# Patient Record
Sex: FEMALE | Race: BLACK OR AFRICAN AMERICAN | Employment: OTHER | URBAN - METROPOLITAN AREA
[De-identification: names, ages, dates, MRNs, and addresses within clinical notes are randomized per-mention and may not be internally consistent; named-entity substitution may affect disease eponyms.]

---

## 2017-01-03 ENCOUNTER — DOCUMENTATION ONLY (OUTPATIENT)
Dept: INTERNAL MEDICINE CLINIC | Age: 67
End: 2017-01-03

## 2017-01-04 ENCOUNTER — OFFICE VISIT (OUTPATIENT)
Dept: INTERNAL MEDICINE CLINIC | Age: 67
End: 2017-01-04

## 2017-01-04 VITALS
SYSTOLIC BLOOD PRESSURE: 158 MMHG | TEMPERATURE: 98 F | WEIGHT: 202.2 LBS | HEART RATE: 64 BPM | DIASTOLIC BLOOD PRESSURE: 100 MMHG | RESPIRATION RATE: 16 BRPM | BODY MASS INDEX: 35.83 KG/M2 | HEIGHT: 63 IN | OXYGEN SATURATION: 100 %

## 2017-01-04 DIAGNOSIS — M79.10 MYALGIA: ICD-10-CM

## 2017-01-04 DIAGNOSIS — I10 ESSENTIAL HYPERTENSION: Primary | ICD-10-CM

## 2017-01-04 DIAGNOSIS — Z23 NEED FOR PNEUMOCOCCAL VACCINATION: ICD-10-CM

## 2017-01-04 DIAGNOSIS — Z12.31 ENCOUNTER FOR SCREENING MAMMOGRAM FOR MALIGNANT NEOPLASM OF BREAST: ICD-10-CM

## 2017-01-04 NOTE — PATIENT INSTRUCTIONS
It was a pleasure to see you! As discussed:    Elevated Blood pressure  - Your blood pressure was slightly high today. Since you did not take your medication this AM we will recheck when you have taken it.   -Please return in 24-48hrs for a nurse visit for a blood pressure check. -Bring your blood pressure monitor to your next appointment.   -Continue to follow a low salt/ low sodium diet (1500mg/ day)  -If your blood pressure is too low (<90/60) or too high (>180/100) or you have any symptoms such as chest pain, dizziness, shortness of breath- seek immediate medical attention. Goal   Good Control <150/90  Call office >160/100  Call office> 180/100   Go to ER> 200/110  Do not take any NSAIDS (Aleve, Ibuprofen, Motrin etc) or decongestants which can raise your blood pressure . Home Blood Pressure Test: About This Test  What is it? A home blood pressure test allows you to keep track of your blood pressure at home. Blood pressure is a measure of the force of blood against the walls of your arteries. Blood pressure readings include two numbers, such as 130/80 (say \"130 over 80\"). The first number is the systolic pressure. The second number is the diastolic pressure. Why is this test done? You may do this test at home to:  · Find out if you have high blood pressure. · Track your blood pressure if you have high blood pressure. · Track how well medicine is working to reduce high blood pressure. · Check how lifestyle changes, such as weight loss and exercise, are affecting blood pressure. How can you prepare for the test?  · Do not use caffeine, tobacco, or medicines known to raise blood pressure (such as nasal decongestant sprays) for at least 30 minutes before taking your blood pressure. · Do not exercise for at least 30 minutes before taking your blood pressure. What happens before the test?  Take your blood pressure while you feel comfortable and relaxed.  Sit quietly with both feet on the floor for at least 5 minutes before the test.  What happens during the test?  · Sit with your arm slightly bent and resting on a table so that your upper arm is at the same level as your heart. · Roll up your sleeve or take off your shirt to expose your upper arm. · Wrap the blood pressure cuff around your upper arm so that the lower edge of the cuff is about 1 inch above the bend of your elbow. Proceed with the following steps depending on if you are using an automatic or manual pressure monitor. Automatic blood pressure monitors  · Press the on/off button on the automatic monitor and wait until the ready-to-measure \"heart\" symbol appears next to zero in the display window. · Press the start button. The cuff will inflate and deflate by itself. · Your blood pressure numbers will appear on the screen. · Write your numbers in your log book, along with the date and time. Manual blood pressure monitors  · Place the earpieces of a stethoscope in your ears, and place the bell of the stethoscope over the artery, just below the cuff. · Close the valve on the rubber inflating bulb. · Squeeze the bulb rapidly with your opposite hand to inflate the cuff until the dial or column of mercury reads about 30 mm Hg higher than your usual systolic pressure. If you do not know your usual pressure, inflate the cuff to 210 mm Hg or until the pulse at your wrist disappears. · Open the pressure valve just slightly by twisting or pressing the valve on the bulb. · As you watch the pressure slowly fall, note the level on the dial at which you first start to hear a pulsing or tapping sound through the stethoscope. This is your systolic blood pressure. · Continue letting the air out slowly. The sounds will become muffled and will finally disappear. Note the pressure when the sounds completely disappear. This is your diastolic blood pressure. Let out all the remaining air.   · Write your numbers in your log book, along with the date and time.  What else should you know about the test?  Results for adults ages 25 and older (mm Hg):  · Normal (ideal): Systolic 289 or below. Diastolic 79 or below. · Prehypertension: Systolic 152 to 723. Diastolic 80 to 89. · Hypertension: Systolic 451 or above. Diastolic 90 or above. Follow-up care is a key part of your treatment and safety. Be sure to make and go to all appointments, and call your doctor if you are having problems. It's also a good idea to keep a list of the medicines you take. Where can you learn more? Go to http://sha-damien.info/. Enter C427 in the search box to learn more about \"Home Blood Pressure Test: About This Test.\"  Current as of: January 27, 2016  Content Version: 11.1  © 0435-2543 [employer]. Care instructions adapted under license by [employer] (which disclaims liability or warranty for this information). If you have questions about a medical condition or this instruction, always ask your healthcare professional. [address] any warranty or liability for your use of this information.

## 2017-01-04 NOTE — PROGRESS NOTES
HISTORY OF PRESENT ILLNESS  Sona Gardner is a 74 y.o. female. HPI  Cardiovascular Review:  The patient has hypertension, HLD, and obesity. Diet and Lifestyle: generally follows a low fat low cholesterol diet, generally follows a low sodium diet, exercises sporadically, nonsmoker has started a reduced carb. Home BP Monitoring: is not measured at home. Pertinent ROS: taking medications as instructed, no medication side effects noted, no TIA's, no chest pain on exertion, no dyspnea on exertion, no swelling of ankles. Myalgias   Has improved. Reviewed labs wnl. ROS per HPI  Patient Active Problem List    Diagnosis Date Noted    Mass of kidney of unknown nature 07/22/2016    Plantar fasciitis, bilateral 11/12/2014    Gout 10/07/2014    Hemorrhoid 07/30/2012    Rosacea 06/03/2011    GERD (gastroesophageal reflux disease) 02/04/2010    Essential hypertension, benign 01/28/2010    Anxiety 01/28/2010       Current Outpatient Prescriptions   Medication Sig Dispense Refill    ALPRAZolam (XANAX) 0.5 mg tablet TAKE 1 TABLET BY MOUTH 3 TIMES A DAY AS NEEDED 45 Tab 0    atorvastatin (LIPITOR) 20 mg tablet Take 1 Tab by mouth daily. 90 Tab 2    co-enzyme Q-10 (CO Q-10) 100 mg capsule Take 1 Cap by mouth daily. 30 Cap 3    lisinopril (PRINIVIL, ZESTRIL) 40 mg tablet TAKE 1 TABLET DAILY 90 Tab 1    ranitidine (ZANTAC) 300 mg tablet TAKE 1 TABLET DAILY 90 Tab 1    Sulfacetamide Sodium-Sulfur 9-4 % clsr by Apply Externally route as needed.  desonide (TRIDESILON) 0.05 % cream Apply  to affected area as needed for Skin Irritation.  ranitidine (ZANTAC) 300 mg tablet Take 300 mg by mouth daily.  allopurinol (ZYLOPRIM) 100 mg tablet TAKE 1 TABLET DAILY 90 Tab 2    fluticasone (FLONASE) 50 mcg/actuation nasal spray USE 2 SPRAYS BY BOTH NOSTRILS ROUTE DAILY. 16 g 2    BYSTOLIC 20 mg tablet TAKE 1 TABLET DAILY 90 Tab 2    metFORMIN ER (GLUCOPHAGE XR) 750 mg tablet Take 750 mg by mouth daily. Allergies   Allergen Reactions    Codeine Nausea and Vomiting    Minocycline Nausea Only      Visit Vitals    BP (!) 158/100 (BP 1 Location: Right arm, BP Patient Position: Sitting)    Pulse 64    Temp 98 °F (36.7 °C) (Oral)    Resp 16    Ht 5' 3\" (1.6 m)    Wt 202 lb 3.2 oz (91.7 kg)    SpO2 100%    BMI 35.82 kg/m2       Physical Exam   Constitutional: She is oriented to person, place, and time. She appears well-developed. No distress. Eyes: Conjunctivae are normal.   Neck: Neck supple. Cardiovascular: Normal rate, regular rhythm and normal heart sounds. Pulmonary/Chest: Effort normal and breath sounds normal. No respiratory distress. She has no wheezes. She has no rales. She exhibits no tenderness. Musculoskeletal: She exhibits no edema (BLE ). Neurological: She is alert and oriented to person, place, and time. Skin: Skin is warm. Psychiatric: She has a normal mood and affect. ASSESSMENT and PLAN  Wai Aquino was seen today for hypertension. Diagnoses and all orders for this visit:    Essential hypertension- BP elevated in the context of lapse of medication. No s/s of end organ damage. Stable for out pt management. Resume home regimen. Return in 24-48hrs for nurse visit for BP recheck. Bring home monitor for calibration. Red flags to warrant ER or earlier clinical evaluation reviewed. See AVS for full details of plan and patient discussion. Discussed the patient's above normal BMI with her. I have recommended the following interventions: dietary management education, guidance, and counseling . The BMI follow up plan is as follows: BMI is out of normal parameters and plan is as follows: I have counseled this patient on diet and exercise regimens        Myalgia- improving. Labs wnl. Check CBC and ESR during next acute flare. Symptomatic management for now.      Encounter for screening mammogram for malignant neoplasm of breast  -     [facility] BI SCREENING INCL CAD; Future    Need for pneumococcal vaccination  -     pneumococcal 13 edwin conj dip (PREVNAR-13) 0.5 mL syrg injection; 0.5 mL by IntraMUSCular route once for 1 dose. Other orders  -     Cancel: [facility] BI SCREENING INCL CAD; Future  -     Cancel: HEPATITIS C AB      Follow-up Disposition:  Return in about 3 months (around 4/4/2017) for Physical - 30 minute appointment, Medicare Wellness. Medication risks/benefits/costs/interactions/alternatives discussed with patient. Rivera Marx  was given an after visit summary which includes diagnoses, current medications, & vitals. she expressed understanding with the diagnosis and plan.

## 2017-01-05 ENCOUNTER — CLINICAL SUPPORT (OUTPATIENT)
Dept: INTERNAL MEDICINE CLINIC | Age: 67
End: 2017-01-05

## 2017-01-05 VITALS
HEART RATE: 70 BPM | BODY MASS INDEX: 35.79 KG/M2 | SYSTOLIC BLOOD PRESSURE: 178 MMHG | OXYGEN SATURATION: 99 % | TEMPERATURE: 98.1 F | RESPIRATION RATE: 16 BRPM | WEIGHT: 202 LBS | DIASTOLIC BLOOD PRESSURE: 100 MMHG | HEIGHT: 63 IN

## 2017-01-05 DIAGNOSIS — I10 ESSENTIAL HYPERTENSION: Primary | ICD-10-CM

## 2017-01-05 NOTE — PROGRESS NOTES
HISTORY OF PRESENT ILLNESS  Leopoldo Hills is a 74 y.o. female. HPI  Cardiovascular Review:  The patient has hypertension. Diet and Lifestyle: nonsmoker  Home BP Monitoring: is not measured at home. Pertinent ROS: taking medications as instructed, no medication side effects noted, no TIA's, no chest pain on exertion, no dyspnea on exertion, no swelling of ankles. High stress due to complications with pregnancy of grandchild- currently deciding if her daughter will be hospitalized (her daughter has tried 3 times to get pregnancy)   Review of Systems   Constitutional: Negative for diaphoresis, fever and weight loss. Eyes: Negative for blurred vision and pain. Respiratory: Negative for shortness of breath. Cardiovascular: Negative for chest pain, orthopnea and leg swelling. Neurological: Negative for focal weakness and headaches. Psychiatric/Behavioral: Negative for depression. Patient Active Problem List    Diagnosis Date Noted    Mass of kidney of unknown nature 07/22/2016    Plantar fasciitis, bilateral 11/12/2014    Gout 10/07/2014    Hemorrhoid 07/30/2012    Rosacea 06/03/2011    GERD (gastroesophageal reflux disease) 02/04/2010    Essential hypertension, benign 01/28/2010    Anxiety 01/28/2010       Current Outpatient Prescriptions   Medication Sig Dispense Refill    ALPRAZolam (XANAX) 0.5 mg tablet TAKE 1 TABLET BY MOUTH 3 TIMES A DAY AS NEEDED 45 Tab 0    atorvastatin (LIPITOR) 20 mg tablet Take 1 Tab by mouth daily. 90 Tab 2    co-enzyme Q-10 (CO Q-10) 100 mg capsule Take 1 Cap by mouth daily. 30 Cap 3    lisinopril (PRINIVIL, ZESTRIL) 40 mg tablet TAKE 1 TABLET DAILY 90 Tab 1    ranitidine (ZANTAC) 300 mg tablet TAKE 1 TABLET DAILY 90 Tab 1    Sulfacetamide Sodium-Sulfur 9-4 % clsr by Apply Externally route as needed.  desonide (TRIDESILON) 0.05 % cream Apply  to affected area as needed for Skin Irritation.       ranitidine (ZANTAC) 300 mg tablet Take 300 mg by mouth daily.      metFORMIN ER (GLUCOPHAGE XR) 750 mg tablet Take 750 mg by mouth daily.  allopurinol (ZYLOPRIM) 100 mg tablet TAKE 1 TABLET DAILY 90 Tab 2    fluticasone (FLONASE) 50 mcg/actuation nasal spray USE 2 SPRAYS BY BOTH NOSTRILS ROUTE DAILY. 16 g 2    BYSTOLIC 20 mg tablet TAKE 1 TABLET DAILY 90 Tab 2       Allergies   Allergen Reactions    Codeine Nausea and Vomiting    Minocycline Nausea Only      Visit Vitals    BP (!) 178/100 (BP 1 Location: Right arm, BP Patient Position: Sitting)    Pulse 70    Temp 98.1 °F (36.7 °C) (Oral)    Resp 16    Ht 5' 3\" (1.6 m)    Wt 202 lb (91.6 kg)    SpO2 99%    BMI 35.78 kg/m2       Physical Exam   Constitutional: She is oriented to person, place, and time. No distress. Cardiovascular: Normal rate, regular rhythm and normal heart sounds. Pulmonary/Chest: Breath sounds normal. No respiratory distress. She has no wheezes. She has no rales. Neurological: She is alert and oriented to person, place, and time. Psychiatric: She has a normal mood and affect. ASSESSMENT and PLAN  Manuela Dominique was seen today for hypertension. Her BP has been elevated on multiple visit while stress and white coat HTN could be contributory given her risk factors she would benefit from improve BP control. Red flags to warrant ER or earlier clinical evaluation reviewed. See AVS for full details of plan and patient discussion. Diagnoses and all orders for this visit:    Essential hypertension  Patient Discussion (from AVS)  Not well controlled today  We will increase your Bystolic to 48HE (2 tabs) once a day. Please purchase a home blood pressure monitor to check your blood pressure daily. Check your blood pressure at a drug store or grocery store until you purchase your machine  -If your blood pressure is too low (<90/60) or too high (>180/100) or you have any symptoms such as chest pain, dizziness, shortness of breath- seek immediate medical attention.   Follow a low sodium diet (<1500mg/ day) at least 1/3 (7 meals a week) and gradually increase   -Exercise regularly goal, 150 minutes of cardiovascular exercise/ week  -If your blood pressure is too low (<90/60) or too high (>180/100) or you have any symptoms such as chest pain, dizziness, shortness of breath- seek immediate medical attention. Goal   Good Control <140/90  Call office >160/100  Call office> 180/100   Go to ER> 200/110  Do not take any NSAIDS (Aleve, Ibuprofen, Motrin etc) or decongestants which can raise your blood pressure . Follow-up Disposition:  Return in about 2 weeks (around 1/19/2017). Medication risks/benefits/costs/interactions/alternatives discussed with patient. Sona Gardner  was given an after visit summary which includes diagnoses, current medications, & vitals. she expressed understanding with the diagnosis and plan.

## 2017-01-05 NOTE — PROGRESS NOTES
HISTORY OF PRESENT ILLNESS  Viktoria De Leon is a 74 y.o. female.   HPI    ROS    Physical Exam    ASSESSMENT and PLAN  {ASSESSMENT/PLAN:[id number]}

## 2017-01-05 NOTE — PATIENT INSTRUCTIONS
It was a pleasure to see you! As discussed:    High Blood Pressure  Not well controlled today  We will increase your Bystolic to 51PS (2 tabs) once a day. Please purchase a home blood pressure monitor to check your blood pressure daily. Check your blood pressure at a drug store or grocery store until you purchase your machine  -If your blood pressure is too low (<90/60) or too high (>180/100) or you have any symptoms such as chest pain, dizziness, shortness of breath- seek immediate medical attention. Follow a low sodium diet (<1500mg/ day) at least 1/3 (7 meals a week) and gradually increase   -Exercise regularly goal, 150 minutes of cardiovascular exercise/ week  -If your blood pressure is too low (<90/60) or too high (>180/100) or you have any symptoms such as chest pain, dizziness, shortness of breath- seek immediate medical attention. Goal   Good Control <140/90  Call office >160/100  Call office> 180/100   Go to ER> 200/110  Do not take any NSAIDS (Aleve, Ibuprofen, Motrin etc) or decongestants which can raise your blood pressure . Home Blood Pressure Test: About This Test  What is it? A home blood pressure test allows you to keep track of your blood pressure at home. Blood pressure is a measure of the force of blood against the walls of your arteries. Blood pressure readings include two numbers, such as 130/80 (say \"130 over 80\"). The first number is the systolic pressure. The second number is the diastolic pressure. Why is this test done? You may do this test at home to:  · Find out if you have high blood pressure. · Track your blood pressure if you have high blood pressure. · Track how well medicine is working to reduce high blood pressure. · Check how lifestyle changes, such as weight loss and exercise, are affecting blood pressure.   How can you prepare for the test?  · Do not use caffeine, tobacco, or medicines known to raise blood pressure (such as nasal decongestant sprays) for at least 30 minutes before taking your blood pressure. · Do not exercise for at least 30 minutes before taking your blood pressure. What happens before the test?  Take your blood pressure while you feel comfortable and relaxed. Sit quietly with both feet on the floor for at least 5 minutes before the test.  What happens during the test?  · Sit with your arm slightly bent and resting on a table so that your upper arm is at the same level as your heart. · Roll up your sleeve or take off your shirt to expose your upper arm. · Wrap the blood pressure cuff around your upper arm so that the lower edge of the cuff is about 1 inch above the bend of your elbow. Proceed with the following steps depending on if you are using an automatic or manual pressure monitor. Automatic blood pressure monitors  · Press the on/off button on the automatic monitor and wait until the ready-to-measure \"heart\" symbol appears next to zero in the display window. · Press the start button. The cuff will inflate and deflate by itself. · Your blood pressure numbers will appear on the screen. · Write your numbers in your log book, along with the date and time. Manual blood pressure monitors  · Place the earpieces of a stethoscope in your ears, and place the bell of the stethoscope over the artery, just below the cuff. · Close the valve on the rubber inflating bulb. · Squeeze the bulb rapidly with your opposite hand to inflate the cuff until the dial or column of mercury reads about 30 mm Hg higher than your usual systolic pressure. If you do not know your usual pressure, inflate the cuff to 210 mm Hg or until the pulse at your wrist disappears. · Open the pressure valve just slightly by twisting or pressing the valve on the bulb. · As you watch the pressure slowly fall, note the level on the dial at which you first start to hear a pulsing or tapping sound through the stethoscope. This is your systolic blood pressure.   · Continue letting the air out slowly. The sounds will become muffled and will finally disappear. Note the pressure when the sounds completely disappear. This is your diastolic blood pressure. Let out all the remaining air. · Write your numbers in your log book, along with the date and time. What else should you know about the test?  Results for adults ages 25 and older (mm Hg):  · Normal (ideal): Systolic 173 or below. Diastolic 79 or below. · Prehypertension: Systolic 393 to 301. Diastolic 80 to 89. · Hypertension: Systolic 249 or above. Diastolic 90 or above. Follow-up care is a key part of your treatment and safety. Be sure to make and go to all appointments, and call your doctor if you are having problems. It's also a good idea to keep a list of the medicines you take. Where can you learn more? Go to http://sha-damien.info/. Enter C427 in the search box to learn more about \"Home Blood Pressure Test: About This Test.\"  Current as of: January 27, 2016  Content Version: 11.1  © 0371-5739 [employer]. Care instructions adapted under license by [employer] (which disclaims liability or warranty for this information). If you have questions about a medical condition or this instruction, always ask your healthcare professional. [address] any warranty or liability for your use of this information.

## 2017-01-19 ENCOUNTER — OFFICE VISIT (OUTPATIENT)
Dept: INTERNAL MEDICINE CLINIC | Age: 67
End: 2017-01-19

## 2017-01-19 VITALS
HEART RATE: 65 BPM | BODY MASS INDEX: 35.68 KG/M2 | RESPIRATION RATE: 16 BRPM | DIASTOLIC BLOOD PRESSURE: 90 MMHG | WEIGHT: 201.4 LBS | TEMPERATURE: 97.8 F | SYSTOLIC BLOOD PRESSURE: 142 MMHG | OXYGEN SATURATION: 99 % | HEIGHT: 63 IN

## 2017-01-19 DIAGNOSIS — I10 ESSENTIAL HYPERTENSION: Primary | ICD-10-CM

## 2017-01-19 RX ORDER — NEBIVOLOL 20 MG/1
TABLET ORAL
Qty: 180 TAB | Refills: 2 | Status: SHIPPED | OUTPATIENT
Start: 2017-01-19 | End: 2018-04-30

## 2017-01-19 NOTE — PROGRESS NOTES
HISTORY OF PRESENT ILLNESS  Jose Kuo is a 74 y.o. female. HPI  Cardiovascular Review:  The patient has hypertension and obesity Body mass index is 35.68 kg/(m^2). she brought in her monitor for calibration  Diet and Lifestyle: generally follows a low sodium diet, sedentary  Home BP Monitoring: is well controlled at home,  Pertinent ROS: taking medications as instructed, no medication side effects noted, no TIA's, no chest pain on exertion, no dyspnea on exertion, no swelling of ankles. SHx: daughter is having a complex pregnancy- HTN. Jose Kuo plans to go [place] in 2 weeks. Review of Systems   Constitutional: Negative for diaphoresis, fever and weight loss. Eyes: Negative for blurred vision and pain. Respiratory: Negative for shortness of breath. Cardiovascular: Negative for chest pain, orthopnea and leg swelling. Neurological: Negative for focal weakness and headaches. Psychiatric/Behavioral: Negative for depression. Patient Active Problem List    Diagnosis Date Noted    Mass of kidney of unknown nature 07/22/2016    Plantar fasciitis, bilateral 11/12/2014    Gout 10/07/2014    Hemorrhoid 07/30/2012    Rosacea 06/03/2011    GERD (gastroesophageal reflux disease) 02/04/2010    Essential hypertension, benign 01/28/2010    Anxiety 01/28/2010       Current Outpatient Prescriptions   Medication Sig Dispense Refill    ALPRAZolam (XANAX) 0.5 mg tablet TAKE 1 TABLET BY MOUTH 3 TIMES A DAY AS NEEDED 45 Tab 0    atorvastatin (LIPITOR) 20 mg tablet Take 1 Tab by mouth daily. 90 Tab 2    co-enzyme Q-10 (CO Q-10) 100 mg capsule Take 1 Cap by mouth daily. 30 Cap 3    lisinopril (PRINIVIL, ZESTRIL) 40 mg tablet TAKE 1 TABLET DAILY 90 Tab 1    ranitidine (ZANTAC) 300 mg tablet TAKE 1 TABLET DAILY 90 Tab 1    Sulfacetamide Sodium-Sulfur 9-4 % clsr by Apply Externally route as needed.  desonide (TRIDESILON) 0.05 % cream Apply  to affected area as needed for Skin Irritation.  ranitidine (ZANTAC) 300 mg tablet Take 300 mg by mouth daily.  metFORMIN ER (GLUCOPHAGE XR) 750 mg tablet Take 750 mg by mouth daily.  allopurinol (ZYLOPRIM) 100 mg tablet TAKE 1 TABLET DAILY 90 Tab 2    fluticasone (FLONASE) 50 mcg/actuation nasal spray USE 2 SPRAYS BY BOTH NOSTRILS ROUTE DAILY. 16 g 2    BYSTOLIC 20 mg tablet TAKE 1 TABLET DAILY (Patient taking differently: TAKE 2 TABLET DAILY) 90 Tab 2       Allergies   Allergen Reactions    Codeine Nausea and Vomiting    Minocycline Nausea Only      Visit Vitals    /90 (BP 1 Location: Right arm, BP Patient Position: Sitting)    Pulse 65    Temp 97.8 °F (36.6 °C) (Oral)    Resp 16    Ht 5' 3\" (1.6 m)    Wt 201 lb 6.4 oz (91.4 kg)    SpO2 99%    BMI 35.68 kg/m2       Physical Exam   Constitutional: She is oriented to person, place, and time. She appears well-developed. No distress. Eyes: Conjunctivae are normal.   Neck: Neck supple. Cardiovascular: Normal rate, regular rhythm and normal heart sounds. Pulmonary/Chest: Effort normal and breath sounds normal. No respiratory distress. She has no wheezes. She has no rales. She exhibits no tenderness. Neurological: She is alert and oriented to person, place, and time. Skin: Skin is warm. Psychiatric: She has a normal mood and affect. ASSESSMENT and PLAN  Staci Branch was seen today for hypertension. Diagnoses and all orders for this visit:    Essential hypertension- continue current regimen (increased bystolic dose). More walking. Stress control See AVS for full details of plan and patient discussion. Red flags to warrant ER or earlier clinical evaluation reviewed. -     nebivolol (BYSTOLIC) 20 mg tablet; TAKE 2 TABLET by mouth DAILY      Follow-up Disposition: If symptoms worsen before appointment    Medication risks/benefits/costs/interactions/alternatives discussed with patient.   Emili Paez  was given an after visit summary which includes diagnoses, current medications, & vitals. she expressed understanding with the diagnosis and plan.

## 2017-01-19 NOTE — PATIENT INSTRUCTIONS
It was a pleasure to see you! As discussed:    High Blood Pressure (BP)  Well controlled today  -Continue to check your BP at home    Subtract 20mmHg from top number, 10mmHg lower number   -Follow a low sodium diet (<1500mg/ day)   -Exercise regularly goal, 150 minutes of cardiovascular exercise/ week  -If your blood pressure is too low (<90/60) or too high (>180/100) or you have any symptoms such as chest pain, dizziness, shortness of breath- seek immediate medical attention. See  Www.health.gov for more information. Home Blood Pressure Test: About This Test  What is it? A home blood pressure test allows you to keep track of your blood pressure at home. Blood pressure is a measure of the force of blood against the walls of your arteries. Blood pressure readings include two numbers, such as 130/80 (say \"130 over 80\"). The first number is the systolic pressure. The second number is the diastolic pressure. Why is this test done? You may do this test at home to:  · Find out if you have high blood pressure. · Track your blood pressure if you have high blood pressure. · Track how well medicine is working to reduce high blood pressure. · Check how lifestyle changes, such as weight loss and exercise, are affecting blood pressure. How can you prepare for the test?  · Do not use caffeine, tobacco, or medicines known to raise blood pressure (such as nasal decongestant sprays) for at least 30 minutes before taking your blood pressure. · Do not exercise for at least 30 minutes before taking your blood pressure. What happens before the test?  Take your blood pressure while you feel comfortable and relaxed. Sit quietly with both feet on the floor for at least 5 minutes before the test.  What happens during the test?  · Sit with your arm slightly bent and resting on a table so that your upper arm is at the same level as your heart.   · Roll up your sleeve or take off your shirt to expose your upper arm.  · Wrap the blood pressure cuff around your upper arm so that the lower edge of the cuff is about 1 inch above the bend of your elbow. Proceed with the following steps depending on if you are using an automatic or manual pressure monitor. Automatic blood pressure monitors  · Press the on/off button on the automatic monitor and wait until the ready-to-measure \"heart\" symbol appears next to zero in the display window. · Press the start button. The cuff will inflate and deflate by itself. · Your blood pressure numbers will appear on the screen. · Write your numbers in your log book, along with the date and time. Manual blood pressure monitors  · Place the earpieces of a stethoscope in your ears, and place the bell of the stethoscope over the artery, just below the cuff. · Close the valve on the rubber inflating bulb. · Squeeze the bulb rapidly with your opposite hand to inflate the cuff until the dial or column of mercury reads about 30 mm Hg higher than your usual systolic pressure. If you do not know your usual pressure, inflate the cuff to 210 mm Hg or until the pulse at your wrist disappears. · Open the pressure valve just slightly by twisting or pressing the valve on the bulb. · As you watch the pressure slowly fall, note the level on the dial at which you first start to hear a pulsing or tapping sound through the stethoscope. This is your systolic blood pressure. · Continue letting the air out slowly. The sounds will become muffled and will finally disappear. Note the pressure when the sounds completely disappear. This is your diastolic blood pressure. Let out all the remaining air. · Write your numbers in your log book, along with the date and time. What else should you know about the test?  Results for adults ages 25 and older (mm Hg):  · Normal (ideal): Systolic 900 or below. Diastolic 79 or below. · Prehypertension: Systolic 492 to 720. Diastolic 80 to 89.   · Hypertension: Systolic 202 or above. Diastolic 90 or above. Follow-up care is a key part of your treatment and safety. Be sure to make and go to all appointments, and call your doctor if you are having problems. It's also a good idea to keep a list of the medicines you take. Where can you learn more? Go to http://sha-damien.info/. Enter C427 in the search box to learn more about \"Home Blood Pressure Test: About This Test.\"  Current as of: January 27, 2016  Content Version: 11.1  © 8161-6419 [employer]. Care instructions adapted under license by [employer] (which disclaims liability or warranty for this information). If you have questions about a medical condition or this instruction, always ask your healthcare professional. [address] any warranty or liability for your use of this information.

## 2017-03-01 DIAGNOSIS — Z12.31 ENCOUNTER FOR SCREENING MAMMOGRAM FOR MALIGNANT NEOPLASM OF BREAST: ICD-10-CM

## 2017-04-04 ENCOUNTER — OFFICE VISIT (OUTPATIENT)
Dept: INTERNAL MEDICINE CLINIC | Age: 67
End: 2017-04-04

## 2017-04-04 VITALS
TEMPERATURE: 98 F | OXYGEN SATURATION: 98 % | BODY MASS INDEX: 35.97 KG/M2 | HEART RATE: 65 BPM | WEIGHT: 203 LBS | DIASTOLIC BLOOD PRESSURE: 90 MMHG | SYSTOLIC BLOOD PRESSURE: 150 MMHG | HEIGHT: 63 IN | RESPIRATION RATE: 16 BRPM

## 2017-04-04 DIAGNOSIS — Z11.59 NEED FOR HEPATITIS C SCREENING TEST: ICD-10-CM

## 2017-04-04 DIAGNOSIS — E78.2 MIXED HYPERLIPIDEMIA: ICD-10-CM

## 2017-04-04 DIAGNOSIS — I10 ESSENTIAL HYPERTENSION: ICD-10-CM

## 2017-04-04 DIAGNOSIS — Z66 DNR (DO NOT RESUSCITATE): ICD-10-CM

## 2017-04-04 DIAGNOSIS — Z00.00 MEDICARE ANNUAL WELLNESS VISIT, SUBSEQUENT: Primary | ICD-10-CM

## 2017-04-04 DIAGNOSIS — Z71.89 ACP (ADVANCE CARE PLANNING): ICD-10-CM

## 2017-04-04 RX ORDER — AMLODIPINE BESYLATE 5 MG/1
5 TABLET ORAL DAILY
Qty: 90 TAB | Refills: 1 | Status: SHIPPED | OUTPATIENT
Start: 2017-04-04 | End: 2017-10-11 | Stop reason: SDUPTHER

## 2017-04-04 NOTE — ACP (ADVANCE CARE PLANNING)
Advance Care Planning (ACP) Provider Note - Comprehensive     Date of ACP Conversation: 04/04/17  Persons included in Conversation:  patient  Length of ACP Conversation in minutes:  16 minutes    Authorized Decision Maker (if patient is incapable of making informed decisions): This person is:   Kelby Maradiaga (1st) if unable Mariluz Perry 2nd           General ACP for ALL Patients with Decision Making Capacity:   Importance of advance care planning, including choosing a healthcare agent to communicate patient's healthcare decisions if patient lost the ability to make decisions, such as after a sudden illness or accident    Review of Existing Advance Directive:  n/a     For Serious or Chronic Illness:  Understanding of medical condition    Understanding of CPR, goals and expected outcomes, benefits and burdens discussed.     Interventions Provided:  Entered DNR order (If yes, complete Durable DNR form)

## 2017-04-04 NOTE — PATIENT INSTRUCTIONS
It was a pleasure to see you! As discussed:    Blood pressure  - Your blood pressure was slightly high today   -Start amlodipine. Please let me know if you have any side effects.   - Since you told me your blood pressure is lower at home. Keep a log of your blood pressure every day. -Bring your blood pressure monitor to your next appointment.   -Continue to follow a low salt/ low sodium diet (1500mg/ day)  -If your blood pressure is too low (<90/60) or too high (>180/100) or you have any symptoms such as chest pain, dizziness, shortness of breath- seek immediate medical attention. Home Blood Pressure Test: About This Test  What is it? A home blood pressure test allows you to keep track of your blood pressure at home. Blood pressure is a measure of the force of blood against the walls of your arteries. Blood pressure readings include two numbers, such as 130/80 (say \"130 over 80\"). The first number is the systolic pressure. The second number is the diastolic pressure. Why is this test done? You may do this test at home to:  · Find out if you have high blood pressure. · Track your blood pressure if you have high blood pressure. · Track how well medicine is working to reduce high blood pressure. · Check how lifestyle changes, such as weight loss and exercise, are affecting blood pressure. How can you prepare for the test?  · Do not use caffeine, tobacco, or medicines known to raise blood pressure (such as nasal decongestant sprays) for at least 30 minutes before taking your blood pressure. · Do not exercise for at least 30 minutes before taking your blood pressure. What happens before the test?  Take your blood pressure while you feel comfortable and relaxed. Sit quietly with both feet on the floor for at least 5 minutes before the test.  What happens during the test?  · Sit with your arm slightly bent and resting on a table so that your upper arm is at the same level as your heart.   · Roll up your sleeve or take off your shirt to expose your upper arm. · Wrap the blood pressure cuff around your upper arm so that the lower edge of the cuff is about 1 inch above the bend of your elbow. Proceed with the following steps depending on if you are using an automatic or manual pressure monitor. Automatic blood pressure monitors  · Press the on/off button on the automatic monitor and wait until the ready-to-measure \"heart\" symbol appears next to zero in the display window. · Press the start button. The cuff will inflate and deflate by itself. · Your blood pressure numbers will appear on the screen. · Write your numbers in your log book, along with the date and time. Manual blood pressure monitors  · Place the earpieces of a stethoscope in your ears, and place the bell of the stethoscope over the artery, just below the cuff. · Close the valve on the rubber inflating bulb. · Squeeze the bulb rapidly with your opposite hand to inflate the cuff until the dial or column of mercury reads about 30 mm Hg higher than your usual systolic pressure. If you do not know your usual pressure, inflate the cuff to 210 mm Hg or until the pulse at your wrist disappears. · Open the pressure valve just slightly by twisting or pressing the valve on the bulb. · As you watch the pressure slowly fall, note the level on the dial at which you first start to hear a pulsing or tapping sound through the stethoscope. This is your systolic blood pressure. · Continue letting the air out slowly. The sounds will become muffled and will finally disappear. Note the pressure when the sounds completely disappear. This is your diastolic blood pressure. Let out all the remaining air. · Write your numbers in your log book, along with the date and time. What else should you know about the test?  Results for adults ages 25 and older (mm Hg):  · Normal (ideal): Systolic 492 or below. Diastolic 79 or below. · Prehypertension: Systolic 994 to 869. Diastolic 80 to 89. · Hypertension: Systolic 884 or above. Diastolic 90 or above. Follow-up care is a key part of your treatment and safety. Be sure to make and go to all appointments, and call your doctor if you are having problems. It's also a good idea to keep a list of the medicines you take. Where can you learn more? Go to http://sha-damien.info/. Enter C427 in the search box to learn more about \"Home Blood Pressure Test: About This Test.\"  Current as of: January 27, 2016  Content Version: 11.2  © 9251-8613 [employer]. Care instructions adapted under license by [employer] (which disclaims liability or warranty for this information). If you have questions about a medical condition or this instruction, always ask your healthcare professional. [address] any warranty or liability for your use of this information.

## 2017-04-04 NOTE — PROGRESS NOTES
HISTORY OF PRESENT ILLNESS  Rosalia Diaz is a 69 y.o. female. HPI  Health Maintenance   Topic Date Due    Hepatitis C Screening  1950    Pneumococcal 65+ Low/Medium Risk (1 of 2 - PCV13) 03/18/2013    INFLUENZA AGE 9 TO ADULT  08/01/2016    GLAUCOMA SCREENING Q2Y  02/10/2017    BREAST CANCER SCRN MAMMOGRAM  02/02/2018    MEDICARE YEARLY EXAM  04/05/2018    DTaP/Tdap/Td series (2 - Td) 06/03/2021    COLONOSCOPY  07/29/2021    OSTEOPOROSIS SCREENING (DEXA)  Completed    ZOSTER VACCINE AGE 60>  Completed     Cardiovascular Review:  The patient has hypertension Body mass index is 35.96 kg/(m^2). Diet and Lifestyle: not attempting to follow a low fat, low cholesterol diet, not attempting to follow a low sodium diet, sedentary, nonsmoker  Home BP Monitoring: is well controlled at home  Pertinent ROS: taking medications as instructed, no medication side effects noted, no TIA's, no chest pain on exertion, no dyspnea on exertion, no swelling of ankles. SHx. Granddaughter Classie  born 2/6/17; DIL had to be hospitalized due to postpartum HTN both doing well now    This is a Subsequent Medicare Annual Wellness Visit providing Personalized Prevention Plan Services (PPPS) (Performed 12 months after initial AWV and PPPS )    I have reviewed the patient's medical history in detail and updated the computerized patient record.      History     Past Medical History:   Diagnosis Date    Gall stones     GERD (gastroesophageal reflux disease)     Hemorrhoid     Hypertension     Ill-defined condition     gout    Ill-defined condition     prediabetes - is on metformin    Ill-defined condition     roscea    Sleep apnea     borderline - was put on a machine/not using a machine now      Past Surgical History:   Procedure Laterality Date    BREAST SURGERY PROCEDURE UNLISTED      benign tumor right breast    COLONOSCOPY  2004    COLONOSCOPY N/A 7/29/2016    [facility]COPY    DEXA BONE DENSITY STUDY AXIAL  2010    nml    HX  SECTION      x 2    HX CHOLECYSTECTOMY      HX HYSTERECTOMY      HX TONSILLECTOMY      WILMAR MAMMOGRAM SCREEN BILAT  3/2014    PAP SMEAR  2000    SINUS SURGERY PROC UNLISTED       Current Outpatient Prescriptions   Medication Sig Dispense Refill    nebivolol (BYSTOLIC) 20 mg tablet TAKE 2 TABLET by mouth DAILY 180 Tab 2    ALPRAZolam (XANAX) 0.5 mg tablet TAKE 1 TABLET BY MOUTH 3 TIMES A DAY AS NEEDED 45 Tab 0    atorvastatin (LIPITOR) 20 mg tablet Take 1 Tab by mouth daily. 90 Tab 2    lisinopril (PRINIVIL, ZESTRIL) 40 mg tablet TAKE 1 TABLET DAILY 90 Tab 1    ranitidine (ZANTAC) 300 mg tablet TAKE 1 TABLET DAILY 90 Tab 1    Sulfacetamide Sodium-Sulfur 9-4 % clsr by Apply Externally route as needed.  desonide (TRIDESILON) 0.05 % cream Apply  to affected area as needed for Skin Irritation.  fluticasone (FLONASE) 50 mcg/actuation nasal spray USE 2 SPRAYS BY BOTH NOSTRILS ROUTE DAILY. 16 g 2    co-enzyme Q-10 (CO Q-10) 100 mg capsule Take 1 Cap by mouth daily. 30 Cap 3    metFORMIN ER (GLUCOPHAGE XR) 750 mg tablet Take 750 mg by mouth daily.       allopurinol (ZYLOPRIM) 100 mg tablet TAKE 1 TABLET DAILY 90 Tab 2     Allergies   Allergen Reactions    Codeine Nausea and Vomiting    Minocycline Nausea Only     Family History   Problem Relation Age of Onset    Cancer Mother      breast, 46s     Diabetes Mother     Heart Disease Mother      MI, CAD    Stroke Father     Heart Disease Father      CAD    Dementia Father      Social History   Substance Use Topics    Smoking status: Never Smoker    Smokeless tobacco: Never Used    Alcohol use Yes      Comment: special occasional     Patient Active Problem List   Diagnosis Code    Essential hypertension, benign I10    Anxiety F41.9    GERD (gastroesophageal reflux disease) K21.9    Rosacea L71.9    Hemorrhoid K64.9    Gout M10.9    Plantar fasciitis, bilateral M72.2    Mass of kidney of unknown nature N28.89       Depression Risk Factor Screening:     PHQ 2 / 9, over the last two weeks 4/4/2017   Little interest or pleasure in doing things Not at all   Feeling down, depressed or hopeless Not at all   Total Score PHQ 2 0     Alcohol Risk Factor Screening: On any occasion during the past 3 months, have you had more than 3 drinks containing alcohol? No    Do you average more than 7 drinks per week? No      Functional Ability and Level of Safety:     Hearing Loss   none    Activities of Daily Living   Self-care. Requires assistance with: no ADLs    Fall Risk     Fall Risk Assessment, last 12 mths 4/4/2017   Able to walk? Yes   Fall in past 12 months? No     Abuse Screen   Patient is not abused  Lives with  of nearly 36 yrs (September)       Review of Systems   Constitutional: Negative for chills, fever and weight loss. HENT: Negative for congestion and sore throat. Eyes: Negative for blurred vision and double vision. Respiratory: Negative for cough and shortness of breath. Cardiovascular: Negative for chest pain, orthopnea and leg swelling. Gastrointestinal: Negative for abdominal pain, blood in stool, constipation, diarrhea, heartburn, nausea and vomiting. Genitourinary: Negative for frequency and urgency. Musculoskeletal: Negative for joint pain and myalgias. Neurological: Negative for dizziness, tremors, weakness and headaches. Endo/Heme/Allergies: Does not bruise/bleed easily. Psychiatric/Behavioral: Negative for depression and suicidal ideas. Physical Exam   Constitutional: She is oriented to person, place, and time. She appears well-developed and well-nourished. HENT:   Right Ear: External ear normal.   Left Ear: External ear normal.   Mouth/Throat: Oropharynx is clear and moist. No oropharyngeal exudate. Eyes: Conjunctivae are normal. No scleral icterus. Neck: Normal range of motion. Neck supple. No thyromegaly present. Cardiovascular: Normal rate, regular rhythm and normal heart sounds. Exam reveals no gallop and no friction rub. No murmur heard. Pulmonary/Chest: Effort normal and breath sounds normal. No respiratory distress. She has no wheezes. She has no rales. She exhibits no tenderness. Abdominal: Soft. Bowel sounds are normal. She exhibits no distension. There is no tenderness. There is no rebound and no guarding. Genitourinary:   Genitourinary Comments: Not indicated. Musculoskeletal: Normal range of motion. She exhibits no edema or tenderness. Neurological: She is alert and oriented to person, place, and time. Skin:        Psychiatric: She has a normal mood and affect. Evaluation of Cognitive Function:  Mood/affect:  happy  Appearance: younger than stated age  Family member/caregiver input: n/a     Patient Care Team:  Layla Fischer MD as PCP - General (Internal Medicine)  Federico Fulton MD (Cardiology)    ASSESSMENT and PLAN    Advice/Referrals/Counseling   Education and counseling provided:  Are appropriate based on today's review and evaluation  End-of-Life planning (with patient's consent)  Pneumococcal Vaccine      Assessment/Plan   Sara Birmingham was seen today for complete physical.    Diagnoses and all orders for this visit:    Medicare annual wellness visit, subsequent- Health Maintenance reviewed - Pneumovax rx previously given. Essential hypertension- Change medication due to cost. Has been on Norvasc in past with questionable   -     amLODIPine (NORVASC) 5 mg tablet; Take 1 Tab by mouth daily.  -     METABOLIC PANEL, COMPREHENSIVE  -     LIPID PANEL    ACP (advance care planning)- see acp note     DNR (do not resuscitate)    Mixed hyperlipidemia- .check lipids.   -     LIPID PANEL    Need for hepatitis C screening test  -     HEPATITIS C AB      Follow-up Disposition: 4 month- HTN    Medication risks/benefits/costs/interactions/alternatives discussed with patient.   Alexandria Espinoza Espinoza  was given an after visit summary which includes diagnoses, current medications, & vitals. she expressed understanding with the diagnosis and plan.

## 2017-05-17 RX ORDER — LISINOPRIL 40 MG/1
TABLET ORAL
Qty: 90 TAB | Refills: 0 | Status: SHIPPED | OUTPATIENT
Start: 2017-05-17 | End: 2017-09-05 | Stop reason: SDUPTHER

## 2017-05-17 RX ORDER — ALLOPURINOL 100 MG/1
TABLET ORAL
Qty: 90 TAB | Refills: 1 | Status: SHIPPED | OUTPATIENT
Start: 2017-05-17 | End: 2017-12-29 | Stop reason: SDUPTHER

## 2017-08-12 DIAGNOSIS — F41.9 ANXIETY: ICD-10-CM

## 2017-08-15 ENCOUNTER — TELEPHONE (OUTPATIENT)
Dept: INTERNAL MEDICINE CLINIC | Age: 67
End: 2017-08-15

## 2017-08-15 RX ORDER — ALPRAZOLAM 0.5 MG/1
TABLET ORAL
Qty: 45 TAB | Refills: 0 | OUTPATIENT
Start: 2017-08-15 | End: 2019-02-03 | Stop reason: SDUPTHER

## 2017-08-30 ENCOUNTER — OFFICE VISIT (OUTPATIENT)
Dept: INTERNAL MEDICINE CLINIC | Age: 67
End: 2017-08-30

## 2017-08-30 VITALS
SYSTOLIC BLOOD PRESSURE: 150 MMHG | RESPIRATION RATE: 16 BRPM | HEART RATE: 83 BPM | TEMPERATURE: 97.4 F | WEIGHT: 211 LBS | BODY MASS INDEX: 37.39 KG/M2 | HEIGHT: 63 IN | DIASTOLIC BLOOD PRESSURE: 96 MMHG | OXYGEN SATURATION: 96 %

## 2017-08-30 DIAGNOSIS — I10 ESSENTIAL HYPERTENSION, BENIGN: Primary | ICD-10-CM

## 2017-08-30 DIAGNOSIS — R63.5 WEIGHT GAIN: ICD-10-CM

## 2017-08-30 DIAGNOSIS — M26.69: ICD-10-CM

## 2017-08-30 NOTE — PROGRESS NOTES
Chief Complaint   Patient presents with    Hypertension     1. Have you been to the ER, urgent care clinic since your last visit? Hospitalized since your last visit? No    2. Have you seen or consulted any other health care providers outside of the [address] [place] [address] since your last visit? Include any pap smears or colon screening. No    Jaw clicking, popping when eating and brushing teeth.  Started a month ago

## 2017-08-30 NOTE — PROGRESS NOTES
HISTORY OF PRESENT ILLNESS  Enrique Kebede is a 69 y.o. female. HPI   Jaw Popping   Started 1 month ago. Left side only Popping and clicking sound each time she eats. Has tried diet modification without improvement. She has spoken to her dentist but has not changed. Denies pain, fevers, chills, tinnitus, h/o  Jaw injury, dry mouth. Cardiovascular Review:  The patient has hypertension and obesity Body mass index is 37.38 kg/(m^2). Diet and Lifestyle: not attempting to follow a low fat, low cholesterol diet, does not rigorously follow a diabetic diet, exercises sporadically, nonsmoker Has stopped her low carb diet. Home BP Monitoring: is not measured at home. Pertinent ROS: taking medications as instructed- did not take her medication this AM, no medication side effects noted, no TIA's, no chest pain on exertion, no dyspnea on exertion, no swelling of ankles. Shx: Granddaughter is 6 months. \"Arlette\"  Review of Systems   Constitutional: Negative for diaphoresis, fever and weight loss. Eyes: Negative for blurred vision and pain. Respiratory: Negative for shortness of breath. Cardiovascular: Negative for chest pain, orthopnea and leg swelling. Neurological: Negative for focal weakness and headaches. Psychiatric/Behavioral: Negative for depression. The patient is nervous/anxious (associated with jaw sx. Only 1 in past month. ).       Patient Active Problem List    Diagnosis Date Noted    Mass of kidney of unknown nature 07/22/2016    Plantar fasciitis, bilateral 11/12/2014    Gout 10/07/2014    Hemorrhoid 07/30/2012    Rosacea 06/03/2011    GERD (gastroesophageal reflux disease) 02/04/2010    Essential hypertension, benign 01/28/2010    Anxiety 01/28/2010       Current Outpatient Prescriptions   Medication Sig Dispense Refill    ALPRAZolam (XANAX) 0.5 mg tablet TAKE 1 TABLET BY MOUTH 3 TIMES A DAY AS NEEDED 45 Tab 0    fluticasone (FLONASE) 50 mcg/actuation nasal spray USE 2 SPRAYS BY BOTH NOSTRILS ROUTE DAILY. 1 Bottle 4    allopurinol (ZYLOPRIM) 100 mg tablet TAKE 1 TABLET DAILY 90 Tab 1    lisinopril (PRINIVIL, ZESTRIL) 40 mg tablet TAKE 1 TABLET DAILY 90 Tab 0    amLODIPine (NORVASC) 5 mg tablet Take 1 Tab by mouth daily. 90 Tab 1    nebivolol (BYSTOLIC) 20 mg tablet TAKE 2 TABLET by mouth DAILY 180 Tab 2    atorvastatin (LIPITOR) 20 mg tablet Take 1 Tab by mouth daily. 90 Tab 2    ranitidine (ZANTAC) 300 mg tablet TAKE 1 TABLET DAILY 90 Tab 1    Sulfacetamide Sodium-Sulfur 9-4 % clsr by Apply Externally route as needed.  desonide (TRIDESILON) 0.05 % cream Apply  to affected area as needed for Skin Irritation. Allergies   Allergen Reactions    Codeine Nausea and Vomiting    Hydrochlorothiazide Other (comments)     Gout     Minocycline Nausea Only      Visit Vitals    BP (!) 150/96 (BP 1 Location: Right arm, BP Patient Position: Sitting)    Pulse 83    Temp 97.4 °F (36.3 °C) (Oral)    Resp 16    Ht 5' 3\" (1.6 m)    Wt 211 lb (95.7 kg)    SpO2 96%    BMI 37.38 kg/m2       Physical Exam   Constitutional: She is oriented to person, place, and time. No distress. HENT:   Head:       Cardiovascular: Normal rate, regular rhythm and normal heart sounds. Pulmonary/Chest: Breath sounds normal. No respiratory distress. She has no wheezes. She has no rales. Musculoskeletal: She exhibits no edema (BLE ). Neurological: She is alert and oriented to person, place, and time. No cranial nerve deficit. ASSESSMENT and PLAN  Diagnoses and all orders for this visit:    1. Essential hypertension, benign- Elevated has not taken medication this AM. Advised to return with home monitor and haven taken her medication. Will titrate medication as indicated. 2. Crepitus of temporomandibular joint on opening of jaw- arthritis +/- TMJ. See ENT. Red flags to warrant ER or earlier clinical evaluation reviewed.      -     REFERRAL TO ENT-OTOLARYNGOLOGY    3. Weight gain- I have reviewed/discussed the above normal BMI with the patient. I have recommended the following interventions: dietary management education, guidance, and counseling . .      -     REFERRAL TO NUTRITION      Follow-up Disposition: 6 weeks HTN    Medication risks/benefits/costs/interactions/alternatives discussed with patient. Snehal Quezada  was given an after visit summary which includes diagnoses, current medications, & vitals. she expressed understanding with the diagnosis and plan.

## 2017-08-30 NOTE — PATIENT INSTRUCTIONS
It was a pleasure to see you! As discussed:    Health Maintenance   I have ordered your age appropriate labs please complete them. You will need to fast 10-12hrs before your appointment. Start paying more attention to your diet and start exercising. Goal for exercise is 150minutes of moderate exercise a week and diet goal is to eat 50% fruits and vegetables with minimal sugar, fat and oil daily. See health.gov or MobileSnackplate.gov for more details. Your cervical cancer and breast cancer screening will be completed by your ob/ gyn as scheduled. Blood pressure  - Your blood pressure was slightly high today   - Since you told me your blood pressure is lower at home. Keep a log of your blood pressure every 2-3 day. -Bring your blood pressure monitor to your next appointment.   -Continue to follow a low salt/ low sodium diet (1500mg/ day)  -If your blood pressure is too low (<90/60) or too high (>180/100) or you have any symptoms such as chest pain, dizziness, shortness of breath- seek immediate medical attention. Goal   Good Control <150/90  Call office >160/100  Call office> 180/100   Go to ER> 200/110  Do not take any NSAIDS (Aleve, Ibuprofen, Motrin etc) or decongestants which can raise your blood pressure . Home Blood Pressure Test: About This Test  What is it? A home blood pressure test allows you to keep track of your blood pressure at home. Blood pressure is a measure of the force of blood against the walls of your arteries. Blood pressure readings include two numbers, such as 130/80 (say \"130 over 80\"). The first number is the systolic pressure. The second number is the diastolic pressure. Why is this test done? You may do this test at home to:  · Find out if you have high blood pressure. · Track your blood pressure if you have high blood pressure. · Track how well medicine is working to reduce high blood pressure.   · Check how lifestyle changes, such as weight loss and exercise, are affecting blood pressure. How can you prepare for the test?  · Do not use caffeine, tobacco, or medicines known to raise blood pressure (such as nasal decongestant sprays) for at least 30 minutes before taking your blood pressure. · Do not exercise for at least 30 minutes before taking your blood pressure. What happens before the test?  Take your blood pressure while you feel comfortable and relaxed. Sit quietly with both feet on the floor for at least 5 minutes before the test.  What happens during the test?  · Sit with your arm slightly bent and resting on a table so that your upper arm is at the same level as your heart. · Roll up your sleeve or take off your shirt to expose your upper arm. · Wrap the blood pressure cuff around your upper arm so that the lower edge of the cuff is about 1 inch above the bend of your elbow. Proceed with the following steps depending on if you are using an automatic or manual pressure monitor. Automatic blood pressure monitors  · Press the on/off button on the automatic monitor and wait until the ready-to-measure \"heart\" symbol appears next to zero in the display window. · Press the start button. The cuff will inflate and deflate by itself. · Your blood pressure numbers will appear on the screen. · Write your numbers in your log book, along with the date and time. Manual blood pressure monitors  · Place the earpieces of a stethoscope in your ears, and place the bell of the stethoscope over the artery, just below the cuff. · Close the valve on the rubber inflating bulb. · Squeeze the bulb rapidly with your opposite hand to inflate the cuff until the dial or column of mercury reads about 30 mm Hg higher than your usual systolic pressure. If you do not know your usual pressure, inflate the cuff to 210 mm Hg or until the pulse at your wrist disappears. · Open the pressure valve just slightly by twisting or pressing the valve on the bulb.   · As you watch the pressure slowly fall, note the level on the dial at which you first start to hear a pulsing or tapping sound through the stethoscope. This is your systolic blood pressure. · Continue letting the air out slowly. The sounds will become muffled and will finally disappear. Note the pressure when the sounds completely disappear. This is your diastolic blood pressure. Let out all the remaining air. · Write your numbers in your log book, along with the date and time. What else should you know about the test?  Results for adults ages 25 and older (mm Hg):  · Normal (ideal): Systolic 108 or below. Diastolic 79 or below. · Prehypertension: Systolic 082 to 784. Diastolic 80 to 89. · Hypertension: Systolic 507 or above. Diastolic 90 or above. Follow-up care is a key part of your treatment and safety. Be sure to make and go to all appointments, and call your doctor if you are having problems. It's also a good idea to keep a list of the medicines you take. Where can you learn more? Go to http://sha-damien.info/. Enter C427 in the search box to learn more about \"Home Blood Pressure Test: About This Test.\"  Current as of: November 3, 2016  Content Version: 11.3  © 3565-3284 [employer]. Care instructions adapted under license by [employer] (which disclaims liability or warranty for this information). If you have questions about a medical condition or this instruction, always ask your healthcare professional. [place] [address] any warranty or liability for your use of this information.

## 2017-08-30 NOTE — MR AVS SNAPSHOT
Visit Information Date & Time Provider Department Dept. Phone Encounter #  
 8/30/2017  8:30 AM Helga Elizondo MD [facility] [phone] [id number] Follow-up Instructions Return in about 6 weeks (around 10/11/2017) for Follow-up, Weight Management. Upcoming Health Maintenance Date Due Hepatitis C Screening 1950 GLAUCOMA SCREENING Q2Y 2/10/2017 BREAST CANCER SCRN MAMMOGRAM 2/2/2018 MEDICARE YEARLY EXAM 4/5/2018 Pneumococcal 65+ Low/Medium Risk (2 of 2 - PPSV23) 8/22/2018 DTaP/Tdap/Td series (2 - Td) 6/3/2021 COLONOSCOPY 7/29/2021 Allergies as of 8/30/2017  Review Complete On: 8/30/2017 By: Helga Elizondo MD  
  
 Severity Noted Reaction Type Reactions Codeine  01/28/2010    Nausea and Vomiting Hydrochlorothiazide  04/04/2017    Other (comments) Gout Minocycline Low 01/28/2010    Nausea Only Current Immunizations  Reviewed on 6/3/2011 Name Date Influenza High Dose Vaccine PF 10/7/2014 Influenza Vaccine Whole 11/1/2009 TDAP Vaccine 6/3/2011 Not reviewed this visit You Were Diagnosed With   
  
 Codes Comments Essential hypertension, benign    -  Primary ICD-10-CM: I10 
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 
ICD-9-CM: 524.69 Weight gain     ICD-10-CM: R63.5 ICD-9-CM: 783.1 Vitals BP Pulse Temp Resp Height(growth percentile) Weight(growth percentile) (!) 150/96 (BP 1 Location: Right arm, BP Patient Position: Sitting) 83 97.4 °F (36.3 °C) (Oral) 16 5' 3\" (1.6 m) 211 lb (95.7 kg) SpO2 BMI OB Status Smoking Status 96% 37.38 kg/m2 Hysterectomy Never Smoker Vitals History BMI and BSA Data Body Mass Index Body Surface Area  
 37.38 kg/m 2 2.06 m 2 Preferred Pharmacy Pharmacy Name Phone  [id number]- [place], [place] - [address] [place] [phone] Your Updated Medication List  
  
   
This list is accurate as of: 8/30/17  9:28 AM.  Always use your most recent med list.  
  
  
  
  
 allopurinol 100 mg tablet Commonly known as:  ZYLOPRIM  
TAKE 1 TABLET DAILY ALPRAZolam 0.5 mg tablet Commonly known as:  XANAX  
TAKE 1 TABLET BY MOUTH 3 TIMES A DAY AS NEEDED  
  
 amLODIPine 5 mg tablet Commonly known as:  Semmes Stuart Take 1 Tab by mouth daily. atorvastatin 20 mg tablet Commonly known as:  LIPITOR Take 1 Tab by mouth daily. desonide 0.05 % cream  
Commonly known as:  Charlann Betito Apply  to affected area as needed for Skin Irritation. fluticasone 50 mcg/actuation nasal spray Commonly known as:  FLONASE  
USE 2 SPRAYS BY BOTH NOSTRILS ROUTE DAILY. lisinopril 40 mg tablet Commonly known as:  PRINIVIL, ZESTRIL  
TAKE 1 TABLET DAILY  
  
 nebivolol 20 mg tablet Commonly known as:  BYSTOLIC  
TAKE 2 TABLET by mouth DAILY  
  
 raNITIdine 300 mg tablet Commonly known as:  ZANTAC TAKE 1 TABLET DAILY Sulfacetamide Sodium-Sulfur 9-4 % Clsr  
by Apply Externally route as needed. We Performed the Following REFERRAL TO ENT-OTOLARYNGOLOGY [FGP38 Custom] Comments:  
 Please evaluate patient for left  ear pain and jaw crepitus. REFERRAL TO NUTRITION [REF50 Custom] Comments:  
 Please evaluate patient for diet counseling Follow-up Instructions Return in about 6 weeks (around 10/11/2017) for Follow-up, Weight Management. Referral Information Referral ID Referred By Referred To  
  
 [id number] Kya Vaughn   
   [address] [place] [address] Visits Status Start Date End Date 1 New Request 8/30/17 8/30/18 If your referral has a status of pending review or denied, additional information will be sent to support the outcome of this decision. Referral ID Referred By Referred To [id number] [address] [place] [address], [place]  
   [address] [facility] [address] [place] [address], [address] [facility] Phone: [phone] Fax: [phone] Visits Status Start Date End Date 1 New Request 8/30/17 8/30/18 If your referral has a status of pending review or denied, additional information will be sent to support the outcome of this decision. Patient Instructions It was a pleasure to see you! As discussed: 
 
Health Maintenance I have ordered your age appropriate labs please complete them. You will need to fast 10-12hrs before your appointment. Start paying more attention to your diet and start exercising. Goal for exercise is 150minutes of moderate exercise a week and diet goal is to eat 50% fruits and vegetables with minimal sugar, fat and oil daily. See health.gov or choosemyplate.gov for more details. Your cervical cancer and breast cancer screening will be completed by your ob/ gyn as scheduled. Blood pressure - Your blood pressure was slightly high today  
- Since you told me your blood pressure is lower at home. Keep a log of your blood pressure every 2-3 day. -Bring your blood pressure monitor to your next appointment.  
-Continue to follow a low salt/ low sodium diet (1500mg/ day) -If your blood pressure is too low (<90/60) or too high (>180/100) or you have any symptoms such as chest pain, dizziness, shortness of breath- seek immediate medical attention. Goal  
Good Control <150/90 Call office >160/100 Call office> 180/100 Go to ER> 200/110 Do not take any NSAIDS (Aleve, Ibuprofen, Motrin etc) or decongestants which can raise your blood pressure . Home Blood Pressure Test: About This Test 
What is it? A home blood pressure test allows you to keep track of your blood pressure at home. Blood pressure is a measure of the force of blood against the walls of your arteries.  Blood pressure readings include two numbers, such as 130/80 (say \"130 over 80\"). The first number is the systolic pressure. The second number is the diastolic pressure. Why is this test done? You may do this test at home to: · Find out if you have high blood pressure. · Track your blood pressure if you have high blood pressure. · Track how well medicine is working to reduce high blood pressure. · Check how lifestyle changes, such as weight loss and exercise, are affecting blood pressure. How can you prepare for the test? 
· Do not use caffeine, tobacco, or medicines known to raise blood pressure (such as nasal decongestant sprays) for at least 30 minutes before taking your blood pressure. · Do not exercise for at least 30 minutes before taking your blood pressure. What happens before the test? 
Take your blood pressure while you feel comfortable and relaxed. Sit quietly with both feet on the floor for at least 5 minutes before the test. 
What happens during the test? 
· Sit with your arm slightly bent and resting on a table so that your upper arm is at the same level as your heart. · Roll up your sleeve or take off your shirt to expose your upper arm. · Wrap the blood pressure cuff around your upper arm so that the lower edge of the cuff is about 1 inch above the bend of your elbow. Proceed with the following steps depending on if you are using an automatic or manual pressure monitor. Automatic blood pressure monitors · Press the on/off button on the automatic monitor and wait until the ready-to-measure \"heart\" symbol appears next to zero in the display window. · Press the start button. The cuff will inflate and deflate by itself. · Your blood pressure numbers will appear on the screen. · Write your numbers in your log book, along with the date and time. Manual blood pressure monitors · Place the earpieces of a stethoscope in your ears, and place the bell of the stethoscope over the artery, just below the cuff. · Close the valve on the rubber inflating bulb. · Squeeze the bulb rapidly with your opposite hand to inflate the cuff until the dial or column of mercury reads about 30 mm Hg higher than your usual systolic pressure. If you do not know your usual pressure, inflate the cuff to 210 mm Hg or until the pulse at your wrist disappears. · Open the pressure valve just slightly by twisting or pressing the valve on the bulb. · As you watch the pressure slowly fall, note the level on the dial at which you first start to hear a pulsing or tapping sound through the stethoscope. This is your systolic blood pressure. · Continue letting the air out slowly. The sounds will become muffled and will finally disappear. Note the pressure when the sounds completely disappear. This is your diastolic blood pressure. Let out all the remaining air. · Write your numbers in your log book, along with the date and time. What else should you know about the test? 
Results for adults ages 25 and older (mm Hg): · Normal (ideal): Systolic 974 or below. Diastolic 79 or below. · Prehypertension: Systolic 161 to 817. Diastolic 80 to 89. · Hypertension: Systolic 325 or above. Diastolic 90 or above. Follow-up care is a key part of your treatment and safety. Be sure to make and go to all appointments, and call your doctor if you are having problems. It's also a good idea to keep a list of the medicines you take. Where can you learn more? Go to http://sha-damien.info/. Enter C427 in the search box to learn more about \"Home Blood Pressure Test: About This Test.\" Current as of: November 3, 2016 Content Version: 11.3 © 2142-6963 [employer]. Care instructions adapted under license by [employer] (which disclaims liability or warranty for this information).  If you have questions about a medical condition or this instruction, always ask your healthcare professional. [employer] disclaims any warranty or liability for your use of this information. Introducing [facility]! Dear Eliseo Landry: Thank you for requesting a SheZoom account. Our records indicate that you already have an active SheZoom account. You can access your account anytime at [email or web address] Did you know that you can access your hospital and ER discharge instructions at any time in SheZoom? You can also review all of your test results from your hospital stay or ER visit. Additional Information If you have questions, please visit the Frequently Asked Questions section of the SheZoom website at [email or web address] Remember, SheZoom is NOT to be used for urgent needs. For medical emergencies, dial 911. Now available from your iPhone and Android! Please provide this summary of care documentation to your next provider. Your primary care clinician is listed as Samreen Huggins. If you have any questions after today's visit, please call [phone].

## 2017-08-31 LAB
ALBUMIN SERPL-MCNC: 4.4 G/DL (ref 3.6–4.8)
ALBUMIN/GLOB SERPL: 1.6 {RATIO} (ref 1.2–2.2)
ALP SERPL-CCNC: 73 IU/L (ref 39–117)
ALT SERPL-CCNC: 20 IU/L (ref 0–32)
AST SERPL-CCNC: 13 IU/L (ref 0–40)
BILIRUB SERPL-MCNC: 0.4 MG/DL (ref 0–1.2)
BUN SERPL-MCNC: 9 MG/DL (ref 8–27)
BUN/CREAT SERPL: 11 (ref 12–28)
CALCIUM SERPL-MCNC: 8.6 MG/DL (ref 8.7–10.3)
CHLORIDE SERPL-SCNC: 104 MMOL/L (ref 96–106)
CHOLEST SERPL-MCNC: 115 MG/DL (ref 100–199)
CO2 SERPL-SCNC: 23 MMOL/L (ref 18–29)
CREAT SERPL-MCNC: 0.79 MG/DL (ref 0.57–1)
GLOBULIN SER CALC-MCNC: 2.7 G/DL (ref 1.5–4.5)
GLUCOSE SERPL-MCNC: 98 MG/DL (ref 65–99)
HCV AB S/CO SERPL IA: <0.1 S/CO RATIO (ref 0–0.9)
HDLC SERPL-MCNC: 47 MG/DL
LDLC SERPL CALC-MCNC: 50 MG/DL (ref 0–99)
POTASSIUM SERPL-SCNC: 4.2 MMOL/L (ref 3.5–5.2)
PROT SERPL-MCNC: 7.1 G/DL (ref 6–8.5)
SODIUM SERPL-SCNC: 144 MMOL/L (ref 134–144)
TRIGL SERPL-MCNC: 90 MG/DL (ref 0–149)
VLDLC SERPL CALC-MCNC: 18 MG/DL (ref 5–40)

## 2017-09-06 RX ORDER — LISINOPRIL 40 MG/1
TABLET ORAL
Qty: 90 TAB | Refills: 0 | Status: SHIPPED | OUTPATIENT
Start: 2017-09-06 | End: 2017-12-29 | Stop reason: SDUPTHER

## 2017-10-01 NOTE — PROGRESS NOTES
Gerard Sims Jonah Tabitha,  Thank you for completing your labs. Good news your cholesterol is well controlled continue your current medication regimen. The remainder of your labs are clinically normal including your hepatitis C screening and your kidney and liver function. I look forward to seeing you at your upcoming appointment. Do not hesitate to contact the office if you have any questions or concerns before your appointment.   Kind regards,   Dr. Bishop Hanks

## 2017-10-11 ENCOUNTER — OFFICE VISIT (OUTPATIENT)
Dept: INTERNAL MEDICINE CLINIC | Age: 67
End: 2017-10-11

## 2017-10-11 VITALS
DIASTOLIC BLOOD PRESSURE: 98 MMHG | TEMPERATURE: 98.2 F | BODY MASS INDEX: 37 KG/M2 | OXYGEN SATURATION: 95 % | HEIGHT: 63 IN | HEART RATE: 85 BPM | WEIGHT: 208.8 LBS | SYSTOLIC BLOOD PRESSURE: 160 MMHG

## 2017-10-11 DIAGNOSIS — I10 ESSENTIAL HYPERTENSION: ICD-10-CM

## 2017-10-11 RX ORDER — AMLODIPINE BESYLATE 10 MG/1
10 TABLET ORAL DAILY
Qty: 90 TAB | Refills: 1 | Status: SHIPPED | OUTPATIENT
Start: 2017-10-11 | End: 2018-05-22 | Stop reason: SDUPTHER

## 2017-10-11 NOTE — PATIENT INSTRUCTIONS
It was a pleasure to see you! As discussed:    Blood pressure calibration (Your monitor vs Office monitor)   Systolic (Top): Subtract- 7   Diastolic (Bottom): Accurate     High Blood Pressure  Not well controlled today  We will increase your Amlodipine to 10mg (2 tabs) once a day. Please purchase a home blood pressure monitor to check your blood pressure daily. Check your blood pressure at a drug store or grocery store until you purchase your machine  -If your blood pressure is too low (<90/60) or too high (>180/100) or you have any symptoms such as chest pain, dizziness, shortness of breath- seek immediate medical attention. Follow a low sodium diet (<1500mg/ day) at least 1/3 (7 meals a week) and gradually increase   -Exercise regularly goal, 150 minutes of cardiovascular exercise/ week  -If your blood pressure is too low (<90/60) or too high (>180/100) or you have any symptoms such as chest pain, dizziness, shortness of breath- seek immediate medical attention. Goal   Good Control <140/90  Call office >160/100  Call office> 180/100   Go to ER> 200/110  Do not take any NSAIDS (Aleve, Ibuprofen, Motrin etc) or decongestants which can raise your blood pressure . Home Blood Pressure Test: About This Test  What is it? A home blood pressure test allows you to keep track of your blood pressure at home. Blood pressure is a measure of the force of blood against the walls of your arteries. Blood pressure readings include two numbers, such as 130/80 (say \"130 over 80\"). The first number is the systolic pressure. The second number is the diastolic pressure. Why is this test done? You may do this test at home to:  · Find out if you have high blood pressure. · Track your blood pressure if you have high blood pressure. · Track how well medicine is working to reduce high blood pressure. · Check how lifestyle changes, such as weight loss and exercise, are affecting blood pressure.   How can you prepare for the test?  · Do not use caffeine, tobacco, or medicines known to raise blood pressure (such as nasal decongestant sprays) for at least 30 minutes before taking your blood pressure. · Do not exercise for at least 30 minutes before taking your blood pressure. What happens before the test?  Take your blood pressure while you feel comfortable and relaxed. Sit quietly with both feet on the floor for at least 5 minutes before the test.  What happens during the test?  · Sit with your arm slightly bent and resting on a table so that your upper arm is at the same level as your heart. · Roll up your sleeve or take off your shirt to expose your upper arm. · Wrap the blood pressure cuff around your upper arm so that the lower edge of the cuff is about 1 inch above the bend of your elbow. Proceed with the following steps depending on if you are using an automatic or manual pressure monitor. Automatic blood pressure monitors  · Press the on/off button on the automatic monitor and wait until the ready-to-measure \"heart\" symbol appears next to zero in the display window. · Press the start button. The cuff will inflate and deflate by itself. · Your blood pressure numbers will appear on the screen. · Write your numbers in your log book, along with the date and time. Manual blood pressure monitors  · Place the earpieces of a stethoscope in your ears, and place the bell of the stethoscope over the artery, just below the cuff. · Close the valve on the rubber inflating bulb. · Squeeze the bulb rapidly with your opposite hand to inflate the cuff until the dial or column of mercury reads about 30 mm Hg higher than your usual systolic pressure. If you do not know your usual pressure, inflate the cuff to 210 mm Hg or until the pulse at your wrist disappears. · Open the pressure valve just slightly by twisting or pressing the valve on the bulb.   · As you watch the pressure slowly fall, note the level on the dial at which you first start to hear a pulsing or tapping sound through the stethoscope. This is your systolic blood pressure. · Continue letting the air out slowly. The sounds will become muffled and will finally disappear. Note the pressure when the sounds completely disappear. This is your diastolic blood pressure. Let out all the remaining air. · Write your numbers in your log book, along with the date and time. What else should you know about the test?  Results for adults ages 25 and older (mm Hg):  · Normal (ideal): Systolic 591 or below. Diastolic 79 or below. · Prehypertension: Systolic 886 to 013. Diastolic 80 to 89. · Hypertension: Systolic 449 or above. Diastolic 90 or above. Follow-up care is a key part of your treatment and safety. Be sure to make and go to all appointments, and call your doctor if you are having problems. It's also a good idea to keep a list of the medicines you take. Where can you learn more? Go to http://sha-damien.info/. Enter C427 in the search box to learn more about \"Home Blood Pressure Test: About This Test.\"  Current as of: November 3, 2016  Content Version: 11.3  © 9869-9583 [employer]. Care instructions adapted under license by [employer] (which disclaims liability or warranty for this information). If you have questions about a medical condition or this instruction, always ask your healthcare professional. [place] [address] any warranty or liability for your use of this information.

## 2017-10-11 NOTE — PROGRESS NOTES
Chief Complaint   Patient presents with    Weight Management     1. Have you been to the ER, urgent care clinic since your last visit? Hospitalized since your last visit? No    2. Have you seen or consulted any other health care providers outside of the [address] [place] [address] since your last visit? Include any pap smears or colon screening.  No    205/117-home monitor

## 2017-10-11 NOTE — PROGRESS NOTES
HISTORY OF PRESENT ILLNESS  Kelli Boyle is a 69 y.o. female. HPI  Cardiovascular Review:  The patient has hypertension and obesity Body mass index is 36.99 kg/(m^2). Diet and Lifestyle: exercises regularly, nonsmoker, has decreased carbs and increased exercise  Home BP Monitoring: is well controlled at home, ranging 130-140's/70's. Pertinent ROS: taking medications as instructed, no medication side effects noted, no TIA's, no chest pain on exertion, no dyspnea on exertion, no swelling of ankles. Review of Systems   Constitutional: Negative for diaphoresis, fever and weight loss. Eyes: Negative for blurred vision and pain. Respiratory: Negative for shortness of breath. Cardiovascular: Negative for chest pain, orthopnea and leg swelling. Neurological: Negative for focal weakness and headaches. Psychiatric/Behavioral: Negative for depression. Patient Active Problem List    Diagnosis Date Noted    Mass of kidney of unknown nature 07/22/2016    Plantar fasciitis, bilateral 11/12/2014    Gout 10/07/2014    Hemorrhoid 07/30/2012    Rosacea 06/03/2011    GERD (gastroesophageal reflux disease) 02/04/2010    Essential hypertension, benign 01/28/2010    Anxiety 01/28/2010       Current Outpatient Prescriptions   Medication Sig Dispense Refill    lisinopril (PRINIVIL, ZESTRIL) 40 mg tablet TAKE 1 TABLET DAILY 90 Tab 0    ALPRAZolam (XANAX) 0.5 mg tablet TAKE 1 TABLET BY MOUTH 3 TIMES A DAY AS NEEDED 45 Tab 0    fluticasone (FLONASE) 50 mcg/actuation nasal spray USE 2 SPRAYS BY BOTH NOSTRILS ROUTE DAILY. 1 Bottle 4    allopurinol (ZYLOPRIM) 100 mg tablet TAKE 1 TABLET DAILY 90 Tab 1    amLODIPine (NORVASC) 5 mg tablet Take 1 Tab by mouth daily. 90 Tab 1    atorvastatin (LIPITOR) 20 mg tablet Take 1 Tab by mouth daily. 90 Tab 2    ranitidine (ZANTAC) 300 mg tablet TAKE 1 TABLET DAILY 90 Tab 1    Sulfacetamide Sodium-Sulfur 9-4 % clsr by Apply Externally route as needed.       desonide (TRIDESILON) 0.05 % cream Apply  to affected area as needed for Skin Irritation.  nebivolol (BYSTOLIC) 20 mg tablet TAKE 2 TABLET by mouth DAILY 180 Tab 2       Allergies   Allergen Reactions    Codeine Nausea and Vomiting    Hydrochlorothiazide Other (comments)     Gout     Minocycline Nausea Only      Visit Vitals    BP (!) 160/98 (BP 1 Location: Right arm, BP Patient Position: Sitting)    Pulse 85    Temp 98.2 °F (36.8 °C) (Oral)    Ht 5' 3\" (1.6 m)    Wt 208 lb 12.8 oz (94.7 kg)    SpO2 95%    BMI 36.99 kg/m2       Physical Exam   Constitutional: She is oriented to person, place, and time. She appears well-developed. No distress. Eyes: Conjunctivae are normal.   Neck: Neck supple. Cardiovascular: Normal rate, regular rhythm and normal heart sounds. Pulmonary/Chest: Effort normal and breath sounds normal. No respiratory distress. She has no wheezes. She has no rales. She exhibits no tenderness. Neurological: She is alert and oriented to person, place, and time. Skin: Skin is warm. Psychiatric: She has a normal mood and affect. ASSESSMENT and PLAN  Diagnoses and all orders for this visit:    1. Essential hypertension-blood pressure still not at goal.  Home machine calibrated and when checked on left arm was close to goal.  Will increase her amlodipine to 10 mg. Monitor for any side effects such as leg swelling continue to monitor blood pressure at home and work on weight loss she is already lost 3 pounds return to clinic sooner if symptoms worsen before Medicare wellness exam.See AVS for full details of plan and patient discussion. Red flags to warrant ER or earlier clinical evaluation reviewed. -     amLODIPine (NORVASC) 10 mg tablet; Take 1 Tab by mouth daily. Follow-up Disposition:  Return in about 3 months (around 1/11/2018) for Medicare Wellness, Physical - 30 minute appointment.     Medication risks/benefits/costs/interactions/alternatives discussed with patient. Jam Garcia  was given an after visit summary which includes diagnoses, current medications, & vitals. she expressed understanding with the diagnosis and plan.

## 2017-10-11 NOTE — MR AVS SNAPSHOT
Visit Information Date & Time Provider Department Dept. Phone Encounter #  
 10/11/2017 11:30 AM Viktor Kyle MD [address] [id number] Internal Medicine [phone] [id number] Follow-up Instructions Return in about 3 months (around 1/11/2018) for Medicare Wellness, Physical - 30 minute appointment. Upcoming Health Maintenance Date Due  
 GLAUCOMA SCREENING Q2Y 2/10/2017 BREAST CANCER SCRN MAMMOGRAM 2/2/2018 MEDICARE YEARLY EXAM 4/5/2018 Pneumococcal 65+ Low/Medium Risk (2 of 2 - PPSV23) 8/22/2018 DTaP/Tdap/Td series (2 - Td) 6/3/2021 COLONOSCOPY 7/29/2021 Allergies as of 10/11/2017  Review Complete On: 10/11/2017 By: Viktor Kyle MD  
  
 Severity Noted Reaction Type Reactions Codeine  01/28/2010    Nausea and Vomiting Hydrochlorothiazide  04/04/2017    Other (comments) Gout Minocycline Low 01/28/2010    Nausea Only Current Immunizations  Reviewed on 6/3/2011 Name Date Influenza High Dose Vaccine PF 10/7/2014 Influenza Vaccine Whole 11/1/2009 TDAP Vaccine 6/3/2011 Not reviewed this visit You Were Diagnosed With   
  
 Codes Comments Essential hypertension     ICD-10-CM: I10 
ICD-9-CM: 401.9 Vitals BP Pulse Temp Height(growth percentile) Weight(growth percentile) SpO2  
 (!) 160/98 (BP 1 Location: Right arm, BP Patient Position: Sitting) 85 98.2 °F (36.8 °C) (Oral) 5' 3\" (1.6 m) 208 lb 12.8 oz (94.7 kg) 95% BMI OB Status Smoking Status 36.99 kg/m2 Hysterectomy Never Smoker Vitals History BMI and BSA Data Body Mass Index Body Surface Area  
 36.99 kg/m 2 2.05 m 2 Preferred Pharmacy Pharmacy Name Phone [address] [facility] [phone] Your Updated Medication List  
  
   
This list is accurate as of: 10/11/17 12:13 PM.  Always use your most recent med list.  
  
  
  
  
 allopurinol 100 mg tablet Commonly known as:  ZYLOPRIM  
TAKE 1 TABLET DAILY ALPRAZolam 0.5 mg tablet Commonly known as:  XANAX  
TAKE 1 TABLET BY MOUTH 3 TIMES A DAY AS NEEDED  
  
 amLODIPine 10 mg tablet Commonly known as:  Jazmyn Croon Take 1 Tab by mouth daily. atorvastatin 20 mg tablet Commonly known as:  LIPITOR Take 1 Tab by mouth daily. desonide 0.05 % cream  
Commonly known as:  Henrene Molly Apply  to affected area as needed for Skin Irritation. fluticasone 50 mcg/actuation nasal spray Commonly known as:  FLONASE  
USE 2 SPRAYS BY BOTH NOSTRILS ROUTE DAILY. lisinopril 40 mg tablet Commonly known as:  PRINIVIL, ZESTRIL  
TAKE 1 TABLET DAILY  
  
 nebivolol 20 mg tablet Commonly known as:  BYSTOLIC  
TAKE 2 TABLET by mouth DAILY  
  
 raNITIdine 300 mg tablet Commonly known as:  ZANTAC TAKE 1 TABLET DAILY Sulfacetamide Sodium-Sulfur 9-4 % Clsr  
by Apply Externally route as needed. Prescriptions Sent to Pharmacy Refills  
 amLODIPine (NORVASC) 10 mg tablet 1 Sig: Take 1 Tab by mouth daily. Class: Normal  
 Pharmacy: [address], [address] [place] [address] #: [phone] Route: Oral  
  
Follow-up Instructions Return in about 3 months (around 1/11/2018) for Medicare Wellness, Physical - 30 minute appointment. To-Do List   
 10/12/2017 9:30 AM  
(Arrive by 9:15 AM) Appointment with OLVIN Sam at [facility] [address] [facility] ([phone]) CONTRAST STUDY: 1. The patient should not eat solid food four hours before the appointment but should be encouraged to drink clear liquids. 2.  If you have to drink oral contrast, please pick it up any weekday prior to your appointment, if you cannot please check in 2 hrs before appt time. 3.  The patient will require IV access for contrast administration.  4.  The patient should not take Ibuprofen (Advil, Motrin, etc.) and Naproxen Sodium (Aleve, etc.)  on the day of the exam. Stopping non-steroidal anti-inflammatory agents (NSAIDs) like Ibuprofen decreases the risk of kidney damage from the x-ray contrast (dye). 5.  Bring any non [facility] facility films/images pertaining to the area of interest with you on the day of appointment. 6.  Bring current lab work if available (within last 90 days CMP) ***If scheduled at [facility], iSTAT is not available, labs will need to be done before appointment*** 7. Check in at registration at least 30 minutes before appt time unless you were instructed to do otherwise. Please arrive 15 minutes prior to appointment to register Patient Instructions It was a pleasure to see you! As discussed: 
 
Blood pressure calibration (Your monitor vs Office monitor) Systolic (Top): Subtract- 7 Diastolic (Bottom): Accurate High Blood Pressure Not well controlled today We will increase your Amlodipine to 10mg (2 tabs) once a day. Please purchase a home blood pressure monitor to check your blood pressure daily. Check your blood pressure at a drug store or grocery store until you purchase your machine 
-If your blood pressure is too low (<90/60) or too high (>180/100) or you have any symptoms such as chest pain, dizziness, shortness of breath- seek immediate medical attention. Follow a low sodium diet (<1500mg/ day) at least 1/3 (7 meals a week) and gradually increase  
-Exercise regularly goal, 150 minutes of cardiovascular exercise/ week 
-If your blood pressure is too low (<90/60) or too high (>180/100) or you have any symptoms such as chest pain, dizziness, shortness of breath- seek immediate medical attention. Goal  
Good Control <140/90 Call office >160/100 Call office> 180/100 Go to ER> 200/110 Do not take any NSAIDS (Aleve, Ibuprofen, Motrin etc) or decongestants which can raise your blood pressure . Home Blood Pressure Test: About This Test 
What is it? A home blood pressure test allows you to keep track of your blood pressure at home. Blood pressure is a measure of the force of blood against the walls of your arteries. Blood pressure readings include two numbers, such as 130/80 (say \"130 over 80\"). The first number is the systolic pressure. The second number is the diastolic pressure. Why is this test done? You may do this test at home to: · Find out if you have high blood pressure. · Track your blood pressure if you have high blood pressure. · Track how well medicine is working to reduce high blood pressure. · Check how lifestyle changes, such as weight loss and exercise, are affecting blood pressure. How can you prepare for the test? 
· Do not use caffeine, tobacco, or medicines known to raise blood pressure (such as nasal decongestant sprays) for at least 30 minutes before taking your blood pressure. · Do not exercise for at least 30 minutes before taking your blood pressure. What happens before the test? 
Take your blood pressure while you feel comfortable and relaxed. Sit quietly with both feet on the floor for at least 5 minutes before the test. 
What happens during the test? 
· Sit with your arm slightly bent and resting on a table so that your upper arm is at the same level as your heart. · Roll up your sleeve or take off your shirt to expose your upper arm. · Wrap the blood pressure cuff around your upper arm so that the lower edge of the cuff is about 1 inch above the bend of your elbow. Proceed with the following steps depending on if you are using an automatic or manual pressure monitor. Automatic blood pressure monitors · Press the on/off button on the automatic monitor and wait until the ready-to-measure \"heart\" symbol appears next to zero in the display window. · Press the start button. The cuff will inflate and deflate by itself. · Your blood pressure numbers will appear on the screen. · Write your numbers in your log book, along with the date and time. Manual blood pressure monitors · Place the earpieces of a stethoscope in your ears, and place the bell of the stethoscope over the artery, just below the cuff. · Close the valve on the rubber inflating bulb. · Squeeze the bulb rapidly with your opposite hand to inflate the cuff until the dial or column of mercury reads about 30 mm Hg higher than your usual systolic pressure. If you do not know your usual pressure, inflate the cuff to 210 mm Hg or until the pulse at your wrist disappears. · Open the pressure valve just slightly by twisting or pressing the valve on the bulb. · As you watch the pressure slowly fall, note the level on the dial at which you first start to hear a pulsing or tapping sound through the stethoscope. This is your systolic blood pressure. · Continue letting the air out slowly. The sounds will become muffled and will finally disappear. Note the pressure when the sounds completely disappear. This is your diastolic blood pressure. Let out all the remaining air. · Write your numbers in your log book, along with the date and time. What else should you know about the test? 
Results for adults ages 25 and older (mm Hg): · Normal (ideal): Systolic 117 or below. Diastolic 79 or below. · Prehypertension: Systolic 630 to 813. Diastolic 80 to 89. · Hypertension: Systolic 606 or above. Diastolic 90 or above. Follow-up care is a key part of your treatment and safety. Be sure to make and go to all appointments, and call your doctor if you are having problems. It's also a good idea to keep a list of the medicines you take. Where can you learn more? Go to http://sha-damien.info/. Enter C427 in the search box to learn more about \"Home Blood Pressure Test: About This Test.\" Current as of: November 3, 2016 Content Version: 11.3 © 8428-3440 [employer].  Care instructions adapted under license by [address] (which disclaims liability or warranty for this information). If you have questions about a medical condition or this instruction, always ask your healthcare professional. [address] any warranty or liability for your use of this information. Introducing [facility]! Dear Adams Vera: Thank you for requesting a [employer] account. Our records indicate that you already have an active [employer] account. You can access your account anytime at [email or web address] Did you know that you can access your hospital and ER discharge instructions at any time in [employer]? You can also review all of your test results from your hospital stay or ER visit. Additional Information If you have questions, please visit the Frequently Asked Questions section of the [employer] website at [email or web address] Remember, C[employer]rade is NOT to be used for urgent needs. For medical emergencies, dial 911. Now available from your iPhone and Android! Please provide this summary of care documentation to your next provider. Your primary care clinician is listed as Madiha Desir. If you have any questions after today's visit, please call [phone].

## 2017-10-12 ENCOUNTER — HOSPITAL ENCOUNTER (OUTPATIENT)
Dept: CT IMAGING | Age: 67
Discharge: HOME | End: 2017-10-12
Attending: UROLOGY
Payer: MEDICARE

## 2017-10-12 DIAGNOSIS — D30.02 BENIGN NEOPLASM OF LEFT KIDNEY: ICD-10-CM

## 2017-10-12 PROCEDURE — 74178 CT ABD&PLV WO CNTR FLWD CNTR: CPT

## 2017-10-12 PROCEDURE — 74011000258 HC RX REV CODE- 258: Performed by: UROLOGY

## 2017-10-12 PROCEDURE — 74011636320 HC RX REV CODE- 636/320: Performed by: UROLOGY

## 2017-10-12 RX ORDER — SODIUM CHLORIDE 9 MG/ML
50 INJECTION, SOLUTION INTRAVENOUS
Status: COMPLETED | OUTPATIENT
Start: 2017-10-12 | End: 2017-10-12

## 2017-10-12 RX ADMIN — IOPAMIDOL 100 ML: 755 INJECTION, SOLUTION INTRAVENOUS at 09:57

## 2017-10-12 RX ADMIN — SODIUM CHLORIDE 50 ML/HR: 900 INJECTION, SOLUTION INTRAVENOUS at 09:57

## 2017-10-18 DIAGNOSIS — E78.2 MIXED HYPERLIPIDEMIA: ICD-10-CM

## 2017-10-18 RX ORDER — ATORVASTATIN CALCIUM 20 MG/1
TABLET, FILM COATED ORAL
Qty: 90 TAB | Refills: 2 | Status: SHIPPED | OUTPATIENT
Start: 2017-10-18 | End: 2018-09-01 | Stop reason: SDUPTHER

## 2018-01-15 ENCOUNTER — OFFICE VISIT (OUTPATIENT)
Dept: INTERNAL MEDICINE CLINIC | Age: 68
End: 2018-01-15

## 2018-01-15 VITALS
HEIGHT: 63 IN | SYSTOLIC BLOOD PRESSURE: 140 MMHG | DIASTOLIC BLOOD PRESSURE: 90 MMHG | TEMPERATURE: 97.9 F | OXYGEN SATURATION: 99 % | BODY MASS INDEX: 38.2 KG/M2 | HEART RATE: 77 BPM | RESPIRATION RATE: 16 BRPM | WEIGHT: 215.6 LBS

## 2018-01-15 DIAGNOSIS — I10 ESSENTIAL HYPERTENSION, BENIGN: Primary | ICD-10-CM

## 2018-01-15 DIAGNOSIS — M54.50 CHRONIC MIDLINE LOW BACK PAIN WITHOUT SCIATICA: ICD-10-CM

## 2018-01-15 DIAGNOSIS — F41.9 ANXIETY: ICD-10-CM

## 2018-01-15 DIAGNOSIS — M1A.0790 CHRONIC IDIOPATHIC GOUT INVOLVING TOE WITHOUT TOPHUS, UNSPECIFIED LATERALITY: ICD-10-CM

## 2018-01-15 DIAGNOSIS — R79.9 ABNORMAL BLOOD CHEMISTRY TEST: ICD-10-CM

## 2018-01-15 DIAGNOSIS — G89.29 CHRONIC MIDLINE LOW BACK PAIN WITHOUT SCIATICA: ICD-10-CM

## 2018-01-15 DIAGNOSIS — Z12.39 BREAST CANCER SCREENING: ICD-10-CM

## 2018-01-15 NOTE — PATIENT INSTRUCTIONS
It was a pleasure to see you! As discussed: Three Goals (Big Goal: at least  3-5 lbs weight loss by next visit)  1. Increase exercise  -Getting exercise machine at home   -Join Silver Sneakers   2. Decrease carbohydrates to 150-200g/ day. 3. Increase water goal 70-90oz/ day. I have ordered an xray to ensure there are no spinal abnormalities- based on the results I will determine if you need to be referred to Sports Medicine or Orthopedics         Back Stretches: Exercises  Your Care Instructions  Here are some examples of exercises for stretching your back. Start each exercise slowly. Ease off the exercise if you start to have pain. Your doctor or physical therapist will tell you when you can start these exercises and which ones will work best for you. How to do the exercises  Overhead stretch    1. Stand comfortably with your feet shoulder-width apart. 2. Looking straight ahead, raise both arms over your head and reach toward the ceiling. Do not allow your head to tilt back. 3. Hold for 15 to 30 seconds, then lower your arms to your sides. 4. Repeat 2 to 4 times. Side stretch    1. Stand comfortably with your feet shoulder-width apart. 2. Raise one arm over your head, and then lean to the other side. 3. Slide your hand down your leg as you let the weight of your arm gently stretch your side muscles. Hold for 15 to 30 seconds. 4. Repeat 2 to 4 times on each side. Press-up    1. Lie on your stomach, supporting your body with your forearms. 2. Press your elbows down into the floor to raise your upper back. As you do this, relax your stomach muscles and allow your back to arch without using your back muscles. As your press up, do not let your hips or pelvis come off the floor. 3. Hold for 15 to 30 seconds, then relax. 4. Repeat 2 to 4 times. Relax and rest    1. Lie on your back with a rolled towel under your neck and a pillow under your knees.  Extend your arms comfortably to your sides.  2. Relax and breathe normally. 3. Remain in this position for about 10 minutes. 4. If you can, do this 2 or 3 times each day. Follow-up care is a key part of your treatment and safety. Be sure to make and go to all appointments, and call your doctor if you are having problems. It's also a good idea to know your test results and keep a list of the medicines you take. Where can you learn more? Go to http://sha-damien.info/. Enter T009 in the search box to learn more about \"Back Stretches: Exercises. \"  Current as of: March 21, 2017  Content Version: 11.4  © 1594-9427 [employer]fw[employer]suhl. Care instructions adapted under license by [employer] (which disclaims liability or warranty for this information). If you have questions about a medical condition or this instruction, always ask your healthcare professional. [place] [address] any warranty or liability for your use of this information. Back Pain: Care Instructions  Your Care Instructions    Back pain has many possible causes. It is often related to problems with muscles and ligaments of the back. It may also be related to problems with the nerves, discs, or bones of the back. Moving, lifting, standing, sitting, or sleeping in an awkward way can strain the back. Sometimes you don't notice the injury until later. Arthritis is another common cause of back pain. Although it may hurt a lot, back pain usually improves on its own within several weeks. Most people recover in 12 weeks or less. Using good home treatment and being careful not to stress your back can help you feel better sooner. Follow-up care is a key part of your treatment and safety. Be sure to make and go to all appointments, and call your doctor if you are having problems. It's also a good idea to know your test results and keep a list of the medicines you take. How can you care for yourself at home?   · Sit or lie in positions that are most comfortable and reduce your pain. Try one of these positions when you lie down:  ¨ Lie on your back with your knees bent and supported by large pillows. ¨ Lie on the floor with your legs on the seat of a sofa or chair. Rosa Elena Fontan on your side with your knees and hips bent and a pillow between your legs. ¨ Lie on your stomach if it does not make pain worse. · Do not sit up in bed, and avoid soft couches and twisted positions. Bed rest can help relieve pain at first, but it delays healing. Avoid bed rest after the first day of back pain. · Change positions every 30 minutes. If you must sit for long periods of time, take breaks from sitting. Get up and walk around, or lie in a comfortable position. · Try using a heating pad on a low or medium setting for 15 to 20 minutes every 2 or 3 hours. Try a warm shower in place of one session with the heating pad. · You can also try an ice pack for 10 to 15 minutes every 2 to 3 hours. Put a thin cloth between the ice pack and your skin. · Take pain medicines exactly as directed. ¨ If the doctor gave you a prescription medicine for pain, take it as prescribed. ¨ If you are not taking a prescription pain medicine, ask your doctor if you can take an over-the-counter medicine. · Take short walks several times a day. You can start with 5 to 10 minutes, 3 or 4 times a day, and work up to longer walks. Walk on level surfaces and avoid hills and stairs until your back is better. · Return to work and other activities as soon as you can. Continued rest without activity is usually not good for your back. · To prevent future back pain, do exercises to stretch and strengthen your back and stomach. Learn how to use good posture, safe lifting techniques, and proper body mechanics. When should you call for help? Call your doctor now or seek immediate medical care if:  ? · You have new or worsening numbness in your legs. ? · You have new or worsening weakness in your legs. (This could make it hard to stand up.)   ? · You lose control of your bladder or bowels. ? Watch closely for changes in your health, and be sure to contact your doctor if:  ? · Your pain gets worse. ? · You are not getting better after 2 weeks. Where can you learn more? Go to http://sha-damien.info/. Enter I832 in the search box to learn more about \"Back Pain: Care Instructions. \"  Current as of: March 21, 2017  Content Version: 11.4  © 1476-9895 [employer]. Care instructions adapted under license by [employer] (which disclaims liability or warranty for this information). If you have questions about a medical condition or this instruction, always ask your healthcare professional. [address] any warranty or liability for your use of this information.

## 2018-01-15 NOTE — PROGRESS NOTES
Chief Complaint   Patient presents with    Hypertension     1. Have you been to the ER, urgent care clinic since your last visit? Hospitalized since your last visit? No    2. Have you seen or consulted any other health care providers outside of the [address] [place] [address] since your last visit? Include any pap smears or colon screening. No    Lower back pain, shoulder.  Pt thinks because not active

## 2018-01-15 NOTE — PROGRESS NOTES
HISTORY OF PRESENT ILLNESS  Bernardo Rebolledo is a 69 y.o. female. Back Pain    The current episode started more than 1 week ago. The pain is present in the lower back. The quality of the pain is described as aching. The pain radiates to the left thigh and right thigh. The pain is moderate. Pertinent negatives include no chest pain, no fever, no weight loss, no headaches and no dysuria. She has tried nothing for the symptoms. Risk factors include obesity. Cardiovascular Review:  The patient has hypertension, hyperlipidemia and obesity Body mass index is 38.19 kg/(m^2). She is planning to start [address]. Diet and Lifestyle: not attempting to follow a low fat, low cholesterol diet, not attempting to follow a low sodium diet, nonsmoker, overindulged over the holidays going back to low carb. Home BP Monitoring: is not measured at home. Pertinent ROS: taking medications as instructed, no medication side effects noted, no TIA's, no chest pain on exertion, no dyspnea on exertion, no swelling of ankles. Three Goals (Big Goal: at least  3-5 lbs weight loss by next visit)  1. Increase exercise  -Getting exercise machine at home   -Join Silver Sneakers   2. -Decrease carbohydrates to 150-200g/ day. 3. Increase water goal 70-90oz/ day. Review of Systems   Constitutional: Negative for diaphoresis, fever and weight loss. Eyes: Negative for blurred vision and pain. Respiratory: Negative for shortness of breath. Cardiovascular: Negative for chest pain, orthopnea and leg swelling. Genitourinary: Negative for dysuria. Musculoskeletal: Positive for back pain. Neurological: Negative for focal weakness and headaches. Psychiatric/Behavioral: Negative for depression.      Patient Active Problem List    Diagnosis Date Noted    Mass of kidney of unknown nature 07/22/2016    Plantar fasciitis, bilateral 11/12/2014    Gout 10/07/2014    Hemorrhoid 07/30/2012    Rosacea 06/03/2011    GERD (gastroesophageal reflux disease) 02/04/2010    Essential hypertension, benign 01/28/2010    Anxiety 01/28/2010       Current Outpatient Prescriptions   Medication Sig Dispense Refill    allopurinol (ZYLOPRIM) 100 mg tablet TAKE 1 TABLET DAILY 90 Tab 3    lisinopril (PRINIVIL, ZESTRIL) 40 mg tablet TAKE 1 TABLET DAILY 90 Tab 2    atorvastatin (LIPITOR) 20 mg tablet TAKE 1 TABLET DAILY 90 Tab 2    amLODIPine (NORVASC) 10 mg tablet Take 1 Tab by mouth daily. 90 Tab 1    ALPRAZolam (XANAX) 0.5 mg tablet TAKE 1 TABLET BY MOUTH 3 TIMES A DAY AS NEEDED 45 Tab 0    fluticasone (FLONASE) 50 mcg/actuation nasal spray USE 2 SPRAYS BY BOTH NOSTRILS ROUTE DAILY. 1 Bottle 4    ranitidine (ZANTAC) 300 mg tablet TAKE 1 TABLET DAILY 90 Tab 1    Sulfacetamide Sodium-Sulfur 9-4 % clsr by Apply Externally route as needed.  desonide (TRIDESILON) 0.05 % cream Apply  to affected area as needed for Skin Irritation.  nebivolol (BYSTOLIC) 20 mg tablet TAKE 2 TABLET by mouth DAILY 180 Tab 2       Allergies   Allergen Reactions    Codeine Nausea and Vomiting    Hydrochlorothiazide Other (comments)     Gout     Minocycline Nausea Only        Physical Exam   Constitutional: She is oriented to person, place, and time. She appears well-developed. No distress. HENT:   Nose: Right sinus exhibits no maxillary sinus tenderness and no frontal sinus tenderness. Left sinus exhibits no maxillary sinus tenderness and no frontal sinus tenderness. Eyes: Conjunctivae are normal.   Neck: Neck supple. Cardiovascular: Normal rate, regular rhythm and normal heart sounds. Pulmonary/Chest: Effort normal and breath sounds normal. No respiratory distress. She has no wheezes. She has no rales. She exhibits no tenderness. Neurological: She is alert and oriented to person, place, and time. Skin: Skin is warm. Psychiatric: She has a normal mood and affect. ASSESSMENT and PLAN  Diagnoses and all orders for this visit:    1. Essential hypertension, benign- sBP elevated with weight gain. Check BP at home. Work on weight loss. Will have to add 4th agent if no improvement. Red flags to warrant ER or earlier clinical evaluation reviewed. -     CBC WITH AUTOMATED DIFF  -     METABOLIC PANEL, COMPREHENSIVE  -     LIPID PANEL    2. Anxiety- stable. Patient Education:  Reviewed concept of anxiety as biochemical imbalance of neurotransmitters and rationale for treatment. Instructed patient to contact office or 911 promptly should condition worsen or any new symptoms appear and provided on-call telephone numbers. IF THE PATIENT HAS ANY SUICIDAL OR HOMICIDAL IDEATION, CALL THE OFFICE, DISCUSS WITH A SUPPORT MEMBER OR GO TO THE ER IMMEDIATELY. Patient was agreeable with this      3. Chronic idiopathic gout involving toe without tophus, unspecified laterality- stable. Check for leukopenia given allopurinol use. -     CBC WITH AUTOMATED DIFF    4. Chronic midline low back pain without sciatica- Check for RA. I have ordered an xray to ensure there are no spinal abnormalities- based on the results I will determine if you need to be referred to Sports Medicine or Orthopedics  Red flags to warrant ER or earlier clinical evaluation reviewed. See AVS for full details of plan and patient discussion.     -     CBC WITH AUTOMATED DIFF  -     RA + CCP ABS  -     XR SPINE LUMB 2 OR 3 V; Future    5. Breast cancer screening  -     [facility] BI SCREENING INCL CAD; Future    6. Abnormal blood chemistry test- h/o prediabetes. -     HEMOGLOBIN A1C WITH EAG      Follow-up Disposition:  Return in about 3 months (around 4/15/2018). Medication risks/benefits/costs/interactions/alternatives discussed with patient. Alyssa Rick  was given an after visit summary which includes diagnoses, current medications, & vitals. she expressed understanding with the diagnosis and plan.

## 2018-01-15 NOTE — MR AVS SNAPSHOT
[address] 
[phone] Patient: Osito Clifford MRN: [id number] Visit Information Date & Time Provider Department Dept. Phone Encounter #  
 1/15/2018 11:30 AM Mio Coffman MD [address] [id number] Internal Medicine [phone] [id number] Follow-up Instructions Return in about 3 months (around 4/15/2018). Upcoming Health Maintenance Date Due  
 GLAUCOMA SCREENING Q2Y 2/10/2017 BREAST CANCER SCRN MAMMOGRAM 2/2/2018 MEDICARE YEARLY EXAM 4/5/2018 Pneumococcal 65+ Low/Medium Risk (2 of 2 - PPSV23) 8/22/2018 DTaP/Tdap/Td series (2 - Td) 6/3/2021 COLONOSCOPY 7/29/2021 Allergies as of 1/15/2018  Review Complete On: 1/15/2018 By: Mio Coffman MD  
  
 Severity Noted Reaction Type Reactions Codeine  01/28/2010    Nausea and Vomiting Hydrochlorothiazide  04/04/2017    Other (comments) Gout Minocycline Low 01/28/2010    Nausea Only Current Immunizations  Reviewed on 6/3/2011 Name Date Influenza High Dose Vaccine PF 10/7/2014 Influenza Vaccine Whole 11/1/2009 TDAP Vaccine 6/3/2011 Not reviewed this visit You Were Diagnosed With   
  
 Codes Comments Essential hypertension, benign    -  Primary ICD-10-CM: I10 
ICD-9-CM: 401.1 Anxiety     ICD-10-CM: F41.9 ICD-9-CM: 300.00 Chronic idiopathic gout involving toe without tophus, unspecified laterality     ICD-10-CM: M1A.0790 ICD-9-CM: 274.02 Chronic midline low back pain without sciatica     ICD-10-CM: M54.5, G89.29 ICD-9-CM: 724.2, 338.29 Breast cancer screening     ICD-10-CM: Z12.31 
ICD-9-CM: V76.10 Abnormal blood chemistry test     ICD-10-CM: R79.9 ICD-9-CM: 790.6 Vitals BP Pulse Temp Resp Height(growth percentile) Weight(growth percentile)  140/90 (BP 1 Location: Right arm, BP Patient Position: Sitting) 77 97.9 °F (36.6 °C) (Oral) 16 5' 3\" (1.6 m) 215 lb 9.6 oz (97.8 kg) SpO2 BMI OB Status Smoking Status 99% 38.19 kg/m2 Hysterectomy Never Smoker Vitals History BMI and BSA Data Body Mass Index Body Surface Area  
 38.19 kg/m 2 2.08 m 2 Preferred Pharmacy Pharmacy Name Phone [address] [facility] [phone] Your Updated Medication List  
  
   
This list is accurate as of: 1/15/18 12:43 PM.  Always use your most recent med list.  
  
  
  
  
 allopurinol 100 mg tablet Commonly known as:  ZYLOPRIM  
TAKE 1 TABLET DAILY ALPRAZolam 0.5 mg tablet Commonly known as:  XANAX  
TAKE 1 TABLET BY MOUTH 3 TIMES A DAY AS NEEDED  
  
 amLODIPine 10 mg tablet Commonly known as:  Jillyn Boast Take 1 Tab by mouth daily. atorvastatin 20 mg tablet Commonly known as:  LIPITOR  
TAKE 1 TABLET DAILY  
  
 desonide 0.05 % cream  
Commonly known as:  TRIDESILON Apply  to affected area as needed for Skin Irritation. fluticasone 50 mcg/actuation nasal spray Commonly known as:  FLONASE  
USE 2 SPRAYS BY BOTH NOSTRILS ROUTE DAILY. lisinopril 40 mg tablet Commonly known as:  PRINIVIL, ZESTRIL  
TAKE 1 TABLET DAILY  
  
 nebivolol 20 mg tablet Commonly known as:  BYSTOLIC  
TAKE 2 TABLET by mouth DAILY  
  
 raNITIdine 300 mg tablet Commonly known as:  ZANTAC TAKE 1 TABLET DAILY Sulfacetamide Sodium-Sulfur 9-4 % Clsr  
by Apply Externally route as needed. We Performed the Following CBC WITH AUTOMATED DIFF [68251 CPT(R)] HEMOGLOBIN A1C WITH EAG [45592 CPT(R)] LIPID PANEL [23819 CPT(R)] METABOLIC PANEL, COMPREHENSIVE [38632 CPT(R)]   
 RA + CCP ABS [[id number] Custom] Follow-up Instructions Return in about 3 months (around 4/15/2018). To-Do List   
 07/18/2018 Imaging:  WILMAR MAMMO BI SCREENING INCL CAD Patient Instructions It was a pleasure to see you! As discussed: Three Goals (Big Goal: at least  3-5 lbs weight loss by next visit) 1. Increase exercise 
-Getting exercise machine at home  
-Join Silver Sneakers 2. Decrease carbohydrates to 150-200g/ day. 3. Increase water goal 70-90oz/ day. I have ordered an xray to ensure there are no spinal abnormalities- based on the results I will determine if you need to be referred to Sports Medicine or Orthopedics Back Stretches: Exercises Your Care Instructions Here are some examples of exercises for stretching your back. Start each exercise slowly. Ease off the exercise if you start to have pain. Your doctor or physical therapist will tell you when you can start these exercises and which ones will work best for you. How to do the exercises Overhead stretch 1. Stand comfortably with your feet shoulder-width apart. 2. Looking straight ahead, raise both arms over your head and reach toward the ceiling. Do not allow your head to tilt back. 3. Hold for 15 to 30 seconds, then lower your arms to your sides. 4. Repeat 2 to 4 times. Side stretch 1. Stand comfortably with your feet shoulder-width apart. 2. Raise one arm over your head, and then lean to the other side. 3. Slide your hand down your leg as you let the weight of your arm gently stretch your side muscles. Hold for 15 to 30 seconds. 4. Repeat 2 to 4 times on each side. Press-up 1. Lie on your stomach, supporting your body with your forearms. 2. Press your elbows down into the floor to raise your upper back. As you do this, relax your stomach muscles and allow your back to arch without using your back muscles. As your press up, do not let your hips or pelvis come off the floor. 3. Hold for 15 to 30 seconds, then relax. 4. Repeat 2 to 4 times. Relax and rest 
 
1. Lie on your back with a rolled towel under your neck and a pillow under your knees. Extend your arms comfortably to your sides. 2. Relax and breathe normally. 3. Remain in this position for about 10 minutes. 4. If you can, do this 2 or 3 times each day. Follow-up care is a key part of your treatment and safety. Be sure to make and go to all appointments, and call your doctor if you are having problems. It's also a good idea to know your test results and keep a list of the medicines you take. Where can you learn more? Go to http://sha-damien.info/. Enter Z764 in the search box to learn more about \"Back Stretches: Exercises. \" Current as of: March 21, 2017 Content Version: 11.4 © 6487-8799 [employer]. Care instructions adapted under license by [employer] (which disclaims liability or warranty for this information). If you have questions about a medical condition or this instruction, always ask your healthcare professional. [place] [address] any warranty or liability for your use of this information. Back Pain: Care Instructions Your Care Instructions Back pain has many possible causes. It is often related to problems with muscles and ligaments of the back. It may also be related to problems with the nerves, discs, or bones of the back. Moving, lifting, standing, sitting, or sleeping in an awkward way can strain the back. Sometimes you don't notice the injury until later. Arthritis is another common cause of back pain. Although it may hurt a lot, back pain usually improves on its own within several weeks. Most people recover in 12 weeks or less. Using good home treatment and being careful not to stress your back can help you feel better sooner. Follow-up care is a key part of your treatment and safety. Be sure to make and go to all appointments, and call your doctor if you are having problems. It's also a good idea to know your test results and keep a list of the medicines you take. How can you care for yourself at home? · Sit or lie in positions that are most comfortable and reduce your pain. Try one of these positions when you lie down: ¨ Lie on your back with your knees bent and supported by large pillows. ¨ Lie on the floor with your legs on the seat of a sofa or chair. Delgado Alar on your side with your knees and hips bent and a pillow between your legs. ¨ Lie on your stomach if it does not make pain worse. · Do not sit up in bed, and avoid soft couches and twisted positions. Bed rest can help relieve pain at first, but it delays healing. Avoid bed rest after the first day of back pain. · Change positions every 30 minutes. If you must sit for long periods of time, take breaks from sitting. Get up and walk around, or lie in a comfortable position. · Try using a heating pad on a low or medium setting for 15 to 20 minutes every 2 or 3 hours. Try a warm shower in place of one session with the heating pad. · You can also try an ice pack for 10 to 15 minutes every 2 to 3 hours. Put a thin cloth between the ice pack and your skin. · Take pain medicines exactly as directed. ¨ If the doctor gave you a prescription medicine for pain, take it as prescribed. ¨ If you are not taking a prescription pain medicine, ask your doctor if you can take an over-the-counter medicine. · Take short walks several times a day. You can start with 5 to 10 minutes, 3 or 4 times a day, and work up to longer walks. Walk on level surfaces and avoid hills and stairs until your back is better. · Return to work and other activities as soon as you can. Continued rest without activity is usually not good for your back. · To prevent future back pain, do exercises to stretch and strengthen your back and stomach. Learn how to use good posture, safe lifting techniques, and proper body mechanics. When should you call for help? Call your doctor now or seek immediate medical care if: 
? · You have new or worsening numbness in your legs. ? · You have new or worsening weakness in your legs. (This could make it hard to stand up.) ? · You lose control of your bladder or bowels. ? Watch closely for changes in your health, and be sure to contact your doctor if: 
? · Your pain gets worse. ? · You are not getting better after 2 weeks. Where can you learn more? Go to http://sha-damien.info/. Enter F906 in the search box to learn more about \"Back Pain: Care Instructions. \" Current as of: March 21, 2017 Content Version: 11.4 © 6689-0074 [employer]. Care instructions adapted under license by [employer] (which disclaims liability or warranty for this information). If you have questions about a medical condition or this instruction, always ask your healthcare professional. [address] any warranty or liability for your use of this information. Introducing [facility]! Dear Anthony Rowley: Thank you for requesting a [facility] [place] account. Our records indicate that you already have an active [employer] [facility] [place] account. You can access your account anytime at [email or web address] Did you know that you can access your hospital and ER discharge instructions at any time in [employer] [facility] [employer] [place] [facility]? You can also review all of your test results from your hospital stay or ER visit. Additional Information If you have questions, please visit the Frequently Asked Questions section of the [employer] [place] website at [email or web address] Remember, [facility] [place] is NOT to be used for urgent needs. For medical emergencies, dial 911. Now available from your iPhone and Android! Please provide this summary of care documentation to your next provider. Your primary care clinician is listed as Regina Mclean. If you have any questions after today's visit, please call [phone].

## 2018-04-11 ENCOUNTER — HOSPITAL ENCOUNTER (OUTPATIENT)
Dept: GENERAL RADIOLOGY | Age: 68
Discharge: HOME | End: 2018-04-11
Payer: MEDICARE

## 2018-04-11 DIAGNOSIS — G89.29 CHRONIC MIDLINE LOW BACK PAIN WITHOUT SCIATICA: ICD-10-CM

## 2018-04-11 DIAGNOSIS — M54.50 CHRONIC MIDLINE LOW BACK PAIN WITHOUT SCIATICA: ICD-10-CM

## 2018-04-11 PROCEDURE — 72100 X-RAY EXAM L-S SPINE 2/3 VWS: CPT

## 2018-04-12 ENCOUNTER — TELEPHONE (OUTPATIENT)
Dept: INTERNAL MEDICINE CLINIC | Age: 68
End: 2018-04-12

## 2018-04-12 DIAGNOSIS — M47.816 SPONDYLOSIS OF LUMBAR REGION WITHOUT MYELOPATHY OR RADICULOPATHY: Primary | ICD-10-CM

## 2018-04-12 NOTE — PROGRESS NOTES
Please let her know the following. Hi Mrs. Tegan Brunner,  Your x-ray shows that you have arthritis of the lower back. You would benefit from seeing the orthopedist given your symptoms. I recommend you schedule with  Foster Adorno MD or   Ab Tsai MD  [facility]  Address: [place] [address] [address]  Phone: [phone]    Also your bones show that you have osteopenia, the precursor to osteoporosis \"brittle bones \", have ordered a bone scan to further evaluate your bones. In the interim follow the following precautions. Do not hesitate to contact the office if you have any questions or concerns before your next appointment. Kind regards,   Dr. Chele De La Cruz About Osteopenia  What is osteopenia? Osteopenia is a decrease in thickness, or density, in bones. That means the bones become thinner and weaker. It is much more common in women than in men. It is an early form of osteoporosis, a condition in which the bones are so thin and weak that they can break easily. It's important to know that osteopenia is not a disease. It can happen normally with aging. Having osteopenia means that there is a greater risk that you may get osteoporosis. It also means that you are more likely to break a bone than someone who does not have osteopenia. But not everyone with osteopenia gets osteoporosis or breaks a bone. Osteopenia doesn't cause any symptoms. It's usually found with a type of X-ray called a bone density test. Osteopenia means that your bone density result (T-score) is between -1.0 and -2.5. What increases the risk for osteopenia? Things that increase your risk include:  · Having a family history of osteoporosis. · Being thin. · Being white or . · Getting too little physical activity. · Smoking. · Drinking too much alcohol often. · Using certain medicines such as steroids. How can you prevent osteoporosis?   There are things you can do to slow down osteopenia and prevent osteoporosis. Certain lifestyle changes will help slow the loss of bone density. · Eat food that has plenty of calcium and vitamin D. Yogurt, cheese, milk, and dark green vegetables are high in calcium. Eggs, fatty fish, cereal, and fortified milk are high in vitamin D.  · Talk to your doctor about taking a calcium supplement that has vitamin D in it. · Get regular exercise. ¨ Do 30 minutes of weight-bearing exercise on most days of the week. Walking, jogging, stair climbing, and dancing are good choices. ¨ Do resistance exercises with weights or elastic bands 2 or 3 days a week. · Limit alcohol to 2 drinks a day for men and 1 drink a day for women. Too much alcohol can cause health problems. · Do not smoke. Smoking can make bones thin faster. If you need help quitting, talk to your doctor about stop-smoking programs and medicines. These can increase your chances of quitting for good. Prescription medicines are available for treating bone thinning. But these are more often used to treat osteoporosis. Follow-up care is a key part of your treatment and safety. Be sure to make and go to all appointments, and call your doctor if you are having problems. It's also a good idea to know your test results and keep a list of the medicines you take. Where can you learn more? Go to http://sha-damien.info/. Enter K960 in the search box to learn more about \"Learning About Osteopenia. \"  Current as of: May 12, 2017  Content Version: 11.4  © 9614-3789 [employer]. Care instructions adapted under license by [employer] (which disclaims liability or warranty for this information). If you have questions about a medical condition or this instruction, always ask your healthcare professional. [place] [address] any warranty or liability for your use of this information.

## 2018-04-13 LAB
ALBUMIN SERPL-MCNC: 4.7 G/DL (ref 3.5–4.8)
ALBUMIN/GLOB SERPL: 1.6 {RATIO} (ref 1.2–2.2)
ALP SERPL-CCNC: 82 IU/L (ref 39–117)
ALT SERPL-CCNC: 14 IU/L (ref 0–32)
AST SERPL-CCNC: 18 IU/L (ref 0–40)
BASOPHILS # BLD AUTO: 0.1 X10E3/UL (ref 0–0.2)
BASOPHILS NFR BLD AUTO: 1 %
BILIRUB SERPL-MCNC: 0.6 MG/DL (ref 0–1.2)
BUN SERPL-MCNC: 19 MG/DL (ref 8–27)
BUN/CREAT SERPL: 22 (ref 12–28)
CALCIUM SERPL-MCNC: 9.5 MG/DL (ref 8.7–10.3)
CCP IGA+IGG SERPL IA-ACNC: 7 UNITS (ref 0–19)
CHLORIDE SERPL-SCNC: 102 MMOL/L (ref 96–106)
CHOLEST SERPL-MCNC: 126 MG/DL (ref 100–199)
CO2 SERPL-SCNC: 22 MMOL/L (ref 18–29)
CREAT SERPL-MCNC: 0.85 MG/DL (ref 0.57–1)
EOSINOPHIL # BLD AUTO: 0.2 X10E3/UL (ref 0–0.4)
EOSINOPHIL NFR BLD AUTO: 3 %
ERYTHROCYTE [DISTWIDTH] IN BLOOD BY AUTOMATED COUNT: 13.6 % (ref 12.3–15.4)
EST. AVERAGE GLUCOSE BLD GHB EST-MCNC: 120 MG/DL
GFR SERPLBLD CREATININE-BSD FMLA CKD-EPI: 70 ML/MIN/1.73
GFR SERPLBLD CREATININE-BSD FMLA CKD-EPI: 80 ML/MIN/1.73
GLOBULIN SER CALC-MCNC: 3 G/DL (ref 1.5–4.5)
GLUCOSE SERPL-MCNC: 95 MG/DL (ref 65–99)
HBA1C MFR BLD: 5.8 % (ref 4.8–5.6)
HCT VFR BLD AUTO: 44.6 % (ref 34–46.6)
HDLC SERPL-MCNC: 42 MG/DL
HGB BLD-MCNC: 15.1 G/DL (ref 11.1–15.9)
IMM GRANULOCYTES # BLD: 0 X10E3/UL (ref 0–0.1)
IMM GRANULOCYTES NFR BLD: 0 %
LDLC SERPL CALC-MCNC: 61 MG/DL (ref 0–99)
LYMPHOCYTES # BLD AUTO: 2.7 X10E3/UL (ref 0.7–3.1)
LYMPHOCYTES NFR BLD AUTO: 33 %
MCH RBC QN AUTO: 31.9 PG (ref 26.6–33)
MCHC RBC AUTO-ENTMCNC: 33.9 G/DL (ref 31.5–35.7)
MCV RBC AUTO: 94 FL (ref 79–97)
MONOCYTES # BLD AUTO: 0.3 X10E3/UL (ref 0.1–0.9)
MONOCYTES NFR BLD AUTO: 4 %
NEUTROPHILS # BLD AUTO: 4.9 X10E3/UL (ref 1.4–7)
NEUTROPHILS NFR BLD AUTO: 59 %
PLATELET # BLD AUTO: 224 X10E3/UL (ref 150–379)
POTASSIUM SERPL-SCNC: 4.2 MMOL/L (ref 3.5–5.2)
PROT SERPL-MCNC: 7.7 G/DL (ref 6–8.5)
RBC # BLD AUTO: 4.74 X10E6/UL (ref 3.77–5.28)
RHEUMATOID FACT SERPL-ACNC: <10 IU/ML (ref 0–13.9)
SODIUM SERPL-SCNC: 142 MMOL/L (ref 134–144)
TRIGL SERPL-MCNC: 114 MG/DL (ref 0–149)
VLDLC SERPL CALC-MCNC: 23 MG/DL (ref 5–40)
WBC # BLD AUTO: 8.3 X10E3/UL (ref 3.4–10.8)

## 2018-04-19 ENCOUNTER — OFFICE VISIT (OUTPATIENT)
Dept: INTERNAL MEDICINE CLINIC | Age: 68
End: 2018-04-19

## 2018-04-19 VITALS
SYSTOLIC BLOOD PRESSURE: 162 MMHG | OXYGEN SATURATION: 99 % | HEART RATE: 90 BPM | RESPIRATION RATE: 16 BRPM | BODY MASS INDEX: 37.03 KG/M2 | WEIGHT: 209 LBS | HEIGHT: 63 IN | DIASTOLIC BLOOD PRESSURE: 98 MMHG | TEMPERATURE: 97.8 F

## 2018-04-19 DIAGNOSIS — I10 ESSENTIAL HYPERTENSION, BENIGN: ICD-10-CM

## 2018-04-19 DIAGNOSIS — Z00.00 MEDICARE ANNUAL WELLNESS VISIT, SUBSEQUENT: Primary | ICD-10-CM

## 2018-04-19 DIAGNOSIS — F41.9 ANXIETY: ICD-10-CM

## 2018-04-19 DIAGNOSIS — M1A.0790 CHRONIC IDIOPATHIC GOUT INVOLVING TOE WITHOUT TOPHUS, UNSPECIFIED LATERALITY: ICD-10-CM

## 2018-04-19 PROBLEM — E66.01 SEVERE OBESITY (BMI 35.0-39.9) WITH COMORBIDITY (HCC): Status: ACTIVE | Noted: 2018-04-19

## 2018-04-19 NOTE — PROGRESS NOTES
Chief Complaint   Patient presents with   [facility] Annual Wellness Visit     1. Have you been to the ER, urgent care clinic since your last visit? Hospitalized since your last visit? No    2. Have you seen or consulted any other health care providers outside of the [address] [place] [address] since your last visit? Include any pap smears or colon screening.  No

## 2018-04-19 NOTE — PATIENT INSTRUCTIONS
It was a pleasure to see you! As discussed:    Lab Review  Congratulations on your weight loss and positive lifestyle changes!!! Continue optimizing your diet and exercise. -Decrease carbohydrates to 150-200g/ day. Your labs were clinically normal/ stable No medication changes are needed   Some labs that may have been tested and their explanation are:  Your electrolytes, kidney & liver function (Metabolic Panel)   Anemia, blood cells (CBC)  Thyroid (TSH + T4, T3)  Hormones (prolactin, vitamin D )   Pregnancy (Beta HCG)    Diabetes (Hemoglobin A1c)   Lipid Panel (Cholesterol, HDL \"good\", LDL \"bad\"    Elevated Blood pressure  - Your blood pressure was slightly high today. Since you did not take your medication this AM we will recheck when you have taken it.   -Bring your blood pressure monitor to your next appointment.   -Continue to follow a low salt/ low sodium diet (1500mg/ day)  -If your blood pressure is too low (<90/60) or too high (>180/100) or you have any symptoms such as chest pain, dizziness, shortness of breath- seek immediate medical attention. Goal   Good Control <140/90  Call office >160/100  Call office> 180/100   Go to ER> 200/110  Do not take any NSAIDS (Aleve, Ibuprofen, Motrin etc) or decongestants which can raise your blood pressure . Home Blood Pressure Test: About This Test  What is it? A home blood pressure test allows you to keep track of your blood pressure at home. Blood pressure is a measure of the force of blood against the walls of your arteries. Blood pressure readings include two numbers, such as 130/80 (say \"130 over 80\"). The first number is the systolic pressure. The second number is the diastolic pressure. Why is this test done? You may do this test at home to:  · Find out if you have high blood pressure. · Track your blood pressure if you have high blood pressure. · Track how well medicine is working to reduce high blood pressure.   · Check how lifestyle changes, such as weight loss and exercise, are affecting blood pressure. How can you prepare for the test?  · Do not use caffeine, tobacco, or medicines known to raise blood pressure (such as nasal decongestant sprays) for at least 30 minutes before taking your blood pressure. · Do not exercise for at least 30 minutes before taking your blood pressure. What happens before the test?  Take your blood pressure while you feel comfortable and relaxed. Sit quietly with both feet on the floor for at least 5 minutes before the test.  What happens during the test?  · Sit with your arm slightly bent and resting on a table so that your upper arm is at the same level as your heart. · Roll up your sleeve or take off your shirt to expose your upper arm. · Wrap the blood pressure cuff around your upper arm so that the lower edge of the cuff is about 1 inch above the bend of your elbow. Proceed with the following steps depending on if you are using an automatic or manual pressure monitor. Automatic blood pressure monitors  · Press the on/off button on the automatic monitor and wait until the ready-to-measure \"heart\" symbol appears next to zero in the display window. · Press the start button. The cuff will inflate and deflate by itself. · Your blood pressure numbers will appear on the screen. · Write your numbers in your log book, along with the date and time. Manual blood pressure monitors  · Place the earpieces of a stethoscope in your ears, and place the bell of the stethoscope over the artery, just below the cuff. · Close the valve on the rubber inflating bulb. · Squeeze the bulb rapidly with your opposite hand to inflate the cuff until the dial or column of mercury reads about 30 mm Hg higher than your usual systolic pressure. If you do not know your usual pressure, inflate the cuff to 210 mm Hg or until the pulse at your wrist disappears.   · Open the pressure valve just slightly by twisting or pressing the valve on the bulb. · As you watch the pressure slowly fall, note the level on the dial at which you first start to hear a pulsing or tapping sound through the stethoscope. This is your systolic blood pressure. · Continue letting the air out slowly. The sounds will become muffled and will finally disappear. Note the pressure when the sounds completely disappear. This is your diastolic blood pressure. Let out all the remaining air. · Write your numbers in your log book, along with the date and time. What else should you know about the test?  Results for adults ages 25 and older (mm Hg):  · Normal (ideal): Systolic 852 or below. Diastolic 79 or below. · Prehypertension: Systolic 189 to 964. Diastolic 80 to 89. · Hypertension: Systolic 169 or above. Diastolic 90 or above. Follow-up care is a key part of your treatment and safety. Be sure to make and go to all appointments, and call your doctor if you are having problems. It's also a good idea to keep a list of the medicines you take. Where can you learn more? Go to http://sha-damien.info/. Enter C427 in the search box to learn more about \"Home Blood Pressure Test: About This Test.\"  Current as of: September 21, 2016  Content Version: 11.4  © 5508-1448 [employer]wise[employer]. Care instructions adapted under license by [employer] (which disclaims liability or warranty for this information). If you have questions about a medical condition or this instruction, always ask your healthcare professional. [place] [address] any warranty or liability for your use of this information.

## 2018-04-19 NOTE — LETTER
4/19/2018 10:31 AM 
 
Ms. Jose Feliciano [address] [facility] [id number] Dear Jose Feliciano: 
Lab Review Congratulations on your weight loss and positive lifestyle changes!!! Continue optimizing your diet and exercise. -Decrease carbohydrates to 150-200g/ day. Your labs were clinically normal/ stable No medication changes are needed Some labs that may have been tested and their explanation are: 
Your electrolytes, kidney & liver function (Metabolic Panel) Anemia, blood cells (CBC) Thyroid (TSH + T4, T3) Hormones (prolactin, vitamin D ) Pregnancy (Beta HCG) Diabetes (Hemoglobin A1c) Lipid Panel (Cholesterol, HDL \"good\", LDL \"bad\") Resulted Orders CBC WITH AUTOMATED DIFF Result Value Ref Range WBC 8.3 3.4 - 10.8 x10E3/uL  
 RBC 4.74 3.77 - 5.28 x10E6/uL HGB 15.1 11.1 - 15.9 g/dL HCT 44.6 34.0 - 46.6 % MCV 94 79 - 97 fL  
 MCH 31.9 26.6 - 33.0 pg  
 MCHC 33.9 31.5 - 35.7 g/dL  
 RDW 13.6 12.3 - 15.4 % PLATELET 661 844 - 451 x10E3/uL NEUTROPHILS 59 Not Estab. % Lymphocytes 33 Not Estab. % MONOCYTES 4 Not Estab. % EOSINOPHILS 3 Not Estab. % BASOPHILS 1 Not Estab. %  
 ABS. NEUTROPHILS 4.9 1.4 - 7.0 x10E3/uL Abs Lymphocytes 2.7 0.7 - 3.1 x10E3/uL  
 ABS. MONOCYTES 0.3 0.1 - 0.9 x10E3/uL  
 ABS. EOSINOPHILS 0.2 0.0 - 0.4 x10E3/uL  
 ABS. BASOPHILS 0.1 0.0 - 0.2 x10E3/uL IMMATURE GRANULOCYTES 0 Not Estab. %  
 ABS. IMM. GRANS. 0.0 0.0 - 0.1 x10E3/uL Narrative Performed at:  [address]  [id number] : Ruchi Arrington MD, Phone:  [phone] METABOLIC PANEL, COMPREHENSIVE Result Value Ref Range Glucose 95 65 - 99 mg/dL BUN 19 8 - 27 mg/dL Creatinine 0.85 0.57 - 1.00 mg/dL GFR est non-AA 70 >59 mL/min/1.73 GFR est AA 80 >59 mL/min/1.73  
 BUN/Creatinine ratio 22 12 - 28 Sodium 142 134 - 144 mmol/L  Potassium 4.2 3.5 - 5.2 mmol/L  
 Chloride 102 96 - 106 mmol/L  
 CO2 22 18 - 29 mmol/L Calcium 9.5 8.7 - 10.3 mg/dL Protein, total 7.7 6.0 - 8.5 g/dL Albumin 4.7 3.5 - 4.8 g/dL GLOBULIN, TOTAL 3.0 1.5 - 4.5 g/dL A-G Ratio 1.6 1.2 - 2.2 Bilirubin, total 0.6 0.0 - 1.2 mg/dL Alk. phosphatase 82 39 - 117 IU/L  
 AST (SGOT) 18 0 - 40 IU/L  
 ALT (SGPT) 14 0 - 32 IU/L Narrative Performed at:  [address]  [id number] : Ritesh Rodney MD, Phone:  [phone] LIPID PANEL Result Value Ref Range Cholesterol, total 126 100 - 199 mg/dL Triglyceride 114 0 - 149 mg/dL HDL Cholesterol 42 >39 mg/dL VLDL, calculated 23 5 - 40 mg/dL LDL, calculated 61 0 - 99 mg/dL Narrative Performed at:  [address]  [id number] : Ritesh Rodney MD, Phone:  [phone] RA + CCP ABS Result Value Ref Range Rheumatoid factor <10.0 0.0 - 13.9 IU/mL  
 CCP Antibodies IgG/IgA 7 0 - 19 units Comment:  
                             Negative               <20 Weak positive      20 - 39 Moderate positive  40 - 59 Strong positive        >59 Narrative Performed at:  [address]  [id number] : Ritesh Rodney MD, Phone:  [phone] HEMOGLOBIN A1C WITH EAG Result Value Ref Range Hemoglobin A1c 5.8 (H) 4.8 - 5.6 % Comment:  
            Pre-diabetes: 5.7 - 6.4 Diabetes: >6.4 Glycemic control for adults with diabetes: <7.0 Estimated average glucose 120 mg/dL Narrative Performed at:  [address]  [id number] : Ritesh Rodney MD, Phone:  [phone]

## 2018-04-19 NOTE — MR AVS SNAPSHOT
[address] [place] [address] 
[phone] Patient: Kristi Farley MRN: [id number] Visit Information Date & Time Provider Department Dept. Phone Encounter #  
 4/19/2018 10:00 AM Rosalino Kaur MD [address] [address] Internal Medicine [phone] [id number] Follow-up Instructions Return in about 3 months (around 7/19/2018) for Follow-up, Hypertension. Upcoming Health Maintenance Date Due  
 GLAUCOMA SCREENING Q2Y 2/10/2017 BREAST CANCER SCRN MAMMOGRAM 2/2/2018 Pneumococcal 65+ Low/Medium Risk (2 of 2 - PPSV23) 8/22/2018 MEDICARE YEARLY EXAM 4/20/2019 DTaP/Tdap/Td series (2 - Td) 6/3/2021 COLONOSCOPY 7/29/2021 Allergies as of 4/19/2018  Review Complete On: 4/19/2018 By: Rosalino Kaur MD  
  
 Severity Noted Reaction Type Reactions Codeine  01/28/2010    Nausea and Vomiting Hydrochlorothiazide  04/04/2017    Other (comments) Gout Minocycline Low 01/28/2010    Nausea Only Current Immunizations  Reviewed on 6/3/2011 Name Date Influenza High Dose Vaccine PF 10/7/2014 Influenza Vaccine Whole 11/1/2009 TDAP Vaccine 6/3/2011 Not reviewed this visit You Were Diagnosed With   
  
 Codes Comments Medicare annual wellness visit, subsequent    -  Primary ICD-10-CM: Z00.00 ICD-9-CM: V70.0 Essential hypertension, benign     ICD-10-CM: I10 
ICD-9-CM: 401.1 Anxiety     ICD-10-CM: F41.9 ICD-9-CM: 300.00 Chronic idiopathic gout involving toe without tophus, unspecified laterality     ICD-10-CM: M1A.0790 ICD-9-CM: 274.02 Vitals BP Pulse Temp Resp Height(growth percentile) Weight(growth percentile) (!) 162/98 (BP 1 Location: Right arm, BP Patient Position: Sitting) 90 97.8 °F (36.6 °C) (Oral) 16 5' 3\" (1.6 m) 209 lb (94.8 kg) SpO2 BMI OB Status Smoking Status 99% 37.02 kg/m2 Hysterectomy Never Smoker Vitals History BMI and BSA Data Body Mass Index Body Surface Area 37.02 kg/m 2 2.05 m 2 Preferred Pharmacy Pharmacy Name Phone Benjie Cantu, [facility] [phone] Your Updated Medication List  
  
   
This list is accurate as of 4/19/18 10:45 AM.  Always use your most recent med list.  
  
  
  
  
 allopurinol 100 mg tablet Commonly known as:  ZYLOPRIM  
TAKE 1 TABLET DAILY ALPRAZolam 0.5 mg tablet Commonly known as:  XANAX  
TAKE 1 TABLET BY MOUTH 3 TIMES A DAY AS NEEDED  
  
 amLODIPine 10 mg tablet Commonly known as:  Kavitha Stuart Take 1 Tab by mouth daily. atorvastatin 20 mg tablet Commonly known as:  LIPITOR  
TAKE 1 TABLET DAILY  
  
 desonide 0.05 % cream  
Commonly known as:  TRIDESILON Apply  to affected area as needed for Skin Irritation. fluticasone 50 mcg/actuation nasal spray Commonly known as:  FLONASE  
USE 2 SPRAYS BY BOTH NOSTRILS ROUTE DAILY. lisinopril 40 mg tablet Commonly known as:  PRINIVIL, ZESTRIL  
TAKE 1 TABLET DAILY  
  
 nebivolol 20 mg tablet Commonly known as:  BYSTOLIC  
TAKE 2 TABLET by mouth DAILY  
  
 raNITIdine 300 mg tablet Commonly known as:  ZANTAC TAKE 1 TABLET DAILY Sulfacetamide Sodium-Sulfur 9-4 % Clsr  
by Apply Externally route as needed. Follow-up Instructions Return in about 3 months (around 7/19/2018) for Follow-up, Hypertension. To-Do List   
 04/24/2018 11:00 AM  
(Arrive by 10:45 AM) Appointment with Wendy Sam at [facility] ([phone]) Please, no calcium supplements or antacids that coat the stomach (ex: Tums, Mylanta) 24 hours prior to procedure. Maintain normal diet and medications. Dairy products are allowed. Wear an outfit with an elastic waistband (no zipper or metal snaps).   Check in at registration 15min before your appointment time unless you were instructed to do otherwise. Please arrive 15 minutes prior to appointment to register. Patient Instructions It was a pleasure to see you! As discussed: 
 
Lab Review Congratulations on your weight loss and positive lifestyle changes!!! Continue optimizing your diet and exercise. -Decrease carbohydrates to 150-200g/ day. Your labs were clinically normal/ stable No medication changes are needed Some labs that may have been tested and their explanation are: 
Your electrolytes, kidney & liver function (Metabolic Panel) Anemia, blood cells (CBC) Thyroid (TSH + T4, T3) Hormones (prolactin, vitamin D ) Pregnancy (Beta HCG) Diabetes (Hemoglobin A1c) Lipid Panel (Cholesterol, HDL \"good\", LDL \"bad\" Elevated Blood pressure - Your blood pressure was slightly high today. Since you did not take your medication this AM we will recheck when you have taken it.  
-Bring your blood pressure monitor to your next appointment.  
-Continue to follow a low salt/ low sodium diet (1500mg/ day) -If your blood pressure is too low (<90/60) or too high (>180/100) or you have any symptoms such as chest pain, dizziness, shortness of breath- seek immediate medical attention. Goal  
Good Control <140/90 Call office >160/100 Call office> 180/100 Go to ER> 200/110 Do not take any NSAIDS (Aleve, Ibuprofen, Motrin etc) or decongestants which can raise your blood pressure . Home Blood Pressure Test: About This Test 
What is it? A home blood pressure test allows you to keep track of your blood pressure at home. Blood pressure is a measure of the force of blood against the walls of your arteries. Blood pressure readings include two numbers, such as 130/80 (say \"130 over 80\"). The first number is the systolic pressure. The second number is the diastolic pressure. Why is this test done? You may do this test at home to: · Find out if you have high blood pressure. · Track your blood pressure if you have high blood pressure. · Track how well medicine is working to reduce high blood pressure. · Check how lifestyle changes, such as weight loss and exercise, are affecting blood pressure. How can you prepare for the test? 
· Do not use caffeine, tobacco, or medicines known to raise blood pressure (such as nasal decongestant sprays) for at least 30 minutes before taking your blood pressure. · Do not exercise for at least 30 minutes before taking your blood pressure. What happens before the test? 
Take your blood pressure while you feel comfortable and relaxed. Sit quietly with both feet on the floor for at least 5 minutes before the test. 
What happens during the test? 
· Sit with your arm slightly bent and resting on a table so that your upper arm is at the same level as your heart. · Roll up your sleeve or take off your shirt to expose your upper arm. · Wrap the blood pressure cuff around your upper arm so that the lower edge of the cuff is about 1 inch above the bend of your elbow. Proceed with the following steps depending on if you are using an automatic or manual pressure monitor. Automatic blood pressure monitors · Press the on/off button on the automatic monitor and wait until the ready-to-measure \"heart\" symbol appears next to zero in the display window. · Press the start button. The cuff will inflate and deflate by itself. · Your blood pressure numbers will appear on the screen. · Write your numbers in your log book, along with the date and time. Manual blood pressure monitors · Place the earpieces of a stethoscope in your ears, and place the bell of the stethoscope over the artery, just below the cuff. · Close the valve on the rubber inflating bulb.  
· Squeeze the bulb rapidly with your opposite hand to inflate the cuff until the dial or column of mercury reads about 30 mm Hg higher than your usual systolic pressure. If you do not know your usual pressure, inflate the cuff to 210 mm Hg or until the pulse at your wrist disappears. · Open the pressure valve just slightly by twisting or pressing the valve on the bulb. · As you watch the pressure slowly fall, note the level on the dial at which you first start to hear a pulsing or tapping sound through the stethoscope. This is your systolic blood pressure. · Continue letting the air out slowly. The sounds will become muffled and will finally disappear. Note the pressure when the sounds completely disappear. This is your diastolic blood pressure. Let out all the remaining air. · Write your numbers in your log book, along with the date and time. What else should you know about the test? 
Results for adults ages 25 and older (mm Hg): · Normal (ideal): Systolic 595 or below. Diastolic 79 or below. · Prehypertension: Systolic 882 to 694. Diastolic 80 to 89. · Hypertension: Systolic 922 or above. Diastolic 90 or above. Follow-up care is a key part of your treatment and safety. Be sure to make and go to all appointments, and call your doctor if you are having problems. It's also a good idea to keep a list of the medicines you take. Where can you learn more? Go to http://sha-damien.info/. Enter C427 in the search box to learn more about \"Home Blood Pressure Test: About This Test.\" Current as of: September 21, 2016 Content Version: 11.4 © 5302-1792 [employer]. Care instructions adapted under license by [employer] (which disclaims liability or warranty for this information). If you have questions about a medical condition or this instruction, always ask your healthcare professional. [place] [address] any warranty or liability for your use of this information. Introducing [facility]! Dear River Bundy: Thank you for requesting a [employer] account.   Our records indicate that you already have an active [employer] account. You can access your account anytime at [email or web address] Did you know that you can access your hospital and ER discharge instructions at any time in [employer]? You can also review all of your test results from your hospital stay or ER visit. Additional Information If you have questions, please visit the Frequently Asked Questions section of the [employer] website at [email or web address] Remember, [employer] is NOT to be used for urgent needs. For medical emergencies, dial 911. Now available from your iPhone and Android! Please provide this summary of care documentation to your next provider. Your primary care clinician is listed as Anisa Handy. If you have any questions after today's visit, please call [phone].

## 2018-04-19 NOTE — PROGRESS NOTES
HISTORY OF PRESENT ILLNESS  Tej Ulloa is a 77 y.o. female. HPI  Cardiovascular Review:  The patient has hypertension and obesity. Diet and Lifestyle: not attempting to follow a low fat, low cholesterol diet, not attempting to follow a low sodium diet, nonsmoker  Home BP Monitoring: is not measured at home regularly   Pertinent ROS: not taking medications as instructed- FORGOT TO TAKE HER MEDICATION THIS AM & has not been taking bystolic only lisinopril and amlodipine, no medication side effects noted, no TIA's, no chest pain on exertion, no dyspnea on exertion, no swelling of ankles. This is the Subsequent Medicare Annual Wellness Exam, performed 12 months or more after the Initial AWV or the last Subsequent AWV    I have reviewed the patient's medical history in detail and updated the computerized patient record.      History     Past Medical History:   Diagnosis Date    Gall stones     GERD (gastroesophageal reflux disease)     Hemorrhoid     Hypertension     Ill-defined condition     gout    Ill-defined condition     prediabetes - is on metformin    Ill-defined condition     roscea    Sleep apnea     borderline - was put on a machine/not using a machine now      Past Surgical History:   Procedure Laterality Date    BREAST SURGERY PROCEDURE UNLISTED      benign tumor right breast    COLONOSCOPY      COLONOSCOPY N/A 2016    COLONOSCOPY performed by Susie Mast MD at [address] [place] [address]      nml    HX  SECTION      x 2    HX CHOLECYSTECTOMY      HX HYSTERECTOMY      HX TONSILLECTOMY      WILMAR MAMMOGRAM SCREEN BILAT  3/2014    PAP SMEAR      SINUS SURGERY PROC UNLISTED       Current Outpatient Prescriptions   Medication Sig Dispense Refill    allopurinol (ZYLOPRIM) 100 mg tablet TAKE 1 TABLET DAILY 90 Tab 3    lisinopril (PRINIVIL, ZESTRIL) 40 mg tablet TAKE 1 TABLET DAILY 90 Tab 2    atorvastatin (LIPITOR) 20 mg tablet TAKE 1 TABLET DAILY 90 Tab 2    amLODIPine (NORVASC) 10 mg tablet Take 1 Tab by mouth daily. 90 Tab 1    ALPRAZolam (XANAX) 0.5 mg tablet TAKE 1 TABLET BY MOUTH 3 TIMES A DAY AS NEEDED 45 Tab 0    fluticasone (FLONASE) 50 mcg/actuation nasal spray USE 2 SPRAYS BY BOTH NOSTRILS ROUTE DAILY. 1 Bottle 4    ranitidine (ZANTAC) 300 mg tablet TAKE 1 TABLET DAILY 90 Tab 1    Sulfacetamide Sodium-Sulfur 9-4 % clsr by Apply Externally route as needed.  desonide (TRIDESILON) 0.05 % cream Apply  to affected area as needed for Skin Irritation.  nebivolol (BYSTOLIC) 20 mg tablet TAKE 2 TABLET by mouth DAILY 180 Tab 2     Allergies   Allergen Reactions    Codeine Nausea and Vomiting    Hydrochlorothiazide Other (comments)     Gout     Minocycline Nausea Only     Family History   Problem Relation Age of Onset    Cancer Mother      breast, 46s     Diabetes Mother     Heart Disease Mother      MI, CAD    Stroke Father     Heart Disease Father      CAD    Dementia Father      Social History   Substance Use Topics    Smoking status: Never Smoker    Smokeless tobacco: Never Used    Alcohol use Yes      Comment: special occasional     Patient Active Problem List   Diagnosis Code    Essential hypertension, benign I10    Anxiety F41.9    GERD (gastroesophageal reflux disease) K21.9    Rosacea L71.9    Hemorrhoid K64.9    Gout M10.9    Plantar fasciitis, bilateral M72.2    Mass of kidney of unknown nature N28.89    Severe obesity (BMI 35.0-39. 9) with comorbidity ([facility] [address] E66.01       Depression Risk Factor Screening:     PHQ over the last two weeks 4/19/2018   Little interest or pleasure in doing things Not at all   Feeling down, depressed or hopeless Not at all   Total Score PHQ 2 0     Alcohol Risk Factor Screening: You do not drink alcohol or very rarely. Functional Ability and Level of Safety:   Hearing Loss  Hearing is good.     Activities of Daily Living  The home contains: no safety equipment. Patient does total self care    Fall Risk  Fall Risk Assessment, last 12 mths 4/19/2018   Able to walk? Yes   Fall in past 12 months? No       Abuse Screen  Patient is not abused  Lives with  of over 36 yrs. Cognitive Screening   Evaluation of Cognitive Function:  Has your family/caregiver stated any concerns about your memory: no      Patient Care Team   Patient Care Team:  Carlo Suarez MD as PCP - General (Internal Medicine)  Loan Long MD (Cardiology)  Review of Systems   Constitutional: Negative for chills, fever and weight loss. HENT: Negative for congestion and sore throat. Eyes: Negative for blurred vision and double vision. Respiratory: Negative for cough and shortness of breath. Cardiovascular: Negative for chest pain, orthopnea and leg swelling. Gastrointestinal: Negative for abdominal pain (still has intermittent abnormal pain when she sneezes ), blood in stool, constipation, diarrhea, heartburn, nausea and vomiting. Genitourinary: Negative for frequency and urgency. Musculoskeletal: Negative for joint pain and myalgias. Neurological: Negative for dizziness, tremors, weakness and headaches. Endo/Heme/Allergies: Does not bruise/bleed easily. Psychiatric/Behavioral: Negative for depression and suicidal ideas. Visit Vitals    BP (!) 162/98 (BP 1 Location: Right arm, BP Patient Position: Sitting)    Pulse 90    Temp 97.8 °F (36.6 °C) (Oral)    Resp 16    Ht 5' 3\" (1.6 m)    Wt 209 lb (94.8 kg)    SpO2 99%    BMI 37.02 kg/m2     Physical Exam   Constitutional: She is oriented to person, place, and time. She appears well-developed and well-nourished. HENT:   Right Ear: External ear normal.   Left Ear: External ear normal.   Mouth/Throat: Oropharynx is clear and moist. No oropharyngeal exudate. Eyes: Conjunctivae are normal. No scleral icterus. Neck: Normal range of motion. Neck supple. No thyromegaly present.    Cardiovascular: Normal rate, regular rhythm and normal heart sounds. Exam reveals no gallop and no friction rub. No murmur heard. Pulmonary/Chest: Effort normal and breath sounds normal. No respiratory distress. She has no wheezes. She has no rales. She exhibits no tenderness. Abdominal: Soft. Bowel sounds are normal. She exhibits no distension. There is no tenderness. There is no rebound and no guarding. Genitourinary:   Genitourinary Comments: Deferred for gyn per pt request   Musculoskeletal: Normal range of motion. She exhibits no edema or tenderness. Neurological: She is alert and oriented to person, place, and time. Lab Results  Component Value Date/Time   WBC 8.3 04/12/2018 10:06 AM   HGB 15.1 04/12/2018 10:06 AM   HCT 44.6 04/12/2018 10:06 AM   PLATELET 587 61/60/6144 10:06 AM   MCV 94 04/12/2018 10:06 AM     Lab Results  Component Value Date/Time   Hemoglobin A1c 5.8 (H) 04/12/2018 10:06 AM   Hemoglobin A1c 5.6 11/15/2016 11:07 AM   Hemoglobin A1c 6.0 (H) 03/15/2016 10:01 AM   Glucose 95 04/12/2018 10:06 AM   LDL, calculated 61 04/12/2018 10:06 AM   Creatinine (POC) 1.0 10/13/2016 10:35 AM   Creatinine 0.85 04/12/2018 10:06 AM      Lab Results  Component Value Date/Time   Cholesterol, total 126 04/12/2018 10:06 AM   HDL Cholesterol 42 04/12/2018 10:06 AM   LDL, calculated 61 04/12/2018 10:06 AM   Triglyceride 114 04/12/2018 10:06 AM     Lab Results  Component Value Date/Time   ALT (SGPT) 14 04/12/2018 10:06 AM   AST (SGOT) 18 04/12/2018 10:06 AM   Alk.  phosphatase 82 04/12/2018 10:06 AM   Bilirubin, total 0.6 04/12/2018 10:06 AM   Albumin 4.7 04/12/2018 10:06 AM   Protein, total 7.7 04/12/2018 10:06 AM   INR 1.1 02/03/2015 11:30 AM   Prothrombin time 10.6 02/03/2015 11:30 AM   PLATELET 681 48/01/9954 10:06 AM       Lab Results  Component Value Date/Time   GFR est non-AA 70 04/12/2018 10:06 AM   GFRNA, POC 55 (L) 10/13/2016 10:35 AM   GFR est AA 80 04/12/2018 10:06 AM   GFRAA, POC >60 10/13/2016 10:35 AM Creatinine 0.85 04/12/2018 10:06 AM   Creatinine (POC) 1.0 10/13/2016 10:35 AM   BUN 19 04/12/2018 10:06 AM   Sodium 142 04/12/2018 10:06 AM   Potassium 4.2 04/12/2018 10:06 AM   Chloride 102 04/12/2018 10:06 AM   CO2 22 04/12/2018 10:06 AM     Lab Results  Component Value Date/Time   TSH 4.120 03/15/2016 10:01 AM   TSH 2.920 12/04/2013 11:49 AM   T4, Free 0.99 12/04/2013 11:49 AM         ASSESSMENT and PLAN    Assessment/Plan   Education and counseling provided:  Are appropriate based on today's review and evaluation  End-of-Life planning (with patient's consent)    Diagnoses and all orders for this visit:    1. Medicare annual wellness visit, subsequent- Health Maintenance reviewed and addressed   Health Maintenance   Topic Date Due    GLAUCOMA SCREENING Q2Y  02/10/2017- UTD get records     BREAST CANCER SCRN MAMMOGRAM  02/02/2018- UTD get records     Pneumococcal 65+ Low/Medium Risk (2 of 2 - PPSV23) 08/22/2018    MEDICARE YEARLY EXAM  04/20/2019    DTaP/Tdap/Td series (2 - Td) 06/03/2021    COLONOSCOPY  07/29/2021    Hepatitis C Screening  Completed    Bone Densitometry (Dexa) Screening  Completed    ZOSTER VACCINE AGE 60>  Completed    Influenza Age 5 to Adult  Completed         2. Essential hypertension, benign- Still not at goal. She did miss her medication this AM but she also has not been taking bystolic. Will have her continue amlodipine and norvasc, check BP if not controlled resume bystolic     3. Anxiety- stable. Rare Xanax use. Patient Education:  Reviewed concept of anxiety as biochemical imbalance of neurotransmitters and rationale for treatment. Instructed patient to contact office or 911 promptly should condition worsen or any new symptoms appear and provided on-call telephone numbers. IF THE PATIENT HAS ANY SUICIDAL OR HOMICIDAL IDEATION, CALL THE OFFICE, DISCUSS WITH A SUPPORT MEMBER OR GO TO THE ER IMMEDIATELY. Patient was agreeable with this      4.  Chronic idiopathic gout involving toe without tophus, unspecified laterality- continue allopurinol. Follow-up Disposition:  Return in about 3 months (around 7/19/2018) for Follow-up, Hypertension. Medication risks/benefits/costs/interactions/alternatives discussed with patient. Mart Carroll  was given an after visit summary which includes diagnoses, current medications, & vitals. she expressed understanding with the diagnosis and plan.

## 2018-04-24 ENCOUNTER — HOSPITAL ENCOUNTER (OUTPATIENT)
Dept: BONE DENSITY | Age: 68
Discharge: HOME | End: 2018-04-24
Attending: INTERNAL MEDICINE
Payer: MEDICARE

## 2018-04-24 DIAGNOSIS — M47.816 SPONDYLOSIS OF LUMBAR REGION WITHOUT MYELOPATHY OR RADICULOPATHY: ICD-10-CM

## 2018-04-24 PROCEDURE — 77080 DXA BONE DENSITY AXIAL: CPT

## 2018-04-30 ENCOUNTER — TELEPHONE (OUTPATIENT)
Dept: INTERNAL MEDICINE CLINIC | Age: 68
End: 2018-04-30

## 2018-04-30 DIAGNOSIS — I10 HYPERTENSION, ESSENTIAL: Primary | ICD-10-CM

## 2018-04-30 RX ORDER — CARVEDILOL 6.25 MG/1
6.25 TABLET ORAL 2 TIMES DAILY WITH MEALS
Qty: 180 TAB | Refills: 0 | Status: SHIPPED | OUTPATIENT
Start: 2018-04-30 | End: 2018-09-01 | Stop reason: SDUPTHER

## 2018-05-22 DIAGNOSIS — I10 ESSENTIAL HYPERTENSION: ICD-10-CM

## 2018-05-22 RX ORDER — AMLODIPINE BESYLATE 10 MG/1
TABLET ORAL
Qty: 90 TAB | Refills: 1 | Status: SHIPPED | OUTPATIENT
Start: 2018-05-22 | End: 2018-12-05 | Stop reason: SDUPTHER

## 2018-05-30 ENCOUNTER — OFFICE VISIT (OUTPATIENT)
Dept: INTERNAL MEDICINE CLINIC | Age: 68
End: 2018-05-30

## 2018-05-30 VITALS
DIASTOLIC BLOOD PRESSURE: 90 MMHG | RESPIRATION RATE: 16 BRPM | HEART RATE: 72 BPM | BODY MASS INDEX: 36.86 KG/M2 | WEIGHT: 208 LBS | TEMPERATURE: 98.1 F | OXYGEN SATURATION: 97 % | HEIGHT: 63 IN | SYSTOLIC BLOOD PRESSURE: 140 MMHG

## 2018-05-30 DIAGNOSIS — M1A.0790 CHRONIC IDIOPATHIC GOUT INVOLVING TOE WITHOUT TOPHUS, UNSPECIFIED LATERALITY: ICD-10-CM

## 2018-05-30 DIAGNOSIS — E66.01 SEVERE OBESITY (BMI 35.0-39.9) WITH COMORBIDITY (HCC): ICD-10-CM

## 2018-05-30 DIAGNOSIS — R60.0 LEG EDEMA, LEFT: ICD-10-CM

## 2018-05-30 DIAGNOSIS — I10 ESSENTIAL HYPERTENSION, BENIGN: Primary | ICD-10-CM

## 2018-05-30 NOTE — MR AVS SNAPSHOT
[address] 
[phone] Patient: Martinez Amzecua MRN: [id number]:[id number] Visit Information Date & Time Provider Department Dept. Phone Encounter #  
 5/30/2018  8:30 AM Erica Menon MD [facility] Internal Medicine [phone] [id number] Follow-up Instructions Return in about 4 months (around 9/30/2018) for Follow-up, Hypertension. Upcoming Health Maintenance Date Due Influenza Age 5 to Adult 8/1/2018 Pneumococcal 65+ Low/Medium Risk (2 of 2 - PPSV23) 8/22/2018 BREAST CANCER SCRN MAMMOGRAM 4/12/2019 MEDICARE YEARLY EXAM 4/20/2019 GLAUCOMA SCREENING Q2Y 12/20/2019 DTaP/Tdap/Td series (2 - Td) 6/3/2021 COLONOSCOPY 7/29/2021 Allergies as of 5/30/2018  Review Complete On: 5/30/2018 By: Erica Menon MD  
  
 Severity Noted Reaction Type Reactions Codeine  01/28/2010    Nausea and Vomiting Hydrochlorothiazide  04/04/2017    Other (comments) Gout Minocycline Low 01/28/2010    Nausea Only Current Immunizations  Reviewed on 6/3/2011 Name Date Influenza High Dose Vaccine PF 10/7/2014 Influenza Vaccine Whole 11/1/2009 TDAP Vaccine 6/3/2011 Not reviewed this visit You Were Diagnosed With   
  
 Codes Comments Essential hypertension, benign    -  Primary ICD-10-CM: I10 
ICD-9-CM: 555. 1 Chronic idiopathic gout involving toe without tophus, unspecified laterality     ICD-10-CM: M1A.0790 ICD-9-CM: 274.02 Severe obesity (BMI 35.0-39. 9) with comorbidity ([facility] [address]     ICD-10-CM: E66.01 
ICD-9-CM: 278.01 Vitals BP Pulse Temp Resp Height(growth percentile) Weight(growth percentile) 140/90 (BP 1 Location: Right arm, BP Patient Position: Sitting) 72 98.1 °F (36.7 °C) (Oral) 16 5' 3\" (1.6 m) 208 lb (94.3 kg) SpO2 BMI OB Status Smoking Status 97% 36.85 kg/m2 Hysterectomy Never Smoker BMI and BSA Data Body Mass Index Body Surface Area  
 36.85 kg/m 2 2.05 m 2 Preferred Pharmacy Pharmacy Name Phone Benjie Cantu, [facility] [phone] Your Updated Medication List  
  
   
This list is accurate as of 5/30/18  9:03 AM.  Always use your most recent med list.  
  
  
  
  
 allopurinol 100 mg tablet Commonly known as:  ZYLOPRIM  
TAKE 1 TABLET DAILY ALPRAZolam 0.5 mg tablet Commonly known as:  XANAX  
TAKE 1 TABLET BY MOUTH 3 TIMES A DAY AS NEEDED  
  
 amLODIPine 10 mg tablet Commonly known as:  Ainsley Cong TAKE 1 TABLET DAILY  
  
 atorvastatin 20 mg tablet Commonly known as:  LIPITOR  
TAKE 1 TABLET DAILY  
  
 carvedilol 6.25 mg tablet Commonly known as:  Maybelle Pallas Take 1 Tab by mouth two (2) times daily (with meals). desonide 0.05 % cream  
Commonly known as:  Kellee Canary Apply  to affected area as needed for Skin Irritation. fluticasone 50 mcg/actuation nasal spray Commonly known as:  FLONASE  
USE 2 SPRAYS BY BOTH NOSTRILS ROUTE DAILY. lisinopril 40 mg tablet Commonly known as:  PRINIVIL, ZESTRIL  
TAKE 1 TABLET DAILY  
  
 raNITIdine 300 mg tablet Commonly known as:  ZANTAC TAKE 1 TABLET DAILY Sulfacetamide Sodium-Sulfur 9-4 % Clsr  
by Apply Externally route as needed. We Performed the Following CBC WITH AUTOMATED DIFF [98758 CPT(R)] LIPID PANEL [89599 CPT(R)] METABOLIC PANEL, COMPREHENSIVE [33697 CPT(R)] URIC ACID [id number] CPT(R)] Follow-up Instructions Return in about 4 months (around 9/30/2018) for Follow-up, Hypertension. Patient Instructions It was a pleasure to see you! As discussed: 
 
Blood pressure - Your blood pressure was slightly high today  
- Since you told me your blood pressure is lower at home. Keep a log of your blood pressure every day. -Bring your blood pressure monitor to your next appointment. -Continue to follow a low salt/ low sodium diet (1500mg/ day) -If your blood pressure is too low (<90/60) or too high (>180/100) or you have any symptoms such as chest pain, dizziness, shortness of breath- seek immediate medical attention. Purine-Restricted Diet: Care Instructions Your Care Instructions Purines are substances that are found in some foods. Your body turns purines into uric acid. High levels of uric acid can cause gout, which is a form of arthritis that causes pain and inflammation in joints. You may be able to help control the amount of uric acid in your body by limiting high-purine foods in your diet. Follow-up care is a key part of your treatment and safety. Be sure to make and go to all appointments, and call your doctor if you are having problems. It's also a good idea to know your test results and keep a list of the medicines you take. How can you care for yourself at home? · Plan your meals and snacks around foods that are low in purines and are safe for you to eat. These foods include: ¨ Green vegetables and tomatoes. ¨ Fruits. ¨ Whole-grain breads, rice, and cereals. ¨ Eggs, peanut butter, and nuts. ¨ Low-fat milk, cheese, and other milk products. ¨ Popcorn. ¨ Gelatin desserts, chocolate, cocoa, and cakes and sweets, in small amounts. · You can eat certain foods that are medium-high in purines, but eat them only once in a while. These foods include: ¨ Legumes, such as dried beans and dried peas. You can have 1 cup cooked legumes each day. ¨ Asparagus, cauliflower, spinach, mushrooms, and green peas. ¨ Fish and seafood (other than very high-purine seafood). ¨ Oatmeal, wheat bran, and wheat germ. · Limit very high-purine foods, including: ¨ Organ meats, such as liver, kidneys, sweetbreads, and brains. ¨ Meats, including lares, beef, pork, and lamb. ¨ Game meats and any other meats in large amounts. ¨ Anchovies, sardines, herring, mackerel, and scallops. ¨ Gravy. ¨ Beer. Where can you learn more? Go to http://sha-damien.info/. Enter F448 in the search box to learn more about \"Purine-Restricted Diet: Care Instructions. \" Current as of: May 12, 2017 Content Version: 11.4 © 8028-5415 [employer]. Care instructions adapted under license by [employer] (which disclaims liability or warranty for this information). If you have questions about a medical condition or this instruction, always ask your healthcare professional. [address] any warranty or liability for your use of this information. Introducing [facility]! Dear Lorenza Moscoso: Thank you for requesting a [employer] account. Our records indicate that you already have an active [employer] account. You can access your account anytime at [email or web address] Did you know that you can access your hospital and ER discharge instructions at any time in [employer]? You can also review all of your test results from your hospital stay or ER visit. Additional Information If you have questions, please visit the Frequently Asked Questions section of the [employer] website at [email or web address] Remember, [employer] is NOT to be used for urgent needs. For medical emergencies, dial 911. Now available from your iPhone and Android! Please provide this summary of care documentation to your next provider. Your primary care clinician is listed as Baxter Smoke. If you have any questions after today's visit, please call [phone].

## 2018-05-30 NOTE — PROGRESS NOTES
HISTORY OF PRESENT ILLNESS  Efraín Wing is a 77 y.o. female. HPI  Cardiovascular Review  The patient has hypertension and obesity. Diet and Lifestyle: generally follows a low sodium diet, exercises regularly, nonsmoker  Home BP Monitoring: is well controlled at home, ranging 111-120's/60-70's. Pertinent ROS: taking medications as instructed, no medication side effects noted, no TIA's, no chest pain on exertion, no dyspnea on exertion, no swelling of ankles-     LLE Edema   Currently 3 weeks ago had LLE edema with pain which resolved. Reports remote trauma to leg. No h/o dvt. .     Gout   Well controlled with allupurinol. Denies join pain or recent flare. No fevers or chills. Review of Systems   Constitutional: Negative for diaphoresis, fever and weight loss. Eyes: Negative for blurred vision and pain. Respiratory: Negative for shortness of breath. Cardiovascular: Negative for chest pain, orthopnea and leg swelling. Neurological: Negative for focal weakness and headaches. Psychiatric/Behavioral: Negative for depression. Patient Active Problem List    Diagnosis Date Noted    Severe obesity (BMI 35.0-39. 9) with comorbidity ([facility] [address] 04/19/2018    Mass of kidney of unknown nature 07/22/2016    Plantar fasciitis, bilateral 11/12/2014    Gout 10/07/2014    Hemorrhoid 07/30/2012    Rosacea 06/03/2011    GERD (gastroesophageal reflux disease) 02/04/2010    Essential hypertension, benign 01/28/2010    Anxiety 01/28/2010       Current Outpatient Prescriptions   Medication Sig Dispense Refill    amLODIPine (NORVASC) 10 mg tablet TAKE 1 TABLET DAILY 90 Tab 1    carvedilol (COREG) 6.25 mg tablet Take 1 Tab by mouth two (2) times daily (with meals).  180 Tab 0    allopurinol (ZYLOPRIM) 100 mg tablet TAKE 1 TABLET DAILY 90 Tab 3    lisinopril (PRINIVIL, ZESTRIL) 40 mg tablet TAKE 1 TABLET DAILY 90 Tab 2    atorvastatin (LIPITOR) 20 mg tablet TAKE 1 TABLET DAILY 90 Tab 2    ALPRAZolam (XANAX) 0.5 mg tablet TAKE 1 TABLET BY MOUTH 3 TIMES A DAY AS NEEDED 45 Tab 0    fluticasone (FLONASE) 50 mcg/actuation nasal spray USE 2 SPRAYS BY BOTH NOSTRILS ROUTE DAILY. 1 Bottle 4    ranitidine (ZANTAC) 300 mg tablet TAKE 1 TABLET DAILY 90 Tab 1    Sulfacetamide Sodium-Sulfur 9-4 % clsr by Apply Externally route as needed.  desonide (TRIDESILON) 0.05 % cream Apply  to affected area as needed for Skin Irritation. Allergies   Allergen Reactions    Codeine Nausea and Vomiting    Hydrochlorothiazide Other (comments)     Gout     Minocycline Nausea Only      Visit Vitals    /90 (BP 1 Location: Right arm, BP Patient Position: Sitting)    Pulse 72    Temp 98.1 °F (36.7 °C) (Oral)    Resp 16    Ht 5' 3\" (1.6 m)    Wt 208 lb (94.3 kg)    SpO2 97%    BMI 36.85 kg/m2       Physical Exam   Constitutional: She is oriented to person, place, and time. She appears well-developed. No distress. Eyes: Conjunctivae are normal.   Cardiovascular: Normal rate, regular rhythm and normal heart sounds. Pulmonary/Chest: Effort normal and breath sounds normal. No respiratory distress. She has no wheezes. She has no rales. She exhibits no tenderness. Musculoskeletal: She exhibits no edema (BLE ). Neurological: She is alert and oriented to person, place, and time. Skin: Skin is warm. Psychiatric: She has a normal mood and affect.      Lab Results  Component Value Date/Time   WBC 8.3 04/12/2018 10:06 AM   HGB 15.1 04/12/2018 10:06 AM   HCT 44.6 04/12/2018 10:06 AM   PLATELET 332 70/90/2022 10:06 AM   MCV 94 04/12/2018 10:06 AM     Lab Results  Component Value Date/Time   Hemoglobin A1c 5.8 (H) 04/12/2018 10:06 AM   Hemoglobin A1c 5.6 11/15/2016 11:07 AM   Hemoglobin A1c 6.0 (H) 03/15/2016 10:01 AM   Glucose 95 04/12/2018 10:06 AM   LDL, calculated 61 04/12/2018 10:06 AM   Creatinine (POC) 1.0 10/13/2016 10:35 AM   Creatinine 0.85 04/12/2018 10:06 AM      Lab Results  Component Value Date/Time Cholesterol, total 126 04/12/2018 10:06 AM   HDL Cholesterol 42 04/12/2018 10:06 AM   LDL, calculated 61 04/12/2018 10:06 AM   Triglyceride 114 04/12/2018 10:06 AM     Lab Results  Component Value Date/Time   ALT (SGPT) 14 04/12/2018 10:06 AM   AST (SGOT) 18 04/12/2018 10:06 AM   Alk. phosphatase 82 04/12/2018 10:06 AM   Bilirubin, total 0.6 04/12/2018 10:06 AM   Albumin 4.7 04/12/2018 10:06 AM   Protein, total 7.7 04/12/2018 10:06 AM   INR 1.1 02/03/2015 11:30 AM   Prothrombin time 10.6 02/03/2015 11:30 AM   PLATELET 220 12/97/6604 10:06 AM       Lab Results  Component Value Date/Time   GFR est non-AA 70 04/12/2018 10:06 AM   GFRNA, POC 55 (L) 10/13/2016 10:35 AM   GFR est AA 80 04/12/2018 10:06 AM   GFRAA, POC >60 10/13/2016 10:35 AM   Creatinine 0.85 04/12/2018 10:06 AM   Creatinine (POC) 1.0 10/13/2016 10:35 AM   BUN 19 04/12/2018 10:06 AM   Sodium 142 04/12/2018 10:06 AM   Potassium 4.2 04/12/2018 10:06 AM   Chloride 102 04/12/2018 10:06 AM   CO2 22 04/12/2018 10:06 AM     Lab Results  Component Value Date/Time   TSH 4.120 03/15/2016 10:01 AM   TSH 2.920 12/04/2013 11:49 AM   T4, Free 0.99 12/04/2013 11:49 AM      Lab Results   Component Value Date/Time    Glucose 95 04/12/2018 10:06 AM         ASSESSMENT and PLAN  Diagnoses and all orders for this visit:    1. Essential hypertension, benign- BP slightly elevated today but reports lower BP at home. No med changes. Counseled on low sodium diet and exercise. Monitor BP at home and notify office if BP remains elevated. Parameters given. See AVS for full details of plan and patient discussion.   -     METABOLIC PANEL, COMPREHENSIVE  -     LIPID PANEL    2. Chronic idiopathic gout involving toe without tophus, unspecified laterality- well controlled. Continue allopurinol.   -     URIC ACID  -     CBC WITH AUTOMATED DIFF    3. Severe obesity (BMI 35.0-39. 9) with comorbidity ([address]- lost 1lb. I have reviewed/discussed the above normal BMI with the patient.   I have recommended the following interventions: dietary management education, guidance, and counseling . Nadine Hall 4. Leg edema, left- resolved. DVT less likely. I reoccurs will send for evaluation. Red flags to warrant ER or earlier clinical evaluation reviewed. Follow-up Disposition:  Return in about 4 months (around 9/30/2018) for Follow-up, Hypertension. Medication risks/benefits/costs/interactions/alternatives discussed with patient. Dre Ovalle  was given an after visit summary which includes diagnoses, current medications, & vitals. she expressed understanding with the diagnosis and plan.

## 2018-05-30 NOTE — PROGRESS NOTES
Chief Complaint   Patient presents with    Hypertension     6 week follow up     Patient states she is here for her 6 week follow up for HTN.

## 2018-05-30 NOTE — PATIENT INSTRUCTIONS
It was a pleasure to see you! As discussed:    Blood pressure  - Your blood pressure was slightly high today   - Since you told me your blood pressure is lower at home. Keep a log of your blood pressure every day. -Bring your blood pressure monitor to your next appointment.   -Continue to follow a low salt/ low sodium diet (1500mg/ day)  -If your blood pressure is too low (<90/60) or too high (>180/100) or you have any symptoms such as chest pain, dizziness, shortness of breath- seek immediate medical attention. Purine-Restricted Diet: Care Instructions  Your Care Instructions  Purines are substances that are found in some foods. Your body turns purines into uric acid. High levels of uric acid can cause gout, which is a form of arthritis that causes pain and inflammation in joints. You may be able to help control the amount of uric acid in your body by limiting high-purine foods in your diet. Follow-up care is a key part of your treatment and safety. Be sure to make and go to all appointments, and call your doctor if you are having problems. It's also a good idea to know your test results and keep a list of the medicines you take. How can you care for yourself at home? · Plan your meals and snacks around foods that are low in purines and are safe for you to eat. These foods include:  ¨ Green vegetables and tomatoes. ¨ Fruits. ¨ Whole-grain breads, rice, and cereals. ¨ Eggs, peanut butter, and nuts. ¨ Low-fat milk, cheese, and other milk products. ¨ Popcorn. ¨ Gelatin desserts, chocolate, cocoa, and cakes and sweets, in small amounts. · You can eat certain foods that are medium-high in purines, but eat them only once in a while. These foods include:  ¨ Legumes, such as dried beans and dried peas. You can have 1 cup cooked legumes each day. ¨ Asparagus, cauliflower, spinach, mushrooms, and green peas. ¨ Fish and seafood (other than very high-purine seafood).   ¨ Oatmeal, wheat bran, and wheat germ.  · Limit very high-purine foods, including:  ¨ Organ meats, such as liver, kidneys, sweetbreads, and brains. ¨ Meats, including lares, beef, pork, and lamb. ¨ Game meats and any other meats in large amounts. ¨ Anchovies, sardines, herring, mackerel, and scallops. ¨ Gravy. ¨ Beer. Where can you learn more? Go to http://sha-damien.info/. Enter F448 in the search box to learn more about \"Purine-Restricted Diet: Care Instructions. \"  Current as of: May 12, 2017  Content Version: 11.4  © 6084-0482 [employer]. Care instructions adapted under license by [employer] (which disclaims liability or warranty for this information). If you have questions about a medical condition or this instruction, always ask your healthcare professional. [address] any warranty or liability for your use of this information.

## 2018-07-03 RX ORDER — FLUTICASONE PROPIONATE 50 MCG
SPRAY, SUSPENSION (ML) NASAL
Qty: 3 BOTTLE | Refills: 1 | Status: SHIPPED | OUTPATIENT
Start: 2018-07-03 | End: 2018-10-08 | Stop reason: SDUPTHER

## 2018-09-21 LAB
ALBUMIN SERPL-MCNC: 4.6 G/DL (ref 3.5–4.8)
ALBUMIN/GLOB SERPL: 1.9 {RATIO} (ref 1.2–2.2)
ALP SERPL-CCNC: 76 IU/L (ref 39–117)
ALT SERPL-CCNC: 19 IU/L (ref 0–32)
AST SERPL-CCNC: 16 IU/L (ref 0–40)
BASOPHILS # BLD AUTO: 0.1 X10E3/UL (ref 0–0.2)
BASOPHILS NFR BLD AUTO: 1 %
BILIRUB SERPL-MCNC: 0.6 MG/DL (ref 0–1.2)
BUN SERPL-MCNC: 24 MG/DL (ref 8–27)
BUN/CREAT SERPL: 26 (ref 12–28)
CALCIUM SERPL-MCNC: 9.2 MG/DL (ref 8.7–10.3)
CHLORIDE SERPL-SCNC: 106 MMOL/L (ref 96–106)
CHOLEST SERPL-MCNC: 123 MG/DL (ref 100–199)
CO2 SERPL-SCNC: 22 MMOL/L (ref 20–29)
CREAT SERPL-MCNC: 0.92 MG/DL (ref 0.57–1)
EOSINOPHIL # BLD AUTO: 0.2 X10E3/UL (ref 0–0.4)
EOSINOPHIL NFR BLD AUTO: 4 %
ERYTHROCYTE [DISTWIDTH] IN BLOOD BY AUTOMATED COUNT: 14 % (ref 12.3–15.4)
GLOBULIN SER CALC-MCNC: 2.4 G/DL (ref 1.5–4.5)
GLUCOSE SERPL-MCNC: 104 MG/DL (ref 65–99)
HCT VFR BLD AUTO: 42.5 % (ref 34–46.6)
HDLC SERPL-MCNC: 43 MG/DL
HGB BLD-MCNC: 14.5 G/DL (ref 11.1–15.9)
IMM GRANULOCYTES # BLD: 0 X10E3/UL (ref 0–0.1)
IMM GRANULOCYTES NFR BLD: 0 %
LDLC SERPL CALC-MCNC: 57 MG/DL (ref 0–99)
LYMPHOCYTES # BLD AUTO: 2.4 X10E3/UL (ref 0.7–3.1)
LYMPHOCYTES NFR BLD AUTO: 37 %
MCH RBC QN AUTO: 31.4 PG (ref 26.6–33)
MCHC RBC AUTO-ENTMCNC: 34.1 G/DL (ref 31.5–35.7)
MCV RBC AUTO: 92 FL (ref 79–97)
MONOCYTES # BLD AUTO: 0.3 X10E3/UL (ref 0.1–0.9)
MONOCYTES NFR BLD AUTO: 4 %
NEUTROPHILS # BLD AUTO: 3.5 X10E3/UL (ref 1.4–7)
NEUTROPHILS NFR BLD AUTO: 54 %
PLATELET # BLD AUTO: 232 X10E3/UL (ref 150–379)
POTASSIUM SERPL-SCNC: 4.1 MMOL/L (ref 3.5–5.2)
PROT SERPL-MCNC: 7 G/DL (ref 6–8.5)
RBC # BLD AUTO: 4.62 X10E6/UL (ref 3.77–5.28)
SODIUM SERPL-SCNC: 143 MMOL/L (ref 134–144)
TRIGL SERPL-MCNC: 117 MG/DL (ref 0–149)
URATE SERPL-MCNC: 5.7 MG/DL (ref 2.5–7.1)
VLDLC SERPL CALC-MCNC: 23 MG/DL (ref 5–40)
WBC # BLD AUTO: 6.5 X10E3/UL (ref 3.4–10.8)

## 2018-09-27 ENCOUNTER — OFFICE VISIT (OUTPATIENT)
Dept: INTERNAL MEDICINE CLINIC | Age: 68
End: 2018-09-27

## 2018-09-27 VITALS
HEART RATE: 91 BPM | SYSTOLIC BLOOD PRESSURE: 130 MMHG | OXYGEN SATURATION: 99 % | DIASTOLIC BLOOD PRESSURE: 86 MMHG | TEMPERATURE: 98.1 F | BODY MASS INDEX: 37.32 KG/M2 | WEIGHT: 210.6 LBS | RESPIRATION RATE: 16 BRPM | HEIGHT: 63 IN

## 2018-09-27 DIAGNOSIS — M1A.0790 CHRONIC IDIOPATHIC GOUT INVOLVING TOE WITHOUT TOPHUS, UNSPECIFIED LATERALITY: ICD-10-CM

## 2018-09-27 DIAGNOSIS — E66.01 SEVERE OBESITY (BMI 35.0-39.9) WITH COMORBIDITY (HCC): ICD-10-CM

## 2018-09-27 DIAGNOSIS — I10 ESSENTIAL HYPERTENSION, BENIGN: Primary | ICD-10-CM

## 2018-09-27 DIAGNOSIS — Z23 ENCOUNTER FOR IMMUNIZATION: ICD-10-CM

## 2018-09-27 DIAGNOSIS — F41.9 ANXIETY: ICD-10-CM

## 2018-09-27 NOTE — PROGRESS NOTES
Jam Garcia is a 77 y.o. female Chief Complaint Patient presents with  Hypertension 1. Have you been to the ER, urgent care clinic since your last visit? Hospitalized since your last visit? No 
 
2. Have you seen or consulted any other health care providers outside of the [facility] since your last visit? Include any pap smears or colon screening. No  
 
Visit Vitals  /86 (BP 1 Location: Right arm, BP Patient Position: Sitting)  Pulse 91  Temp 98.1 °F (36.7 °C) (Oral)  Resp 16  
 Ht 5' 3\" (1.6 m)  Wt 210 lb 9.6 oz (95.5 kg)  SpO2 99%  BMI 37.31 kg/m2 Health Maintenance Due Topic Date Due  Shingrix Vaccine Age 50> (1 of 2) 03/18/1998  Influenza Age 5 to Adult  08/01/2018  Pneumococcal 65+ Low/Medium Risk (2 of 2 - PPSV23) 08/22/2018 Administered 0.5 ml of Pneumococcal vaccine into left deltoid. Administered 0.5 ml of high dose influenza vaccine into right deltoid. Patient tolerated injections well with no adverse effects noted.  
 
Dhiraj Duarte LPN

## 2018-09-27 NOTE — PROGRESS NOTES
HISTORY OF PRESENT ILLNESS Wendy Alvarado is a 77 y.o. female. HPI Cardiovascular Review: 
The patient has hypertension and obesity. Diet and Lifestyle: exercises regularly, nonsmoker Stressed over politics watching [employer] 24hrs. Mann Reasoner +Craving Carbs Home BP Monitoring: is not measured at home regularly Pertinent ROS: taking medications as instructed, no medication side effects noted, no TIA's, no chest pain on exertion, no dyspnea on exertion, no swelling of ankles, taking medications as instructed\",no medication side effects noted. ROSper HPI Patient Active Problem List  
 Diagnosis Date Noted  Severe obesity (BMI 35.0-39. 9) with comorbidity ([address] 04/19/2018  Mass of kidney of unknown nature 07/22/2016  Plantar fasciitis, bilateral 11/12/2014  Gout 10/07/2014  Hemorrhoid 07/30/2012  Rosacea 06/03/2011  GERD (gastroesophageal reflux disease) 02/04/2010  Essential hypertension, benign 01/28/2010  Anxiety 01/28/2010 Current Outpatient Prescriptions Medication Sig Dispense Refill  atorvastatin (LIPITOR) 20 mg tablet TAKE 1 TABLET DAILY 90 Tab 2  carvedilol (COREG) 6.25 mg tablet TAKE 1 TABLET TWICE A DAY WITH MEALS 180 Tab 2  
 fluticasone (FLONASE) 50 mcg/actuation nasal spray USE 2 SPRAYS BY BOTH NOSTRILS ROUTE DAILY. 3 Bottle 1  
 amLODIPine (NORVASC) 10 mg tablet TAKE 1 TABLET DAILY 90 Tab 1  
 allopurinol (ZYLOPRIM) 100 mg tablet TAKE 1 TABLET DAILY 90 Tab 3  
 lisinopril (PRINIVIL, ZESTRIL) 40 mg tablet TAKE 1 TABLET DAILY 90 Tab 2  ALPRAZolam (XANAX) 0.5 mg tablet TAKE 1 TABLET BY MOUTH 3 TIMES A DAY AS NEEDED 45 Tab 0  
 ranitidine (ZANTAC) 300 mg tablet TAKE 1 TABLET DAILY 90 Tab 1  
 Sulfacetamide Sodium-Sulfur 9-4 % clsr by Apply Externally route as needed.  desonide (TRIDESILON) 0.05 % cream Apply  to affected area as needed for Skin Irritation. Allergies Allergen Reactions  Codeine Nausea and Vomiting  Hydrochlorothiazide Other (comments) Gout  Minocycline Nausea Only Visit Vitals  /86 (BP 1 Location: Right arm, BP Patient Position: Sitting)  Pulse 91  Temp 98.1 °F (36.7 °C) (Oral)  Resp 16  
 Ht 5' 3\" (1.6 m)  Wt 210 lb 9.6 oz (95.5 kg)  SpO2 99%  BMI 37.31 kg/m2 Physical Exam  
Constitutional: She is oriented to person, place, and time. She appears well-developed. No distress. Eyes: Conjunctivae are normal.  
Neck: Neck supple. Cardiovascular: Normal rate, regular rhythm and normal heart sounds. Pulmonary/Chest: Effort normal and breath sounds normal. No respiratory distress. She has no wheezes. She has no rales. She exhibits no tenderness. Musculoskeletal: She exhibits no edema (BLE ). Neurological: She is alert and oriented to person, place, and time. Skin: Skin is warm. Psychiatric: She has a normal mood and affect. ASSESSMENT and PLAN Diagnoses and all orders for this visit: 1. Essential hypertension, benign- well controlled. Counseled on diet and exercise. Continue current regimen. 2. Severe obesity (BMI 35.0-39. 9) with comorbidity ([address]- I have reviewed/discussed the above normal BMI with the patient. I have recommended the following interventions: dietary management education, guidance, and counseling . Kathleen Eldridge 3. Anxiety- advised to \"fast\" from political news as it is exacerbating her anxiety. Patient Education:  Reviewed concept of anxiety as biochemical imbalance of neurotransmitters and rationale for treatment. Instructed patient to contact office or 911 promptly should condition worsen or any new symptoms appear and provided on-call telephone numbers. IF THE PATIENT HAS ANY SUICIDAL OR HOMICIDAL IDEATION, CALL THE OFFICE, DISCUSS WITH A SUPPORT MEMBER OR GO TO THE ER IMMEDIATELY. Patient was agreeable with this 4. Chronic idiopathic gout involving toe without tophus, unspecified laterality- well controlled. 5. Encounter for immunization-  Received influenza vaccine in clinic without complication. 
 
-     INFLUENZA VACCINE INACTIVATED (IIV), SUBUNIT, ADJUVANTED, IM 
-     ADMIN INFLUENZA VIRUS VAC 
-     ADMIN PNEUMOCOCCAL VACCINE 
-     PNEUMOCOCCAL POLYSACCHARIDE VACCINE, 23-VALENT, ADULT OR IMMUNOSUPPRESSED PT DOSE, 
-     varicella-zoster recombinant, PF, (SHINGRIX, PF,) 50 mcg/0.5 mL susr injection; 0.5 mL by IntraMUSCular route once for 1 dose. Follow-up Disposition: 
Return in about 6 months (around 3/27/2019) for Physical - 30 minute appointment, Medicare Wellness. Medication risks/benefits/costs/interactions/alternatives discussed with patient. Moe Whitaker  was given an after visit summary which includes diagnoses, current medications, & vitals. she expressed understanding with the diagnosis and plan.

## 2018-10-03 ENCOUNTER — HOSPITAL ENCOUNTER (OUTPATIENT)
Dept: CT IMAGING | Age: 68
Discharge: HOME | End: 2018-10-03
Attending: UROLOGY
Payer: MEDICARE

## 2018-10-03 DIAGNOSIS — D30.02 BENIGN NEOPLASM OF LEFT KIDNEY: ICD-10-CM

## 2018-10-03 PROCEDURE — 72193 CT PELVIS W/DYE: CPT

## 2018-10-03 PROCEDURE — 74011636320 HC RX REV CODE- 636/320: Performed by: UROLOGY

## 2018-10-03 PROCEDURE — 74011250636 HC RX REV CODE- 250/636: Performed by: UROLOGY

## 2018-10-03 RX ORDER — SODIUM CHLORIDE 9 MG/ML
50 INJECTION, SOLUTION INTRAVENOUS
Status: COMPLETED | OUTPATIENT
Start: 2018-10-03 | End: 2018-10-03

## 2018-10-03 RX ORDER — SODIUM CHLORIDE 0.9 % (FLUSH) 0.9 %
10 SYRINGE (ML) INJECTION
Status: COMPLETED | OUTPATIENT
Start: 2018-10-03 | End: 2018-10-03

## 2018-10-03 RX ADMIN — Medication 10 ML: at 09:07

## 2018-10-03 RX ADMIN — IOPAMIDOL 100 ML: 755 INJECTION, SOLUTION INTRAVENOUS at 09:07

## 2018-10-03 RX ADMIN — SODIUM CHLORIDE 50 ML/HR: 900 INJECTION, SOLUTION INTRAVENOUS at 09:07

## 2018-10-08 DIAGNOSIS — J30.9 ALLERGIC RHINITIS, UNSPECIFIED SEASONALITY, UNSPECIFIED TRIGGER: Primary | ICD-10-CM

## 2018-10-08 RX ORDER — FLUTICASONE PROPIONATE 50 MCG
SPRAY, SUSPENSION (ML) NASAL
Qty: 48 G | Refills: 2 | Status: SHIPPED | OUTPATIENT
Start: 2018-10-08 | End: 2019-07-02 | Stop reason: SDUPTHER

## 2018-11-05 RX ORDER — LISINOPRIL 40 MG/1
TABLET ORAL
Qty: 90 TAB | Refills: 2 | Status: SHIPPED | OUTPATIENT
Start: 2018-11-05 | End: 2019-05-13 | Stop reason: SDUPTHER

## 2018-12-05 DIAGNOSIS — I10 ESSENTIAL HYPERTENSION: ICD-10-CM

## 2018-12-07 RX ORDER — AMLODIPINE BESYLATE 10 MG/1
TABLET ORAL
Qty: 90 TAB | Refills: 1 | Status: SHIPPED | OUTPATIENT
Start: 2018-12-07 | End: 2019-05-13 | Stop reason: SDUPTHER

## 2019-01-28 DIAGNOSIS — M1A.0790 CHRONIC IDIOPATHIC GOUT INVOLVING TOE WITHOUT TOPHUS, UNSPECIFIED LATERALITY: Primary | ICD-10-CM

## 2019-01-29 RX ORDER — ALLOPURINOL 100 MG/1
TABLET ORAL
Qty: 90 TAB | Refills: 3 | Status: SHIPPED | OUTPATIENT
Start: 2019-01-29 | End: 2020-01-02 | Stop reason: SDUPTHER

## 2019-02-03 DIAGNOSIS — F41.9 ANXIETY: ICD-10-CM

## 2019-02-05 RX ORDER — ALPRAZOLAM 0.5 MG/1
0.5 TABLET ORAL
Qty: 45 TAB | Refills: 0 | OUTPATIENT
Start: 2019-02-05 | End: 2020-03-17 | Stop reason: SDUPTHER

## 2019-05-13 ENCOUNTER — OFFICE VISIT (OUTPATIENT)
Dept: INTERNAL MEDICINE CLINIC | Age: 69
End: 2019-05-13

## 2019-05-13 VITALS
BODY MASS INDEX: 38.52 KG/M2 | HEART RATE: 87 BPM | SYSTOLIC BLOOD PRESSURE: 110 MMHG | HEIGHT: 63 IN | TEMPERATURE: 98.3 F | OXYGEN SATURATION: 97 % | DIASTOLIC BLOOD PRESSURE: 88 MMHG | WEIGHT: 217.4 LBS | RESPIRATION RATE: 16 BRPM

## 2019-05-13 DIAGNOSIS — Z00.00 MEDICARE ANNUAL WELLNESS VISIT, SUBSEQUENT: Primary | ICD-10-CM

## 2019-05-13 DIAGNOSIS — I10 ESSENTIAL HYPERTENSION: ICD-10-CM

## 2019-05-13 DIAGNOSIS — E66.9 OBESITY (BMI 30-39.9): ICD-10-CM

## 2019-05-13 DIAGNOSIS — M1A.09X0 IDIOPATHIC CHRONIC GOUT OF MULTIPLE SITES WITHOUT TOPHUS: ICD-10-CM

## 2019-05-13 DIAGNOSIS — R79.9 ABNORMAL BLOOD CHEMISTRY: ICD-10-CM

## 2019-05-13 DIAGNOSIS — E78.2 MIXED HYPERLIPIDEMIA: ICD-10-CM

## 2019-05-13 DIAGNOSIS — M79.89 SWELLING OF BOTH HANDS: ICD-10-CM

## 2019-05-13 DIAGNOSIS — G47.33 OSA ON CPAP: ICD-10-CM

## 2019-05-13 DIAGNOSIS — I10 HYPERTENSION, ESSENTIAL: ICD-10-CM

## 2019-05-13 DIAGNOSIS — Z12.39 BREAST CANCER SCREENING: ICD-10-CM

## 2019-05-13 DIAGNOSIS — Z99.89 OSA ON CPAP: ICD-10-CM

## 2019-05-13 DIAGNOSIS — F43.29 STRESS AND ADJUSTMENT REACTION: ICD-10-CM

## 2019-05-13 RX ORDER — ATORVASTATIN CALCIUM 20 MG/1
TABLET, FILM COATED ORAL
Qty: 90 TAB | Refills: 1 | Status: SHIPPED | OUTPATIENT
Start: 2019-05-13 | End: 2020-01-02 | Stop reason: SDUPTHER

## 2019-05-13 RX ORDER — LISINOPRIL 40 MG/1
TABLET ORAL
Qty: 90 TAB | Refills: 1 | Status: SHIPPED | OUTPATIENT
Start: 2019-05-13 | End: 2020-01-02 | Stop reason: SDUPTHER

## 2019-05-13 RX ORDER — CARVEDILOL 6.25 MG/1
TABLET ORAL
Qty: 180 TAB | Refills: 1 | Status: SHIPPED | OUTPATIENT
Start: 2019-05-13 | End: 2020-01-02 | Stop reason: SDUPTHER

## 2019-05-13 RX ORDER — AMLODIPINE BESYLATE 10 MG/1
TABLET ORAL
Qty: 90 TAB | Refills: 1 | Status: SHIPPED | OUTPATIENT
Start: 2019-05-13 | End: 2019-12-30 | Stop reason: SDUPTHER

## 2019-05-13 NOTE — PATIENT INSTRUCTIONS
It was a pleasure to see you! As discussed: 
 
-Improve stress management (exercise at least 5mins/ day- goal at least 150mins/ a week) Try melantonin at night to sleep Recovering From Depression: Care Instructions Your Care Instructions Taking good care of yourself is important as you recover from depression. In time, your symptoms will fade as your treatment takes hold. Do not give up. Instead, focus your energy on getting better. Your mood will improve. It just takes some time. Focus on things that can help you feel better, such as being with friends and family, eating well, and getting enough rest. But take things slowly. Do not do too much too soon. You will begin to feel better gradually. Follow-up care is a key part of your treatment and safety. Be sure to make and go to all appointments, and call your doctor if you are having problems. It's also a good idea to know your test results and keep a list of the medicines you take. How can you care for yourself at home? Be realistic · If you have a large task to do, break it up into smaller steps you can handle, and just do what you can. · You may want to put off important decisions until your depression has lifted. If you have plans that will have a major impact on your life, such as marriage, divorce, or a job change, try to wait a bit. Talk it over with friends and loved ones who can help you look at the overall picture first. 
· Reaching out to people for help is important. Do not isolate yourself. Let your family and friends help you. Find someone you can trust and confide in, and talk to that person. · Be patient, and be kind to yourself. Remember that depression is not your fault and is not something you can overcome with willpower alone. Treatment is necessary for depression, just like for any other illness. Feeling better takes time, and your mood will improve little by little. Stay active · Stay busy and get outside. Take a walk, or try some other light exercise. · Talk with your doctor about an exercise program. Exercise can help with mild depression. · Go to a movie or concert. Take part in a [employer] activity or other social gathering. Go to a ball game. · Ask a friend to have dinner with you. Take care of yourself · Eat a balanced diet with plenty of fresh fruits and vegetables, whole grains, and lean protein. If you have lost your appetite, eat small snacks rather than large meals. · Avoid drinking alcohol or using illegal drugs. Do not take medicines that have not been prescribed for you. They may interfere with medicines you may be taking for depression, or they may make your depression worse. · Take your medicines exactly as they are prescribed. You may start to feel better within 1 to 3 weeks of taking antidepressant medicine. But it can take as many as 6 to 8 weeks to see more improvement. If you have questions or concerns about your medicines, or if you do not notice any improvement by 3 weeks, talk to your doctor. · If you have any side effects from your medicine, tell your doctor. Antidepressants can make you feel tired, dizzy, or nervous. Some people have dry mouth, constipation, headaches, sexual problems, or diarrhea. Many of these side effects are mild and will go away on their own after you have been taking the medicine for a few weeks. Some may last longer. Talk to your doctor if side effects are bothering you too much. You might be able to try a different medicine. · Get enough sleep. If you have problems sleeping: 
? Go to bed at the same time every night, and get up at the same time every morning. ? Keep your bedroom dark and quiet. ? Do not exercise after 5:00 p.m. ? Avoid drinks with caffeine after 5:00 p.m. · Avoid sleeping pills unless they are prescribed by the doctor treating your depression.  Sleeping pills may make you groggy during the day, and they may interact with other medicine you are taking. · If you have any other illnesses, such as diabetes, heart disease, or high blood pressure, make sure to continue with your treatment. Tell your doctor about all of the medicines you take, including those with or without a prescription. · Keep the numbers for these national suicide hotlines: 5-799-086-TALK (2-869.690.5145) and 6-836-VFLKKKA (7-291.157.9061). If you or someone you know talks about suicide or feeling hopeless, get help right away. When should you call for help? Call 911 anytime you think you may need emergency care. For example, call if: 
  · You feel like hurting yourself or someone else.  
  · Someone you know has depression and is about to attempt or is attempting suicide.  
[facility] your doctor now or seek immediate medical care if: 
  · You hear voices.  
  · Someone you know has depression and: 
? Starts to give away his or her possessions. ? Uses illegal drugs or drinks alcohol heavily. ? Talks or writes about death, including writing suicide notes or talking about guns, knives, or pills. ? Starts to spend a lot of time alone. ? Acts very aggressively or suddenly appears calm.  
 Watch closely for changes in your health, and be sure to contact your doctor if: 
  · You do not get better as expected. Where can you learn more? Go to http://sha-damien.info/. Enter T810 in the search box to learn more about \"Recovering From Depression: Care Instructions. \" Current as of: September 11, 2018 Content Version: 11.9 © 9266-4960 [employer]. Care instructions adapted under license by [employer] (which disclaims liability or warranty for this information). If you have questions about a medical condition or this instruction, always ask your healthcare professional. [address] any warranty or liability for your use of this information.  
 
Crow Brunner MD 
 Address: [facility], [address] [facility], [address] Phone: [phone]

## 2019-05-13 NOTE — PROGRESS NOTES
HISTORY OF PRESENT ILLNESS Anuj Rossi is a 68 y.o. female. HPI Gout Well controlled on allopurinol. No recent flares. Cardiovascular Review: 
The patient has hypertension, hyperlipidemia and obesity Body mass index is 38.51 kg/m². Diet and Lifestyle: does not rigorously follow a diabetic diet, sedentary, nonsmoker, reduced carbohydrate diet, high sugar/ high carbohydrate diet, Drinks soda/ sweetened beverages frequently Home BP Monitoring: is not measured at home. Pertinent ROS: no TIA's, no chest pain on exertion, no dyspnea on exertion, no swelling of ankles, taking medications as instructed\",no medication side effects noted. Kidney lesion Seeing a new Urologist for interval growth of kidney lesion. Plan for possible kidney biopsy. Followed by Dr. Iker Florence. Shx: Daughter in [facility] had triplets 3 months prematurely- 2 have , 2rd daughter Eloy Kumar) is doing well. This is the Subsequent Medicare Annual Wellness Exam, performed 12 months or more after the Initial AWV or the last Subsequent AWV I have reviewed the patient's medical history in detail and updated the computerized patient record. History Past Medical History:  
Diagnosis Date  Gall stones  GERD (gastroesophageal reflux disease)  Hemorrhoid  Hypertension  Ill-defined condition   
 gout  Ill-defined condition   
 prediabetes - is on metformin  Ill-defined condition   
 roscea  Sleep apnea   
 borderline - was put on a machine/not using a machine now Past Surgical History:  
Procedure Laterality Date  BREAST SURGERY PROCEDURE UNLISTED    
 benign tumor right breast  
 COLONOSCOPY    COLONOSCOPY N/A 2016 COLONOSCOPY performed by Annel Cain MD at [address] [place] [address] ENDOSCOPY  DEXA BONE DENSITY STUDY AXIAL    
 nml  HX  SECTION    
 x 2  
[facility] [address]   HX TONSILLECTOMY  WILMAR MAMMOGRAM SCREEN BILAT  3/2014  PAP SMEAR  2000  
 SINUS SURGERY PROC UNLISTED Current Outpatient Medications Medication Sig Dispense Refill  ALPRAZolam (XANAX) 0.5 mg tablet Take 1 Tab by mouth three (3) times daily as needed for Anxiety. Max Daily Amount: 1.5 mg. 45 Tab 0  
 allopurinol (ZYLOPRIM) 100 mg tablet TAKE 1 TABLET DAILY 90 Tab 3  
 amLODIPine (NORVASC) 10 mg tablet TAKE 1 TABLET DAILY 90 Tab 1  
 lisinopril (PRINIVIL, ZESTRIL) 40 mg tablet TAKE 1 TABLET DAILY 90 Tab 2  
 fluticasone (FLONASE) 50 mcg/actuation nasal spray USE 2 SPRAYS BY BOTH NOSTRILS ROUTE DAILY. 48 g 2  
 atorvastatin (LIPITOR) 20 mg tablet TAKE 1 TABLET DAILY 90 Tab 2  carvedilol (COREG) 6.25 mg tablet TAKE 1 TABLET TWICE A DAY WITH MEALS 180 Tab 2  
 ranitidine (ZANTAC) 300 mg tablet TAKE 1 TABLET DAILY (Patient taking differently: PRN) 90 Tab 1  
 Sulfacetamide Sodium-Sulfur 9-4 % clsr by Apply Externally route as needed.  desonide (TRIDESILON) 0.05 % cream Apply  to affected area as needed for Skin Irritation. Allergies Allergen Reactions  Codeine Nausea and Vomiting  Hydrochlorothiazide Other (comments) Gout  Minocycline Nausea Only Family History Problem Relation Age of Onset  Cancer Mother   
     breast, 46s  Diabetes Mother  Heart Disease Mother MI, CAD  Stroke Father  Heart Disease Father CAD  Dementia Father Social History Tobacco Use  Smoking status: Never Smoker  Smokeless tobacco: Never Used Substance Use Topics  Alcohol use: Yes Comment: special occasional  
 
Patient Active Problem List  
Diagnosis Code  Essential hypertension, benign I10  
 Anxiety F41.9  GERD (gastroesophageal reflux disease) K21.9  Rosacea L71.9  Hemorrhoid K64.9  
 Gout M10.9  Plantar fasciitis, bilateral M72.2  Mass of kidney of unknown nature N28.89  Severe obesity (BMI 35.0-39. 9) with comorbidity ([facility] [address] E66.01  
 
 
 Depression Risk Factor Screening:  
 
3 most recent PHQ Screens 5/13/2019 Little interest or pleasure in doing things Several days Feeling down, depressed, irritable, or hopeless More than half the days Total Score PHQ 2 3 Alcohol Risk Factor Screening: You do not drink alcohol or very rarely. Functional Ability and Level of Safety:  
Hearing Loss Hearing is good. Activities of Daily Living The home contains: no safety equipment. Patient does total self care Fall Risk Fall Risk Assessment, last 12 mths 5/13/2019 Able to walk? Yes Fall in past 12 months? Yes Fall with injury? No  
Number of falls in past 12 months 1 Fall Risk Score 1 Abuse Screen Patient is not abused Cognitive Screening Evaluation of Cognitive Function: 
Has your family/caregiver stated any concerns about your memory: no 
 
 
Patient Care Team  
Patient Care Team: 
Awa Jeff MD as PCP - General (Internal Medicine) Viri Mills MD (Cardiology) Review of Systems Musculoskeletal: Positive for falls (right leg gave). Psychiatric/Behavioral: The patient has insomnia (history of JESSE not using CPAP. Also having anxiety over stressors ).   
per HPI Physical Exam  
Constitutional: She is oriented to person, place, and time. She appears well-developed and well-nourished. HENT:  
Right Ear: External ear normal.  
Left Ear: External ear normal.  
Mouth/Throat: Oropharynx is clear and moist. No oropharyngeal exudate. Eyes: Conjunctivae are normal. No scleral icterus. Neck: Normal range of motion. Neck supple. No thyromegaly present. Cardiovascular: Normal rate and regular rhythm. PVCs Pulmonary/Chest: Effort normal and breath sounds normal. No respiratory distress. She has no wheezes. She has no rales. She exhibits no tenderness. Abdominal: Soft. Bowel sounds are normal. She exhibits no distension. There is no tenderness. There is no rebound and no guarding. Musculoskeletal: Normal range of motion. She exhibits no edema or tenderness. Neurological: She is alert and oriented to person, place, and time. Final [99] 10/03/2018 08:37 10/03/2018 09:06 Result Information Status: Final result (Exam End: 10/3/2018 09:06) Provider Status: Open Study Result INDICATION: Benign neoplasm of left kidney; none 
  
COMPARISON: CT abdomen pelvis October 12, 2017 
  
TECHNIQUE:  
Following a noncontrast CT of the abdomen, arterial phase, nephrographic phase, 
and excretory phase imaging of the abdomen and pelvis was performed. 100 cc Isovue-370 was administered intravenously. Coronal and sagittal reconstructions 
were generated. Oral contrast was not administered. CT dose reduction was 
achieved through use of a standardized protocol tailored for this examination 
and automatic exposure control for dose modulation.  
  
FINDINGS:  
VISUALIZED THORAX: Clear lungs. Normal heart size. No pleural or pericardial 
effusion. LIVER: No mass or biliary dilatation. GALLBLADDER: Surgically absent. SPLEEN: Unremarkable PANCREAS: No mass or ductal dilatation. ADRENALS: Unremarkable. KIDNEYS/URETERS: Normal right kidney. Unchanged simple cyst at the upper pole of 
left kidney. Projecting towards the hilum from the interpolar cortex is a 14 x 
13 mm predominantly hypodense lesion with thin enhancing septations. On 
noncontrast imaging, the lesion measures less than 10 Hounsfield units. There is 
mild internal enhancement. Overall, comparing to the prior studies, there has 
been minimal growth but no change in the enhancement pattern. The ureters are 
normal in course and caliber bilaterally. PERITONEUM: No abdominal lymphadenopathy or ascites. STOMACH: Unremarkable. SMALL BOWEL: No dilatation or wall thickening. COLON: No dilatation or wall thickening. APPENDIX: Normal 
PELVIS: Status post hysterectomy. No pelvic free fluid or lymphadenopathy. Normal urinary bladder. BONES: No destructive bone lesion.  
  
ADDITIONAL COMMENTS: N/A 
  
IMPRESSION IMPRESSION:  
14 x 13 mm hypodense left renal hilar lesion with low-level enhancement and thin 
internal septations. Slight growth but otherwise minimal change from the prior CTs suggests an indolent neoplasm such as an oncocytoma or a benign lesion such 
as angiomyolipoma. ASSESSMENT and PLAN Assessment/Plan Education and counseling provided: 
Are appropriate based on today's review and evaluation Diagnoses and all orders for this visit: 
 
1. Medicare annual wellness visit, subsequent- Health Maintenance reviewed and addressed as ordered below 2. Essential hypertension- BP well controlled. Continue current regimen. 
-     METABOLIC PANEL, COMPREHENSIVE 
-     LIPID PANEL 
-     amLODIPine (NORVASC) 10 mg tablet; TAKE 1 TABLET DAILY 3. Mixed hyperlipidemia- check lipids -     METABOLIC PANEL, COMPREHENSIVE 
-     LIPID PANEL 
-     atorvastatin (LIPITOR) 20 mg tablet; TAKE 1 TABLET DAILY 4. Hypertension, essential 
-     carvedilol (COREG) 6.25 mg tablet; TAKE 1 TABLET TWICE A DAY WITH MEALS 
 
5. JESSE on CPAP- return to sleep medicine. Not compliant with CPAP 
-     SLEEP MEDICINE REFERRAL 6. Obesity (BMI 30-39. 9)- + stress eating. Plans to optimize stress managemetn 7. Breast cancer screening -     [facility] 3D RICARDO W MAMMO BI SCREENING INCL CAD; Future 8. Abnormal blood chemistry 
-     HEMOGLOBIN A1C WITH EAG 9. Swelling of both hands- likely dietary related. R/o RA 
-     RA + CCP ABS 10. Idiopathic chronic gout of multiple sites without tophus- asx 
-     URIC ACID 11. Stress and adjustment reaction- due to multiple stressors. Trial of sleep optimization and exercise. If no improvement--> rtc to discuss treatment options including antidepressant (zoloft) +?- therapy. Other orders 
-     lisinopril (PRINIVIL, ZESTRIL) 40 mg tablet; TAKE 1 TABLET DAILY Medication risks/benefits/costs/interactions/alternatives discussed with patient. Dane Arambula  was given an after visit summary which includes diagnoses, current medications, & vitals. she expressed understanding with the diagnosis and plan.

## 2019-05-13 NOTE — PROGRESS NOTES
Identified pt with two pt identifiers(name and ). Reviewed record in preparation for visit and have obtained necessary documentation. All patient medications has been reviewed. Chief Complaint Patient presents with  Obesity  Hypertension Health Maintenance Due Topic  Shingrix Vaccine Age 50> (1 of 2)  BREAST CANCER SCRN MAMMOGRAM   
 MEDICARE YEARLY EXAM   
 
 
Vitals:  
 [id number] BP: 110/88 Pulse: 87 Resp: 16 Temp: 98.3 °F (36.8 °C) TempSrc: Oral  
SpO2: 97% Weight: 217 lb 6.4 oz (98.6 kg) Height: 5' 3\" (1.6 m) PainSc:   0 - No pain Coordination of Care Questionnaire:  
1) Have you been to an emergency room, urgent care, or hospitalized since your last visit?   no    
 
2. Have seen or consulted any other health care provider since your last visit? NO 
 
3) Do you have an Advanced Directive/ Living Will in place? YES If yes, do we have a copy on file YES If no, would you like information NO Patient is accompanied by self I have received verbal consent from Viktoria De Leon to discuss any/all medical information while they are present in the room.

## 2019-05-20 LAB
ALBUMIN SERPL-MCNC: 4.3 G/DL (ref 3.5–4.8)
ALBUMIN/GLOB SERPL: 1.3 {RATIO} (ref 1.2–2.2)
ALP SERPL-CCNC: 91 IU/L (ref 39–117)
ALT SERPL-CCNC: 14 IU/L (ref 0–32)
AST SERPL-CCNC: 12 IU/L (ref 0–40)
BILIRUB SERPL-MCNC: 0.7 MG/DL (ref 0–1.2)
BUN SERPL-MCNC: 12 MG/DL (ref 8–27)
BUN/CREAT SERPL: 13 (ref 12–28)
CALCIUM SERPL-MCNC: 9.7 MG/DL (ref 8.7–10.3)
CCP IGA+IGG SERPL IA-ACNC: 27 UNITS (ref 0–19)
CHLORIDE SERPL-SCNC: 104 MMOL/L (ref 96–106)
CHOLEST SERPL-MCNC: 137 MG/DL (ref 100–199)
CO2 SERPL-SCNC: 22 MMOL/L (ref 20–29)
CREAT SERPL-MCNC: 0.93 MG/DL (ref 0.57–1)
EST. AVERAGE GLUCOSE BLD GHB EST-MCNC: 123 MG/DL
GLOBULIN SER CALC-MCNC: 3.3 G/DL (ref 1.5–4.5)
GLUCOSE SERPL-MCNC: 112 MG/DL (ref 65–99)
HBA1C MFR BLD: 5.9 % (ref 4.8–5.6)
HDLC SERPL-MCNC: 46 MG/DL
LDLC SERPL CALC-MCNC: 58 MG/DL (ref 0–99)
POTASSIUM SERPL-SCNC: 4.7 MMOL/L (ref 3.5–5.2)
PROT SERPL-MCNC: 7.6 G/DL (ref 6–8.5)
RHEUMATOID FACT SERPL-ACNC: <10 IU/ML (ref 0–13.9)
SODIUM SERPL-SCNC: 143 MMOL/L (ref 134–144)
TRIGL SERPL-MCNC: 163 MG/DL (ref 0–149)
URATE SERPL-MCNC: 6.1 MG/DL (ref 2.5–7.1)
VLDLC SERPL CALC-MCNC: 33 MG/DL (ref 5–40)

## 2019-05-30 ENCOUNTER — HOSPITAL ENCOUNTER (OUTPATIENT)
Dept: MAMMOGRAPHY | Age: 69
Discharge: HOME | End: 2019-05-30
Attending: INTERNAL MEDICINE
Payer: MEDICARE

## 2019-05-30 DIAGNOSIS — Z12.39 BREAST CANCER SCREENING: ICD-10-CM

## 2019-05-30 PROCEDURE — 77063 BREAST TOMOSYNTHESIS BI: CPT

## 2019-07-02 DIAGNOSIS — J30.9 ALLERGIC RHINITIS, UNSPECIFIED SEASONALITY, UNSPECIFIED TRIGGER: ICD-10-CM

## 2019-07-02 RX ORDER — FLUTICASONE PROPIONATE 50 MCG
SPRAY, SUSPENSION (ML) NASAL
Qty: 48 G | Refills: 1 | Status: SHIPPED | OUTPATIENT
Start: 2019-07-02 | End: 2021-01-25 | Stop reason: SDUPTHER

## 2019-07-31 DIAGNOSIS — K21.9 GASTROESOPHAGEAL REFLUX DISEASE WITHOUT ESOPHAGITIS: ICD-10-CM

## 2019-07-31 RX ORDER — RANITIDINE 300 MG/1
300 TABLET ORAL DAILY
Qty: 90 TAB | Refills: 1 | Status: SHIPPED | OUTPATIENT
Start: 2019-07-31 | End: 2020-01-02 | Stop reason: SDUPTHER

## 2019-07-31 NOTE — TELEPHONE ENCOUNTER
Patient stopped taking Zantac for a while. Sx flare up, she is need to medication. Confirmed appointment with new PCP.  Pt verbalized understanding

## 2019-08-27 ENCOUNTER — OFFICE VISIT (OUTPATIENT)
Dept: INTERNAL MEDICINE CLINIC | Age: 69
End: 2019-08-27

## 2019-08-27 VITALS
BODY MASS INDEX: 38.91 KG/M2 | TEMPERATURE: 98.4 F | WEIGHT: 219.6 LBS | HEIGHT: 63 IN | OXYGEN SATURATION: 98 % | SYSTOLIC BLOOD PRESSURE: 128 MMHG | HEART RATE: 85 BPM | RESPIRATION RATE: 19 BRPM | DIASTOLIC BLOOD PRESSURE: 80 MMHG

## 2019-08-27 DIAGNOSIS — I10 ESSENTIAL HYPERTENSION: Primary | ICD-10-CM

## 2019-08-27 DIAGNOSIS — M1A.0790 CHRONIC IDIOPATHIC GOUT INVOLVING TOE WITHOUT TOPHUS, UNSPECIFIED LATERALITY: ICD-10-CM

## 2019-08-27 DIAGNOSIS — E78.2 MIXED HYPERLIPIDEMIA: ICD-10-CM

## 2019-08-27 NOTE — PROGRESS NOTES
1. Have you been to the ER, urgent care clinic since your last visit? Hospitalized since your last visit? No    2. Have you seen or consulted any other health care providers outside of the [address] [place] [address] since your last visit? Include any pap smears or colon screening.  No

## 2019-08-27 NOTE — PROGRESS NOTES
New Patient Evaluation    Betsy Schreiber is a 68 y.o. female. They are here to establish care with the group and me as a primary care provider. she has seen Dr. Padmaja Yancey in the past.  The last visit was May of this year      The patient has a history hypertension. Controlled on medications. She has a history hyperlipidemia. On statin. Controlled. She has some reflux. On ranitidine no issues. She has a history of gout. She takes allopurinol. Controlled as of now. She has some rosacea. Was seeing derm    She has a renal cyst.  She was seeing Dr. Nneka Amador at [facility]. She will see him in Oct.  Question if she has a renal cancer. She had been told that this had grown. Planning to follow up. Patient Active Problem List    Diagnosis Date Noted    Severe obesity (BMI 35.0-39. 9) with comorbidity ([facility] [address] 04/19/2018    Mass of kidney of unknown nature 07/22/2016    Plantar fasciitis, bilateral 11/12/2014    Gout 10/07/2014    Hemorrhoid 07/30/2012    Rosacea 06/03/2011    GERD (gastroesophageal reflux disease) 02/04/2010    Essential hypertension, benign 01/28/2010    Anxiety 01/28/2010     Current Outpatient Medications   Medication Sig Dispense Refill    raNITIdine (ZANTAC) 300 mg tab Take 1 Tab by mouth daily. 90 Tab 1    fluticasone propionate (FLONASE) 50 mcg/actuation nasal spray USE 2 SPRAYS BY BOTH NOSTRILS ROUTE DAILY. 48 g 1    amLODIPine (NORVASC) 10 mg tablet TAKE 1 TABLET DAILY 90 Tab 1    lisinopril (PRINIVIL, ZESTRIL) 40 mg tablet TAKE 1 TABLET DAILY 90 Tab 1    atorvastatin (LIPITOR) 20 mg tablet TAKE 1 TABLET DAILY 90 Tab 1    carvedilol (COREG) 6.25 mg tablet TAKE 1 TABLET TWICE A DAY WITH MEALS 180 Tab 1    ALPRAZolam (XANAX) 0.5 mg tablet Take 1 Tab by mouth three (3) times daily as needed for Anxiety.  Max Daily Amount: 1.5 mg. 45 Tab 0    allopurinol (ZYLOPRIM) 100 mg tablet TAKE 1 TABLET DAILY 90 Tab 3    Sulfacetamide Sodium-Sulfur 9-4 % clsr by Apply Externally route as needed.  desonide (TRIDESILON) 0.05 % cream Apply  to affected area as needed for Skin Irritation. Allergies   Allergen Reactions    Codeine Nausea and Vomiting    Hydrochlorothiazide Other (comments)     Gout     Minocycline Nausea Only     Past Medical History:   Diagnosis Date    Gall stones     GERD (gastroesophageal reflux disease)     Hemorrhoid     Hypertension     Ill-defined condition     gout    Ill-defined condition     prediabetes - is on metformin    Ill-defined condition     roscea    Sleep apnea     borderline - was put on a machine/not using a machine now     Past Surgical History:   Procedure Laterality Date    BREAST SURGERY PROCEDURE UNLISTED      benign tumor right breast    COLONOSCOPY      COLONOSCOPY N/A 2016    COLONOSCOPY performed by Lavelle Cintron MD at [address] [place] [address]      nml    HX BREAST BIOPSY Right     15+ years ago.  Benign (per patient)    HX  SECTION      x 2    HX CHOLECYSTECTOMY      HX HYSTERECTOMY      HX TONSILLECTOMY      WILMAR MAMMOGRAM SCREEN BILAT  3/2014    PAP SMEAR      SINUS SURGERY PROC UNLISTED       Family History   Problem Relation Age of Onset    Cancer Mother         breast, 46s     Diabetes Mother     Heart Disease Mother         MI, CAD    Breast Cancer Mother         onset: late 48 early 61    Stroke Father     Heart Disease Father         CAD    Dementia Father      Social History     Tobacco Use    Smoking status: Never Smoker    Smokeless tobacco: Never Used   Substance Use Topics    Alcohol use: Yes     Comment: special occasional        Health Maintenance   Topic Date Due    Influenza Age 5 to Adult  2019    Shingrix Vaccine Age 50> (2 of 2) 10/17/2019    GLAUCOMA SCREENING Q2Y  2019    MEDICARE YEARLY EXAM  2020    BREAST CANCER SCRN MAMMOGRAM  2020    DTaP/Tdap/Td series (2 - Td) 2021  COLONOSCOPY  07/29/2021    Hepatitis C Screening  Completed    Bone Densitometry (Dexa) Screening  Completed    Pneumococcal 65+ years  Completed       Review of Systems   Constitutional: Negative. Respiratory: Negative. Cardiovascular: Negative. Gastrointestinal: Negative. Visit Vitals  /80 (BP 1 Location: Right arm, BP Patient Position: Sitting)   Pulse 85   Temp 98.4 °F (36.9 °C) (Oral)   Resp 19   Ht 5' 3\" (1.6 m)   Wt 219 lb 9.6 oz (99.6 kg)   SpO2 98%   BMI 38.90 kg/m²       Physical Exam   Constitutional: No distress. Cardiovascular: Normal rate and regular rhythm. No murmur heard. Pulmonary/Chest: Effort normal and breath sounds normal.           ASSESSMENT/PLAN    Diagnoses and all orders for this visit:    1. Essential hypertension    2. Mixed hyperlipidemia    3. Chronic idiopathic gout involving toe without tophus, unspecified laterality      Follow-up and Dispositions    · Return in about 4 months (around 12/27/2019) for Follow up blood pressure.             -Discussed with the patient to continue the current plan of care. We will obtain baseline labwork and determine if any adjustments need to be done. We will also await the records of the previous PCP to ascertain further details of the patient's history. The patient agrees with and understands the plan of care. All questions have been answered.

## 2019-08-27 NOTE — PATIENT INSTRUCTIONS
Low Sodium Diet (2,000 Milligram): Care Instructions  Your Care Instructions    Too much sodium causes your body to hold on to extra water. This can raise your blood pressure and force your heart and kidneys to work harder. In very serious cases, this could cause you to be put in the hospital. It might even be life-threatening. By limiting sodium, you will feel better and lower your risk of serious problems. The most common source of sodium is salt. People get most of the salt in their diet from canned, prepared, and packaged foods. Fast food and restaurant meals also are very high in sodium. Your doctor will probably limit your sodium to less than 2,000 milligrams (mg) a day. This limit counts all the sodium in prepared and packaged foods and any salt you add to your food. Follow-up care is a key part of your treatment and safety. Be sure to make and go to all appointments, and call your doctor if you are having problems. It's also a good idea to know your test results and keep a list of the medicines you take. How can you care for yourself at home? Read food labels  · Read labels on cans and food packages. The labels tell you how much sodium is in each serving. Make sure that you look at the serving size. If you eat more than the serving size, you have eaten more sodium. · Food labels also tell you the Percent Daily Value for sodium. Choose products with low Percent Daily Values for sodium. · Be aware that sodium can come in forms other than salt, including monosodium glutamate (MSG), sodium citrate, and sodium bicarbonate (baking soda). MSG is often added to Asian food. When you eat out, you can sometimes ask for food without MSG or added salt. Buy low-sodium foods  · Buy foods that are labeled \"unsalted\" (no salt added), \"sodium-free\" (less than 5 mg of sodium per serving), or \"low-sodium\" (less than 140 mg of sodium per serving).  Foods labeled \"reduced-sodium\" and \"light sodium\" may still have too much sodium. Be sure to read the label to see how much sodium you are getting. · Buy fresh vegetables, or frozen vegetables without added sauces. Buy low-sodium versions of canned vegetables, soups, and other canned goods. Prepare low-sodium meals  · Cut back on the amount of salt you use in cooking. This will help you adjust to the taste. Do not add salt after cooking. One teaspoon of salt has about 2,300 mg of sodium. · Take the salt shaker off the table. · Flavor your food with garlic, lemon juice, onion, vinegar, herbs, and spices. Do not use soy sauce, lite soy sauce, steak sauce, onion salt, garlic salt, celery salt, mustard, or ketchup on your food. · Use low-sodium salad dressings, sauces, and ketchup. Or make your own salad dressings and sauces without adding salt. · Use less salt (or none) when recipes call for it. You can often use half the salt a recipe calls for without losing flavor. Other foods such as rice, pasta, and grains do not need added salt. · Rinse canned vegetables, and cook them in fresh water. This removes some--but not all--of the salt. · Avoid water that is naturally high in sodium or that has been treated with water softeners, which add sodium. Call your local water company to find out the sodium content of your water supply. If you buy bottled water, read the label and choose a sodium-free brand. Avoid high-sodium foods  · Avoid eating:  ? Smoked, cured, salted, and canned meat, fish, and poultry. ? Ham, lares, hot dogs, and luncheon meats. ? Regular, hard, and processed cheese and regular peanut butter. ? Crackers with salted tops, and other salted snack foods such as pretzels, chips, and salted popcorn. ? Frozen prepared meals, unless labeled low-sodium. ? Canned and dried soups, broths, and bouillon, unless labeled sodium-free or low-sodium. ? Canned vegetables, unless labeled sodium-free or low-sodium. ? Western Ema fries, pizza, tacos, and other fast foods.   ? Paula Farrar olives, ketchup, and other condiments, especially soy sauce, unless labeled sodium-free or low-sodium. Where can you learn more? Go to http://sha-damien.info/. Enter E063 in the search box to learn more about \"Low Sodium Diet (2,000 Milligram): Care Instructions. \"  Current as of: November 7, 2018  Content Version: 12.1  © 7797-1913 Healthwise, [employer]. Care instructions adapted under license by [employer] (which disclaims liability or warranty for this information). If you have questions about a medical condition or this instruction, always ask your healthcare professional. [address] any warranty or liability for your use of this information.

## 2019-12-17 ENCOUNTER — OFFICE VISIT (OUTPATIENT)
Dept: INTERNAL MEDICINE CLINIC | Age: 69
End: 2019-12-17

## 2019-12-17 VITALS
WEIGHT: 223.6 LBS | RESPIRATION RATE: 19 BRPM | TEMPERATURE: 98.1 F | HEART RATE: 83 BPM | HEIGHT: 63 IN | OXYGEN SATURATION: 96 % | SYSTOLIC BLOOD PRESSURE: 110 MMHG | BODY MASS INDEX: 39.62 KG/M2 | DIASTOLIC BLOOD PRESSURE: 80 MMHG

## 2019-12-17 DIAGNOSIS — E78.2 MIXED HYPERLIPIDEMIA: ICD-10-CM

## 2019-12-17 DIAGNOSIS — R73.03 PREDIABETES: ICD-10-CM

## 2019-12-17 DIAGNOSIS — I10 ESSENTIAL HYPERTENSION: Primary | ICD-10-CM

## 2019-12-17 NOTE — PROGRESS NOTES
Follow Up Visit    Crystal Ferrer is a 68 y.o. female. she presents for Hypertension    Cardiovascular Review  The patient has hypertension. She reports taking medications as instructed, no medication side effects noted. Diet and Lifestyle: generally follows a low fat low cholesterol diet, generally follows a low sodium diet. Lab review: orders written for new lab studies as appropriate; see orders. She has had prediabetes in the past.  We will follow up an A1c to assess her current status. Patient Active Problem List   Diagnosis Code    Essential hypertension, benign I10    Anxiety F41.9    GERD (gastroesophageal reflux disease) K21.9    Rosacea L71.9    Hemorrhoid K64.9    Gout M10.9    Plantar fasciitis, bilateral M72.2    Mass of kidney of unknown nature N28.89    Severe obesity (BMI 35.0-39. 9) with comorbidity ([facility] [address] E66.01         Prior to Admission medications    Medication Sig Start Date End Date Taking? Authorizing Provider   raNITIdine (ZANTAC) 300 mg tab Take 1 Tab by mouth daily. 7/31/19  Yes Shabnam Vo MD   fluticasone propionate (FLONASE) 50 mcg/actuation nasal spray USE 2 SPRAYS BY BOTH NOSTRILS ROUTE DAILY. 7/2/19  Yes Shabnam Vo MD   amLODIPine (NORVASC) 10 mg tablet TAKE 1 TABLET DAILY 5/13/19  Yes Shabnam Vo MD   lisinopril (PRINIVIL, ZESTRIL) 40 mg tablet TAKE 1 TABLET DAILY 5/13/19  Yes Shabnam Vo MD   atorvastatin (LIPITOR) 20 mg tablet TAKE 1 TABLET DAILY 5/13/19  Yes Shabnam Vo MD   carvedilol (COREG) 6.25 mg tablet TAKE 1 TABLET TWICE A DAY WITH MEALS 5/13/19  Yes Shabnam Vo MD   ALPRAZolam Marrion Jeans) 0.5 mg tablet Take 1 Tab by mouth three (3) times daily as needed for Anxiety.  Max Daily Amount: 1.5 mg. 2/5/19  Yes Lissett Vázquez MD   allopurinol (ZYLOPRIM) 100 mg tablet TAKE 1 TABLET DAILY 1/29/19  Yes Shabnam Vo MD   Sulfacetamide Sodium-Sulfur 9-4 % clsr by Apply Externally route as needed. Yes Provider, Historical   desonide (TRIDESILON) 0.05 % cream Apply  to affected area as needed for Skin Irritation. Yes Provider, Historical         Health Maintenance   Topic Date Due    GLAUCOMA SCREENING Q2Y  12/20/2019    MEDICARE YEARLY EXAM  05/13/2020    BREAST CANCER SCRN MAMMOGRAM  05/30/2020    DTaP/Tdap/Td series (2 - Td) 06/03/2021    COLONOSCOPY  07/29/2021    Hepatitis C Screening  Completed    Bone Densitometry (Dexa) Screening  Completed    Shingrix Vaccine Age 50>  Completed    Influenza Age 5 to Adult  Completed    Pneumococcal 65+ years  Completed       Review of Systems   Constitutional: Negative. HENT: Negative. Respiratory: Negative for cough. Cardiovascular: Negative for chest pain and palpitations. Gastrointestinal: Negative. Musculoskeletal: Negative. All other systems reviewed and are negative. Visit Vitals  /80 (BP 1 Location: Left arm, BP Patient Position: Sitting)   Pulse 83   Temp 98.1 °F (36.7 °C) (Oral)   Resp 19   Ht 5' 3\" (1.6 m)   Wt 223 lb 9.6 oz (101.4 kg)   SpO2 96%   BMI 39.61 kg/m²       Physical Exam  Constitutional:       Appearance: She is well-developed. Cardiovascular:      Rate and Rhythm: Normal rate and regular rhythm. Pulmonary:      Effort: Pulmonary effort is normal.      Breath sounds: Normal breath sounds. ASSESSMENT/PLAN    Diagnoses and all orders for this visit:    1. Essential hypertension    2. Mixed hyperlipidemia  -     LIPID PANEL  -     METABOLIC PANEL, COMPREHENSIVE    3. Prediabetes  -     HEMOGLOBIN A1C WITH EAG           Follow-up and Dispositions    · Return in about 5 months (around 5/17/2020) for Medicare Wellness Visit- 30 minute appointment.

## 2019-12-28 LAB
ALBUMIN SERPL-MCNC: 4.4 G/DL (ref 3.5–4.8)
ALBUMIN/GLOB SERPL: 1.6 {RATIO} (ref 1.2–2.2)
ALP SERPL-CCNC: 92 IU/L (ref 39–117)
ALT SERPL-CCNC: 20 IU/L (ref 0–32)
AST SERPL-CCNC: 19 IU/L (ref 0–40)
BILIRUB SERPL-MCNC: 0.5 MG/DL (ref 0–1.2)
BUN SERPL-MCNC: 11 MG/DL (ref 8–27)
BUN/CREAT SERPL: 13 (ref 12–28)
CALCIUM SERPL-MCNC: 10 MG/DL (ref 8.7–10.3)
CHLORIDE SERPL-SCNC: 105 MMOL/L (ref 96–106)
CHOLEST SERPL-MCNC: 140 MG/DL (ref 100–199)
CO2 SERPL-SCNC: 23 MMOL/L (ref 20–29)
CREAT SERPL-MCNC: 0.84 MG/DL (ref 0.57–1)
EST. AVERAGE GLUCOSE BLD GHB EST-MCNC: 134 MG/DL
GLOBULIN SER CALC-MCNC: 2.7 G/DL (ref 1.5–4.5)
GLUCOSE SERPL-MCNC: 96 MG/DL (ref 65–99)
HBA1C MFR BLD: 6.3 % (ref 4.8–5.6)
HDLC SERPL-MCNC: 45 MG/DL
LDLC SERPL CALC-MCNC: 60 MG/DL (ref 0–99)
POTASSIUM SERPL-SCNC: 4.5 MMOL/L (ref 3.5–5.2)
PROT SERPL-MCNC: 7.1 G/DL (ref 6–8.5)
SODIUM SERPL-SCNC: 146 MMOL/L (ref 134–144)
TRIGL SERPL-MCNC: 173 MG/DL (ref 0–149)
VLDLC SERPL CALC-MCNC: 35 MG/DL (ref 5–40)

## 2019-12-30 DIAGNOSIS — I10 ESSENTIAL HYPERTENSION: ICD-10-CM

## 2019-12-30 RX ORDER — AMLODIPINE BESYLATE 10 MG/1
TABLET ORAL
Qty: 90 TAB | Refills: 1 | Status: SHIPPED | OUTPATIENT
Start: 2019-12-30 | End: 2020-01-03 | Stop reason: SDUPTHER

## 2020-01-02 DIAGNOSIS — I10 ESSENTIAL HYPERTENSION: ICD-10-CM

## 2020-01-02 DIAGNOSIS — I10 HYPERTENSION, ESSENTIAL: ICD-10-CM

## 2020-01-02 DIAGNOSIS — K21.9 GASTROESOPHAGEAL REFLUX DISEASE WITHOUT ESOPHAGITIS: ICD-10-CM

## 2020-01-02 DIAGNOSIS — E78.2 MIXED HYPERLIPIDEMIA: ICD-10-CM

## 2020-01-02 DIAGNOSIS — M1A.0790 CHRONIC IDIOPATHIC GOUT INVOLVING TOE WITHOUT TOPHUS, UNSPECIFIED LATERALITY: ICD-10-CM

## 2020-01-02 RX ORDER — LISINOPRIL 40 MG/1
TABLET ORAL
Qty: 90 TAB | Refills: 1 | Status: SHIPPED | OUTPATIENT
Start: 2020-01-02 | End: 2020-01-03 | Stop reason: SDUPTHER

## 2020-01-02 RX ORDER — ALLOPURINOL 100 MG/1
TABLET ORAL
Qty: 90 TAB | Refills: 3 | Status: SHIPPED | OUTPATIENT
Start: 2020-01-02 | End: 2020-01-03 | Stop reason: SDUPTHER

## 2020-01-02 RX ORDER — ATORVASTATIN CALCIUM 20 MG/1
TABLET, FILM COATED ORAL
Qty: 90 TAB | Refills: 1 | Status: SHIPPED | OUTPATIENT
Start: 2020-01-02 | End: 2020-01-03 | Stop reason: SDUPTHER

## 2020-01-02 RX ORDER — RANITIDINE 300 MG/1
300 TABLET ORAL DAILY
Qty: 90 TAB | Refills: 1 | Status: SHIPPED | OUTPATIENT
Start: 2020-01-02 | End: 2020-01-03 | Stop reason: SDUPTHER

## 2020-01-02 RX ORDER — AMLODIPINE BESYLATE 10 MG/1
TABLET ORAL
Qty: 90 TAB | Refills: 1 | OUTPATIENT
Start: 2020-01-02

## 2020-01-02 RX ORDER — CARVEDILOL 6.25 MG/1
TABLET ORAL
Qty: 180 TAB | Refills: 1 | Status: SHIPPED | OUTPATIENT
Start: 2020-01-02 | End: 2020-01-03 | Stop reason: SDUPTHER

## 2020-01-02 NOTE — TELEPHONE ENCOUNTER
Ramona Mcrae ([facility]) [phone] (H)     Refill on allopurinol, amlodipine, atorvastatin, carvedilol, lisinopril, ranitidine[facility]

## 2020-01-03 DIAGNOSIS — I10 ESSENTIAL HYPERTENSION: ICD-10-CM

## 2020-01-03 DIAGNOSIS — E78.2 MIXED HYPERLIPIDEMIA: ICD-10-CM

## 2020-01-03 DIAGNOSIS — I10 HYPERTENSION, ESSENTIAL: ICD-10-CM

## 2020-01-03 DIAGNOSIS — M1A.0790 CHRONIC IDIOPATHIC GOUT INVOLVING TOE WITHOUT TOPHUS, UNSPECIFIED LATERALITY: ICD-10-CM

## 2020-01-03 DIAGNOSIS — K21.9 GASTROESOPHAGEAL REFLUX DISEASE WITHOUT ESOPHAGITIS: ICD-10-CM

## 2020-01-03 RX ORDER — LISINOPRIL 40 MG/1
TABLET ORAL
Qty: 90 TAB | Refills: 1 | Status: SHIPPED | OUTPATIENT
Start: 2020-01-03 | End: 2020-01-03 | Stop reason: SDUPTHER

## 2020-01-03 RX ORDER — ATORVASTATIN CALCIUM 20 MG/1
TABLET, FILM COATED ORAL
Qty: 90 TAB | Refills: 1 | Status: SHIPPED | OUTPATIENT
Start: 2020-01-03 | End: 2020-01-03 | Stop reason: SDUPTHER

## 2020-01-03 RX ORDER — AMLODIPINE BESYLATE 10 MG/1
TABLET ORAL
Qty: 30 TAB | Refills: 0 | OUTPATIENT
Start: 2020-01-03 | End: 2020-01-26

## 2020-01-03 RX ORDER — ALLOPURINOL 100 MG/1
TABLET ORAL
Qty: 90 TAB | Refills: 1 | Status: SHIPPED | OUTPATIENT
Start: 2020-01-03 | End: 2020-01-03 | Stop reason: SDUPTHER

## 2020-01-03 RX ORDER — AMLODIPINE BESYLATE 10 MG/1
TABLET ORAL
Qty: 90 TAB | Refills: 1 | Status: SHIPPED | OUTPATIENT
Start: 2020-01-03 | End: 2020-01-03 | Stop reason: SDUPTHER

## 2020-01-03 RX ORDER — LISINOPRIL 40 MG/1
TABLET ORAL
Qty: 90 TAB | Refills: 1 | OUTPATIENT
Start: 2020-01-03 | End: 2020-09-11

## 2020-01-03 RX ORDER — RANITIDINE 300 MG/1
300 TABLET ORAL DAILY
Qty: 90 TAB | Refills: 1 | Status: SHIPPED | OUTPATIENT
Start: 2020-01-03 | End: 2020-01-03 | Stop reason: SDUPTHER

## 2020-01-03 RX ORDER — ATORVASTATIN CALCIUM 20 MG/1
TABLET, FILM COATED ORAL
Qty: 90 TAB | Refills: 1 | OUTPATIENT
Start: 2020-01-03

## 2020-01-03 RX ORDER — RANITIDINE 300 MG/1
300 TABLET ORAL DAILY
Qty: 90 TAB | Refills: 1 | OUTPATIENT
Start: 2020-01-03 | End: 2021-10-29

## 2020-01-03 RX ORDER — CARVEDILOL 6.25 MG/1
TABLET ORAL
Qty: 180 TAB | Refills: 1 | OUTPATIENT
Start: 2020-01-03

## 2020-01-03 RX ORDER — CARVEDILOL 6.25 MG/1
TABLET ORAL
Qty: 180 TAB | Refills: 1 | Status: SHIPPED | OUTPATIENT
Start: 2020-01-03 | End: 2020-01-03 | Stop reason: SDUPTHER

## 2020-01-03 RX ORDER — AMLODIPINE BESYLATE 10 MG/1
TABLET ORAL
Qty: 90 TAB | Refills: 1 | OUTPATIENT
Start: 2020-01-03

## 2020-01-03 RX ORDER — AMLODIPINE BESYLATE 10 MG/1
TABLET ORAL
Qty: 90 TAB | Refills: 1 | OUTPATIENT
Start: 2020-01-03 | End: 2020-01-03 | Stop reason: SDUPTHER

## 2020-01-03 RX ORDER — ALLOPURINOL 100 MG/1
TABLET ORAL
Qty: 90 TAB | Refills: 1 | OUTPATIENT
Start: 2020-01-03 | End: 2020-09-11

## 2020-01-03 RX ORDER — ATORVASTATIN CALCIUM 20 MG/1
TABLET, FILM COATED ORAL
Qty: 90 TAB | Refills: 1 | OUTPATIENT
Start: 2020-01-03 | End: 2020-07-07 | Stop reason: SDUPTHER

## 2020-01-03 RX ORDER — AMLODIPINE BESYLATE 10 MG/1
TABLET ORAL
Qty: 30 TAB | Refills: 0 | Status: SHIPPED | OUTPATIENT
Start: 2020-01-03 | End: 2020-01-03 | Stop reason: SDUPTHER

## 2020-01-03 NOTE — TELEPHONE ENCOUNTER
Spoke with [employer] at [facility]. Informed transmission error submitting medications. VO given per failure to transmit. Notified patient. Sending short script to local pharmacy for amlodipine 10 mg.

## 2020-01-03 NOTE — TELEPHONE ENCOUNTER
Pt mail order pharmacy never received the refill request on   Requested Prescriptions     Pending Prescriptions Disp Refills    amLODIPine (NORVASC) 10 mg tablet 90 Tab 1     Sig: TAKE 1 TABLET DAILY    carvedilol (COREG) 6.25 mg tablet 180 Tab 1     Sig: TAKE 1 TABLET TWICE A DAY WITH MEALS    atorvastatin (LIPITOR) 20 mg tablet 90 Tab 1     Sig: TAKE 1 TABLET DAILY       [facility] - [place], [place] - [facility]  [phone]

## 2020-01-03 NOTE — TELEPHONE ENCOUNTER
The prescription dr Morales Speaker sent e-scribe failed  Pt wants to know if you can call in a weeks worth of tablet for [facility]lod[facility]   Have given her  [phone]  To call in meds

## 2020-01-14 ENCOUNTER — OFFICE VISIT (OUTPATIENT)
Dept: INTERNAL MEDICINE CLINIC | Age: 70
End: 2020-01-14

## 2020-01-14 VITALS
TEMPERATURE: 99.9 F | OXYGEN SATURATION: 92 % | RESPIRATION RATE: 16 BRPM | SYSTOLIC BLOOD PRESSURE: 130 MMHG | HEIGHT: 63 IN | BODY MASS INDEX: 39.41 KG/M2 | HEART RATE: 93 BPM | DIASTOLIC BLOOD PRESSURE: 80 MMHG | WEIGHT: 222.4 LBS

## 2020-01-14 DIAGNOSIS — J06.9 VIRAL URI WITH COUGH: Primary | ICD-10-CM

## 2020-01-14 DIAGNOSIS — R50.9 FEVER WITH CHILLS: ICD-10-CM

## 2020-01-14 DIAGNOSIS — R68.89 FLU-LIKE SYMPTOMS: ICD-10-CM

## 2020-01-14 LAB
QUICKVUE INFLUENZA TEST: NEGATIVE
VALID INTERNAL CONTROL?: YES

## 2020-01-14 RX ORDER — AMOXICILLIN AND CLAVULANATE POTASSIUM 875; 125 MG/1; MG/1
1 TABLET, FILM COATED ORAL EVERY 12 HOURS
Qty: 14 TAB | Refills: 0 | Status: SHIPPED | OUTPATIENT
Start: 2020-01-14 | End: 2020-01-21

## 2020-01-14 NOTE — PROGRESS NOTES
HISTORY OF PRESENT ILLNESS  Sona Gardner is a 68 y.o. female. Patient reports fever, chills, body aches, cough, and congestion starting 4 days ago. She has taken coricidin, nyquil, mucinex with some relief. She feels like the cough is a little better today. Congestion is clear to yellow. Sputum is cloudy to yellow. She felt like she was wheezing the other night. Visit Vitals  /80 (BP 1 Location: Left arm, BP Patient Position: Sitting)   Pulse 93   Temp 99.9 °F (37.7 °C) (Oral)   Resp 16   Ht 5' 3\" (1.6 m)   Wt 222 lb 6.4 oz (100.9 kg)   SpO2 92%   BMI 39.40 kg/m²       HPI    Review of Systems   Constitutional: Positive for chills and fever. HENT: Positive for congestion and sore throat. Respiratory: Positive for cough and sputum production. Physical Exam  Constitutional:       Appearance: Normal appearance. HENT:      Head: Normocephalic. Right Ear: Hearing, tympanic membrane, ear canal and external ear normal.      Left Ear: Hearing, ear canal and external ear normal. Tympanic membrane is erythematous. Nose: Congestion and rhinorrhea present. Rhinorrhea is purulent. Right Turbinates: Swollen. Left Turbinates: Swollen. Mouth/Throat:      Lips: Pink. Mouth: Mucous membranes are moist.      Pharynx: Oropharynx is clear. Cardiovascular:      Rate and Rhythm: Normal rate and regular rhythm. Pulmonary:      Effort: Pulmonary effort is normal.      Breath sounds: Normal breath sounds. Skin:     General: Skin is warm and dry. Neurological:      General: No focal deficit present. Mental Status: She is alert and oriented to person, place, and time.    Psychiatric:         Mood and Affect: Mood normal.         Behavior: Behavior normal.       Results for orders placed or performed in visit on 01/14/20   AMB POC RAPID INFLUENZA TEST   Result Value Ref Range    VALID INTERNAL CONTROL POC Yes     QuickVue Influenza test Negative Negative         ASSESSMENT and PLAN    ICD-10-CM ICD-9-CM    1. Viral URI with cough J06.9 465.9     B97.89     2. Flu-like symptoms R68.89 780.99 AMB POC RAPID INFLUENZA TEST   3.  Fever with chills R50.9 780.60      Orders Placed This Encounter    AMB POC RAPID INFLUENZA TEST    amoxicillin-clavulanate (AUGMENTIN) 875-125 mg per tablet   tylenol to keep fever down  mucinex  Increase water intake and rest  Consider starting antibiotic if no improvement in 3-5 days or symptoms worsen  Follow up if no improvement over the next week

## 2020-01-14 NOTE — PROGRESS NOTES
Chief Complaint   Patient presents with    Cold Symptoms     Reviewed record in preparation for visit and have obtained necessary documentation. Identified pt with two pt identifiers(name and ). Health Maintenance Due   Topic    GLAUCOMA SCREENING Q2Y          Chief Complaint   Patient presents with    Cold Symptoms        Wt Readings from Last 3 Encounters:   20 222 lb 6.4 oz (100.9 kg)   19 223 lb 9.6 oz (101.4 kg)   19 219 lb 9.6 oz (99.6 kg)     Temp Readings from Last 3 Encounters:   20 99.9 °F (37.7 °C) (Oral)   19 98.1 °F (36.7 °C) (Oral)   19 98.4 °F (36.9 °C) (Oral)     BP Readings from Last 3 Encounters:   20 130/80   19 110/80   19 128/80     Pulse Readings from Last 3 Encounters:   20 93   19 83   19 85           Learning Assessment:  :     Learning Assessment 10/7/2014   PRIMARY LEARNER Patient   HIGHEST LEVEL OF EDUCATION - PRIMARY LEARNER  SOME COLLEGE   BARRIERS PRIMARY LEARNER NONE   CO-LEARNER CAREGIVER No   PRIMARY LANGUAGE ENGLISH   LEARNER PREFERENCE PRIMARY LISTENING   ANSWERED BY patient   RELATIONSHIP SELF       Depression Screening:  :     3 most recent PHQ Screens 2019   Little interest or pleasure in doing things Not at all   Feeling down, depressed, irritable, or hopeless Several days   Total Score PHQ 2 1       Fall Risk Assessment:  :     Fall Risk Assessment, last 12 mths 2019   Able to walk? Yes   Fall in past 12 months? No   Fall with injury? -   Number of falls in past 12 months -   Fall Risk Score -       Abuse Screening:  :     Abuse Screening Questionnaire 2018   Do you ever feel afraid of your partner? N   Are you in a relationship with someone who physically or mentally threatens you? N   Is it safe for you to go home?  Y       Coordination of Care Questionnaire:  :     1) Have you been to an emergency room, urgent care clinic since your last visit? no   Hospitalized since your last visit? no             2) Have you seen or consulted any other health care providers outside of [address] [place] [address] since your last visit? no  (Include any pap smears or colon screenings in this section.)    3) Do you have an Advance Directive on file? yes    4) Are you interested in receiving information on Advance Directives? NO      Patient is accompanied by spouse I have received verbal consent from Akua Reveles to discuss any/all medical information while they are present in the room. Reviewed record  In preparation for visit and have obtained necessary documentation.

## 2020-03-16 DIAGNOSIS — F41.9 ANXIETY: ICD-10-CM

## 2020-03-17 DIAGNOSIS — F41.9 ANXIETY: ICD-10-CM

## 2020-03-17 RX ORDER — ALPRAZOLAM 0.5 MG/1
0.5 TABLET ORAL
Qty: 45 TAB | Refills: 0 | Status: SHIPPED | OUTPATIENT
Start: 2020-03-17 | End: 2020-06-17 | Stop reason: SDUPTHER

## 2020-03-18 RX ORDER — ALPRAZOLAM 0.5 MG/1
0.5 TABLET ORAL
Qty: 45 TAB | Refills: 0 | Status: SHIPPED | OUTPATIENT
Start: 2020-03-18 | End: 2020-09-03 | Stop reason: SDUPTHER

## 2020-05-01 DIAGNOSIS — I10 ESSENTIAL HYPERTENSION, BENIGN: Primary | ICD-10-CM

## 2020-05-01 RX ORDER — CARVEDILOL 6.25 MG/1
6.25 TABLET ORAL 2 TIMES DAILY WITH MEALS
Qty: 180 TAB | Refills: 3 | Status: SHIPPED | OUTPATIENT
Start: 2020-05-01 | End: 2021-05-05

## 2020-06-17 ENCOUNTER — OFFICE VISIT (OUTPATIENT)
Dept: INTERNAL MEDICINE CLINIC | Age: 70
End: 2020-06-17

## 2020-06-17 VITALS
BODY MASS INDEX: 39.12 KG/M2 | HEIGHT: 63 IN | SYSTOLIC BLOOD PRESSURE: 134 MMHG | OXYGEN SATURATION: 98 % | RESPIRATION RATE: 16 BRPM | HEART RATE: 79 BPM | TEMPERATURE: 97.3 F | DIASTOLIC BLOOD PRESSURE: 80 MMHG | WEIGHT: 220.8 LBS

## 2020-06-17 DIAGNOSIS — R05.9 COUGH: ICD-10-CM

## 2020-06-17 DIAGNOSIS — E78.2 MIXED HYPERLIPIDEMIA: ICD-10-CM

## 2020-06-17 DIAGNOSIS — I10 ESSENTIAL HYPERTENSION: ICD-10-CM

## 2020-06-17 DIAGNOSIS — R73.03 PREDIABETES: ICD-10-CM

## 2020-06-17 DIAGNOSIS — Z00.00 MEDICARE ANNUAL WELLNESS VISIT, SUBSEQUENT: Primary | ICD-10-CM

## 2020-06-17 DIAGNOSIS — Z12.39 BREAST CANCER SCREENING: ICD-10-CM

## 2020-06-17 NOTE — PROGRESS NOTES
This is the Subsequent Medicare Annual Wellness Exam, performed 12 months or more after the Initial AWV or the last Subsequent AWV    I have reviewed the patient's medical history in detail and updated the computerized patient record. She reports feeling well today. No complaints. She has been working on improving her diet and eating less carb. We discussed her labs from the last testing. Noted prediabetic A1c. She reports having a cough which has been ongoing for the past 3 months. No fevers, no chills. She feels as if she has a \"tickle\" in her chest.     History     Patient Active Problem List   Diagnosis Code    Essential hypertension, benign I10    Anxiety F41.9    GERD (gastroesophageal reflux disease) K21.9    Rosacea L71.9    Hemorrhoid K64.9    Gout M10.9    Plantar fasciitis, bilateral M72.2    Mass of kidney of unknown nature N28.89    Severe obesity (BMI 35.0-39. 9) with comorbidity ([facility] [address] E66.01     Past Medical History:   Diagnosis Date    Gall stones     GERD (gastroesophageal reflux disease)     Hemorrhoid     Hypertension     Ill-defined condition     gout    Ill-defined condition     prediabetes - is on metformin    Ill-defined condition     roscea    Sleep apnea     borderline - was put on a machine/not using a machine now      Past Surgical History:   Procedure Laterality Date    BREAST SURGERY PROCEDURE UNLISTED      benign tumor right breast    COLONOSCOPY      COLONOSCOPY N/A 2016    COLONOSCOPY performed by Mandie Hughes MD at [address] [place] [address]  2010    nml    HX BREAST BIOPSY Right     15+ years ago.  Benign (per patient)    HX  SECTION      x 2    HX CHOLECYSTECTOMY      HX HYSTERECTOMY      HX TONSILLECTOMY      WILMAR MAMMOGRAM SCREEN BILAT  3/2014    PAP SMEAR      SINUS SURGERY PROC UNLISTED       Current Outpatient Medications   Medication Sig Dispense Refill    carvediloL (COREG) 6.25 mg tablet Take 1 Tab by mouth two (2) times daily (with meals). 180 Tab 3    ALPRAZolam (XANAX) 0.5 mg tablet Take 1 Tab by mouth three (3) times daily as needed for Anxiety. Max Daily Amount: 1.5 mg. 45 Tab 0    amLODIPine (NORVASC) 10 mg tablet TAKE 1 TABLET BY MOUTH EVERY DAY 90 Tab 1    allopurinol (ZYLOPRIM) 100 mg tablet TAKE 1 TABLET DAILY 90 Tab 1    atorvastatin (LIPITOR) 20 mg tablet TAKE 1 TABLET DAILY 90 Tab 1    lisinopril (PRINIVIL, ZESTRIL) 40 mg tablet TAKE 1 TABLET DAILY 90 Tab 1    fluticasone propionate (FLONASE) 50 mcg/actuation nasal spray USE 2 SPRAYS BY BOTH NOSTRILS ROUTE DAILY. 48 g 1    Sulfacetamide Sodium-Sulfur 9-4 % clsr by Apply Externally route as needed.  desonide (TRIDESILON) 0.05 % cream Apply  to affected area as needed for Skin Irritation.  raNITIdine (ZANTAC) 300 mg tab Take 1 Tab by mouth daily.  90 Tab 1     Allergies   Allergen Reactions    Codeine Nausea and Vomiting    Hydrochlorothiazide Other (comments)     Gout     Minocycline Nausea Only       Family History   Problem Relation Age of Onset    Cancer Mother         breast, 46s     Diabetes Mother     Heart Disease Mother         MI, CAD    Breast Cancer Mother         onset: late 48 early 61    Stroke Father     Heart Disease Father         CAD    Dementia Father      Social History     Tobacco Use    Smoking status: Never Smoker    Smokeless tobacco: Never Used   Substance Use Topics    Alcohol use: Yes     Comment: special occasional       Depression Risk Factor Screening:     3 most recent PHQ Screens 6/17/2020   Little interest or pleasure in doing things Not at all   Feeling down, depressed, irritable, or hopeless Not at all   Total Score PHQ 2 0       Alcohol Risk Factor Screening:   Do you average 1 drink per night or more than 7 drinks a week:  No    On any one occasion in the past three months have you have had more than 3 drinks containing alcohol:  No      Functional Ability and Level of Safety:   Hearing: Hearing is good. Activities of Daily Living: The home contains: no safety equipment. Patient does total self care     Ambulation: with no difficulty     Fall Risk:  Fall Risk Assessment, last 12 mths 6/17/2020   Able to walk? Yes   Fall in past 12 months? No   Fall with injury? -   Number of falls in past 12 months -   Fall Risk Score -     Abuse Screen:  Patient is not abused       Cognitive Screening   Has your family/caregiver stated any concerns about your memory: no         Patient Care Team   Patient Care Team:  Alexis Hoang MD as PCP - General (Internal Medicine)  Alexis Hoang MD as PCP - [facility]  Ceasar Rice MD (Cardiology)    Assessment/Plan   Education and counseling provided:  Are appropriate based on today's review and evaluation    Diagnoses and all orders for this visit:    1. Medicare annual wellness visit, subsequent    2. Breast cancer screening  -     WILMAR MAMMO BI SCREENING INCL CAD; Future    3. Essential hypertension  -     METABOLIC PANEL, COMPREHENSIVE  -     CBC WITH AUTOMATED DIFF    4. Prediabetes  -     HEMOGLOBIN A1C WITH EAG    5. Mixed hyperlipidemia  -     LIPID PANEL    6. Cough  -     XR CHEST PA LAT;  Future        Health Maintenance Due   Topic Date Due    GLAUCOMA SCREENING Q2Y  12/20/2019    Medicare Yearly Exam  05/13/2020    Breast Cancer Screen Mammogram  05/30/2020

## 2020-06-17 NOTE — PROGRESS NOTES
Chief Complaint   Patient presents with   [facility] Annual Wellness Visit     1. Have you been to the ER, urgent care clinic since your last visit? Hospitalized since your last visit? No    2. Have you seen or consulted any other health care providers outside of the [address] [place] [address] since your last visit? Include any pap smears or colon screening.  No    complains of cough, pain in right hand, stomach-after eating, nausea

## 2020-06-17 NOTE — PATIENT INSTRUCTIONS
Medicare Wellness Visit, Female The best way to live healthy is to have a lifestyle where you eat a well-balanced diet, exercise regularly, limit alcohol use, and quit all forms of tobacco/nicotine, if applicable. Regular preventive services are another way to keep healthy. Preventive services (vaccines, screening tests, monitoring & exams) can help personalize your care plan, which helps you manage your own care. Screening tests can find health problems at the earliest stages, when they are easiest to treat. [facility] follows the current, evidence-based guidelines published by the [facility] ([facility]) when recommending preventive services for our patients. Because we follow these guidelines, sometimes recommendations change over time as research supports it. (For example, mammograms used to be recommended annually. Even though Medicare will still pay for an annual mammogram, the newer guidelines recommend a mammogram every two years for women of average risk). Of course, you and your doctor may decide to screen more often for some diseases, based on your risk and your co-morbidities (chronic disease you are already diagnosed with). Preventive services for you include: - Medicare offers their members a free annual wellness visit, which is time for you and your primary care provider to discuss and plan for your preventive service needs. Take advantage of this benefit every year! 
-All adults over the age of 72 should receive the recommended pneumonia vaccines. Current USPSTF guidelines recommend a series of two vaccines for the best pneumonia protection.  
-All adults should have a flu vaccine yearly and a tetanus vaccine every 10 years.  
-All adults age 48 and older should receive the shingles vaccines (series of two vaccines). -All adults age 38-68 who are overweight should have a diabetes screening test once every three years. -All adults born between [birth year] and 1967 should be screened once for Hepatitis C. 
-Other screening tests and preventive services for persons with diabetes include: an eye exam to screen for diabetic retinopathy, a kidney function test, a foot exam, and stricter control over your cholesterol.  
-Cardiovascular screening for adults with routine risk involves an electrocardiogram (ECG) at intervals determined by your doctor.  
-Colorectal cancer screenings should be done for adults age 54-65 with no increased risk factors for colorectal cancer. There are a number of acceptable methods of screening for this type of cancer. Each test has its own benefits and drawbacks. Discuss with your doctor what is most appropriate for you during your annual wellness visit. The different tests include: colonoscopy (considered the best screening method), a fecal occult blood test, a fecal DNA test, and sigmoidoscopy. 
 
-A bone mass density test is recommended when a woman turns 65 to screen for osteoporosis. This test is only recommended one time, as a screening. Some providers will use this same test as a disease monitoring tool if you already have osteoporosis. -Breast cancer screenings are recommended every other year for women of normal risk, age 54-69. 
-Cervical cancer screenings for women over age 72 are only recommended with certain risk factors. Here is a list of your current Health Maintenance items (your personalized list of preventive services) with a due date: 
Health Maintenance Due Topic Date Due  Glaucoma Screening   12/20/2019 [address] [place] [address] Annual Well Visit  05/13/2020  Mammogram  05/30/2020

## 2020-07-07 ENCOUNTER — HOSPITAL ENCOUNTER (OUTPATIENT)
Dept: GENERAL RADIOLOGY | Age: 70
Discharge: HOME | End: 2020-07-07
Attending: INTERNAL MEDICINE
Payer: MEDICARE

## 2020-07-07 ENCOUNTER — HOSPITAL ENCOUNTER (OUTPATIENT)
Dept: MAMMOGRAPHY | Age: 70
Discharge: HOME | End: 2020-07-07
Attending: INTERNAL MEDICINE
Payer: MEDICARE

## 2020-07-07 DIAGNOSIS — E78.2 MIXED HYPERLIPIDEMIA: ICD-10-CM

## 2020-07-07 DIAGNOSIS — Z12.39 BREAST CANCER SCREENING: ICD-10-CM

## 2020-07-07 DIAGNOSIS — R05.9 COUGH: ICD-10-CM

## 2020-07-07 PROCEDURE — 77067 SCR MAMMO BI INCL CAD: CPT

## 2020-07-07 PROCEDURE — 71046 X-RAY EXAM CHEST 2 VIEWS: CPT

## 2020-07-07 RX ORDER — ATORVASTATIN CALCIUM 20 MG/1
TABLET, FILM COATED ORAL
Qty: 90 TAB | Refills: 3 | Status: SHIPPED | OUTPATIENT
Start: 2020-07-07 | End: 2021-08-09

## 2020-07-09 LAB
ALBUMIN SERPL-MCNC: 4.3 G/DL (ref 3.7–4.7)
ALBUMIN/GLOB SERPL: 1.3 {RATIO} (ref 1.2–2.2)
ALP SERPL-CCNC: 96 IU/L (ref 39–117)
ALT SERPL-CCNC: 18 IU/L (ref 0–32)
AST SERPL-CCNC: 15 IU/L (ref 0–40)
BASOPHILS # BLD AUTO: 0.1 X10E3/UL (ref 0–0.2)
BASOPHILS NFR BLD AUTO: 1 %
BILIRUB SERPL-MCNC: 0.5 MG/DL (ref 0–1.2)
BUN SERPL-MCNC: 11 MG/DL (ref 8–27)
BUN/CREAT SERPL: 12 (ref 12–28)
CALCIUM SERPL-MCNC: 9.1 MG/DL (ref 8.7–10.3)
CHLORIDE SERPL-SCNC: 106 MMOL/L (ref 96–106)
CHOLEST SERPL-MCNC: 104 MG/DL (ref 100–199)
CO2 SERPL-SCNC: 22 MMOL/L (ref 20–29)
CREAT SERPL-MCNC: 0.91 MG/DL (ref 0.57–1)
EOSINOPHIL # BLD AUTO: 0.2 X10E3/UL (ref 0–0.4)
EOSINOPHIL NFR BLD AUTO: 3 %
ERYTHROCYTE [DISTWIDTH] IN BLOOD BY AUTOMATED COUNT: 13.7 % (ref 11.7–15.4)
EST. AVERAGE GLUCOSE BLD GHB EST-MCNC: 134 MG/DL
GLOBULIN SER CALC-MCNC: 3.2 G/DL (ref 1.5–4.5)
GLUCOSE SERPL-MCNC: 108 MG/DL (ref 65–99)
HBA1C MFR BLD: 6.3 % (ref 4.8–5.6)
HCT VFR BLD AUTO: 45.6 % (ref 34–46.6)
HDLC SERPL-MCNC: 40 MG/DL
HGB BLD-MCNC: 14.7 G/DL (ref 11.1–15.9)
IMM GRANULOCYTES # BLD AUTO: 0 X10E3/UL (ref 0–0.1)
IMM GRANULOCYTES NFR BLD AUTO: 0 %
LDLC SERPL CALC-MCNC: 43 MG/DL (ref 0–99)
LYMPHOCYTES # BLD AUTO: 2.7 X10E3/UL (ref 0.7–3.1)
LYMPHOCYTES NFR BLD AUTO: 38 %
MCH RBC QN AUTO: 30.6 PG (ref 26.6–33)
MCHC RBC AUTO-ENTMCNC: 32.2 G/DL (ref 31.5–35.7)
MCV RBC AUTO: 95 FL (ref 79–97)
MONOCYTES # BLD AUTO: 0.4 X10E3/UL (ref 0.1–0.9)
MONOCYTES NFR BLD AUTO: 6 %
NEUTROPHILS # BLD AUTO: 3.7 X10E3/UL (ref 1.4–7)
NEUTROPHILS NFR BLD AUTO: 52 %
PLATELET # BLD AUTO: 217 X10E3/UL (ref 150–450)
POTASSIUM SERPL-SCNC: 4.7 MMOL/L (ref 3.5–5.2)
PROT SERPL-MCNC: 7.5 G/DL (ref 6–8.5)
RBC # BLD AUTO: 4.8 X10E6/UL (ref 3.77–5.28)
SODIUM SERPL-SCNC: 145 MMOL/L (ref 134–144)
TRIGL SERPL-MCNC: 105 MG/DL (ref 0–149)
VLDLC SERPL CALC-MCNC: 21 MG/DL (ref 5–40)
WBC # BLD AUTO: 7.2 X10E3/UL (ref 3.4–10.8)

## 2021-01-25 DIAGNOSIS — J30.9 ALLERGIC RHINITIS, UNSPECIFIED SEASONALITY, UNSPECIFIED TRIGGER: ICD-10-CM

## 2021-01-26 RX ORDER — FLUTICASONE PROPIONATE 50 MCG
SPRAY, SUSPENSION (ML) NASAL
Qty: 48 G | Refills: 0 | Status: SHIPPED | OUTPATIENT
Start: 2021-01-26 | End: 2021-05-09

## 2021-01-26 RX ORDER — FLUTICASONE PROPIONATE 50 MCG
SPRAY, SUSPENSION (ML) NASAL
Qty: 48 G | Refills: 1 | Status: SHIPPED | OUTPATIENT
Start: 2021-01-26 | End: 2021-01-26 | Stop reason: SDUPTHER

## 2021-02-05 ENCOUNTER — OFFICE VISIT (OUTPATIENT)
Dept: INTERNAL MEDICINE CLINIC | Age: 71
End: 2021-02-05
Payer: MEDICARE

## 2021-02-05 VITALS
TEMPERATURE: 97.5 F | RESPIRATION RATE: 16 BRPM | SYSTOLIC BLOOD PRESSURE: 120 MMHG | BODY MASS INDEX: 39.02 KG/M2 | HEIGHT: 63 IN | WEIGHT: 220.2 LBS | OXYGEN SATURATION: 98 % | HEART RATE: 84 BPM | DIASTOLIC BLOOD PRESSURE: 80 MMHG

## 2021-02-05 DIAGNOSIS — F41.9 ANXIETY: Primary | ICD-10-CM

## 2021-02-05 DIAGNOSIS — I10 ESSENTIAL HYPERTENSION: ICD-10-CM

## 2021-02-05 DIAGNOSIS — R73.03 PREDIABETES: ICD-10-CM

## 2021-02-05 DIAGNOSIS — E78.2 MIXED HYPERLIPIDEMIA: ICD-10-CM

## 2021-02-05 PROCEDURE — G8752 SYS BP LESS 140: HCPCS | Performed by: INTERNAL MEDICINE

## 2021-02-05 PROCEDURE — G8427 DOCREV CUR MEDS BY ELIG CLIN: HCPCS | Performed by: INTERNAL MEDICINE

## 2021-02-05 PROCEDURE — G8510 SCR DEP NEG, NO PLAN REQD: HCPCS | Performed by: INTERNAL MEDICINE

## 2021-02-05 PROCEDURE — G8399 PT W/DXA RESULTS DOCUMENT: HCPCS | Performed by: INTERNAL MEDICINE

## 2021-02-05 PROCEDURE — G8417 CALC BMI ABV UP PARAM F/U: HCPCS | Performed by: INTERNAL MEDICINE

## 2021-02-05 PROCEDURE — G9899 SCRN MAM PERF RSLTS DOC: HCPCS | Performed by: INTERNAL MEDICINE

## 2021-02-05 PROCEDURE — 1090F PRES/ABSN URINE INCON ASSESS: CPT | Performed by: INTERNAL MEDICINE

## 2021-02-05 PROCEDURE — 3017F COLORECTAL CA SCREEN DOC REV: CPT | Performed by: INTERNAL MEDICINE

## 2021-02-05 PROCEDURE — 1101F PT FALLS ASSESS-DOCD LE1/YR: CPT | Performed by: INTERNAL MEDICINE

## 2021-02-05 PROCEDURE — 99214 OFFICE O/P EST MOD 30 MIN: CPT | Performed by: INTERNAL MEDICINE

## 2021-02-05 PROCEDURE — G8536 NO DOC ELDER MAL SCRN: HCPCS | Performed by: INTERNAL MEDICINE

## 2021-02-05 PROCEDURE — G8754 DIAS BP LESS 90: HCPCS | Performed by: INTERNAL MEDICINE

## 2021-02-05 RX ORDER — SERTRALINE HYDROCHLORIDE 25 MG/1
25 TABLET, FILM COATED ORAL DAILY
Qty: 30 TAB | Refills: 1 | Status: SHIPPED | OUTPATIENT
Start: 2021-02-05 | End: 2021-04-12

## 2021-02-05 NOTE — PROGRESS NOTES
Chief Complaint   Patient presents with    Medication Evaluation     Reviewed record in preparation for visit and have obtained necessary documentation. Identified pt with two pt identifiers(name and ). Health Maintenance Due   Topic    COVID-19 Vaccine (1 of 2)    GLAUCOMA SCREENING Q2Y          Chief Complaint   Patient presents with    Medication Evaluation        Wt Readings from Last 3 Encounters:   21 220 lb 3.2 oz (99.9 kg)   20 220 lb 12.8 oz (100.2 kg)   20 222 lb 6.4 oz (100.9 kg)     Temp Readings from Last 3 Encounters:   21 97.5 °F (36.4 °C) (Temporal)   20 97.3 °F (36.3 °C) (Temporal)   20 99.9 °F (37.7 °C) (Oral)     BP Readings from Last 3 Encounters:   21 120/80   20 134/80   20 130/80     Pulse Readings from Last 3 Encounters:   21 84   20 79   20 93           Learning Assessment:  :     Learning Assessment 10/7/2014   PRIMARY LEARNER Patient   HIGHEST LEVEL OF EDUCATION - PRIMARY LEARNER  SOME COLLEGE   BARRIERS PRIMARY LEARNER NONE   CO-LEARNER CAREGIVER No   PRIMARY LANGUAGE ENGLISH   LEARNER PREFERENCE PRIMARY LISTENING   ANSWERED BY patient   RELATIONSHIP SELF       Depression Screening:  :     3 most recent PHQ Screens 2021   Little interest or pleasure in doing things Not at all   Feeling down, depressed, irritable, or hopeless Not at all   Total Score PHQ 2 0       Fall Risk Assessment:  :     Fall Risk Assessment, last 12 mths 2021   Able to walk? No   Fall in past 12 months? -   Number of falls in past 12 months -   Fall with injury? -       Abuse Screening:  :     Abuse Screening Questionnaire 2018   Do you ever feel afraid of your partner? N   Are you in a relationship with someone who physically or mentally threatens you? N   Is it safe for you to go home? Y       Coordination of Care Questionnaire:  :     1) Have you been to an emergency room, urgent care clinic since your last visit? no   Hospitalized since your last visit? no             2) Have you seen or consulted any other health care providers outside of [address] [place] [address] since your last visit? no  (Include any pap smears or colon screenings in this section.)    3) Do you have an Advance Directive on file? no    4) Are you interested in receiving information on Advance Directives? NO      Patient is accompanied by self I have received verbal consent from Elaine Hauser to discuss any/all medical information while they are present in the room. Reviewed record  In preparation for visit and have obtained necessary documentation.

## 2021-02-05 NOTE — PROGRESS NOTES
Follow Up Visit    Denece Lashawn is a 65 y.o. female. she presents for Medication Evaluation    Mental Health Review  Patient is seen for anxiety disorder. She has had anxiety for many years. Current treatment includes benzodiazepines and no other therapies. She tells me that she has felt that she needed to have more of the medication recently in order to have the same effect. Ongoing symptoms include: psychomotor agitation. She denies: racing thoughts. Reported side effects from the treatment: none. Patient Active Problem List   Diagnosis Code    Essential hypertension, benign I10    Anxiety F41.9    GERD (gastroesophageal reflux disease) K21.9    Rosacea L71.9    Hemorrhoid K64.9    Gout M10.9    Plantar fasciitis, bilateral M72.2    Mass of kidney of unknown nature N28.89    Severe obesity (BMI 35.0-39. 9) with comorbidity ([facility] [address] E66.01         Prior to Admission medications    Medication Sig Start Date End Date Taking? Authorizing Provider   ALPRAZotabitha Wills) 0.5 mg tablet Take 1 Tab by mouth three (3) times daily as needed for Anxiety. Max Daily Amount: 1.5 mg. 1/28/21  Yes Alexa Brooke MD   fluticasone propionate (FLONASE) 50 mcg/actuation nasal spray USE 2 SPRAYS BY BOTH NOSTRILS ROUTE DAILY. 1/26/21  Yes Alexa Brooke MD   amLODIPine (NORVASC) 10 mg tablet TAKE 1 TABLET DAILY 10/16/20  Yes Alexa Brooke MD   allopurinoL (ZYLOPRIM) 100 mg tablet TAKE 1 TABLET DAILY 9/11/20  Yes Alexa Brooke MD   lisinopriL (PRINIVIL, ZESTRIL) 40 mg tablet TAKE 1 TABLET DAILY 9/11/20  Yes Alexa Brooke MD   atorvastatin (LIPITOR) 20 mg tablet TAKE 1 TABLET DAILY 7/7/20  Yes Alexa Brooke MD   carvediloL (COREG) 6.25 mg tablet Take 1 Tab by mouth two (2) times daily (with meals). 5/1/20  Yes Alexa Brooke MD   Sulfacetamide Sodium-Sulfur 9-4 % clsr by Apply Externally route as needed.    Yes Provider, Historical   desonide (TRIDESILON) 0.05 % cream Apply  to affected area as needed for Skin Irritation. Yes Provider, Historical   sertraline (ZOLOFT) 25 mg tablet Take 1 Tab by mouth daily. 2/5/21  Yes Jarrod Narajno MD   raNITIdine (ZANTAC) 300 mg tab Take 1 Tab by mouth daily. 1/3/20   Jarrod Naranjo MD         Health Maintenance   Topic Date Due    COVID-19 Vaccine (1 of 2) 03/18/1966    GLAUCOMA SCREENING Q2Y  12/20/2019    DTaP/Tdap/Td series (2 - Td) 06/03/2021    Medicare Yearly Exam  06/18/2021    Breast Cancer Screen Mammogram  07/07/2021    A1C test (Diabetic or Prediabetic)  07/08/2021    Lipid Screen  07/08/2021    Colorectal Cancer Screening Combo  07/29/2021    Hepatitis C Screening  Completed    Bone Densitometry (Dexa) Screening  Completed    Shingrix Vaccine Age 50>  Completed    Flu Vaccine  Completed    Pneumococcal 65+ years  Completed       Review of Systems   Constitutional: Negative. Respiratory: Negative. Cardiovascular: Negative. Gastrointestinal: Negative. Visit Vitals  /80 (BP 1 Location: Left upper arm, BP Patient Position: Sitting, BP Cuff Size: Large adult)   Pulse 84   Temp 97.5 °F (36.4 °C) (Temporal)   Resp 16   Ht 5' 3\" (1.6 m)   Wt 220 lb 3.2 oz (99.9 kg)   SpO2 98%   BMI 39.01 kg/m²       Physical Exam  Constitutional:       Appearance: She is well-developed. Cardiovascular:      Rate and Rhythm: Normal rate and regular rhythm. Pulmonary:      Effort: Pulmonary effort is normal.      Breath sounds: Normal breath sounds. Neurological:      Mental Status: She is alert. ASSESSMENT/PLAN    Diagnoses and all orders for this visit:    1. Anxiety - Long discussion with the patient regarding her anxiety. Advised that we should try to decrease her use of benzodiazepine as much. We will add Zoloft in order to accomplish this. -     sertraline (ZOLOFT) 25 mg tablet; Take 1 Tab by mouth daily. 2. Essential hypertension  -     CBC WITH AUTOMATED DIFF; Future    3.  Mixed hyperlipidemia  -     LIPID PANEL; Future  -     METABOLIC PANEL, COMPREHENSIVE; Future    4. Prediabetes  -     HEMOGLOBIN A1C WITH EAG; Future    Follow-up and Dispositions    · Return in about 1 month (around 3/5/2021), or if symptoms worsen or fail to improve.

## 2021-04-08 RX ORDER — LISINOPRIL 40 MG/1
TABLET ORAL
Qty: 90 TAB | Refills: 1 | Status: SHIPPED | COMMUNITY
Start: 2021-04-08 | End: 2021-10-04

## 2021-04-10 LAB
ALBUMIN SERPL-MCNC: 4.6 G/DL (ref 3.7–4.7)
ALBUMIN/GLOB SERPL: 1.4 {RATIO} (ref 1.2–2.2)
ALP SERPL-CCNC: 101 IU/L (ref 39–117)
ALT SERPL-CCNC: 12 IU/L (ref 0–32)
AST SERPL-CCNC: 15 IU/L (ref 0–40)
BASOPHILS # BLD AUTO: 0.1 X10E3/UL (ref 0–0.2)
BASOPHILS NFR BLD AUTO: 1 %
BILIRUB SERPL-MCNC: 0.8 MG/DL (ref 0–1.2)
BUN SERPL-MCNC: 12 MG/DL (ref 8–27)
BUN/CREAT SERPL: 14 (ref 12–28)
CALCIUM SERPL-MCNC: 9.5 MG/DL (ref 8.7–10.3)
CHLORIDE SERPL-SCNC: 104 MMOL/L (ref 96–106)
CHOLEST SERPL-MCNC: 127 MG/DL (ref 100–199)
CO2 SERPL-SCNC: 23 MMOL/L (ref 20–29)
CREAT SERPL-MCNC: 0.86 MG/DL (ref 0.57–1)
EOSINOPHIL # BLD AUTO: 0.3 X10E3/UL (ref 0–0.4)
EOSINOPHIL NFR BLD AUTO: 4 %
ERYTHROCYTE [DISTWIDTH] IN BLOOD BY AUTOMATED COUNT: 13.2 % (ref 11.7–15.4)
EST. AVERAGE GLUCOSE BLD GHB EST-MCNC: 126 MG/DL
GLOBULIN SER CALC-MCNC: 3.4 G/DL (ref 1.5–4.5)
GLUCOSE SERPL-MCNC: 108 MG/DL (ref 65–99)
HBA1C MFR BLD: 6 % (ref 4.8–5.6)
HCT VFR BLD AUTO: 45.9 % (ref 34–46.6)
HDLC SERPL-MCNC: 41 MG/DL
HGB BLD-MCNC: 15.5 G/DL (ref 11.1–15.9)
IMM GRANULOCYTES # BLD AUTO: 0 X10E3/UL (ref 0–0.1)
IMM GRANULOCYTES NFR BLD AUTO: 0 %
LDLC SERPL CALC-MCNC: 58 MG/DL (ref 0–99)
LYMPHOCYTES # BLD AUTO: 2.4 X10E3/UL (ref 0.7–3.1)
LYMPHOCYTES NFR BLD AUTO: 35 %
MCH RBC QN AUTO: 31.5 PG (ref 26.6–33)
MCHC RBC AUTO-ENTMCNC: 33.8 G/DL (ref 31.5–35.7)
MCV RBC AUTO: 93 FL (ref 79–97)
MONOCYTES # BLD AUTO: 0.3 X10E3/UL (ref 0.1–0.9)
MONOCYTES NFR BLD AUTO: 5 %
NEUTROPHILS # BLD AUTO: 3.9 X10E3/UL (ref 1.4–7)
NEUTROPHILS NFR BLD AUTO: 55 %
PLATELET # BLD AUTO: 218 X10E3/UL (ref 150–450)
POTASSIUM SERPL-SCNC: 4.3 MMOL/L (ref 3.5–5.2)
PROT SERPL-MCNC: 8 G/DL (ref 6–8.5)
RBC # BLD AUTO: 4.92 X10E6/UL (ref 3.77–5.28)
SODIUM SERPL-SCNC: 142 MMOL/L (ref 134–144)
TRIGL SERPL-MCNC: 167 MG/DL (ref 0–149)
VLDLC SERPL CALC-MCNC: 28 MG/DL (ref 5–40)
WBC # BLD AUTO: 7 X10E3/UL (ref 3.4–10.8)

## 2021-05-09 DIAGNOSIS — J30.9 ALLERGIC RHINITIS, UNSPECIFIED SEASONALITY, UNSPECIFIED TRIGGER: ICD-10-CM

## 2021-05-09 RX ORDER — FLUTICASONE PROPIONATE 50 MCG
SPRAY, SUSPENSION (ML) NASAL
Qty: 1 BOTTLE | Refills: 2 | Status: SHIPPED | OUTPATIENT
Start: 2021-05-09 | End: 2021-08-27

## 2021-06-21 ENCOUNTER — OFFICE VISIT (OUTPATIENT)
Dept: INTERNAL MEDICINE CLINIC | Age: 71
End: 2021-06-21
Payer: MEDICARE

## 2021-06-21 VITALS
OXYGEN SATURATION: 97 % | TEMPERATURE: 97.7 F | BODY MASS INDEX: 39.23 KG/M2 | DIASTOLIC BLOOD PRESSURE: 80 MMHG | HEIGHT: 63 IN | HEART RATE: 72 BPM | SYSTOLIC BLOOD PRESSURE: 120 MMHG | WEIGHT: 221.4 LBS | RESPIRATION RATE: 16 BRPM

## 2021-06-21 DIAGNOSIS — Z12.11 COLON CANCER SCREENING: ICD-10-CM

## 2021-06-21 DIAGNOSIS — Z12.31 ENCOUNTER FOR SCREENING MAMMOGRAM FOR MALIGNANT NEOPLASM OF BREAST: ICD-10-CM

## 2021-06-21 DIAGNOSIS — Z13.39 SCREENING FOR ALCOHOLISM: ICD-10-CM

## 2021-06-21 DIAGNOSIS — Z00.00 MEDICARE ANNUAL WELLNESS VISIT, SUBSEQUENT: Primary | ICD-10-CM

## 2021-06-21 DIAGNOSIS — M25.50 ARTHRALGIA, UNSPECIFIED JOINT: ICD-10-CM

## 2021-06-21 PROCEDURE — G8417 CALC BMI ABV UP PARAM F/U: HCPCS | Performed by: INTERNAL MEDICINE

## 2021-06-21 PROCEDURE — G8399 PT W/DXA RESULTS DOCUMENT: HCPCS | Performed by: INTERNAL MEDICINE

## 2021-06-21 PROCEDURE — G8754 DIAS BP LESS 90: HCPCS | Performed by: INTERNAL MEDICINE

## 2021-06-21 PROCEDURE — G8427 DOCREV CUR MEDS BY ELIG CLIN: HCPCS | Performed by: INTERNAL MEDICINE

## 2021-06-21 PROCEDURE — G9899 SCRN MAM PERF RSLTS DOC: HCPCS | Performed by: INTERNAL MEDICINE

## 2021-06-21 PROCEDURE — G8752 SYS BP LESS 140: HCPCS | Performed by: INTERNAL MEDICINE

## 2021-06-21 PROCEDURE — G8510 SCR DEP NEG, NO PLAN REQD: HCPCS | Performed by: INTERNAL MEDICINE

## 2021-06-21 PROCEDURE — G0439 PPPS, SUBSEQ VISIT: HCPCS | Performed by: INTERNAL MEDICINE

## 2021-06-21 PROCEDURE — 1101F PT FALLS ASSESS-DOCD LE1/YR: CPT | Performed by: INTERNAL MEDICINE

## 2021-06-21 PROCEDURE — G8536 NO DOC ELDER MAL SCRN: HCPCS | Performed by: INTERNAL MEDICINE

## 2021-06-21 PROCEDURE — 3017F COLORECTAL CA SCREEN DOC REV: CPT | Performed by: INTERNAL MEDICINE

## 2021-06-21 NOTE — PROGRESS NOTES
Chief Complaint   Patient presents with   [place] Wellness Visit     Reviewed record in preparation for visit and have obtained necessary documentation. Identified pt with two pt identifiers(name and ). Health Maintenance Due   Topic    DTaP/Tdap/Td series (2 - Td or Tdap)    Medicare Yearly Exam     Breast Cancer Screen Mammogram          Chief Complaint   Patient presents with   [facility] Annual Wellness Visit        Wt Readings from Last 3 Encounters:   21 221 lb 6.4 oz (100.4 kg)   21 220 lb 3.2 oz (99.9 kg)   20 220 lb 12.8 oz (100.2 kg)     Temp Readings from Last 3 Encounters:   21 97.7 °F (36.5 °C) (Temporal)   21 97.5 °F (36.4 °C) (Temporal)   20 97.3 °F (36.3 °C) (Temporal)     BP Readings from Last 3 Encounters:   21 120/80   21 120/80   20 134/80     Pulse Readings from Last 3 Encounters:   21 72   21 84   20 79           Learning Assessment:  :     Learning Assessment 10/7/2014   PRIMARY LEARNER Patient   HIGHEST LEVEL OF EDUCATION - PRIMARY LEARNER  SOME COLLEGE   BARRIERS PRIMARY LEARNER NONE   CO-LEARNER CAREGIVER No   PRIMARY LANGUAGE ENGLISH   LEARNER PREFERENCE PRIMARY LISTENING   ANSWERED BY patient   RELATIONSHIP SELF       Depression Screening:  :     3 most recent PHQ Screens 2021   Little interest or pleasure in doing things Not at all   Feeling down, depressed, irritable, or hopeless Not at all   Total Score PHQ 2 0       Fall Risk Assessment:  :     Fall Risk Assessment, last 12 mths 2021   Able to walk? Yes   Fall in past 12 months? 1   Do you feel unsteady? 0   Are you worried about falling 0   Is TUG test greater than 12 seconds? 0   Is the gait abnormal? 0   Number of falls in past 12 months 1   Fall with injury? 0       Abuse Screening:  :     Abuse Screening Questionnaire 2018   Do you ever feel afraid of your partner?  N   Are you in a relationship with someone who physically or mentally threatens you? N   Is it safe for you to go home? Y       Coordination of Care Questionnaire:  :     1) Have you been to an emergency room, urgent care clinic since your last visit? no   Hospitalized since your last visit? no             2) Have you seen or consulted any other health care providers outside of [address] [place] [address] since your last visit? no  (Include any pap smears or colon screenings in this section.)    3) Do you have an Advance Directive on file? no    4) Are you interested in receiving information on Advance Directives? NO      Patient is accompanied by self I have received verbal consent from Gabriel Hackett to discuss any/all medical information while they are present in the room. Reviewed record  In preparation for visit and have obtained necessary documentation.

## 2021-06-21 NOTE — PATIENT INSTRUCTIONS

## 2021-06-21 NOTE — PROGRESS NOTES
This is the Subsequent Medicare Annual Wellness Exam, performed 12 months or more after the Initial AWV or the last Subsequent AWV    I have reviewed the patient's medical history in detail and updated the computerized patient record. Assessment/Plan   Education and counseling provided:  Are appropriate based on today's review and evaluation    1. Medicare annual wellness visit, subsequent  2. Screening for alcoholism  -     [place] ANNUAL ALCOHOL SCREEN 15 MIN  3. Arthralgia, unspecified joint  -     C REACTIVE PROTEIN, QT; Future  -     SED RATE (ESR); Future  -     URIC ACID; Future  4. Encounter for screening mammogram for malignant neoplasm of breast  -     WILMAR MAMMO BI SCREENING INCL CAD; Future  5. Colon cancer screening  -     REFERRAL TO GASTROENTEROLOGY       Depression Risk Factor Screening     3 most recent PHQ Screens 6/21/2021   Little interest or pleasure in doing things Not at all   Feeling down, depressed, irritable, or hopeless Not at all   Total Score PHQ 2 0       Alcohol Risk Screen    Do you average more than 1 drink per night or more than 7 drinks a week:  No    On any one occasion in the past three months have you have had more than 3 drinks containing alcohol:  No        Functional Ability and Level of Safety    Hearing: Hearing is good. Activities of Daily Living: The home contains: no safety equipment. Patient does total self care      Ambulation: with no difficulty     Fall Risk:  Fall Risk Assessment, last 12 mths 6/21/2021   Able to walk? Yes   Fall in past 12 months? 1   Do you feel unsteady? 0   Are you worried about falling 0   Is TUG test greater than 12 seconds? 0   Is the gait abnormal? 0   Number of falls in past 12 months 1   Fall with injury?  0      Abuse Screen:  Patient is not abused       Cognitive Screening    Has your family/caregiver stated any concerns about your memory: no         Health Maintenance Due     Health Maintenance Due   Topic Date Due    DTaP/Tdap/Td series (2 - Td or Tdap) 2021    Breast Cancer Screen Mammogram  2021       Patient Care Team   Patient Care Team:  Kody Greene MD as PCP - General (Internal Medicine)  Kody Greene MD as PCP - [facility] Provider  Marito Melo MD (Cardiology)    History     Patient Active Problem List   Diagnosis Code    Essential hypertension, benign I10    Anxiety F41.9    GERD (gastroesophageal reflux disease) K21.9    Rosacea L71.9    Hemorrhoid K64.9    Gout M10.9    Plantar fasciitis, bilateral M72.2    Mass of kidney of unknown nature N28.89    Severe obesity (BMI 35.0-39. 9) with comorbidity ([address] E66.01     Past Medical History:   Diagnosis Date    Gall stones     GERD (gastroesophageal reflux disease)     Hemorrhoid     Hypertension     Ill-defined condition     gout    Ill-defined condition     prediabetes - is on metformin    Ill-defined condition     roscea    Sleep apnea     borderline - was put on a machine/not using a machine now      Past Surgical History:   Procedure Laterality Date    COLONOSCOPY      COLONOSCOPY N/A 2016    COLONOSCOPY performed by Hilario Pinto MD at [address] [place] [address]  2010    nml    HX BREAST BIOPSY Right     15+ years ago. Benign (per patient)    HX  SECTION      x 2    HX CHOLECYSTECTOMY      HX HYSTERECTOMY      HX TONSILLECTOMY      WILMAR MAMMOGRAM SCREEN BILAT  3/2014    PAP SMEAR      TN BREAST SURGERY PROCEDURE UNLISTED      benign tumor right breast    TN SINUS SURGERY PROC UNLISTED       Current Outpatient Medications   Medication Sig Dispense Refill    fluticasone propionate (FLONASE) 50 mcg/actuation nasal spray USE 2 SPRAYS BY BOTH NOSTRILS ROUTE DAILY.  1 Bottle 2    amLODIPine (NORVASC) 10 mg tablet TAKE 1 TABLET DAILY 90 Tab 3    carvediloL (COREG) 6.25 mg tablet TAKE 1 TABLET TWICE A DAY WITH MEALS 180 Tab 3    sertraline (ZOLOFT) 25 mg tablet TAKE 1 TABLET BY MOUTH EVERY DAY 30 Tab 3    lisinopriL (PRINIVIL, ZESTRIL) 40 mg tablet TAKE 1 TABLET DAILY 90 Tab 1    allopurinoL (ZYLOPRIM) 100 mg tablet TAKE 1 TABLET DAILY 90 Tab 1    ALPRAZolam (XANAX) 0.5 mg tablet Take 1 Tab by mouth three (3) times daily as needed for Anxiety. Max Daily Amount: 1.5 mg. 90 Tab 0    atorvastatin (LIPITOR) 20 mg tablet TAKE 1 TABLET DAILY 90 Tab 3    Sulfacetamide Sodium-Sulfur 9-4 % clsr by Apply Externally route as needed.  desonide (TRIDESILON) 0.05 % cream Apply  to affected area as needed for Skin Irritation.  raNITIdine (ZANTAC) 300 mg tab Take 1 Tab by mouth daily.  (Patient not taking: Reported on 6/21/2021) 90 Tab 1     Allergies   Allergen Reactions    Codeine Nausea and Vomiting    Hydrochlorothiazide Other (comments)     Gout     Minocycline Nausea Only       Family History   Problem Relation Age of Onset    Cancer Mother         breast, 46s     Diabetes Mother     Heart Disease Mother         MI, CAD    Breast Cancer Mother         onset: late 48 early 61    Stroke Father     Heart Disease Father         CAD    Dementia Father      Social History     Tobacco Use    Smoking status: Never Smoker    Smokeless tobacco: Never Used   Substance Use Topics    Alcohol use: Yes     Comment: special occasional         Keisha Palacio MD

## 2021-07-09 ENCOUNTER — HOSPITAL ENCOUNTER (OUTPATIENT)
Dept: MAMMOGRAPHY | Age: 71
Discharge: HOME | End: 2021-07-09
Attending: INTERNAL MEDICINE
Payer: MEDICARE

## 2021-07-09 ENCOUNTER — TRANSCRIBE ORDER (OUTPATIENT)
Dept: GENERAL RADIOLOGY | Age: 71
End: 2021-07-09

## 2021-07-09 DIAGNOSIS — Z12.31 SCREENING MAMMOGRAM, ENCOUNTER FOR: ICD-10-CM

## 2021-07-09 DIAGNOSIS — Z12.31 SCREENING MAMMOGRAM, ENCOUNTER FOR: Primary | ICD-10-CM

## 2021-07-09 PROCEDURE — 77063 BREAST TOMOSYNTHESIS BI: CPT

## 2021-08-09 DIAGNOSIS — E78.2 MIXED HYPERLIPIDEMIA: ICD-10-CM

## 2021-08-09 RX ORDER — ATORVASTATIN CALCIUM 20 MG/1
TABLET, FILM COATED ORAL
Qty: 90 TABLET | Refills: 3 | Status: SHIPPED | OUTPATIENT
Start: 2021-08-09 | End: 2022-08-31

## 2021-08-18 ENCOUNTER — OFFICE VISIT (OUTPATIENT)
Dept: URGENT CARE | Age: 71
End: 2021-08-18
Payer: MEDICARE

## 2021-08-18 VITALS — RESPIRATION RATE: 18 BRPM | TEMPERATURE: 98.6 F | OXYGEN SATURATION: 95 % | HEART RATE: 78 BPM

## 2021-08-18 DIAGNOSIS — R05.9 COUGH: ICD-10-CM

## 2021-08-18 DIAGNOSIS — J40 WHEEZY BRONCHITIS: ICD-10-CM

## 2021-08-18 DIAGNOSIS — R50.9 FEVER, UNSPECIFIED FEVER CAUSE: Primary | ICD-10-CM

## 2021-08-18 LAB — SARS-COV-2 POC: NEGATIVE

## 2021-08-18 PROCEDURE — G8536 NO DOC ELDER MAL SCRN: HCPCS | Performed by: NURSE PRACTITIONER

## 2021-08-18 PROCEDURE — G8756 NO BP MEASURE DOC: HCPCS | Performed by: NURSE PRACTITIONER

## 2021-08-18 PROCEDURE — S9083 URGENT CARE CENTER GLOBAL: HCPCS | Performed by: NURSE PRACTITIONER

## 2021-08-18 PROCEDURE — 87426 SARSCOV CORONAVIRUS AG IA: CPT | Performed by: NURSE PRACTITIONER

## 2021-08-18 PROCEDURE — G8417 CALC BMI ABV UP PARAM F/U: HCPCS | Performed by: NURSE PRACTITIONER

## 2021-08-18 PROCEDURE — G8399 PT W/DXA RESULTS DOCUMENT: HCPCS | Performed by: NURSE PRACTITIONER

## 2021-08-18 PROCEDURE — G8432 DEP SCR NOT DOC, RNG: HCPCS | Performed by: NURSE PRACTITIONER

## 2021-08-18 PROCEDURE — G8427 DOCREV CUR MEDS BY ELIG CLIN: HCPCS | Performed by: NURSE PRACTITIONER

## 2021-08-18 PROCEDURE — G9899 SCRN MAM PERF RSLTS DOC: HCPCS | Performed by: NURSE PRACTITIONER

## 2021-08-18 PROCEDURE — 1101F PT FALLS ASSESS-DOCD LE1/YR: CPT | Performed by: NURSE PRACTITIONER

## 2021-08-18 PROCEDURE — 3017F COLORECTAL CA SCREEN DOC REV: CPT | Performed by: NURSE PRACTITIONER

## 2021-08-18 PROCEDURE — 1090F PRES/ABSN URINE INCON ASSESS: CPT | Performed by: NURSE PRACTITIONER

## 2021-08-18 RX ORDER — PREDNISONE 20 MG/1
TABLET ORAL
Qty: 8 TABLET | Refills: 0 | Status: SHIPPED | OUTPATIENT
Start: 2021-08-18 | End: 2021-10-29

## 2021-08-18 RX ORDER — AZITHROMYCIN 250 MG/1
TABLET, FILM COATED ORAL
Qty: 6 TABLET | Refills: 0 | Status: SHIPPED | OUTPATIENT
Start: 2021-08-18 | End: 2021-10-29

## 2021-08-18 RX ORDER — PENICILLIN V POTASSIUM 500 MG/1
500 TABLET, FILM COATED ORAL 3 TIMES DAILY
COMMUNITY
End: 2021-10-29

## 2021-08-18 NOTE — PROGRESS NOTES
Subjective: (As above and below)       This patient was seen in [facility] at [address] [place] [address] Urgent Care while outdoors at their vehicle due to COVID-19 pandemic with PPE and focused examination in order to decrease community viral transmission. The patient/guardian gave verbal consent to treat. Chief Complaint   Patient presents with    Covid Testing     cough, sore throat, headache, congestion, fever 100.7 x 1 week     Anu Jane is a 66 y.o. female who presents for evaluation of : cough, congestion x 1 week fever at onset no longer present. No other identified aggravating or alleviating factors. Symptoms are constant and overall not resolved. Promotes no decrease in PO intake of fluids. Denies: severe lethargy, SOB, syncope/near syncope, vomiting/diarrhea, chest pain, chest pain with breathing, labored breathing, severe headache, fevers . Recent travel: no  Known Exposure to COVID-19: no  Known flu or strep contact: no       Review of Systems - negative except as listed above    Reviewed PmHx, RxHx, FmHx, SocHx, AllgHx and updated in chart.   Family History   Problem Relation Age of Onset    Cancer Mother         breast, 46s     Diabetes Mother     Heart Disease Mother         MI, CAD    Breast Cancer Mother         onset: late 48 early 61    Stroke Father     Heart Disease Father         CAD    Dementia Father        Past Medical History:   Diagnosis Date    Gall stones     GERD (gastroesophageal reflux disease)     Hemorrhoid     Hypertension     Ill-defined condition     gout    Ill-defined condition     prediabetes - is on metformin    Ill-defined condition     roscea    Sleep apnea     borderline - was put on a machine/not using a machine now      Social History     Socioeconomic History    Marital status:      Spouse name: Not on file    Number of children: 2    Years of education: Not on file    Highest education level: Not on file   Occupational History  Occupation: Retired [employer], [employer]   Tobacco Use    Smoking status: Never Smoker    Smokeless tobacco: Never Used   Vaping Use    Vaping Use: Never used   Substance and Sexual Activity    Alcohol use: Yes     Comment: special occasional    Drug use: No    Sexual activity: Yes     Partners: Male     Birth control/protection: Surgical   Other Topics Concern     Social Determinants of Health     Financial Resource Strain:     Difficulty of Paying Living Expenses:    Food Insecurity:     Worried About Running Out of Food in the Last Year:     [address] in the Last Year:    Transportation Needs:     Lack of Transportation (Medical):  Lack of Transportation (Non-Medical):    Physical Activity:     Days of Exercise per Week:     Minutes of Exercise per Session:    Stress:     Feeling of Stress :    Social Connections:     Frequency of Communication with Friends and Family:     Frequency of Social Gatherings with Friends and Family:     Attends Anab[employer]ist Services:     Active Member of Clubs or Organizations:     Attends Club or Organization Meetings:     Marital Status:           Current Outpatient Medications   Medication Sig    penicillin v potassium (VEETID) 500 mg tablet Take 500 mg by mouth three (3) times daily.  azithromycin (ZITHROMAX) 250 mg tablet Take 2 tablets on day 1 then 1 tablet per day for remaining 4 days. Take by mouth.  predniSONE (DELTASONE) 20 mg tablet Take 2 tabs by mouth one time daily with food for 4 days.  atorvastatin (LIPITOR) 20 mg tablet TAKE 1 TABLET DAILY    fluticasone propionate (FLONASE) 50 mcg/actuation nasal spray USE 2 SPRAYS BY BOTH NOSTRILS ROUTE DAILY.     amLODIPine (NORVASC) 10 mg tablet TAKE 1 TABLET DAILY    carvediloL (COREG) 6.25 mg tablet TAKE 1 TABLET TWICE A DAY WITH MEALS    sertraline (ZOLOFT) 25 mg tablet TAKE 1 TABLET BY MOUTH EVERY DAY    lisinopriL (PRINIVIL, ZESTRIL) 40 mg tablet TAKE 1 TABLET DAILY    allopurinoL (ZYLOPRIM) 100 mg tablet TAKE 1 TABLET DAILY    ALPRAZolam (XANAX) 0.5 mg tablet Take 1 Tab by mouth three (3) times daily as needed for Anxiety. Max Daily Amount: 1.5 mg.    raNITIdine (ZANTAC) 300 mg tab Take 1 Tab by mouth daily.  Sulfacetamide Sodium-Sulfur 9-4 % clsr by Apply Externally route as needed.  desonide (TRIDESILON) 0.05 % cream Apply  to affected area as needed for Skin Irritation. No current facility-administered medications for this visit. Objective:     Vitals:    08/18/21 1524   Pulse: 78   Resp: 18   Temp: 98.6 °F (37 °C)   SpO2: 95%       Physical Exam  General appearance  appears well hydrated and does not appear toxic, no acute distress  Eyes - EOMs intact. Non injected. No scleral icterus   Ears - no external swelling  Nose - nasal congestion. No purulent drainage  Mouth - OP clear without swelling, exudate or lesion. Mucus membranes moist. Uvula midline. Neck/Lymphatics  trachea midline, full AROM  Chest - Normal breathing effort. Scattered wheeze bilat. no rales, rhonchi or diminishments bilaterally. Heart - RRR, no murmurs  Skin - no observable rashes or pallor  Neurologic- alert and oriented x 3  Psychiatric- normal mood, behavior and though content. Assessment/ Plan:     1. Fever, unspecified fever cause    - azithromycin (ZITHROMAX) 250 mg tablet; Take 2 tablets on day 1 then 1 tablet per day for remaining 4 days. Take by mouth. Dispense: 6 Tablet; Refill: 0  - AMB POC SARS-COV-2    2. Cough    - azithromycin (ZITHROMAX) 250 mg tablet; Take 2 tablets on day 1 then 1 tablet per day for remaining 4 days. Take by mouth. Dispense: 6 Tablet; Refill: 0  - AMB POC SARS-COV-2    3. Wheezy bronchitis    - predniSONE (DELTASONE) 20 mg tablet; Take 2 tabs by mouth one time daily with food for 4 days. Dispense: 8 Tablet; Refill: 0    Rapid covid 19 NEGATIVE  Start prednisone for wheeze  Azithromycin givne productive cough over past week.    No evidence suggesting complication of illness at this time. Will discharge home with close monitoring and follow up. Supportive home care for mild symptoms advised- maintain adequate fluid intake, lozenges, over the counter Tylenol (for fever, aches, pains, chills), deep breathing exercises  Recommendation for self isolation/quarantine based on current CDC guidelines        Follow up: We have reviewed worsening/concerning signs and symptoms that may warrant seeking immediate care in the ED setting. For other non-severe changes, non-improvement or questions, patient aware to contact PCP office or consider a virtual online medical consultation.         Thaddeus Woodard NP

## 2021-10-03 DIAGNOSIS — M1A.0790 CHRONIC IDIOPATHIC GOUT INVOLVING TOE WITHOUT TOPHUS, UNSPECIFIED LATERALITY: ICD-10-CM

## 2021-10-04 RX ORDER — ALLOPURINOL 100 MG/1
TABLET ORAL
Qty: 90 TABLET | Refills: 3 | Status: SHIPPED | OUTPATIENT
Start: 2021-10-04 | End: 2022-11-01

## 2021-10-04 RX ORDER — LISINOPRIL 40 MG/1
TABLET ORAL
Qty: 90 TABLET | Refills: 3 | Status: SHIPPED | OUTPATIENT
Start: 2021-10-04 | End: 2022-11-01

## 2021-10-27 DIAGNOSIS — F41.9 ANXIETY: ICD-10-CM

## 2021-10-27 RX ORDER — ALPRAZOLAM 0.5 MG/1
0.5 TABLET ORAL
Qty: 90 TABLET | Refills: 0 | Status: SHIPPED | OUTPATIENT
Start: 2021-10-27 | End: 2022-04-28

## 2021-11-01 ENCOUNTER — TRANSCRIBE ORDER (OUTPATIENT)
Dept: REGISTRATION | Age: 71
End: 2021-11-01

## 2021-11-01 ENCOUNTER — HOSPITAL ENCOUNTER (OUTPATIENT)
Dept: PREADMISSION TESTING | Age: 71
Discharge: HOME | End: 2021-11-01
Payer: MEDICARE

## 2021-11-01 DIAGNOSIS — Z01.812 PRE-PROCEDURE LAB EXAM: Primary | ICD-10-CM

## 2021-11-01 DIAGNOSIS — Z01.812 PRE-PROCEDURE LAB EXAM: ICD-10-CM

## 2021-11-01 PROCEDURE — U0005 INFEC AGEN DETEC AMPLI PROBE: HCPCS

## 2021-11-03 LAB
SARS-COV-2, XPLCVT: NOT DETECTED
SOURCE, COVRS: NORMAL

## 2021-11-05 ENCOUNTER — HOSPITAL ENCOUNTER (OUTPATIENT)
Age: 71
Setting detail: OUTPATIENT SURGERY
Discharge: HOME | End: 2021-11-05
Attending: INTERNAL MEDICINE | Admitting: INTERNAL MEDICINE
Payer: MEDICARE

## 2021-11-05 ENCOUNTER — ANESTHESIA (OUTPATIENT)
Dept: ENDOSCOPY | Age: 71
End: 2021-11-05
Payer: MEDICARE

## 2021-11-05 ENCOUNTER — ANESTHESIA EVENT (OUTPATIENT)
Dept: ENDOSCOPY | Age: 71
End: 2021-11-05
Payer: MEDICARE

## 2021-11-05 VITALS
OXYGEN SATURATION: 98 % | RESPIRATION RATE: 19 BRPM | SYSTOLIC BLOOD PRESSURE: 118 MMHG | BODY MASS INDEX: 38.98 KG/M2 | DIASTOLIC BLOOD PRESSURE: 74 MMHG | TEMPERATURE: 99.1 F | HEIGHT: 63 IN | HEART RATE: 63 BPM | WEIGHT: 220 LBS

## 2021-11-05 PROCEDURE — 74011000250 HC RX REV CODE- 250: Performed by: NURSE ANESTHETIST, CERTIFIED REGISTERED

## 2021-11-05 PROCEDURE — 2709999900 HC NON-CHARGEABLE SUPPLY: Performed by: INTERNAL MEDICINE

## 2021-11-05 PROCEDURE — 76040000019: Performed by: INTERNAL MEDICINE

## 2021-11-05 PROCEDURE — 76060000031 HC ANESTHESIA FIRST 0.5 HR: Performed by: INTERNAL MEDICINE

## 2021-11-05 PROCEDURE — 74011250636 HC RX REV CODE- 250/636: Performed by: NURSE ANESTHETIST, CERTIFIED REGISTERED

## 2021-11-05 RX ORDER — DEXTROMETHORPHAN/PSEUDOEPHED 2.5-7.5/.8
1.2 DROPS ORAL
Status: DISCONTINUED | OUTPATIENT
Start: 2021-11-05 | End: 2021-11-05 | Stop reason: HOSPADM

## 2021-11-05 RX ORDER — LIDOCAINE HYDROCHLORIDE 20 MG/ML
INJECTION, SOLUTION EPIDURAL; INFILTRATION; INTRACAUDAL; PERINEURAL AS NEEDED
Status: DISCONTINUED | OUTPATIENT
Start: 2021-11-05 | End: 2021-11-05 | Stop reason: HOSPADM

## 2021-11-05 RX ORDER — PROPOFOL 10 MG/ML
INJECTION, EMULSION INTRAVENOUS AS NEEDED
Status: DISCONTINUED | OUTPATIENT
Start: 2021-11-05 | End: 2021-11-05 | Stop reason: HOSPADM

## 2021-11-05 RX ORDER — NALOXONE HYDROCHLORIDE 0.4 MG/ML
0.4 INJECTION, SOLUTION INTRAMUSCULAR; INTRAVENOUS; SUBCUTANEOUS
Status: DISCONTINUED | OUTPATIENT
Start: 2021-11-05 | End: 2021-11-05 | Stop reason: HOSPADM

## 2021-11-05 RX ORDER — EPINEPHRINE 0.1 MG/ML
1 INJECTION INTRACARDIAC; INTRAVENOUS
Status: DISCONTINUED | OUTPATIENT
Start: 2021-11-05 | End: 2021-11-05 | Stop reason: HOSPADM

## 2021-11-05 RX ORDER — SODIUM CHLORIDE 0.9 % (FLUSH) 0.9 %
5-40 SYRINGE (ML) INJECTION EVERY 8 HOURS
Status: DISCONTINUED | OUTPATIENT
Start: 2021-11-05 | End: 2021-11-05 | Stop reason: HOSPADM

## 2021-11-05 RX ORDER — SODIUM CHLORIDE 9 MG/ML
50 INJECTION, SOLUTION INTRAVENOUS CONTINUOUS
Status: DISCONTINUED | OUTPATIENT
Start: 2021-11-05 | End: 2021-11-05 | Stop reason: HOSPADM

## 2021-11-05 RX ORDER — FLUMAZENIL 0.1 MG/ML
0.2 INJECTION INTRAVENOUS
Status: DISCONTINUED | OUTPATIENT
Start: 2021-11-05 | End: 2021-11-05 | Stop reason: HOSPADM

## 2021-11-05 RX ORDER — SODIUM CHLORIDE 0.9 % (FLUSH) 0.9 %
5-40 SYRINGE (ML) INJECTION AS NEEDED
Status: DISCONTINUED | OUTPATIENT
Start: 2021-11-05 | End: 2021-11-05 | Stop reason: HOSPADM

## 2021-11-05 RX ORDER — ATROPINE SULFATE 0.1 MG/ML
0.5 INJECTION INTRAVENOUS
Status: DISCONTINUED | OUTPATIENT
Start: 2021-11-05 | End: 2021-11-05 | Stop reason: HOSPADM

## 2021-11-05 RX ORDER — SODIUM CHLORIDE 9 MG/ML
INJECTION, SOLUTION INTRAVENOUS
Status: DISCONTINUED | OUTPATIENT
Start: 2021-11-05 | End: 2021-11-05 | Stop reason: HOSPADM

## 2021-11-05 RX ADMIN — PROPOFOL 25 MG: 10 INJECTION, EMULSION INTRAVENOUS at 08:59

## 2021-11-05 RX ADMIN — SODIUM CHLORIDE: 900 INJECTION, SOLUTION INTRAVENOUS at 08:38

## 2021-11-05 RX ADMIN — PROPOFOL 50 MG: 10 INJECTION, EMULSION INTRAVENOUS at 08:56

## 2021-11-05 RX ADMIN — PROPOFOL 25 MG: 10 INJECTION, EMULSION INTRAVENOUS at 09:00

## 2021-11-05 RX ADMIN — PROPOFOL 25 MG: 10 INJECTION, EMULSION INTRAVENOUS at 09:02

## 2021-11-05 RX ADMIN — LIDOCAINE HYDROCHLORIDE 50 MG: 20 INJECTION, SOLUTION EPIDURAL; INFILTRATION; INTRACAUDAL; PERINEURAL at 08:55

## 2021-11-05 RX ADMIN — PROPOFOL 25 MG: 10 INJECTION, EMULSION INTRAVENOUS at 09:04

## 2021-11-05 RX ADMIN — PROPOFOL 50 MG: 10 INJECTION, EMULSION INTRAVENOUS at 08:55

## 2021-11-05 RX ADMIN — PROPOFOL 25 MG: 10 INJECTION, EMULSION INTRAVENOUS at 08:57

## 2021-11-05 NOTE — ANESTHESIA PREPROCEDURE EVALUATION
Anesthetic History   No history of anesthetic complications            Review of Systems / Medical History  Patient summary reviewed, nursing notes reviewed and pertinent labs reviewed    Pulmonary        Sleep apnea: No treatment           Neuro/Psych   Within defined limits           Cardiovascular    Hypertension: well controlled                   GI/Hepatic/Renal     GERD           Endo/Other        Morbid obesity and arthritis     Other Findings   Comments: JESSE-- no treatment in many yrs per pt  Gout           Physical Exam    Airway  Mallampati: II  TM Distance: > 6 cm  Neck ROM: normal range of motion   Mouth opening: Normal     Cardiovascular  Regular rate and rhythm,  S1 and S2 normal,  no murmur, click, rub, or gallop             Dental  No notable dental hx       Pulmonary  Breath sounds clear to auscultation               Abdominal  GI exam deferred       Other Findings            Anesthetic Plan    ASA: 3  Anesthesia type: MAC          Induction: Intravenous  Anesthetic plan and risks discussed with: Patient

## 2021-11-05 NOTE — PERIOP NOTES

## 2021-11-05 NOTE — DISCHARGE INSTRUCTIONS
[facility].  [address] [place] [address]  [phone]                                  Marija Strickland  [id number]  1950    COLON DISCHARGE INSTRUCTIONS    DISCOMFORT:  Redness at IV site- apply warm compress to area; if redness or soreness persist- contact your physician  There may be a slight amount of blood passed from the rectum  Gaseous discomfort- walking, belching will help relieve any discomfort      DIET:   High fiber diet. - however -  remember your colon is empty and a heavy meal will produce gas. Avoid these foods:  vegetables, fried / greasy foods, carbonated drinks for today  You may not  drink alcoholic beverages for at least 12 hours     ACTIVITY:  It is recommended that you spend the remainder of the day resting -  avoid any strenuous activity. You may not operate a vehicle for 12 hours  You may not  engage in an occupation involving machinery or appliances for rest of today    Avoid making any critical decisions for at least 24 hour    CALL M.D. ANY SIGN OF:   Increasing pain, nausea, vomiting  Abdominal distension (swelling)  New increased bleeding (oral or rectal)  Fever (chills)  Pain in chest area  Bloody discharge from nose or mouth  Shortness of breath    You may not  take any Advil, Aspirin, Ibuprofen, Motrin, Aleve, or Goodys for 3 days, ONLY  Tylenol as needed for pain. Post procedure diagnosis: Hemorrhoids      Follow-up Instructions:    Call Dr. Vinod Coronel for any questions or problems. If we took a biopsy please call the office within 2 weeks to discuss your  pathology results. Telephone # [phone]         Learning About Coronavirus [phone])  Coronavirus [phone]): Overview  What is coronavirus (COVID-19)? The coronavirus disease (COVID-19) is caused by a virus. It is an illness that was first found in [place], [place], in December 2019. It has since spread worldwide. The virus can cause fever, cough, and trouble breathing.  In severe cases, it can cause pneumonia and make it hard to breathe without help. It can cause death. Coronaviruses are a large group of viruses. They cause the common cold. They also cause more serious illnesses like Middle East respiratory syndrome (MERS) and severe acute respiratory syndrome (SARS). COVID-19 is caused by a novel coronavirus. That means it's a new type that has not been seen in people before. This virus spreads person-to-person through droplets from coughing and sneezing. It can also spread when you are close to someone who is infected. And it can spread when you touch something that has the virus on it, such as a doorknob or a tabletop. What can you do to protect yourself from coronavirus (COVID-19)? The best way to protect yourself from getting sick is to:  · Avoid areas where there is an outbreak. · Avoid contact with people who may be infected. · Wash your hands often with soap or alcohol-based hand sanitizers. · Avoid crowds and try to stay at least 6 feet away from other people. · Wash your hands often, especially after you cough or sneeze. Use soap and water, and scrub for at least 20 seconds. If soap and water aren't available, use an alcohol-based hand . · Avoid touching your mouth, nose, and eyes. What can you do to avoid spreading the virus to others? To help avoid spreading the virus to others:  · Cover your mouth with a tissue when you cough or sneeze. Then throw the tissue in the trash. · Use a disinfectant to clean things that you touch often. · Stay home if you are sick or have been exposed to the virus. Don't go to school, work, or public areas. And don't use public transportation. · If you are sick:  ? Leave your home only if you need to get medical care. But call the doctor's office first so they know you're coming. And wear a face mask, if you have one.  ? If you have a face mask, wear it whenever you're around other people.  It can help stop the spread of the virus when you cough or sneeze. ? Clean and disinfect your home every day. Use household  and disinfectant wipes or sprays. Take special care to clean things that you grab with your hands. These include doorknobs, remote controls, phones, and handles on your refrigerator and microwave. And don't forget countertops, tabletops, bathrooms, and computer keyboards. When to call for help  Call 911 anytime you think you may need emergency care. For example, call if:  · You have severe trouble breathing. (You can't talk at all.)  · You have constant chest pain or pressure. · You are severely dizzy or lightheaded. · You are confused or can't think clearly. · Your face and lips have a blue color. · You pass out (lose consciousness) or are very hard to wake up. Call your doctor now if you develop symptoms such as:  · Shortness of breath. · Fever. · Cough. If you need to get care, call ahead to the doctor's office for instructions before you go. Make sure you wear a face mask, if you have one, to prevent exposing other people to the virus. Where can you get the latest information? The following health organizations are tracking and studying this virus. Their websites contain the most up-to-date information. Matt Ao also learn what to do if you think you may have been exposed to the virus. · U.S. Centers for Disease Control and Prevention (CDC): The CDC provides updated news about the disease and travel advice. The website also tells you how to prevent the spread of infection. www.cdc.gov  · World Health Organization [facility]): WHO offers information about the virus outbreaks. WHO also has travel advice. www.who.int  Current as of: April 1, 2020               Content Version: 12.4  © 1214-7217 [employer].    Care instructions adapted under license by your healthcare professional. If you have questions about a medical condition or this instruction, always ask your healthcare professional. [employer] disclaims any warranty or liability for your use of this information.

## 2021-11-05 NOTE — H&P
[facility].  [facility] 140 [facility], [address]  [phone]                                History and Physical     NAME: Deanna Lomax   :  1950   MRN:  [id number]     HPI:  The patient was seen and examined. Past Surgical History:   Procedure Laterality Date    COLONOSCOPY      COLONOSCOPY N/A 2016    COLONOSCOPY performed by Kayla Cervantes MD at [address] [place] [address] STUDY AXIAL      nml    HX BREAST BIOPSY Right     15+ years ago.  Benign (per patient) - as of 10/29/2021 pt states she thinks she has only had one breast bx    HX  SECTION      x 2    HX CHOLECYSTECTOMY      HX HYSTERECTOMY      HX TONSILLECTOMY      WILMAR MAMMOGRAM SCREEN BILAT  3/2014    PAP SMEAR      MT BREAST SURGERY PROCEDURE UNLISTED      benign tumor right breast    MT SINUS SURGERY PROC UNLISTED       Past Medical History:   Diagnosis Date    Gall stones     GERD (gastroesophageal reflux disease)     Hemorrhoid     Hypertension     Ill-defined condition     gout    Ill-defined condition     prediabetes - is on metformin/no longer on metformin    Ill-defined condition     rosacea    Ill-defined condition     obesity per pt    Sleep apnea     borderline - was put on a machine/not using a machine now     Social History     Tobacco Use    Smoking status: Never Smoker    Smokeless tobacco: Never Used   Vaping Use    Vaping Use: Never used   Substance Use Topics    Alcohol use: Yes     Comment: special occasional    Drug use: No     Allergies   Allergen Reactions    Codeine Nausea and Vomiting    Hydrochlorothiazide Other (comments)     Gout     Minocycline Nausea Only     Family History   Problem Relation Age of Onset    Cancer Mother         breast, 46s     Diabetes Mother     Heart Disease Mother         MI, CAD    Breast Cancer Mother         onset: late 48 early 61    Stroke Father     Heart Disease Father CAD    Dementia Father      Current Facility-Administered Medications   Medication Dose Route Frequency    0.9% sodium chloride infusion  50 mL/hr IntraVENous CONTINUOUS    sodium chloride (NS) flush 5-40 mL  5-40 mL IntraVENous Q8H    sodium chloride (NS) flush 5-40 mL  5-40 mL IntraVENous PRN    naloxone (NARCAN) injection 0.4 mg  0.4 mg IntraVENous Multiple    flumazeniL (ROMAZICON) 0.1 mg/mL injection 0.2 mg  0.2 mg IntraVENous Multiple    simethicone (MYLICON) 37VA/3.3UN oral drops 80 mg  1.2 mL Oral Multiple    atropine injection 0.5 mg  0.5 mg IntraVENous ONCE PRN    EPINEPHrine (ADRENALIN) 0.1 mg/mL syringe 1 mg  1 mg Endoscopically ONCE PRN     Facility-Administered Medications Ordered in Other Encounters   Medication Dose Route Frequency    0.9% sodium chloride infusion   IntraVENous CONTINUOUS         PHYSICAL EXAM:  General: WD, WN. Alert, cooperative, no acute distress    HEENT: NC, Atraumatic. PERRLA, EOMI. Anicteric sclerae. Lungs:  CTA Bilaterally. No Wheezing/Rhonchi/Rales. Heart:  Regular  rhythm,  No murmur, No Rubs, No Gallops  Abdomen: Soft, Non distended, Non tender. +Bowel sounds, no HSM  Extremities: No c/c/e  Neurologic:  CN 2-12 gi, Alert and oriented X 3. No acute neurological distress   Psych:   Good insight. Not anxious nor agitated. The heart, lungs and mental status were satisfactory for the administration of MAC sedation and for the procedure. Mallampati score: 3     The patient was counseled at length about the risks of ellis Covid-19 in the shirin-operative and post-operative states including the recovery window of their procedure. The patient was made aware that ellis Covid-19 after a surgical procedure may worsen their prognosis for recovering from the virus and lend to a higher morbidity and or mortality risk. The patient was given the options of postponing their procedure. All of the risks, benefits, and alternatives were discussed.  The patient does  wish to proceed with the procedure.       Assessment:   · Screening colonoscopy    Plan:   · Endoscopic procedure  · MAC sedation

## 2021-11-05 NOTE — ANESTHESIA POSTPROCEDURE EVALUATION
Post-Anesthesia Evaluation and Assessment    Patient: Patricia Dumont MRN: [id number]  SSN: [id number]    YOB: 1950  Age: 66 y.o. Sex: female      I have evaluated the patient and they are stable and ready for discharge from the PACU. Cardiovascular Function/Vital Signs  Visit Vitals  /74   Pulse 63   Temp 37.3 °C (99.1 °F)   Resp 19   Ht 5' 3\" (1.6 m)   Wt 99.8 kg (220 lb)   SpO2 98%   BMI 38.97 kg/m²       Patient is status post MAC anesthesia for Procedure(s):  COLONOSCOPY   :-.    Nausea/Vomiting: None    Postoperative hydration reviewed and adequate. Pain:  Pain Scale 1: Numeric (0 - 10) (11/05/21 0925)  Pain Intensity 1: 0 (11/05/21 0925)   Managed    Neurological Status: At baseline    Mental Status, Level of Consciousness: Alert and  oriented to person, place, and time    Pulmonary Status:   O2 Device: None (Room air) (11/05/21 0925)   Adequate oxygenation and airway patent    Complications related to anesthesia: None    Post-anesthesia assessment completed. No concerns    Signed By: Yoselin Mora MD     November 5, 2021              Procedure(s):  COLONOSCOPY   :-.    MAC    <BSHSIANPOST>    INITIAL Post-op Vital signs:   Vitals Value Taken Time   /79 11/05/21 0931   Temp 37.3 °C (99.1 °F) 11/05/21 0911   Pulse 61 11/05/21 0932   Resp 21 11/05/21 0932   SpO2 99 % 11/05/21 0932   Vitals shown include unvalidated device data.

## 2021-11-05 NOTE — PROCEDURES
[facility].  [address] [place] [address] E Naty Cortez, [address]  [phone]                              Colonoscopy Procedure Note      Indications:    Personal history of colon polyps (screening only)     :  Nikos Romo MD    Surgical Assistant: Endoscopy Technician-1: Maddi Grewal  Endoscopy RN-1: Daniel Ramirez RN    Implants: none    Referring Provider: Dane Medina MD    Sedation:  MAC anesthesia    Procedure Details:  After informed consent was obtained with all risks and benefits of procedure explained and preoperative exam completed, the patient was taken to the endoscopy suite and placed in the left lateral decubitus position. Upon sequential sedation as per above, a digital rectal exam was performed  And was normal.  The Olympus videocolonoscope  was inserted in the rectum and carefully advanced to the cecum, which was identified by the ileocecal valve and appendiceal orifice. The quality of preparation was good. The colonoscope was slowly withdrawn with careful evaluation between folds. Retroflexion in the rectum was performed and was normal..     Findings:   Rectum: no mucosal lesion appreciated  Grade 1 internal hemorrhoid(s); Sigmoid: no mucosal lesion appreciated      - Diverticulosis  Descending Colon: no mucosal lesion appreciated  Transverse Colon: no mucosal lesion appreciated  Ascending Colon: no mucosal lesion appreciated      - Diverticulum  Cecum: no mucosal lesion appreciated  Terminal Ileum: not intubated    Interventions:  none    Specimen Removed:  * No specimens in log *    Complications: None. EBL:  None. Recommendations:   -High fiber diet. -Repeat colonoscopy in 7 years     -Resume normal medication(s). Discharge Disposition:  Home in the company of a  when able to ambulate.     Nikos Romo MD  11/5/2021  9:11 AM

## 2022-01-06 DIAGNOSIS — F41.9 ANXIETY: ICD-10-CM

## 2022-01-06 DIAGNOSIS — J30.9 ALLERGIC RHINITIS, UNSPECIFIED SEASONALITY, UNSPECIFIED TRIGGER: ICD-10-CM

## 2022-01-06 RX ORDER — FLUTICASONE PROPIONATE 50 MCG
SPRAY, SUSPENSION (ML) NASAL
Qty: 16 G | Refills: 3 | Status: SHIPPED | OUTPATIENT
Start: 2022-01-06 | End: 2022-03-01 | Stop reason: SDUPTHER

## 2022-01-06 RX ORDER — SERTRALINE HYDROCHLORIDE 25 MG/1
TABLET, FILM COATED ORAL
Qty: 30 TABLET | Refills: 3 | Status: SHIPPED | OUTPATIENT
Start: 2022-01-06 | End: 2022-03-01 | Stop reason: SDUPTHER

## 2022-02-15 ENCOUNTER — OFFICE VISIT (OUTPATIENT)
Dept: INTERNAL MEDICINE CLINIC | Age: 72
End: 2022-02-15
Payer: MEDICARE

## 2022-02-15 VITALS
TEMPERATURE: 97.1 F | HEIGHT: 63 IN | RESPIRATION RATE: 16 BRPM | HEART RATE: 78 BPM | BODY MASS INDEX: 39.41 KG/M2 | DIASTOLIC BLOOD PRESSURE: 80 MMHG | OXYGEN SATURATION: 96 % | SYSTOLIC BLOOD PRESSURE: 130 MMHG | WEIGHT: 222.4 LBS

## 2022-02-15 DIAGNOSIS — R73.03 PREDIABETES: ICD-10-CM

## 2022-02-15 DIAGNOSIS — E66.01 SEVERE OBESITY (BMI 35.0-39.9) WITH COMORBIDITY (HCC): ICD-10-CM

## 2022-02-15 DIAGNOSIS — E78.2 MIXED HYPERLIPIDEMIA: ICD-10-CM

## 2022-02-15 DIAGNOSIS — M1A.0790 CHRONIC IDIOPATHIC GOUT INVOLVING TOE WITHOUT TOPHUS, UNSPECIFIED LATERALITY: ICD-10-CM

## 2022-02-15 DIAGNOSIS — Z00.00 MEDICARE ANNUAL WELLNESS VISIT, SUBSEQUENT: Primary | ICD-10-CM

## 2022-02-15 PROCEDURE — G8417 CALC BMI ABV UP PARAM F/U: HCPCS | Performed by: INTERNAL MEDICINE

## 2022-02-15 PROCEDURE — G8427 DOCREV CUR MEDS BY ELIG CLIN: HCPCS | Performed by: INTERNAL MEDICINE

## 2022-02-15 PROCEDURE — 1101F PT FALLS ASSESS-DOCD LE1/YR: CPT | Performed by: INTERNAL MEDICINE

## 2022-02-15 PROCEDURE — G8399 PT W/DXA RESULTS DOCUMENT: HCPCS | Performed by: INTERNAL MEDICINE

## 2022-02-15 PROCEDURE — G8536 NO DOC ELDER MAL SCRN: HCPCS | Performed by: INTERNAL MEDICINE

## 2022-02-15 PROCEDURE — G0439 PPPS, SUBSEQ VISIT: HCPCS | Performed by: INTERNAL MEDICINE

## 2022-02-15 PROCEDURE — 3017F COLORECTAL CA SCREEN DOC REV: CPT | Performed by: INTERNAL MEDICINE

## 2022-02-15 PROCEDURE — G8754 DIAS BP LESS 90: HCPCS | Performed by: INTERNAL MEDICINE

## 2022-02-15 PROCEDURE — G8510 SCR DEP NEG, NO PLAN REQD: HCPCS | Performed by: INTERNAL MEDICINE

## 2022-02-15 PROCEDURE — G8752 SYS BP LESS 140: HCPCS | Performed by: INTERNAL MEDICINE

## 2022-02-15 PROCEDURE — G9899 SCRN MAM PERF RSLTS DOC: HCPCS | Performed by: INTERNAL MEDICINE

## 2022-02-15 NOTE — PROGRESS NOTES
Chief Complaint   [facility]   [facility]     Reviewed record in preparation for visit and have obtained necessary documentation. Identified pt with two pt identifiers(name and ). There are no preventive care reminders to display for this patient. Chief Complaint   Patient presents with   Tamra Blank Annual Wellness Visit        Wt Readings from Last 3 Encounters:   02/15/22 222 lb 6.4 oz (100.9 kg)   21 220 lb (99.8 kg)   21 221 lb 6.4 oz (100.4 kg)     Temp Readings from Last 3 Encounters:   02/15/22 97.1 °F (36.2 °C) (Temporal)   21 99.1 °F (37.3 °C)   21 98.6 °F (37 °C)     BP Readings from Last 3 Encounters:   02/15/22 130/80   21 118/74   21 120/80     Pulse Readings from Last 3 Encounters:   02/15/22 78   21 63   21 78           Learning Assessment:  :     Learning Assessment 10/7/2014   PRIMARY LEARNER Patient   HIGHEST LEVEL OF EDUCATION - PRIMARY LEARNER  SOME COLLEGE   BARRIERS PRIMARY LEARNER NONE   CO-LEARNER CAREGIVER No   PRIMARY LANGUAGE ENGLISH   LEARNER PREFERENCE PRIMARY LISTENING   ANSWERED BY patient   RELATIONSHIP SELF       Depression Screening:  :     3 most recent PHQ Screens 2/15/2022   Little interest or pleasure in doing things Not at all   Feeling down, depressed, irritable, or hopeless Not at all   Total Score PHQ 2 0       Fall Risk Assessment:  :     Fall Risk Assessment, last 12 mths 2/15/2022   Able to walk? Yes   Fall in past 12 months? 0   Do you feel unsteady? 0   Are you worried about falling 0   Is TUG test greater than 12 seconds? -   Is the gait abnormal? -   Number of falls in past 12 months -   Fall with injury? -       Abuse Screening:  :     Abuse Screening Questionnaire 2/15/2022 2018   Do you ever feel afraid of your partner? N N   Are you in a relationship with someone who physically or mentally threatens you? N N   Is it safe for you to go home?  Nathan Slater       Coordination of Care Questionnaire:  :     1) Have you been to an emergency room, urgent care clinic since your last visit? no   Hospitalized since your last visit? no             2) Have you seen or consulted any other health care providers outside of [address] [place] [address] since your last visit? no  (Include any pap smears or colon screenings in this section.)    3) Do you have an Advance Directive on file? no    4) Are you interested in receiving information on Advance Directives? NO      Patient is accompanied by self I have received verbal consent from Bethanie Dandy to discuss any/all medical information while they are present in the room. Reviewed record  In preparation for visit and have obtained necessary documentation.

## 2022-02-15 NOTE — PROGRESS NOTES
This is the Subsequent Medicare Annual Wellness Exam, performed 12 months or more after the Initial AWV or the last Subsequent AWV    I have reviewed the patient's medical history in detail and updated the computerized patient record. We discussed a healthy lifestyle extensively. She will work on being more active. Also, she will monitor her diet. Reviewed health maintenance. Orders placed as needed. Assessment/Plan   Education and counseling provided:  Are appropriate based on today's review and evaluation    1. Medicare annual wellness visit, subsequent  2. Severe obesity (BMI 35.0-39. 9) with comorbidity ([address]  3. Mixed hyperlipidemia  -     LIPID PANEL; Future  -     METABOLIC PANEL, COMPREHENSIVE; Future  4. Chronic idiopathic gout involving toe without tophus, unspecified laterality  -     CBC WITH AUTOMATED DIFF; Future  5. Prediabetes  -     HEMOGLOBIN A1C WITH EAG; Future       Depression Risk Factor Screening     3 most recent PHQ Screens 2/15/2022   Little interest or pleasure in doing things Not at all   Feeling down, depressed, irritable, or hopeless Not at all   Total Score PHQ 2 0       Alcohol & Drug Abuse Risk Screen    Do you average more than 1 drink per night or more than 7 drinks a week:  No    On any one occasion in the past three months have you have had more than 3 drinks containing alcohol:  No          Functional Ability and Level of Safety    Hearing: Hearing is good. Activities of Daily Living: The home contains: no safety equipment. Patient does total self care      Ambulation: with no difficulty     Fall Risk:  Fall Risk Assessment, last 12 mths 2/15/2022   Able to walk? Yes   Fall in past 12 months? 0   Do you feel unsteady? 0   Are you worried about falling 0   Is TUG test greater than 12 seconds? -   Is the gait abnormal? -   Number of falls in past 12 months -   Fall with injury?  -      Abuse Screen:  Patient is not abused       Cognitive Screening    Has your family/caregiver stated any concerns about your memory: no         Health Maintenance Due   There are no preventive care reminders to display for this patient. Patient Care Team   Patient Care Team:  Teresa Steve MD as PCP - General (Internal Medicine)  Teresa Steve MD as PCP - [facility] Empaneled Provider  Nam Alas MD (Cardiology)    History     Patient Active Problem List   Diagnosis Code    Essential hypertension, benign I10    Anxiety F41.9    GERD (gastroesophageal reflux disease) K21.9    Rosacea L71.9    Hemorrhoid K64.9    Gout M10.9    Plantar fasciitis, bilateral M72.2    Mass of kidney of unknown nature N28.89    Severe obesity (BMI 35.0-39. 9) with comorbidity ([facility] E66.01     Past Medical History:   Diagnosis Date    Gall stones     GERD (gastroesophageal reflux disease)     Hemorrhoid     Hypertension     Ill-defined condition     gout    Ill-defined condition     prediabetes - is on metformin/no longer on metformin    Ill-defined condition     rosacea    Ill-defined condition     obesity per pt    Sleep apnea     borderline - was put on a machine/not using a machine now      Past Surgical History:   Procedure Laterality Date    COLONOSCOPY      COLONOSCOPY N/A 2016    COLONOSCOPY performed by Tisha Wolff MD at [address] COLONOSCOPY N/A 2021    COLONOSCOPY   :- performed by Bárbara Linton MD at [address] [place] [address]    nm    HX BREAST BIOPSY Right     15+ years ago.  Benign (per patient) - as of 10/29/2021 pt states she thinks she has only had one breast bx    HX  SECTION      x 2    HX CHOLECYSTECTOMY      HX HYSTERECTOMY      HX TONSILLECTOMY      WILMAR MAMMOGRAM SCREEN BILAT  3/2014    PAP SMEAR      NC BREAST SURGERY PROCEDURE UNLISTED      benign tumor right breast    NC SINUS SURGERY PROC UNLISTED       Current Outpatient Medications   Medication Sig Dispense Refill    fluticasone propionate (FLONASE) 50 mcg/actuation nasal spray USE 2 SPRAYS BY BOTH NOSTRILS ROUTE DAILY. 16 g 3    sertraline (ZOLOFT) 25 mg tablet TAKE 1 TABLET BY MOUTH EVERY DAY 30 Tablet 3    allopurinoL (ZYLOPRIM) 100 mg tablet TAKE 1 TABLET DAILY 90 Tablet 3    lisinopriL (PRINIVIL, ZESTRIL) 40 mg tablet TAKE 1 TABLET DAILY 90 Tablet 3    atorvastatin (LIPITOR) 20 mg tablet TAKE 1 TABLET DAILY 90 Tablet 3    amLODIPine (NORVASC) 10 mg tablet TAKE 1 TABLET DAILY 90 Tab 3    carvediloL (COREG) 6.25 mg tablet TAKE 1 TABLET TWICE A DAY WITH MEALS 180 Tab 3    DESONIDE EX by Apply Externally route as needed.  ALPRAZolam (XANAX) 0.5 mg tablet Take 1 Tablet by mouth three (3) times daily as needed for Anxiety. Max Daily Amount: 1.5 mg. 90 Tablet 0    Sulfacetamide Sodium-Sulfur 9-4 % clsr by Apply Externally route as needed.        Allergies   Allergen Reactions    Codeine Nausea and Vomiting    Hydrochlorothiazide Other (comments)     Gout     Minocycline Nausea Only       Family History   Problem Relation Age of Onset    Cancer Mother         breast, 46s     Diabetes Mother     Heart Disease Mother         MI, CAD   Garry Lobe Breast Cancer Mother         onset: late 48 early 61    Stroke Father     Heart Disease Father         CAD    Dementia Father      Social History     Tobacco Use    Smoking status: Never Smoker    Smokeless tobacco: Never Used   Substance Use Topics    Alcohol use: Yes     Comment: special occasional         Janet Daily MD

## 2022-02-15 NOTE — PATIENT INSTRUCTIONS
Medicare Wellness Visit, Female     The best way to live healthy is to have a lifestyle where you eat a well-balanced diet, exercise regularly, limit alcohol use, and quit all forms of tobacco/nicotine, if applicable. Regular preventive services are another way to keep healthy. Preventive services (vaccines, screening tests, monitoring & exams) can help personalize your care plan, which helps you manage your own care. Screening tests can find health problems at the earliest stages, when they are easiest to treat. [facility] follows the current, evidence-based guidelines published by the [facility] ([facility]) when recommending preventive services for our patients. Because we follow these guidelines, sometimes recommendations change over time as research supports it. (For example, mammograms used to be recommended annually. Even though Medicare will still pay for an annual mammogram, the newer guidelines recommend a mammogram every two years for women of average risk). Of course, you and your doctor may decide to screen more often for some diseases, based on your risk and your co-morbidities (chronic disease you are already diagnosed with). Preventive services for you include:  - Medicare offers their members a free annual wellness visit, which is time for you and your primary care provider to discuss and plan for your preventive service needs. Take advantage of this benefit every year!  -All adults over the age of 72 should receive the recommended pneumonia vaccines. Current USPSTF guidelines recommend a series of two vaccines for the best pneumonia protection.   -All adults should have a flu vaccine yearly and a tetanus vaccine every 10 years.   -All adults age 48 and older should receive the shingles vaccines (series of two vaccines).       -All adults age 38-68 who are overweight should have a diabetes screening test once every three years.   -All adults born between [birth year] and 1965 should be screened once for Hepatitis C.  -Other screening tests and preventive services for persons with diabetes include: an eye exam to screen for diabetic retinopathy, a kidney function test, a foot exam, and stricter control over your cholesterol.   -Cardiovascular screening for adults with routine risk involves an electrocardiogram (ECG) at intervals determined by your doctor.   -Colorectal cancer screenings should be done for adults age 54-65 with no increased risk factors for colorectal cancer. There are a number of acceptable methods of screening for this type of cancer. Each test has its own benefits and drawbacks. Discuss with your doctor what is most appropriate for you during your annual wellness visit. The different tests include: colonoscopy (considered the best screening method), a fecal occult blood test, a fecal DNA test, and sigmoidoscopy.    -A bone mass density test is recommended when a woman turns 65 to screen for osteoporosis. This test is only recommended one time, as a screening. Some providers will use this same test as a disease monitoring tool if you already have osteoporosis. -Breast cancer screenings are recommended every other year for women of normal risk, age 54-69.  -Cervical cancer screenings for women over age 72 are only recommended with certain risk factors. Here is a list of your current Health Maintenance items (your personalized list of preventive services) with a due date: There are no preventive care reminders to display for this patient.

## 2022-03-01 ENCOUNTER — TELEPHONE (OUTPATIENT)
Dept: INTERNAL MEDICINE CLINIC | Age: 72
End: 2022-03-01

## 2022-03-01 DIAGNOSIS — F41.9 ANXIETY: ICD-10-CM

## 2022-03-01 DIAGNOSIS — J30.9 ALLERGIC RHINITIS, UNSPECIFIED SEASONALITY, UNSPECIFIED TRIGGER: ICD-10-CM

## 2022-03-01 RX ORDER — SERTRALINE HYDROCHLORIDE 25 MG/1
25 TABLET, FILM COATED ORAL DAILY
Qty: 90 TABLET | Refills: 1 | Status: SHIPPED | COMMUNITY
Start: 2022-03-01 | End: 2022-11-01

## 2022-03-01 RX ORDER — FLUTICASONE PROPIONATE 50 MCG
SPRAY, SUSPENSION (ML) NASAL
Qty: 3 EACH | Refills: 1 | Status: SHIPPED | COMMUNITY
Start: 2022-03-01 | End: 2022-04-05

## 2022-03-01 NOTE — TELEPHONE ENCOUNTER
----- Message from David Garcia MD sent at 3/1/2022  1:10 PM EST -----  Regarding: FW: ref: refills  Please refill through [facility].  ----- Message -----  From: Leni Johnson LPN  Sent: 8/75/0807  12:02 PM EST  To: David Garcia MD  Subject: FW: ref: refills                                   ----- Message -----  From: Guy Ricketts  Sent: 2/28/2022  10:29 AM EST  To: [facility]  Subject: ref: refills                                     I currently have 2 ongoing refills from [facility], I would like to request for both to be refilled with [facility] 90 day fills, this way, I will have all of my prescIptions filled through [facility] (1) SERTRALINE HCL  25 MG TABLET (2) FLUTICASONE PROPIONATE NASAL SPRAY USP 50 MCG  thanking you in advance Aaliyah Reyes

## 2022-03-19 PROBLEM — E66.01 SEVERE OBESITY (BMI 35.0-39.9) WITH COMORBIDITY (HCC): Status: ACTIVE | Noted: 2018-04-19

## 2022-04-05 DIAGNOSIS — J30.9 ALLERGIC RHINITIS, UNSPECIFIED SEASONALITY, UNSPECIFIED TRIGGER: ICD-10-CM

## 2022-04-05 RX ORDER — FLUTICASONE PROPIONATE 50 MCG
SPRAY, SUSPENSION (ML) NASAL
Qty: 16 G | Refills: 3 | Status: SHIPPED | OUTPATIENT
Start: 2022-04-05

## 2022-04-25 DIAGNOSIS — F41.9 ANXIETY: ICD-10-CM

## 2022-04-28 RX ORDER — ALPRAZOLAM 0.5 MG/1
TABLET ORAL
Qty: 90 TABLET | Refills: 0 | Status: SHIPPED | OUTPATIENT
Start: 2022-04-28

## 2022-06-20 ENCOUNTER — TRANSCRIBE ORDER (OUTPATIENT)
Dept: SCHEDULING | Age: 72
End: 2022-06-20

## 2022-06-20 DIAGNOSIS — Z12.31 OTHER SCREENING MAMMOGRAM: Primary | ICD-10-CM

## 2022-06-26 DIAGNOSIS — I10 ESSENTIAL HYPERTENSION: ICD-10-CM

## 2022-06-27 RX ORDER — AMLODIPINE BESYLATE 10 MG/1
TABLET ORAL
Qty: 90 TABLET | Refills: 0 | Status: SHIPPED | COMMUNITY
Start: 2022-06-27 | End: 2022-10-18

## 2022-07-11 ENCOUNTER — HOSPITAL ENCOUNTER (OUTPATIENT)
Dept: MAMMOGRAPHY | Age: 72
Discharge: HOME | End: 2022-07-11
Attending: INTERNAL MEDICINE
Payer: MEDICARE

## 2022-07-11 ENCOUNTER — TRANSCRIBE ORDER (OUTPATIENT)
Dept: GENERAL RADIOLOGY | Age: 72
End: 2022-07-11

## 2022-07-11 DIAGNOSIS — Z12.31 SCREENING MAMMOGRAM, ENCOUNTER FOR: Primary | ICD-10-CM

## 2022-07-11 DIAGNOSIS — Z12.31 OTHER SCREENING MAMMOGRAM: ICD-10-CM

## 2022-07-11 PROCEDURE — 77063 BREAST TOMOSYNTHESIS BI: CPT

## 2022-07-21 LAB
ALBUMIN SERPL-MCNC: 4.4 G/DL (ref 3.7–4.7)
ALBUMIN/GLOB SERPL: 1.3 {RATIO} (ref 1.2–2.2)
ALP SERPL-CCNC: 111 IU/L (ref 44–121)
ALT SERPL-CCNC: 21 IU/L (ref 0–32)
AST SERPL-CCNC: 15 IU/L (ref 0–40)
BASOPHILS # BLD AUTO: 0.1 X10E3/UL (ref 0–0.2)
BASOPHILS NFR BLD AUTO: 1 %
BILIRUB SERPL-MCNC: 0.7 MG/DL (ref 0–1.2)
BUN SERPL-MCNC: 12 MG/DL (ref 8–27)
BUN/CREAT SERPL: 13 (ref 12–28)
CALCIUM SERPL-MCNC: 9.5 MG/DL (ref 8.7–10.3)
CHLORIDE SERPL-SCNC: 104 MMOL/L (ref 96–106)
CHOLEST SERPL-MCNC: 125 MG/DL (ref 100–199)
CO2 SERPL-SCNC: 22 MMOL/L (ref 20–29)
CREAT SERPL-MCNC: 0.91 MG/DL (ref 0.57–1)
EGFR: 66 ML/MIN/1.73
EOSINOPHIL # BLD AUTO: 0.2 X10E3/UL (ref 0–0.4)
EOSINOPHIL NFR BLD AUTO: 2 %
ERYTHROCYTE [DISTWIDTH] IN BLOOD BY AUTOMATED COUNT: 12.9 % (ref 11.7–15.4)
EST. AVERAGE GLUCOSE BLD GHB EST-MCNC: 137 MG/DL
GLOBULIN SER CALC-MCNC: 3.3 G/DL (ref 1.5–4.5)
GLUCOSE SERPL-MCNC: 115 MG/DL (ref 65–99)
HBA1C MFR BLD: 6.4 % (ref 4.8–5.6)
HCT VFR BLD AUTO: 49.1 % (ref 34–46.6)
HDLC SERPL-MCNC: 43 MG/DL
HGB BLD-MCNC: 15.4 G/DL (ref 11.1–15.9)
IMM GRANULOCYTES # BLD AUTO: 0 X10E3/UL (ref 0–0.1)
IMM GRANULOCYTES NFR BLD AUTO: 0 %
LDLC SERPL CALC-MCNC: 57 MG/DL (ref 0–99)
LYMPHOCYTES # BLD AUTO: 2.6 X10E3/UL (ref 0.7–3.1)
LYMPHOCYTES NFR BLD AUTO: 35 %
MCH RBC QN AUTO: 29.8 PG (ref 26.6–33)
MCHC RBC AUTO-ENTMCNC: 31.4 G/DL (ref 31.5–35.7)
MCV RBC AUTO: 95 FL (ref 79–97)
MONOCYTES # BLD AUTO: 0.4 X10E3/UL (ref 0.1–0.9)
MONOCYTES NFR BLD AUTO: 6 %
NEUTROPHILS # BLD AUTO: 4.2 X10E3/UL (ref 1.4–7)
NEUTROPHILS NFR BLD AUTO: 56 %
PLATELET # BLD AUTO: 238 X10E3/UL (ref 150–450)
POTASSIUM SERPL-SCNC: 4.7 MMOL/L (ref 3.5–5.2)
PROT SERPL-MCNC: 7.7 G/DL (ref 6–8.5)
RBC # BLD AUTO: 5.17 X10E6/UL (ref 3.77–5.28)
SODIUM SERPL-SCNC: 144 MMOL/L (ref 134–144)
TRIGL SERPL-MCNC: 142 MG/DL (ref 0–149)
VLDLC SERPL CALC-MCNC: 25 MG/DL (ref 5–40)
WBC # BLD AUTO: 7.5 X10E3/UL (ref 3.4–10.8)

## 2022-08-16 ENCOUNTER — OFFICE VISIT (OUTPATIENT)
Dept: INTERNAL MEDICINE CLINIC | Age: 72
End: 2022-08-16
Payer: MEDICARE

## 2022-08-16 VITALS
TEMPERATURE: 97.8 F | WEIGHT: 220.4 LBS | OXYGEN SATURATION: 98 % | RESPIRATION RATE: 16 BRPM | BODY MASS INDEX: 39.05 KG/M2 | HEART RATE: 73 BPM | SYSTOLIC BLOOD PRESSURE: 145 MMHG | DIASTOLIC BLOOD PRESSURE: 80 MMHG | HEIGHT: 63 IN

## 2022-08-16 DIAGNOSIS — I10 ESSENTIAL HYPERTENSION: Primary | ICD-10-CM

## 2022-08-16 DIAGNOSIS — E78.2 MIXED HYPERLIPIDEMIA: ICD-10-CM

## 2022-08-16 PROCEDURE — 1090F PRES/ABSN URINE INCON ASSESS: CPT | Performed by: INTERNAL MEDICINE

## 2022-08-16 PROCEDURE — 3017F COLORECTAL CA SCREEN DOC REV: CPT | Performed by: INTERNAL MEDICINE

## 2022-08-16 PROCEDURE — G9899 SCRN MAM PERF RSLTS DOC: HCPCS | Performed by: INTERNAL MEDICINE

## 2022-08-16 PROCEDURE — G8399 PT W/DXA RESULTS DOCUMENT: HCPCS | Performed by: INTERNAL MEDICINE

## 2022-08-16 PROCEDURE — G8417 CALC BMI ABV UP PARAM F/U: HCPCS | Performed by: INTERNAL MEDICINE

## 2022-08-16 PROCEDURE — G8510 SCR DEP NEG, NO PLAN REQD: HCPCS | Performed by: INTERNAL MEDICINE

## 2022-08-16 PROCEDURE — G8754 DIAS BP LESS 90: HCPCS | Performed by: INTERNAL MEDICINE

## 2022-08-16 PROCEDURE — G8536 NO DOC ELDER MAL SCRN: HCPCS | Performed by: INTERNAL MEDICINE

## 2022-08-16 PROCEDURE — 1101F PT FALLS ASSESS-DOCD LE1/YR: CPT | Performed by: INTERNAL MEDICINE

## 2022-08-16 PROCEDURE — G8753 SYS BP > OR = 140: HCPCS | Performed by: INTERNAL MEDICINE

## 2022-08-16 PROCEDURE — G8427 DOCREV CUR MEDS BY ELIG CLIN: HCPCS | Performed by: INTERNAL MEDICINE

## 2022-08-16 PROCEDURE — 99213 OFFICE O/P EST LOW 20 MIN: CPT | Performed by: INTERNAL MEDICINE

## 2022-08-16 NOTE — PROGRESS NOTES
Follow Up Visit    Elaine Hauser is a 74 y.o. female. she presents for Hypertension and Cholesterol Problem    Cardiovascular Review  The patient has hypertension and hyperlipidemia. She reports taking medications as instructed, no medication side effects noted, no chest pain on exertion, no dyspnea on exertion, no swelling of ankles. Diet and Lifestyle: generally follows a low fat low cholesterol diet, generally follows a low sodium diet. Lab review: labs reviewed and discussed with patient. We discussed the need to improve diet and exercise. Patient Active Problem List   Diagnosis Code    Essential hypertension, benign I10    Anxiety F41.9    GERD (gastroesophageal reflux disease) K21.9    Rosacea L71.9    Hemorrhoid K64.9    Gout M10.9    Plantar fasciitis, bilateral M72.2    Mass of kidney of unknown nature N28.89    Severe obesity (BMI 35.0-39. 9) with comorbidity ([address] E66.01         Prior to Admission medications    Medication Sig Start Date End Date Taking? Authorizing Provider   amLODIPine (NORVASC) 10 mg tablet TAKE 1 TABLET DAILY 6/27/22  Yes Nallely Brooke MD   ALPRAZolam [place] Johnston) 0.5 mg tablet TAKE 1 TABLET THREE TIMES A DAY AS NEEDED FOR ANXIETY, MAXIMUM DAILY AMOUNT 3 TABLETS (1.5 MG) 4/28/22  Yes Nallely Brooke MD   fluticasone propionate (FLONASE) 50 mcg/actuation nasal spray USE 2 SPRAYS BY BOTH NOSTRILS ROUTE DAILY. 4/5/22  Yes Nallely Brooke MD   sertraline (ZOLOFT) 25 mg tablet Take 1 Tablet by mouth daily.  3/1/22  Yes Nallely Brooke MD   allopurinoL (ZYLOPRIM) 100 mg tablet TAKE 1 TABLET DAILY 10/4/21  Yes Nallely Brooke MD   lisinopriL (PRINIVIL, ZESTRIL) 40 mg tablet TAKE 1 TABLET DAILY 10/4/21  Yes Nallely Brooke MD   atorvastatin (LIPITOR) 20 mg tablet TAKE 1 TABLET DAILY 8/9/21  Yes Nallely Brooke MD   carvediloL (COREG) 6.25 mg tablet TAKE 1 TABLET TWICE A DAY WITH MEALS 5/5/21  Yes Nallely Brooke MD   DESONIDE EX by Apply Externally route as needed. Provider, Historical   Sulfacetamide Sodium-Sulfur 9-4 % clsr by Apply Externally route as needed. Provider, Historical         Health Maintenance   Topic Date Due    COVID-19 Vaccine (4 - Booster for Pfizer series) 02/09/2022    Flu Vaccine (1) 09/01/2022    Depression Screen  02/15/2023    Medicare Yearly Exam  02/16/2023    Breast Cancer Screen Mammogram  07/11/2023    A1C test (Diabetic or Prediabetic)  07/20/2023    Lipid Screen  07/20/2023    Colorectal Cancer Screening Combo  11/05/2028    DTaP/Tdap/Td series (3 - Td or Tdap) 07/15/2031    Hepatitis C Screening  Completed    Bone Densitometry (Dexa) Screening  Completed    Shingrix Vaccine Age 50>  Completed    Pneumococcal 65+ years  Completed       Review of Systems   Constitutional: Negative. Respiratory: Negative. Cardiovascular: Negative. Gastrointestinal: Negative. Visit Vitals  BP (!) 150/90 (BP 1 Location: Left arm, BP Patient Position: Sitting, BP Cuff Size: Adult)   Pulse 73   Temp 97.8 °F (36.6 °C) (Temporal)   Resp 16   Ht 5' 3\" (1.6 m)   Wt 220 lb 6.4 oz (100 kg)   SpO2 98%   BMI 39.04 kg/m²       Physical Exam  Constitutional:       Appearance: Normal appearance. She is well-developed. Cardiovascular:      Rate and Rhythm: Normal rate and regular rhythm. Pulmonary:      Effort: Pulmonary effort is normal.      Breath sounds: Normal breath sounds. ASSESSMENT/PLAN    Diagnoses and all orders for this visit:    1. Essential hypertension    2.  Mixed hyperlipidemia

## 2022-08-31 DIAGNOSIS — I10 ESSENTIAL HYPERTENSION, BENIGN: ICD-10-CM

## 2022-08-31 DIAGNOSIS — E78.2 MIXED HYPERLIPIDEMIA: ICD-10-CM

## 2022-08-31 RX ORDER — CARVEDILOL 6.25 MG/1
TABLET ORAL
Qty: 180 TABLET | Refills: 3 | Status: SHIPPED | OUTPATIENT
Start: 2022-08-31

## 2022-08-31 RX ORDER — ATORVASTATIN CALCIUM 20 MG/1
TABLET, FILM COATED ORAL
Qty: 90 TABLET | Refills: 3 | Status: SHIPPED | OUTPATIENT
Start: 2022-08-31

## 2022-10-29 DIAGNOSIS — M1A.0790 CHRONIC IDIOPATHIC GOUT INVOLVING TOE WITHOUT TOPHUS, UNSPECIFIED LATERALITY: ICD-10-CM

## 2022-10-29 DIAGNOSIS — F41.9 ANXIETY: ICD-10-CM

## 2022-11-01 RX ORDER — ALLOPURINOL 100 MG/1
TABLET ORAL
Qty: 90 TABLET | Refills: 3 | Status: SHIPPED | OUTPATIENT
Start: 2022-11-01

## 2022-11-01 RX ORDER — LISINOPRIL 40 MG/1
TABLET ORAL
Qty: 90 TABLET | Refills: 3 | Status: SHIPPED | OUTPATIENT
Start: 2022-11-01

## 2022-11-01 RX ORDER — SERTRALINE HYDROCHLORIDE 25 MG/1
TABLET, FILM COATED ORAL
Qty: 90 TABLET | Refills: 3 | Status: SHIPPED | OUTPATIENT
Start: 2022-11-01

## 2022-12-07 DIAGNOSIS — F41.9 ANXIETY: ICD-10-CM

## 2022-12-13 RX ORDER — ALPRAZOLAM 0.5 MG/1
0.5 TABLET ORAL
Qty: 90 TABLET | Refills: 0 | Status: SHIPPED | OUTPATIENT
Start: 2022-12-13

## 2023-02-08 LAB
ALBUMIN SERPL-MCNC: 4.5 G/DL (ref 3.7–4.7)
ALBUMIN/GLOB SERPL: 1.4 {RATIO} (ref 1.2–2.2)
ALP SERPL-CCNC: 117 IU/L (ref 44–121)
ALT SERPL-CCNC: 11 IU/L (ref 0–32)
AST SERPL-CCNC: 12 IU/L (ref 0–40)
BASOPHILS # BLD AUTO: 0.1 X10E3/UL (ref 0–0.2)
BASOPHILS NFR BLD AUTO: 1 %
BILIRUB SERPL-MCNC: 1.1 MG/DL (ref 0–1.2)
BUN SERPL-MCNC: 12 MG/DL (ref 8–27)
BUN/CREAT SERPL: 13 (ref 12–28)
CALCIUM SERPL-MCNC: 9.6 MG/DL (ref 8.7–10.3)
CHLORIDE SERPL-SCNC: 101 MMOL/L (ref 96–106)
CHOLEST SERPL-MCNC: 132 MG/DL (ref 100–199)
CO2 SERPL-SCNC: 23 MMOL/L (ref 20–29)
CREAT SERPL-MCNC: 0.91 MG/DL (ref 0.57–1)
EGFRCR SERPLBLD CKD-EPI 2021: 66 ML/MIN/1.73
EOSINOPHIL # BLD AUTO: 0.3 X10E3/UL (ref 0–0.4)
EOSINOPHIL NFR BLD AUTO: 2 %
ERYTHROCYTE [DISTWIDTH] IN BLOOD BY AUTOMATED COUNT: 13.1 % (ref 11.7–15.4)
EST. AVERAGE GLUCOSE BLD GHB EST-MCNC: 137 MG/DL
GLOBULIN SER CALC-MCNC: 3.3 G/DL (ref 1.5–4.5)
GLUCOSE SERPL-MCNC: 109 MG/DL (ref 70–99)
HBA1C MFR BLD: 6.4 % (ref 4.8–5.6)
HCT VFR BLD AUTO: 47.8 % (ref 34–46.6)
HDLC SERPL-MCNC: 44 MG/DL
HGB BLD-MCNC: 15.8 G/DL (ref 11.1–15.9)
IMM GRANULOCYTES # BLD AUTO: 0 X10E3/UL (ref 0–0.1)
IMM GRANULOCYTES NFR BLD AUTO: 0 %
LDLC SERPL CALC-MCNC: 65 MG/DL (ref 0–99)
LYMPHOCYTES # BLD AUTO: 2.8 X10E3/UL (ref 0.7–3.1)
LYMPHOCYTES NFR BLD AUTO: 25 %
MCH RBC QN AUTO: 30.9 PG (ref 26.6–33)
MCHC RBC AUTO-ENTMCNC: 33.1 G/DL (ref 31.5–35.7)
MCV RBC AUTO: 94 FL (ref 79–97)
MONOCYTES # BLD AUTO: 0.7 X10E3/UL (ref 0.1–0.9)
MONOCYTES NFR BLD AUTO: 6 %
NEUTROPHILS # BLD AUTO: 7.4 X10E3/UL (ref 1.4–7)
NEUTROPHILS NFR BLD AUTO: 66 %
PLATELET # BLD AUTO: 246 X10E3/UL (ref 150–450)
POTASSIUM SERPL-SCNC: 4.5 MMOL/L (ref 3.5–5.2)
PROT SERPL-MCNC: 7.8 G/DL (ref 6–8.5)
RBC # BLD AUTO: 5.11 X10E6/UL (ref 3.77–5.28)
SODIUM SERPL-SCNC: 139 MMOL/L (ref 134–144)
TRIGL SERPL-MCNC: 133 MG/DL (ref 0–149)
VLDLC SERPL CALC-MCNC: 23 MG/DL (ref 5–40)
WBC # BLD AUTO: 11.3 X10E3/UL (ref 3.4–10.8)

## 2023-02-16 ENCOUNTER — OFFICE VISIT (OUTPATIENT)
Dept: INTERNAL MEDICINE CLINIC | Age: 73
End: 2023-02-16
Payer: MEDICARE

## 2023-02-16 VITALS
BODY MASS INDEX: 39.34 KG/M2 | RESPIRATION RATE: 16 BRPM | HEIGHT: 63 IN | OXYGEN SATURATION: 98 % | TEMPERATURE: 98 F | DIASTOLIC BLOOD PRESSURE: 80 MMHG | WEIGHT: 222 LBS | SYSTOLIC BLOOD PRESSURE: 130 MMHG | HEART RATE: 84 BPM

## 2023-02-16 DIAGNOSIS — E66.01 SEVERE OBESITY (BMI 35.0-39.9) WITH COMORBIDITY (HCC): ICD-10-CM

## 2023-02-16 DIAGNOSIS — M25.511 CHRONIC PAIN OF BOTH SHOULDERS: ICD-10-CM

## 2023-02-16 DIAGNOSIS — Z00.00 MEDICARE ANNUAL WELLNESS VISIT, SUBSEQUENT: Primary | ICD-10-CM

## 2023-02-16 DIAGNOSIS — M25.512 CHRONIC PAIN OF BOTH SHOULDERS: ICD-10-CM

## 2023-02-16 DIAGNOSIS — Z13.39 SCREENING FOR ALCOHOLISM: ICD-10-CM

## 2023-02-16 DIAGNOSIS — G89.29 CHRONIC PAIN OF BOTH SHOULDERS: ICD-10-CM

## 2023-02-16 RX ORDER — TAVABOROLE TOPICAL SOLUTION, 5% 43.5 MG/ML
SOLUTION TOPICAL
COMMUNITY
Start: 2023-01-01

## 2023-02-16 RX ORDER — CLOTRIMAZOLE 1 %
CREAM (GRAM) TOPICAL
COMMUNITY
Start: 2023-01-18

## 2023-02-16 NOTE — PROGRESS NOTES
Chief Complaint   Patient presents with    Medication Evaluation     6 month follow up     Reviewed record in preparation for visit and have obtained necessary documentation. Identified pt with two pt identifiers(name and ). Health Maintenance Due   Topic    COVID-19 Vaccine (4 - Booster for Goldberg Peter series)    Flu Vaccine (1)    Medicare Yearly Exam          Chief Complaint   Patient presents with    Medication Evaluation     6 month follow up        Wt Readings from Last 3 Encounters:   23 222 lb (100.7 kg)   22 220 lb 6.4 oz (100 kg)   02/15/22 222 lb 6.4 oz (100.9 kg)     Temp Readings from Last 3 Encounters:   23 98 °F (36.7 °C) (Temporal)   22 97.8 °F (36.6 °C) (Temporal)   02/15/22 97.1 °F (36.2 °C) (Temporal)     BP Readings from Last 3 Encounters:   23 130/80   22 (!) 145/80   02/15/22 130/80     Pulse Readings from Last 3 Encounters:   23 84   22 73   02/15/22 78           Learning Assessment:  :     Learning Assessment 10/7/2014   PRIMARY LEARNER Patient   HIGHEST LEVEL OF EDUCATION - PRIMARY LEARNER  SOME COLLEGE   BARRIERS PRIMARY LEARNER NONE   CO-LEARNER CAREGIVER No   PRIMARY LANGUAGE ENGLISH   LEARNER PREFERENCE PRIMARY LISTENING   ANSWERED BY patient   RELATIONSHIP SELF       Depression Screening:  :     3 most recent PHQ Screens 2023   Little interest or pleasure in doing things Not at all   Feeling down, depressed, irritable, or hopeless Not at all   Total Score PHQ 2 0       Fall Risk Assessment:  :     Fall Risk Assessment, last 12 mths 2023   Able to walk? Yes   Fall in past 12 months? 0   Do you feel unsteady? 0   Are you worried about falling 0   Is TUG test greater than 12 seconds? -   Is the gait abnormal? -   Number of falls in past 12 months -   Fall with injury? -       Abuse Screening:  :     Abuse Screening Questionnaire 2023 2022 2/15/2022 2018   Do you ever feel afraid of your partner?  N N N N   Are you in a relationship with someone who physically or mentally threatens you? N N N N   Is it safe for you to go home? Y Y Y Y       Coordination of Care Questionnaire:  :     1) Have you been to an emergency room, urgent care clinic since your last visit? no   Hospitalized since your last visit? no             2) Have you seen or consulted any other health care providers outside of [address] [place] [address] since your last visit? no  (Include any pap smears or colon screenings in this section.)    3) Do you have an Advance Directive on file? no    4) Are you interested in receiving information on Advance Directives? NO      Patient is accompanied by self I have received verbal consent from Kel Juárez to discuss any/all medical information while they are present in the room. Reviewed record  In preparation for visit and have obtained necessary documentation.

## 2023-02-16 NOTE — PATIENT INSTRUCTIONS
Medicare Wellness Visit, Female     The best way to live healthy is to have a lifestyle where you eat a well-balanced diet, exercise regularly, limit alcohol use, and quit all forms of tobacco/nicotine, if applicable. Regular preventive services are another way to keep healthy. Preventive services (vaccines, screening tests, monitoring & exams) can help personalize your care plan, which helps you manage your own care. Screening tests can find health problems at the earliest stages, when they are easiest to treat. [facility] follows the current, evidence-based guidelines published by the [facility] ([facility]) when recommending preventive services for our patients. Because we follow these guidelines, sometimes recommendations change over time as research supports it. (For example, mammograms used to be recommended annually. Even though Medicare will still pay for an annual mammogram, the newer guidelines recommend a mammogram every two years for women of average risk). Of course, you and your doctor may decide to screen more often for some diseases, based on your risk and your co-morbidities (chronic disease you are already diagnosed with). Preventive services for you include:  - Medicare offers their members a free annual wellness visit, which is time for you and your primary care provider to discuss and plan for your preventive service needs.  Take advantage of this benefit every year!    -Over the age of 72 should receive the recommended pneumonia vaccines.    -All adults should have a flu vaccine yearly.  -All adults should have a tetanus vaccine every 10 years.   -Over the age 48 should receive the shingles vaccines.        -All adults should be screened once for Hepatitis C.  -All adults age 38-68 who are overweight should have a diabetes screening test once every three years.   -Other screening tests and preventive services for persons with diabetes include: an eye exam to screen for diabetic retinopathy, a kidney function test, a foot exam, and stricter control over your cholesterol.   -Cardiovascular screening for adults with routine risk involves an electrocardiogram (ECG) at intervals determined by your doctor.     -Colorectal cancer screenings should be done for adults age 39-70 with no increased risk factors for colorectal cancer. There are a number of acceptable methods of screening for this type of cancer. Each test has its own benefits and drawbacks. Discuss with your doctor what is most appropriate for you during your annual wellness visit. The different tests include: colonoscopy (considered the best screening method), a fecal occult blood test, a fecal DNA test, and sigmoidoscopy.    -Lung cancer screening is recommended annually with a low dose CT scan for adults between age 54 and 68, who have smoked at least 30 pack years (equivalent of 1 pack per day for 30 days), and who is a current smoker or quit less than 15 years ago.    -A bone mass density test is recommended when a woman turns 65 to screen for osteoporosis. This test is only recommended one time, as a screening. Some providers will use this same test as a disease monitoring tool if you already have osteoporosis. -Breast cancer screenings are recommended every other year for women of normal risk, age 54-69.    -Cervical cancer screenings for women over age 72 are only recommended with certain risk factors.      Here is a list of your current Health Maintenance items (your personalized list of preventive services) with a due date:  Health Maintenance Due   Topic Date Due    COVID-19 Vaccine (4 - Booster for Chairish series) 12/04/2021    Yearly Flu Vaccine (1) 08/01/2022

## 2023-02-16 NOTE — PROGRESS NOTES
This is the Subsequent Medicare Annual Wellness Exam, performed 12 months or more after the Initial AWV or the last Subsequent AWV    I have reviewed the patient's medical history in detail and updated the computerized patient record. Assessment/Plan   Education and counseling provided:  Are appropriate based on today's review and evaluation    1. Medicare annual wellness visit, subsequent  2. Screening for alcoholism  -     NE ANNUAL ALCOHOL SCREEN 15 MIN  3. Chronic pain of both shoulders  -     REFERRAL TO ORTHOPEDICS  4. Severe obesity (BMI 35.0-39. 9) with comorbidity ([address]       Depression Risk Factor Screening:     3 most recent PHQ Screens 2/16/2023   Little interest or pleasure in doing things Not at all   Feeling down, depressed, irritable, or hopeless Not at all   Total Score PHQ 2 0       Alcohol & Drug Abuse Risk Screen    Do you average more than 1 drink per night or more than 7 drinks a week:  No    On any one occasion in the past three months have you have had more than 3 drinks containing alcohol:  No          Functional Ability and Level of Safety:    Hearing: Hearing is good. Activities of Daily Living: The home contains: no safety equipment. Patient does total self care      Ambulation: with mild difficulty     Fall Risk:  Fall Risk Assessment, last 12 mths 2/16/2023   Able to walk? Yes   Fall in past 12 months? 0   Do you feel unsteady? 0   Are you worried about falling 0   Is TUG test greater than 12 seconds? -   Is the gait abnormal? -   Number of falls in past 12 months -   Fall with injury?  -      Abuse Screen:  Patient is not abused       Cognitive Screening    Has your family/caregiver stated any concerns about your memory: no        Health Maintenance Due     Health Maintenance Due   Topic Date Due    COVID-19 Vaccine (4 - Booster for Goldberg Peter series) 12/04/2021    Flu Vaccine (1) 08/01/2022       Patient Care Team   Patient Care Team:  Zack Sol MD as PCP - General (Internal Medicine Physician)  Francheska Stanley MD as PCP - [facility] Provider  Alyssa Murray MD (Cardiovascular Disease Physician)    History     Patient Active Problem List   Diagnosis Code    Essential hypertension, benign I10    Anxiety F41.9    GERD (gastroesophageal reflux disease) K21.9    Rosacea L71.9    Hemorrhoid K64.9    Gout M10.9    Plantar fasciitis, bilateral M72.2    Mass of kidney of unknown nature N28.89    Severe obesity (BMI 35.0-39. 9) with comorbidity ([facility] [address] [address] E66.01     Past Medical History:   Diagnosis Date    Gall stones     GERD (gastroesophageal reflux disease)     Hemorrhoid     Hypertension     Ill-defined condition     gout    Ill-defined condition     prediabetes - is on metformin/no longer on metformin    Ill-defined condition     rosacea    Ill-defined condition     obesity per pt    Sleep apnea     borderline - was put on a machine/not using a machine now      Past Surgical History:   Procedure Laterality Date    COLONOSCOPY      COLONOSCOPY N/A 2016    COLONOSCOPY performed by Valentín Castaneda MD at [address] [place] [address] N/A 2021    COLONOSCOPY   :- performed by Abigail Russo MD at [place] [address]      nml    HX BREAST BIOPSY Right     15+ years ago. Benign (per patient) - as of 10/29/2021 pt states she thinks she has only had one breast bx    HX  SECTION      x 2    HX CHOLECYSTECTOMY      HX HYSTERECTOMY      HX TONSILLECTOMY      WILMAR MAMMOGRAM SCREEN BILAT  3/2014    PAP SMEAR      NC BREAST SURGERY PROCEDURE UNLISTED      benign tumor right breast    NC SINUS SURGERY PROC UNLISTED       Current Outpatient Medications   Medication Sig Dispense Refill    tavaborole 5 % anabel       ALPRAZolam (XANAX) 0.5 mg tablet Take 1 Tablet by mouth three (3) times daily as needed for Anxiety.  Max Daily Amount: 1.5 mg. TAKE 1 TABLET THREE TIMES A DAY AS NEEDED FOR ANXIETY, MAXIMUM DAILY AMOUNT 3 TABLETS (1. 5 MG) 90 Tablet 0    allopurinoL (ZYLOPRIM) 100 mg tablet TAKE 1 TABLET DAILY 90 Tablet 3    lisinopriL (PRINIVIL, ZESTRIL) 40 mg tablet TAKE 1 TABLET DAILY 90 Tablet 3    sertraline (ZOLOFT) 25 mg tablet TAKE 1 TABLET DAILY 90 Tablet 3    amLODIPine (NORVASC) 10 mg tablet TAKE 1 TABLET DAILY 90 Tablet 1    carvediloL (COREG) 6.25 mg tablet TAKE 1 TABLET TWICE A DAY WITH MEALS 180 Tablet 3    atorvastatin (LIPITOR) 20 mg tablet TAKE 1 TABLET DAILY 90 Tablet 3    fluticasone propionate (FLONASE) 50 mcg/actuation nasal spray USE 2 SPRAYS BY BOTH NOSTRILS ROUTE DAILY. 16 g 3    DESONIDE EX by Apply Externally route as needed. clotrimazole (LOTRIMIN) 1 % topical cream APPLY TO BOTTOM OF BOTH FEET TWICE DAILY AS DIRECTED      Sulfacetamide Sodium-Sulfur 9-4 % clsr by Apply Externally route as needed.        Allergies   Allergen Reactions    Codeine Nausea and Vomiting    Hydrochlorothiazide Other (comments)     Gout     Minocycline Nausea Only       Family History   Problem Relation Age of Onset    Cancer Mother         breast, 46s     Diabetes Mother     Heart Disease Mother         MI, CAD    Breast Cancer Mother         onset: late 48 early 61    Stroke Father     Heart Disease Father         CAD    Dementia Father      Social History     Tobacco Use    Smoking status: Never    Smokeless tobacco: Never   Substance Use Topics    Alcohol use: Yes     Comment: special occasional     Davy Castillo MD

## 2023-02-28 ENCOUNTER — TELEPHONE (OUTPATIENT)
Dept: INTERNAL MEDICINE CLINIC | Age: 73
End: 2023-02-28

## 2023-03-01 ENCOUNTER — HOSPITAL ENCOUNTER (OUTPATIENT)
Dept: GENERAL RADIOLOGY | Age: 73
Discharge: HOME | End: 2023-03-01
Attending: INTERNAL MEDICINE

## 2023-03-01 ENCOUNTER — OFFICE VISIT (OUTPATIENT)
Dept: INTERNAL MEDICINE CLINIC | Age: 73
End: 2023-03-01
Attending: INTERNAL MEDICINE

## 2023-03-01 VITALS
DIASTOLIC BLOOD PRESSURE: 80 MMHG | HEART RATE: 81 BPM | RESPIRATION RATE: 16 BRPM | BODY MASS INDEX: 38.98 KG/M2 | OXYGEN SATURATION: 96 % | WEIGHT: 220 LBS | HEIGHT: 63 IN | TEMPERATURE: 97.3 F | SYSTOLIC BLOOD PRESSURE: 130 MMHG

## 2023-03-01 DIAGNOSIS — R05.1 ACUTE COUGH: ICD-10-CM

## 2023-03-01 DIAGNOSIS — R05.1 ACUTE COUGH: Primary | ICD-10-CM

## 2023-03-01 RX ORDER — AZITHROMYCIN 250 MG/1
250 TABLET, FILM COATED ORAL SEE ADMIN INSTRUCTIONS
Qty: 6 TABLET | Refills: 0 | Status: SHIPPED | OUTPATIENT
Start: 2023-03-01 | End: 2023-03-06

## 2023-03-01 RX ORDER — BENZONATATE 200 MG/1
200 CAPSULE ORAL
Qty: 30 CAPSULE | Refills: 0 | Status: SHIPPED | OUTPATIENT
Start: 2023-03-01 | End: 2023-03-08

## 2023-03-01 NOTE — PROGRESS NOTES
Chief Complaint   Patient presents with    Cough     Cough, SOB     Reviewed record in preparation for visit and have obtained necessary documentation. Identified pt with two pt identifiers(name and ). Health Maintenance Due   Topic    COVID-19 Vaccine (4 - Booster for Pfizer series)    Flu Vaccine (1)         Chief Complaint   Patient presents with    Cough     Cough, SOB        Wt Readings from Last 3 Encounters:   23 220 lb (99.8 kg)   23 222 lb (100.7 kg)   22 220 lb 6.4 oz (100 kg)     Temp Readings from Last 3 Encounters:   23 97.3 °F (36.3 °C) (Temporal)   23 98 °F (36.7 °C) (Temporal)   22 97.8 °F (36.6 °C) (Temporal)     BP Readings from Last 3 Encounters:   23 130/80   23 130/80   22 (!) 145/80     Pulse Readings from Last 3 Encounters:   23 81   23 84   22 73           Learning Assessment:  :     Learning Assessment 10/7/2014   PRIMARY LEARNER Patient   HIGHEST LEVEL OF EDUCATION - PRIMARY LEARNER  SOME COLLEGE   BARRIERS PRIMARY LEARNER NONE   CO-LEARNER CAREGIVER No   PRIMARY LANGUAGE ENGLISH   LEARNER PREFERENCE PRIMARY LISTENING   ANSWERED BY patient   RELATIONSHIP SELF       Depression Screening:  :     3 most recent PHQ Screens 3/1/2023   Little interest or pleasure in doing things Not at all   Feeling down, depressed, irritable, or hopeless Not at all   Total Score PHQ 2 0       Fall Risk Assessment:  :     Fall Risk Assessment, last 12 mths 3/1/2023   Able to walk? Yes   Fall in past 12 months? 0   Do you feel unsteady? 0   Are you worried about falling 0   Is TUG test greater than 12 seconds? -   Is the gait abnormal? -   Number of falls in past 12 months -   Fall with injury? -       Abuse Screening:  :     Abuse Screening Questionnaire 3/1/2023 2023 2022 2/15/2022 2018   Do you ever feel afraid of your partner?  N N N N N   Are you in a relationship with someone who physically or mentally threatens you? N N N N N   Is it safe for you to go home? Y Y Y Y Y       Coordination of Care Questionnaire:  :     1) Have you been to an emergency room, urgent care clinic since your last visit? no   Hospitalized since your last visit? no             2) Have you seen or consulted any other health care providers outside of [address] [place] [address] since your last visit? no  (Include any pap smears or colon screenings in this section.)    3) Do you have an Advance Directive on file? no    4) Are you interested in receiving information on Advance Directives? NO      Patient is accompanied by self I have received verbal consent from Jonathan Aid to discuss any/all medical information while they are present in the room. Reviewed record  In preparation for visit and have obtained necessary documentation.

## 2023-03-01 NOTE — PROGRESS NOTES
Acute Care Note    Monika Pink is 74 y.o. female. she presents for evaluation of Cough (Cough, SOB)      The patient reports the presence of cough which has been bothersome for the period of at least 10 days. She denies fever or chills. She has had no sick contacts. She does take care of her grandchildren who attend public schools. We discussed that her cough is minimally productive of sputum. There has been no blood. She has mild shortness of breath. Prior to Admission medications    Medication Sig Start Date End Date Taking? Authorizing Provider   tavaborole 5 % anbael  1/1/23  Yes Provider, Historical   clotrimazole (LOTRIMIN) 1 % topical cream APPLY TO BOTTOM OF BOTH FEET TWICE DAILY AS DIRECTED 1/18/23  Yes Provider, Historical   ALPRAZolam (XANAX) 0.5 mg tablet Take 1 Tablet by mouth three (3) times daily as needed for Anxiety. Max Daily Amount: 1.5 mg. TAKE 1 TABLET THREE TIMES A DAY AS NEEDED FOR ANXIETY, MAXIMUM DAILY AMOUNT 3 TABLETS (1.5 MG) 12/13/22  Yes Neelima Gaines MD   allopurinoL (ZYLOPRIM) 100 mg tablet TAKE 1 TABLET DAILY 11/1/22  Yes Neelima Gaines MD   lisinopriL (PRINIVIL, ZESTRIL) 40 mg tablet TAKE 1 TABLET DAILY 11/1/22  Yes Neelima Gaines MD   sertraline (ZOLOFT) 25 mg tablet TAKE 1 TABLET DAILY 11/1/22  Yes Neelima Gaines MD   amLODIPine (NORVASC) 10 mg tablet TAKE 1 TABLET DAILY 10/18/22  Yes Neelima Gaines MD   carvediloL (COREG) 6.25 mg tablet TAKE 1 TABLET TWICE A DAY WITH MEALS 8/31/22  Yes Neelima Gaines MD   atorvastatin (LIPITOR) 20 mg tablet TAKE 1 TABLET DAILY 8/31/22  Yes Neleima Gaines MD   fluticasone propionate (FLONASE) 50 mcg/actuation nasal spray USE 2 SPRAYS BY BOTH NOSTRILS ROUTE DAILY. 4/5/22  Yes Neelima Gaines MD   DESONIDE EX by Apply Externally route as needed. Yes Provider, Historical   Sulfacetamide Sodium-Sulfur 9-4 % clsr by Apply Externally route as needed.    Yes Provider, Historical         Patient Active Problem List Diagnosis Code    Essential hypertension, benign I10    Anxiety F41.9    GERD (gastroesophageal reflux disease) K21.9    Rosacea L71.9    Hemorrhoid K64.9    Gout M10.9    Plantar fasciitis, bilateral M72.2    Mass of kidney of unknown nature N28.89    Severe obesity (BMI 35.0-39. 9) with comorbidity (HCC) E66.01         ROS  As per HPI    Visit Vitals  /80   Pulse 81   Temp 97.3 °F (36.3 °C) (Temporal)   Resp 16   Ht 5' 3\" (1.6 m)   Wt 220 lb (99.8 kg)   SpO2 96%   BMI 38.97 kg/m²       Physical Exam  Constitutional:       Appearance: She is well-developed. Cardiovascular:      Rate and Rhythm: Normal rate and regular rhythm. Pulmonary:      Effort: Pulmonary effort is normal.      Breath sounds: Rhonchi (mild) present. ASSESSMENT/PLAN  Diagnoses and all orders for this visit:    1. Acute cough  -     XR CHEST PA LAT; Future  -     azithromycin (ZITHROMAX) 250 mg tablet; Take 1 Tablet by mouth See Admin Instructions for 5 days. -     benzonatate (TESSALON) 200 mg capsule; Take 1 Capsule by mouth three (3) times daily as needed for Cough for up to 7 days. Advised the patient to call back or return to office if symptoms worsen/change/persist.   Discussed expected course/resolution/complications of diagnosis in detail with patient. Medication risks/benefits/costs/interactions/alternatives discussed with patient. The patient was given an after visit summary which includes diagnoses, current medications, & vitals. They expressed understanding with the diagnosis and plan.

## 2023-03-10 DIAGNOSIS — J30.9 ALLERGIC RHINITIS, UNSPECIFIED SEASONALITY, UNSPECIFIED TRIGGER: ICD-10-CM

## 2023-03-10 NOTE — TELEPHONE ENCOUNTER
Requested Prescriptions     Pending Prescriptions Disp Refills    fluticasone propionate (FLONASE) 50 mcg/actuation nasal spray 16 g 3     [facility] ([address] [facility]) [address]  [phone]

## 2023-03-13 RX ORDER — FLUTICASONE PROPIONATE 50 MCG
SPRAY, SUSPENSION (ML) NASAL
Qty: 16 G | Refills: 3 | Status: SHIPPED | OUTPATIENT
Start: 2023-03-13

## 2023-04-21 DIAGNOSIS — Z12.31 SCREENING MAMMOGRAM, ENCOUNTER FOR: Primary | ICD-10-CM

## 2023-07-20 ENCOUNTER — OFFICE VISIT (OUTPATIENT)
Age: 73
End: 2023-07-20
Payer: MEDICARE

## 2023-07-20 VITALS
DIASTOLIC BLOOD PRESSURE: 83 MMHG | WEIGHT: 220 LBS | HEIGHT: 63 IN | BODY MASS INDEX: 38.98 KG/M2 | OXYGEN SATURATION: 99 % | SYSTOLIC BLOOD PRESSURE: 139 MMHG | TEMPERATURE: 98.4 F | HEART RATE: 83 BPM | RESPIRATION RATE: 16 BRPM

## 2023-07-20 DIAGNOSIS — K64.4 EXTERNAL HEMORRHOID, BLEEDING: Primary | ICD-10-CM

## 2023-07-20 DIAGNOSIS — Z12.31 ENCOUNTER FOR SCREENING MAMMOGRAM FOR MALIGNANT NEOPLASM OF BREAST: ICD-10-CM

## 2023-07-20 PROCEDURE — G8427 DOCREV CUR MEDS BY ELIG CLIN: HCPCS | Performed by: INTERNAL MEDICINE

## 2023-07-20 PROCEDURE — 99214 OFFICE O/P EST MOD 30 MIN: CPT | Performed by: INTERNAL MEDICINE

## 2023-07-20 PROCEDURE — 1036F TOBACCO NON-USER: CPT | Performed by: INTERNAL MEDICINE

## 2023-07-20 PROCEDURE — 1123F ACP DISCUSS/DSCN MKR DOCD: CPT | Performed by: INTERNAL MEDICINE

## 2023-07-20 PROCEDURE — 3075F SYST BP GE 130 - 139MM HG: CPT | Performed by: INTERNAL MEDICINE

## 2023-07-20 PROCEDURE — G8399 PT W/DXA RESULTS DOCUMENT: HCPCS | Performed by: INTERNAL MEDICINE

## 2023-07-20 PROCEDURE — 3079F DIAST BP 80-89 MM HG: CPT | Performed by: INTERNAL MEDICINE

## 2023-07-20 PROCEDURE — 3017F COLORECTAL CA SCREEN DOC REV: CPT | Performed by: INTERNAL MEDICINE

## 2023-07-20 PROCEDURE — 1090F PRES/ABSN URINE INCON ASSESS: CPT | Performed by: INTERNAL MEDICINE

## 2023-07-20 PROCEDURE — G8419 CALC BMI OUT NRM PARAM NOF/U: HCPCS | Performed by: INTERNAL MEDICINE

## 2023-07-20 RX ORDER — HYDROCORTISONE ACETATE 25 MG/1
25 SUPPOSITORY RECTAL EVERY 12 HOURS
Status: CANCELLED | OUTPATIENT
Start: 2023-07-20

## 2023-07-20 SDOH — ECONOMIC STABILITY: HOUSING INSECURITY
IN THE LAST 12 MONTHS, WAS THERE A TIME WHEN YOU DID NOT HAVE A STEADY PLACE TO SLEEP OR SLEPT IN A SHELTER (INCLUDING NOW)?: NO

## 2023-07-20 SDOH — ECONOMIC STABILITY: FOOD INSECURITY: WITHIN THE PAST 12 MONTHS, THE FOOD YOU BOUGHT JUST DIDN'T LAST AND YOU DIDN'T HAVE MONEY TO GET MORE.: NEVER TRUE

## 2023-07-20 SDOH — ECONOMIC STABILITY: FOOD INSECURITY: WITHIN THE PAST 12 MONTHS, YOU WORRIED THAT YOUR FOOD WOULD RUN OUT BEFORE YOU GOT MONEY TO BUY MORE.: NEVER TRUE

## 2023-07-20 SDOH — ECONOMIC STABILITY: TRANSPORTATION INSECURITY
IN THE PAST 12 MONTHS, HAS LACK OF TRANSPORTATION KEPT YOU FROM MEETINGS, WORK, OR FROM GETTING THINGS NEEDED FOR DAILY LIVING?: NO

## 2023-07-20 SDOH — ECONOMIC STABILITY: INCOME INSECURITY: HOW HARD IS IT FOR YOU TO PAY FOR THE VERY BASICS LIKE FOOD, HOUSING, MEDICAL CARE, AND HEATING?: NOT HARD AT ALL

## 2023-07-20 NOTE — PROGRESS NOTES
Acute Care Note    Carolina Albright is 74 y.o. female. she presents for evaluation of Hemorrhoids      The patient presents with a complaint of some rectal bleeding, constipation and itchiness for the past few days. She notes starting with a significant amount of constipation which had seemed to resolve by this morning. At the onset of the constipation, she has had some GI bleeding noted as well. This was associated with some rectal itching. We discussed that she has had a colonoscopy in 2021 which noted internal hemorrhoids. Prior to Admission medications    Medication Sig Start Date End Date Taking? Authorizing Provider   DESONIDE EX Apply topically as needed   Yes Ar Automatic Reconciliation   allopurinol (ZYLOPRIM) 100 MG tablet Take 1 tablet by mouth daily 11/1/22  Yes Ar Automatic Reconciliation   ALPRAZolam (XANAX) 0.5 MG tablet Take 1 tablet by mouth 3 times daily as needed. 12/13/22  Yes Ar Automatic Reconciliation   amLODIPine (NORVASC) 10 MG tablet Take 1 tablet by mouth daily 10/18/22  Yes Ar Automatic Reconciliation   atorvastatin (LIPITOR) 20 MG tablet Take 1 tablet by mouth daily 8/31/22  Yes Ar Automatic Reconciliation   carvedilol (COREG) 6.25 MG tablet Take 1 tablet by mouth 2 times daily (with meals) 8/31/22  Yes Ar Automatic Reconciliation   clotrimazole (LOTRIMIN) 1 % cream APPLY TO BOTTOM OF BOTH FEET TWICE DAILY AS DIRECTED 1/18/23  Yes Ar Automatic Reconciliation   fluticasone (FLONASE) 50 MCG/ACT nasal spray USE 2 SPRAYS BY BOTH NOSTRILS ROUTE DAILY.  3/13/23  Yes Ar Automatic Reconciliation   lisinopril (PRINIVIL;ZESTRIL) 40 MG tablet Take 1 tablet by mouth daily 11/1/22  Yes Ar Automatic Reconciliation   sertraline (ZOLOFT) 25 MG tablet Take 1 tablet by mouth daily 11/1/22  Yes Ar Automatic Reconciliation   Tavaborole 5 % SOLN ceived the following from [facility]: Outside name: tavaborole 5 % flako 1/1/23  Yes Ar Automatic Reconciliation   Sulfacetamide

## 2023-08-15 ENCOUNTER — HOSPITAL ENCOUNTER (OUTPATIENT)
Age: 73
Discharge: HOME | End: 2023-08-18
Payer: MEDICARE

## 2023-08-15 VITALS — WEIGHT: 220 LBS | HEIGHT: 63 IN | BODY MASS INDEX: 38.98 KG/M2

## 2023-08-15 DIAGNOSIS — Z12.31 ENCOUNTER FOR SCREENING MAMMOGRAM FOR MALIGNANT NEOPLASM OF BREAST: ICD-10-CM

## 2023-08-15 PROCEDURE — 77063 BREAST TOMOSYNTHESIS BI: CPT

## 2023-08-16 ENCOUNTER — OFFICE VISIT (OUTPATIENT)
Age: 73
End: 2023-08-16
Payer: MEDICARE

## 2023-08-16 VITALS
RESPIRATION RATE: 16 BRPM | HEIGHT: 63 IN | OXYGEN SATURATION: 98 % | WEIGHT: 223.6 LBS | HEART RATE: 68 BPM | SYSTOLIC BLOOD PRESSURE: 130 MMHG | TEMPERATURE: 97.5 F | BODY MASS INDEX: 39.62 KG/M2 | DIASTOLIC BLOOD PRESSURE: 80 MMHG

## 2023-08-16 DIAGNOSIS — E78.2 MIXED HYPERLIPIDEMIA: ICD-10-CM

## 2023-08-16 DIAGNOSIS — E66.01 SEVERE OBESITY (BMI 35.0-39.9) WITH COMORBIDITY (HCC): Primary | ICD-10-CM

## 2023-08-16 DIAGNOSIS — I10 ESSENTIAL (PRIMARY) HYPERTENSION: ICD-10-CM

## 2023-08-16 DIAGNOSIS — R73.03 PREDIABETES: ICD-10-CM

## 2023-08-16 PROCEDURE — G8417 CALC BMI ABV UP PARAM F/U: HCPCS | Performed by: INTERNAL MEDICINE

## 2023-08-16 PROCEDURE — 3074F SYST BP LT 130 MM HG: CPT | Performed by: INTERNAL MEDICINE

## 2023-08-16 PROCEDURE — 3078F DIAST BP <80 MM HG: CPT | Performed by: INTERNAL MEDICINE

## 2023-08-16 PROCEDURE — G8428 CUR MEDS NOT DOCUMENT: HCPCS | Performed by: INTERNAL MEDICINE

## 2023-08-16 PROCEDURE — 1090F PRES/ABSN URINE INCON ASSESS: CPT | Performed by: INTERNAL MEDICINE

## 2023-08-16 PROCEDURE — 99214 OFFICE O/P EST MOD 30 MIN: CPT | Performed by: INTERNAL MEDICINE

## 2023-08-16 PROCEDURE — G8399 PT W/DXA RESULTS DOCUMENT: HCPCS | Performed by: INTERNAL MEDICINE

## 2023-08-16 PROCEDURE — 1036F TOBACCO NON-USER: CPT | Performed by: INTERNAL MEDICINE

## 2023-08-16 PROCEDURE — 3017F COLORECTAL CA SCREEN DOC REV: CPT | Performed by: INTERNAL MEDICINE

## 2023-08-16 PROCEDURE — 1123F ACP DISCUSS/DSCN MKR DOCD: CPT | Performed by: INTERNAL MEDICINE

## 2023-08-16 RX ORDER — SEMAGLUTIDE 0.25 MG/.5ML
0.25 INJECTION, SOLUTION SUBCUTANEOUS
Qty: 2 ML | Refills: 0 | Status: SHIPPED | OUTPATIENT
Start: 2023-08-16

## 2023-08-17 DIAGNOSIS — E78.2 MIXED HYPERLIPIDEMIA: ICD-10-CM

## 2023-08-17 DIAGNOSIS — R73.03 PREDIABETES: ICD-10-CM

## 2023-08-17 LAB
ALBUMIN SERPL-MCNC: 3.7 G/DL (ref 3.5–5)
ALBUMIN/GLOB SERPL: 0.9 (ref 1.1–2.2)
ALP SERPL-CCNC: 107 U/L (ref 45–117)
ALT SERPL-CCNC: 21 U/L (ref 12–78)
ANION GAP SERPL CALC-SCNC: 5 MMOL/L (ref 5–15)
AST SERPL-CCNC: 14 U/L (ref 15–37)
BASOPHILS # BLD: 0.1 K/UL (ref 0–0.1)
BASOPHILS NFR BLD: 1 % (ref 0–1)
BILIRUB SERPL-MCNC: 0.8 MG/DL (ref 0.2–1)
BUN SERPL-MCNC: 11 MG/DL (ref 6–20)
BUN/CREAT SERPL: 13 (ref 12–20)
CALCIUM SERPL-MCNC: 9.5 MG/DL (ref 8.5–10.1)
CHLORIDE SERPL-SCNC: 107 MMOL/L (ref 97–108)
CO2 SERPL-SCNC: 30 MMOL/L (ref 21–32)
CREAT SERPL-MCNC: 0.88 MG/DL (ref 0.55–1.02)
DIFFERENTIAL METHOD BLD: ABNORMAL
EOSINOPHIL # BLD: 0.4 K/UL (ref 0–0.4)
EOSINOPHIL NFR BLD: 5 % (ref 0–7)
ERYTHROCYTE [DISTWIDTH] IN BLOOD BY AUTOMATED COUNT: 13.2 % (ref 11.5–14.5)
EST. AVERAGE GLUCOSE BLD GHB EST-MCNC: 128 MG/DL
GLOBULIN SER CALC-MCNC: 4.1 G/DL (ref 2–4)
GLUCOSE SERPL-MCNC: 126 MG/DL (ref 65–100)
HBA1C MFR BLD: 6.1 % (ref 4–5.6)
HCT VFR BLD AUTO: 48.2 % (ref 35–47)
HGB BLD-MCNC: 15.4 G/DL (ref 11.5–16)
IMM GRANULOCYTES # BLD AUTO: 0 K/UL (ref 0–0.04)
IMM GRANULOCYTES NFR BLD AUTO: 0 % (ref 0–0.5)
LYMPHOCYTES # BLD: 2.6 K/UL (ref 0.8–3.5)
LYMPHOCYTES NFR BLD: 33 % (ref 12–49)
MCH RBC QN AUTO: 30.4 PG (ref 26–34)
MCHC RBC AUTO-ENTMCNC: 32 G/DL (ref 30–36.5)
MCV RBC AUTO: 95.3 FL (ref 80–99)
MONOCYTES # BLD: 0.4 K/UL (ref 0–1)
MONOCYTES NFR BLD: 6 % (ref 5–13)
NEUTS SEG # BLD: 4.5 K/UL (ref 1.8–8)
NEUTS SEG NFR BLD: 55 % (ref 32–75)
NRBC # BLD: 0 K/UL (ref 0–0.01)
NRBC BLD-RTO: 0 PER 100 WBC
PLATELET # BLD AUTO: 255 K/UL (ref 150–400)
PMV BLD AUTO: 11 FL (ref 8.9–12.9)
POTASSIUM SERPL-SCNC: 4.9 MMOL/L (ref 3.5–5.1)
PROT SERPL-MCNC: 7.8 G/DL (ref 6.4–8.2)
RBC # BLD AUTO: 5.06 M/UL (ref 3.8–5.2)
SODIUM SERPL-SCNC: 142 MMOL/L (ref 136–145)
WBC # BLD AUTO: 8.1 K/UL (ref 3.6–11)

## 2023-08-18 ASSESSMENT — ENCOUNTER SYMPTOMS
RESPIRATORY NEGATIVE: 1
GASTROINTESTINAL NEGATIVE: 1

## 2023-08-18 NOTE — PROGRESS NOTES
Follow Up Visit    Petra Peterson is a 74 y.o. female. she presents for Hypertension    The patient comes to follow-up her blood pressure. We discussed that her blood pressure is stable. She has been tolerating medications without difficulty. Likewise, she has a history of hyperlipidemia. Her cholesterol levels will be checked today. She is tolerating her statin therapy. We had a long discussion regarding weight management. She is overweight and has tried to lose weight recently. She tells me that she has increased her exercise as well as decreasing her intake of carbohydrates. She is interested in GLP-1 medication to help with her weight. We discussed that without a diagnosis of diabetes, we would attempt to treat this with [facility]. I shared the likelihood that [facility] would not be covered by her insurance. Patient Active Problem List   Diagnosis    Gout    Severe obesity (BMI 35.0-39. 9) with comorbidity ([address])    Anxiety    Essential hypertension, benign    Plantar fasciitis, bilateral    Mass of kidney of unknown nature    GERD (gastroesophageal reflux disease)    Rosacea    Hemorrhoid         Prior to Admission medications    Medication Sig Start Date End Date Taking? Authorizing Provider   Semaglutide-Weight Management (WEGOVY) 0.25 MG/0.5ML SOAJ SC injection Inject 0.25 mg into the skin every 7 days 8/16/23  Yes Derrick Lorenzo MD   DESONIDE EX Apply topically as needed   Yes Ar Automatic Reconciliation   allopurinol (ZYLOPRIM) 100 MG tablet Take 1 tablet by mouth daily 11/1/22  Yes Ar Automatic Reconciliation   ALPRAZolam (XANAX) 0.5 MG tablet Take 1 tablet by mouth 3 times daily as needed.  12/13/22  Yes Ar Automatic Reconciliation   amLODIPine (NORVASC) 10 MG tablet Take 1 tablet by mouth daily 10/18/22  Yes Ar Automatic Reconciliation   atorvastatin (LIPITOR) 20 MG tablet Take 1 tablet by mouth daily 8/31/22  Yes Ar Automatic Reconciliation   carvedilol (COREG) 6.25 MG tablet Take 1

## 2023-08-21 ENCOUNTER — TELEPHONE (OUTPATIENT)
Age: 73
End: 2023-08-21

## 2023-08-21 DIAGNOSIS — F41.9 ANXIETY: Primary | ICD-10-CM

## 2023-08-21 NOTE — TELEPHONE ENCOUNTER
Patient requesting refill of the following medication:    ALPRAZolam (XANAX) 0.5 mg tablet    [id number] [place]

## 2023-08-24 RX ORDER — ALPRAZOLAM 0.5 MG/1
0.5 TABLET ORAL 3 TIMES DAILY PRN
Qty: 30 TABLET | Refills: 1 | Status: SHIPPED | OUTPATIENT
Start: 2023-08-24 | End: 2023-09-24

## 2023-09-07 RX ORDER — COVID-19 ANTIGEN TEST
KIT MISCELLANEOUS
Qty: 1 KIT | Refills: 3 | Status: SHIPPED | OUTPATIENT
Start: 2023-09-07

## 2023-10-24 RX ORDER — ATORVASTATIN CALCIUM 20 MG/1
20 TABLET, FILM COATED ORAL DAILY
Qty: 90 TABLET | Refills: 1 | Status: SHIPPED | OUTPATIENT
Start: 2023-10-24

## 2023-10-24 RX ORDER — LISINOPRIL 40 MG/1
40 TABLET ORAL DAILY
Qty: 90 TABLET | Refills: 1 | Status: SHIPPED | OUTPATIENT
Start: 2023-10-24

## 2023-10-24 NOTE — TELEPHONE ENCOUNTER
Previous Dr Dewayne Peterson patient. Labs up to date.       Future Appointments   Date Time Provider [address]   3/4/2024 10:20 AM Alexander President, DO [facility]

## 2023-10-24 NOTE — TELEPHONE ENCOUNTER
No primary care provider on file. ,    Gómez Humphrey has been flagged for Adherence by [employer] [place] ([facility]). Patient's insurance will allow a 90 day supply    Lisinopril 40 mg last filled on 23 for  qty 90 / 90 day supply. Next refill due: 23 -PAST DUE 36 DAYS   Atorvastatin 20 mg last filled on 23 for  qty 90 / 90 day supply. Next refill due: 23  RX has  for both medication    I have pended refills for your approval and changed to a 90 day supply. Please let me know if there is anything I can help with. Brooks Olivo  NOV:NA    Thank you,  Deo OkeefeD, [facility], toll free: [phone] Option 2    Requested Prescriptions     Pending Prescriptions Disp Refills    atorvastatin (LIPITOR) 20 MG tablet 90 tablet 1     Sig: Take 1 tablet by mouth daily    lisinopril (PRINIVIL;ZESTRIL) 40 MG tablet 90 tablet 1     Sig: Take 1 tablet by mouth daily        ================================================================================    POPULATION HEALTH CLINICAL PHARMACY: ADHERENCE REVIEW  Identified care gap per [place] [place] ([facility]) fills with [facility]: ACE/ARB adherence    Last Visit: 23  Next Visit: NA    Patient also appears to be prescribed:STATIN  Medicare [facility] Records claims through 10/24/23 (Prior Year [address] = not reported; YTD [address] = 75%; Potential Fail Date: 10/26/23): Lisinopril 40 mg last filled on 23 for  qty 90 / 90 day supply. Next refill due: 23 -PAST DUE 36 DAYS   Per [facility]:   RX . Will pend for New RX    BP Readings from Last 3 Encounters:   23 130/80   23 139/83   23 130/80     Estimated Creatinine Clearance: 63 mL/min (based on SCr of 0.88 mg/dL).   Lab Results   Component Value Date    CREATININE 0.88 2023     Lab Results   Component Value Date    K 4.9 2023     [address] [place] [address]

## 2023-12-06 NOTE — TELEPHONE ENCOUNTER
[facility] patient is scheduled to establish with Dr. Zachary Randall in March.   Needs refills for the following meds:        allopurinol (ZYLOPRIM) 100 MG tablet  amLODIPine (NORVASC) 10 MG tablet  carvedilol (COREG) 6.25 MG tablet  sertraline (ZOLOFT) 25 MG tablet    [facility]

## 2023-12-07 RX ORDER — ALLOPURINOL 100 MG/1
100 TABLET ORAL DAILY
Qty: 90 TABLET | Refills: 0 | Status: SHIPPED | OUTPATIENT
Start: 2023-12-07

## 2023-12-07 RX ORDER — SERTRALINE HYDROCHLORIDE 25 MG/1
25 TABLET, FILM COATED ORAL DAILY
Qty: 90 TABLET | Refills: 0 | Status: SHIPPED | OUTPATIENT
Start: 2023-12-07

## 2023-12-07 RX ORDER — CARVEDILOL 6.25 MG/1
6.25 TABLET ORAL 2 TIMES DAILY WITH MEALS
Qty: 180 TABLET | Refills: 0 | Status: SHIPPED | OUTPATIENT
Start: 2023-12-07

## 2024-02-12 DIAGNOSIS — F41.9 ANXIETY: Primary | ICD-10-CM

## 2024-02-12 NOTE — TELEPHONE ENCOUNTER
Medication Refill Request    Cole Velasquez is requesting a refill of the following medication(s):     Alprazolam (Xanax) 0.5 MG tablet    Please send refill to:     [facility] - [place], [place] - [address] -  [phone] - F [phone]  [address]  [facility] [address]  Phone: [phone] Fax: [phone]

## 2024-02-13 RX ORDER — CARVEDILOL 6.25 MG/1
6.25 TABLET ORAL 2 TIMES DAILY WITH MEALS
Qty: 180 TABLET | Refills: 0 | Status: SHIPPED | OUTPATIENT
Start: 2024-02-13

## 2024-02-13 RX ORDER — SERTRALINE HYDROCHLORIDE 25 MG/1
25 TABLET, FILM COATED ORAL DAILY
Qty: 90 TABLET | Refills: 0 | Status: SHIPPED | OUTPATIENT
Start: 2024-02-13

## 2024-02-13 RX ORDER — ALPRAZOLAM 0.5 MG/1
0.5 TABLET ORAL NIGHTLY PRN
Qty: 30 TABLET | Refills: 0 | Status: SHIPPED | OUTPATIENT
Start: 2024-02-13 | End: 2024-03-14

## 2024-02-13 RX ORDER — AMLODIPINE BESYLATE 10 MG/1
10 TABLET ORAL DAILY
Qty: 90 TABLET | Refills: 0 | Status: SHIPPED | OUTPATIENT
Start: 2024-02-13

## 2024-02-13 NOTE — TELEPHONE ENCOUNTER
Orders Placed This Encounter   Medications    sertraline (ZOLOFT) 25 MG tablet     Sig: Take 1 tablet by mouth daily Need to establish with a new primary care provider for further refills     Dispense:  90 tablet     Refill:  0     Please send a replace/new response with 90-Day Supply if appropriate to maximize member benefit. Requesting 1 year supply.    carvedilol (COREG) 6.25 MG tablet     Sig: Take 1 tablet by mouth 2 times daily (with meals) Need to establish with a new primary care provider for further refills     Dispense:  180 tablet     Refill:  0     Please send a replace/new response with 90-Day Supply if appropriate to maximize member benefit. Requesting 1 year supply.    ALPRAZolam (XANAX) 0.5 MG tablet     Sig: Take 1 tablet by mouth nightly as needed for Sleep or Anxiety for up to 30 days. Need to establish with a new primary care provider for further refills Max Daily Amount: 0.5 mg     Dispense:  30 tablet     Refill:  0        reviewed 2/13/2024

## 2024-02-21 RX ORDER — ALLOPURINOL 100 MG/1
100 TABLET ORAL DAILY
Qty: 90 TABLET | Refills: 3 | OUTPATIENT
Start: 2024-02-21

## 2024-02-21 NOTE — TELEPHONE ENCOUNTER
Pt last seen on 8/16/23. Due to return in 6 months.    Has appt on 3/4/34 to establish with Dr Mead.    Rx last filled on 12/7/23 #90/0RF. Pt has enough to get though to her appt.

## 2024-03-04 ENCOUNTER — OFFICE VISIT (OUTPATIENT)
Age: 74
End: 2024-03-04
Payer: MEDICARE

## 2024-03-04 VITALS
RESPIRATION RATE: 16 BRPM | BODY MASS INDEX: 40.04 KG/M2 | OXYGEN SATURATION: 98 % | SYSTOLIC BLOOD PRESSURE: 137 MMHG | WEIGHT: 226 LBS | HEART RATE: 90 BPM | TEMPERATURE: 98.1 F | HEIGHT: 63 IN | DIASTOLIC BLOOD PRESSURE: 83 MMHG

## 2024-03-04 DIAGNOSIS — E66.01 OBESITY, CLASS III, BMI 40-49.9 (MORBID OBESITY) (HCC): ICD-10-CM

## 2024-03-04 DIAGNOSIS — K52.9 CHRONIC DIARRHEA: Primary | ICD-10-CM

## 2024-03-04 DIAGNOSIS — R73.03 PRE-DIABETES: ICD-10-CM

## 2024-03-04 DIAGNOSIS — I10 PRIMARY HYPERTENSION: ICD-10-CM

## 2024-03-04 DIAGNOSIS — K21.9 GASTROESOPHAGEAL REFLUX DISEASE, UNSPECIFIED WHETHER ESOPHAGITIS PRESENT: ICD-10-CM

## 2024-03-04 DIAGNOSIS — M1A.9XX0 CHRONIC GOUT WITHOUT TOPHUS, UNSPECIFIED CAUSE, UNSPECIFIED SITE: ICD-10-CM

## 2024-03-04 DIAGNOSIS — J30.2 SEASONAL ALLERGIES: ICD-10-CM

## 2024-03-04 DIAGNOSIS — Z76.89 ENCOUNTER TO ESTABLISH CARE: ICD-10-CM

## 2024-03-04 DIAGNOSIS — F41.9 ANXIETY: ICD-10-CM

## 2024-03-04 PROBLEM — E66.813 OBESITY, CLASS III, BMI 40-49.9 (MORBID OBESITY): Status: ACTIVE | Noted: 2018-04-19

## 2024-03-04 PROCEDURE — G8427 DOCREV CUR MEDS BY ELIG CLIN: HCPCS | Performed by: STUDENT IN AN ORGANIZED HEALTH CARE EDUCATION/TRAINING PROGRAM

## 2024-03-04 PROCEDURE — G8484 FLU IMMUNIZE NO ADMIN: HCPCS | Performed by: STUDENT IN AN ORGANIZED HEALTH CARE EDUCATION/TRAINING PROGRAM

## 2024-03-04 PROCEDURE — 1036F TOBACCO NON-USER: CPT | Performed by: STUDENT IN AN ORGANIZED HEALTH CARE EDUCATION/TRAINING PROGRAM

## 2024-03-04 PROCEDURE — 99214 OFFICE O/P EST MOD 30 MIN: CPT | Performed by: STUDENT IN AN ORGANIZED HEALTH CARE EDUCATION/TRAINING PROGRAM

## 2024-03-04 PROCEDURE — 3079F DIAST BP 80-89 MM HG: CPT | Performed by: STUDENT IN AN ORGANIZED HEALTH CARE EDUCATION/TRAINING PROGRAM

## 2024-03-04 PROCEDURE — 3075F SYST BP GE 130 - 139MM HG: CPT | Performed by: STUDENT IN AN ORGANIZED HEALTH CARE EDUCATION/TRAINING PROGRAM

## 2024-03-04 PROCEDURE — 1123F ACP DISCUSS/DSCN MKR DOCD: CPT | Performed by: STUDENT IN AN ORGANIZED HEALTH CARE EDUCATION/TRAINING PROGRAM

## 2024-03-04 PROCEDURE — 3017F COLORECTAL CA SCREEN DOC REV: CPT | Performed by: STUDENT IN AN ORGANIZED HEALTH CARE EDUCATION/TRAINING PROGRAM

## 2024-03-04 PROCEDURE — G8417 CALC BMI ABV UP PARAM F/U: HCPCS | Performed by: STUDENT IN AN ORGANIZED HEALTH CARE EDUCATION/TRAINING PROGRAM

## 2024-03-04 PROCEDURE — G8399 PT W/DXA RESULTS DOCUMENT: HCPCS | Performed by: STUDENT IN AN ORGANIZED HEALTH CARE EDUCATION/TRAINING PROGRAM

## 2024-03-04 PROCEDURE — 1090F PRES/ABSN URINE INCON ASSESS: CPT | Performed by: STUDENT IN AN ORGANIZED HEALTH CARE EDUCATION/TRAINING PROGRAM

## 2024-03-04 RX ORDER — PANTOPRAZOLE SODIUM 40 MG/1
40 TABLET, DELAYED RELEASE ORAL
Qty: 90 TABLET | Refills: 1 | Status: SHIPPED | OUTPATIENT
Start: 2024-03-04

## 2024-03-04 RX ORDER — LISINOPRIL 40 MG/1
40 TABLET ORAL DAILY
Qty: 90 TABLET | Refills: 1 | Status: SHIPPED | OUTPATIENT
Start: 2024-03-04

## 2024-03-04 RX ORDER — ALPRAZOLAM 0.5 MG/1
0.5 TABLET ORAL 2 TIMES DAILY PRN
Qty: 60 TABLET | Refills: 0 | Status: SHIPPED | OUTPATIENT
Start: 2024-03-10 | End: 2024-04-09

## 2024-03-04 RX ORDER — ALLOPURINOL 100 MG/1
100 TABLET ORAL DAILY
Qty: 90 TABLET | Refills: 0 | Status: SHIPPED | OUTPATIENT
Start: 2024-03-04

## 2024-03-04 RX ORDER — FLUTICASONE PROPIONATE 50 MCG
1 SPRAY, SUSPENSION (ML) NASAL DAILY
Qty: 32 G | Refills: 3 | Status: SHIPPED | OUTPATIENT
Start: 2024-03-04

## 2024-03-04 ASSESSMENT — ENCOUNTER SYMPTOMS
DIARRHEA: 0
CONSTIPATION: 0
ABDOMINAL PAIN: 0
NAUSEA: 0
BLOOD IN STOOL: 0
SHORTNESS OF BREATH: 0
VOMITING: 0
COUGH: 0

## 2024-03-04 NOTE — ASSESSMENT & PLAN NOTE
We discussed weight management. If A1c is in diabetic range would start ozempic if covered for her. Otherwise we discussed calorie counting or weight watchers which she has done in the past

## 2024-03-04 NOTE — ASSESSMENT & PLAN NOTE
Check labs and stool studies including fecal elastase today. If workup unrevealing, will refer to GI.

## 2024-03-04 NOTE — PROGRESS NOTES
obesity) (HCC)    Anxiety    Primary hypertension    Plantar fasciitis, bilateral    Mass of kidney of unknown nature    GERD (gastroesophageal reflux disease)    Rosacea    Hemorrhoid    Chronic diarrhea    Pre-diabetes       Past Medical History:   Diagnosis Date    Gall stones     GERD (gastroesophageal reflux disease)     Hemorrhoid     Hypertension     Ill-defined condition     rosacea    Ill-defined condition     prediabetes - is on metformin/no longer on metformin    Ill-defined condition     gout    Ill-defined condition     obesity per pt    Sleep apnea     borderline - was put on a machine/not using a machine now       Prior to Admission medications    Medication Sig Start Date End Date Taking? Authorizing Provider   amLODIPine (NORVASC) 10 MG tablet Take 1 tablet by mouth daily Need to establish with a new primary care provider for further refills 2/13/24  Yes Sara Walter MD   sertraline (ZOLOFT) 25 MG tablet Take 1 tablet by mouth daily Need to establish with a new primary care provider for further refills 2/13/24  Yes Sara Walter MD   carvedilol (COREG) 6.25 MG tablet Take 1 tablet by mouth 2 times daily (with meals) Need to establish with a new primary care provider for further refills 2/13/24  Yes Sara Walter MD   ALPRAZolam (XANAX) 0.5 MG tablet Take 1 tablet by mouth nightly as needed for Sleep or Anxiety for up to 30 days. Need to establish with a new primary care provider for further refills Max Daily Amount: 0.5 mg 2/13/24 3/14/24 Yes Sara Walter MD   allopurinol (ZYLOPRIM) 100 MG tablet Take 1 tablet by mouth daily 12/7/23  Yes Jona Mead,    atorvastatin (LIPITOR) 20 MG tablet Take 1 tablet by mouth daily 10/24/23  Yes Emerson Damon MD   lisinopril (PRINIVIL;ZESTRIL) 40 MG tablet Take 1 tablet by mouth daily 10/24/23  Yes Emerson Damon MD   DESONIDE EX Apply topically as needed   Yes Automatic Reconciliation, Ar   clotrimazole (LOTRIMIN) 1 % cream  1/18/23  Yes Automatic

## 2024-03-04 NOTE — ASSESSMENT & PLAN NOTE
We discussed need to avoid benzodiazepines in patients over 65 years of age. Would like to at least reduce her use and preferably stop her xanax. Will start with increasing zoloft to 50mg daily and she will follow up with me in three months to re-assess her symptoms. Will cont xanax as written for now. If anxiety better controlled with zoloft increase, will discuss with her alternative medications that are safer for sleep

## 2024-03-05 LAB
ALBUMIN SERPL-MCNC: 3.6 G/DL (ref 3.5–5)
ALBUMIN/GLOB SERPL: 0.9 (ref 1.1–2.2)
ALP SERPL-CCNC: 110 U/L (ref 45–117)
ALT SERPL-CCNC: 23 U/L (ref 12–78)
ANION GAP SERPL CALC-SCNC: 3 MMOL/L (ref 5–15)
AST SERPL-CCNC: 15 U/L (ref 15–37)
BILIRUB SERPL-MCNC: 0.8 MG/DL (ref 0.2–1)
BUN SERPL-MCNC: 11 MG/DL (ref 6–20)
BUN/CREAT SERPL: 11 (ref 12–20)
CALCIUM SERPL-MCNC: 9.3 MG/DL (ref 8.5–10.1)
CHLORIDE SERPL-SCNC: 109 MMOL/L (ref 97–108)
CO2 SERPL-SCNC: 27 MMOL/L (ref 21–32)
CREAT SERPL-MCNC: 0.97 MG/DL (ref 0.55–1.02)
ERYTHROCYTE [DISTWIDTH] IN BLOOD BY AUTOMATED COUNT: 13.2 % (ref 11.5–14.5)
EST. AVERAGE GLUCOSE BLD GHB EST-MCNC: 134 MG/DL
GLOBULIN SER CALC-MCNC: 4 G/DL (ref 2–4)
GLUCOSE SERPL-MCNC: 129 MG/DL (ref 65–100)
HBA1C MFR BLD: 6.3 % (ref 4–5.6)
HCT VFR BLD AUTO: 46.6 % (ref 35–47)
HGB BLD-MCNC: 15.7 G/DL (ref 11.5–16)
MCH RBC QN AUTO: 31.3 PG (ref 26–34)
MCHC RBC AUTO-ENTMCNC: 33.7 G/DL (ref 30–36.5)
MCV RBC AUTO: 93 FL (ref 80–99)
NRBC # BLD: 0 K/UL (ref 0–0.01)
NRBC BLD-RTO: 0 PER 100 WBC
PLATELET # BLD AUTO: 225 K/UL (ref 150–400)
PMV BLD AUTO: 11.1 FL (ref 8.9–12.9)
POTASSIUM SERPL-SCNC: 4.2 MMOL/L (ref 3.5–5.1)
PROT SERPL-MCNC: 7.6 G/DL (ref 6.4–8.2)
RBC # BLD AUTO: 5.01 M/UL (ref 3.8–5.2)
SODIUM SERPL-SCNC: 139 MMOL/L (ref 136–145)
T4 FREE SERPL-MCNC: 0.9 NG/DL (ref 0.8–1.5)
TSH SERPL DL<=0.05 MIU/L-ACNC: 3.31 UIU/ML (ref 0.36–3.74)
WBC # BLD AUTO: 7.2 K/UL (ref 3.6–11)

## 2024-03-06 DIAGNOSIS — K52.9 CHRONIC DIARRHEA: ICD-10-CM

## 2024-03-09 LAB — ELASTASE PANC STL-MCNT: 259 UG ELAST./G

## 2024-03-11 LAB
BACTERIA SPEC CULT: NORMAL
CAMPYLOBACTER STL CULT: NORMAL
E COLI SXT STL QL IA: NEGATIVE
SALMONELLA/SHIGELLA SCREEN: NORMAL
SPECIMEN SOURCE: NORMAL

## 2024-06-02 SDOH — HEALTH STABILITY: PHYSICAL HEALTH: ON AVERAGE, HOW MANY DAYS PER WEEK DO YOU ENGAGE IN MODERATE TO STRENUOUS EXERCISE (LIKE A BRISK WALK)?: 0 DAYS

## 2024-06-02 ASSESSMENT — LIFESTYLE VARIABLES
HOW OFTEN DO YOU HAVE SIX OR MORE DRINKS ON ONE OCCASION: 1
HOW OFTEN DO YOU HAVE A DRINK CONTAINING ALCOHOL: 1
HOW MANY STANDARD DRINKS CONTAINING ALCOHOL DO YOU HAVE ON A TYPICAL DAY: 0
HOW MANY STANDARD DRINKS CONTAINING ALCOHOL DO YOU HAVE ON A TYPICAL DAY: PATIENT DOES NOT DRINK
HOW OFTEN DO YOU HAVE A DRINK CONTAINING ALCOHOL: NEVER

## 2024-06-02 ASSESSMENT — PATIENT HEALTH QUESTIONNAIRE - PHQ9
SUM OF ALL RESPONSES TO PHQ QUESTIONS 1-9: 0
SUM OF ALL RESPONSES TO PHQ QUESTIONS 1-9: 0
SUM OF ALL RESPONSES TO PHQ9 QUESTIONS 1 & 2: 0
2. FEELING DOWN, DEPRESSED OR HOPELESS: NOT AT ALL
1. LITTLE INTEREST OR PLEASURE IN DOING THINGS: NOT AT ALL
SUM OF ALL RESPONSES TO PHQ QUESTIONS 1-9: 0
SUM OF ALL RESPONSES TO PHQ QUESTIONS 1-9: 0

## 2024-06-04 ENCOUNTER — OFFICE VISIT (OUTPATIENT)
Age: 74
End: 2024-06-04
Payer: MEDICARE

## 2024-06-04 ENCOUNTER — HOSPITAL ENCOUNTER (OUTPATIENT)
Age: 74
Discharge: HOME | End: 2024-06-07
Payer: MEDICARE

## 2024-06-04 VITALS
TEMPERATURE: 98 F | OXYGEN SATURATION: 94 % | SYSTOLIC BLOOD PRESSURE: 118 MMHG | RESPIRATION RATE: 16 BRPM | HEART RATE: 77 BPM | WEIGHT: 224 LBS | BODY MASS INDEX: 39.69 KG/M2 | DIASTOLIC BLOOD PRESSURE: 73 MMHG | HEIGHT: 63 IN

## 2024-06-04 DIAGNOSIS — F41.9 ANXIETY: ICD-10-CM

## 2024-06-04 DIAGNOSIS — E66.01 OBESITY, CLASS III, BMI 40-49.9 (MORBID OBESITY) (HCC): ICD-10-CM

## 2024-06-04 DIAGNOSIS — R51.9 ACUTE INTRACTABLE HEADACHE, UNSPECIFIED HEADACHE TYPE: ICD-10-CM

## 2024-06-04 DIAGNOSIS — R73.03 PRE-DIABETES: ICD-10-CM

## 2024-06-04 DIAGNOSIS — E78.2 MIXED HYPERLIPIDEMIA: ICD-10-CM

## 2024-06-04 DIAGNOSIS — Z00.00 MEDICARE ANNUAL WELLNESS VISIT, SUBSEQUENT: Primary | ICD-10-CM

## 2024-06-04 DIAGNOSIS — I10 PRIMARY HYPERTENSION: ICD-10-CM

## 2024-06-04 DIAGNOSIS — M1A.9XX0 CHRONIC GOUT WITHOUT TOPHUS, UNSPECIFIED CAUSE, UNSPECIFIED SITE: ICD-10-CM

## 2024-06-04 PROCEDURE — 1090F PRES/ABSN URINE INCON ASSESS: CPT | Performed by: STUDENT IN AN ORGANIZED HEALTH CARE EDUCATION/TRAINING PROGRAM

## 2024-06-04 PROCEDURE — G8417 CALC BMI ABV UP PARAM F/U: HCPCS | Performed by: STUDENT IN AN ORGANIZED HEALTH CARE EDUCATION/TRAINING PROGRAM

## 2024-06-04 PROCEDURE — 3074F SYST BP LT 130 MM HG: CPT | Performed by: STUDENT IN AN ORGANIZED HEALTH CARE EDUCATION/TRAINING PROGRAM

## 2024-06-04 PROCEDURE — 70450 CT HEAD/BRAIN W/O DYE: CPT

## 2024-06-04 PROCEDURE — G8427 DOCREV CUR MEDS BY ELIG CLIN: HCPCS | Performed by: STUDENT IN AN ORGANIZED HEALTH CARE EDUCATION/TRAINING PROGRAM

## 2024-06-04 PROCEDURE — 1036F TOBACCO NON-USER: CPT | Performed by: STUDENT IN AN ORGANIZED HEALTH CARE EDUCATION/TRAINING PROGRAM

## 2024-06-04 PROCEDURE — 1123F ACP DISCUSS/DSCN MKR DOCD: CPT | Performed by: STUDENT IN AN ORGANIZED HEALTH CARE EDUCATION/TRAINING PROGRAM

## 2024-06-04 PROCEDURE — G8399 PT W/DXA RESULTS DOCUMENT: HCPCS | Performed by: STUDENT IN AN ORGANIZED HEALTH CARE EDUCATION/TRAINING PROGRAM

## 2024-06-04 PROCEDURE — 99214 OFFICE O/P EST MOD 30 MIN: CPT | Performed by: STUDENT IN AN ORGANIZED HEALTH CARE EDUCATION/TRAINING PROGRAM

## 2024-06-04 PROCEDURE — G0439 PPPS, SUBSEQ VISIT: HCPCS | Performed by: STUDENT IN AN ORGANIZED HEALTH CARE EDUCATION/TRAINING PROGRAM

## 2024-06-04 PROCEDURE — 3078F DIAST BP <80 MM HG: CPT | Performed by: STUDENT IN AN ORGANIZED HEALTH CARE EDUCATION/TRAINING PROGRAM

## 2024-06-04 RX ORDER — CARVEDILOL 6.25 MG/1
6.25 TABLET ORAL 2 TIMES DAILY WITH MEALS
Qty: 180 TABLET | Refills: 3 | Status: SHIPPED | OUTPATIENT
Start: 2024-06-04 | End: 2025-05-30

## 2024-06-04 RX ORDER — ATORVASTATIN CALCIUM 20 MG/1
20 TABLET, FILM COATED ORAL DAILY
Qty: 90 TABLET | Refills: 3 | Status: SHIPPED | OUTPATIENT
Start: 2024-06-04

## 2024-06-04 RX ORDER — AMLODIPINE BESYLATE 10 MG/1
10 TABLET ORAL DAILY
Qty: 90 TABLET | Refills: 3 | Status: SHIPPED | OUTPATIENT
Start: 2024-06-04

## 2024-06-04 RX ORDER — ALPRAZOLAM 0.5 MG/1
0.5 TABLET ORAL DAILY PRN
Qty: 30 TABLET | Refills: 0 | Status: SHIPPED | OUTPATIENT
Start: 2024-06-04 | End: 2024-07-04

## 2024-06-04 RX ORDER — ALLOPURINOL 100 MG/1
100 TABLET ORAL DAILY
Qty: 90 TABLET | Refills: 3 | Status: SHIPPED | OUTPATIENT
Start: 2024-06-04

## 2024-06-04 NOTE — PROGRESS NOTES
Verified name and birth date for privacy precautions.   Chart reviewed in preparation for today's visit.     Chief Complaint   Patient presents with    Medicare AWV    Hand Pain     Bilateral hand pain. Trouble bending fingers at times x 1 month     Knee Pain    Ankle Pain    Head Injury     Hit head on night stand 3 weeks ago. Having headaches since           Health Maintenance Due   Topic    COVID-19 Vaccine (7 - 2023-24 season)    Annual Wellness Visit (Medicare Advantage)     Lipids          Wt Readings from Last 3 Encounters:   06/04/24 101.6 kg (224 lb)   03/04/24 102.5 kg (226 lb)   08/15/23 99.8 kg (220 lb)     Temp Readings from Last 3 Encounters:   06/04/24 98 °F (36.7 °C) (Oral)   03/04/24 98.1 °F (36.7 °C) (Oral)   08/16/23 97.5 °F (36.4 °C) (Temporal)     BP Readings from Last 3 Encounters:   06/04/24 118/73   03/04/24 137/83   08/16/23 130/80     Pulse Readings from Last 3 Encounters:   06/04/24 77   03/04/24 90   08/16/23 68       Social Determinants of Health     Tobacco Use: Low Risk  (6/4/2024)    Patient History     Smoking Tobacco Use: Never     Smokeless Tobacco Use: Never     Passive Exposure: Not on file   Alcohol Use: Not At Risk (6/2/2024)    AUDIT-C     Frequency of Alcohol Consumption: Never     Average Number of Drinks: Patient does not drink     Frequency of Binge Drinking: Never   Financial Resource Strain: Low Risk  (7/20/2023)    Overall Financial Resource Strain (CARDIA)     Difficulty of Paying Living Expenses: Not hard at all   Food Insecurity: Not on file (7/20/2023)   Transportation Needs: Unknown (7/20/2023)    PRAPARE - Transportation     Lack of Transportation (Medical): Not on file     Lack of Transportation (Non-Medical): No   Physical Activity: Unknown (6/2/2024)    Exercise Vital Sign     Days of Exercise per Week: 0 days     Minutes of Exercise per Session: Not on file   Stress: Not on file   Social Connections: Not on file   Intimate Partner Violence: Not on file 
tablet by mouth daily, Disp-90 tablet, R-3Normal  Obesity, Class III, BMI 40-49.9 (morbid obesity) ([facility])  Assessment & Plan:  Start metformin - will titrate slowly since she had some GI upset with this in the past    Recommendations for Preventive Services Due: see orders and patient instructions/AVS.  Recommended screening schedule for the next 5-10 years is provided to the patient in written form: see Patient Instructions/AVS.     Return in about 6 months (around 12/4/2024) for Followup.         Subjective   The following acute and/or chronic problems were also addressed today:    Headache  - Reports about three weeks ago she was in bed and leaned over to  her laptop and hit her head on her nightstand  - She said that she developed a headache within a few minutes and it has not gone away. Says maybe a little less intense, has been taking tylenol here and there    Obesity  - She says that she is having more arthritis pain in her back and neck and would like to be more active to lose weight  - She was prescribed a GLP-1 but it was not covered by her insurance. She says that she has thought about metformin that has been recommended to her before and she would like to try it    Anxiety/depression  - Last visit increased her Zoloft to 50mg. Since then she says she has been feeling better - less anxiety symptoms.  - Still using Xanax as needed - reviewed , she has been using less over the last three months than previous    Patient's complete Health Risk Assessment and screening values have been reviewed and are found in Flowsheets. The following problems were reviewed today and where indicated follow up appointments were made and/or referrals ordered.    Positive Risk Factor Screenings with Interventions:    Fall Risk:  Do you feel unsteady or are you worried about falling? : (!) yes  2 or more falls in past year?: no  Fall with injury in past year?: no     Interventions:    Reviewed medications, home hazards,

## 2024-06-04 NOTE — PATIENT INSTRUCTIONS
keep a list of the medicines you take.  What should you include in an advance directive?  Many states have a unique advance directive form. (It may ask you to address specific issues.) Or you might use a universal form that's approved by many states.  If your form doesn't tell you what to address, it may be hard to know what to include in your advance directive. Use the questions below to help you get started.  Who do you want to make decisions about your medical care if you are not able to?  What life-support measures do you want if you have a serious illness that gets worse over time or can't be cured?  What are you most afraid of that might happen? (Maybe you're afraid of having pain, losing your independence, or being kept alive by machines.)  Where would you prefer to die? (Your home? A hospital? A nursing home?)  Do you want to donate your organs when you die?  Do you want certain Mosque practices performed before you die?  When should you call for help?  Be sure to contact your doctor if you have any questions.  Where can you learn more?  Go to https://www.[employer].net/patientEd and enter R264 to learn more about \"Advance Directives: Care Instructions.\"  Current as of: November 16, 2023               Content Version: 14.0  © 7733-6111 [employer].   Care instructions adapted under license by [employer]. If you have questions about a medical condition or this instruction, always ask your healthcare professional. [employer] disclaims any warranty or liability for your use of this information.      Personalized Preventive Plan for Cole Velasquez - 6/4/2024  Medicare offers a range of preventive health benefits. Some of the tests and screenings are paid in full while other may be subject to a deductible, co-insurance, and/or copay.    Some of these benefits include a comprehensive review of your medical history including lifestyle, illnesses that may run in your family, and

## 2024-06-04 NOTE — ASSESSMENT & PLAN NOTE
Cont increased dose of Zoloft, cont Xanax PRN for now and will continue to titrate SSRI and decrease/stop Xanax going forward

## 2024-07-29 DIAGNOSIS — R73.03 PRE-DIABETES: ICD-10-CM

## 2024-08-17 DIAGNOSIS — K21.9 GASTROESOPHAGEAL REFLUX DISEASE, UNSPECIFIED WHETHER ESOPHAGITIS PRESENT: ICD-10-CM

## 2024-08-19 RX ORDER — PANTOPRAZOLE SODIUM 40 MG/1
40 TABLET, DELAYED RELEASE ORAL
Qty: 90 TABLET | Refills: 3 | Status: SHIPPED | OUTPATIENT
Start: 2024-08-19

## 2024-08-19 NOTE — TELEPHONE ENCOUNTER
Pt last seen on 6/4/24. Due to return in 6 months.    Has appt on 12/5/24.    Rx last filled on 3/4/24 #90/1RF.    Will forward to MD for refill.

## 2024-09-04 ENCOUNTER — HOSPITAL ENCOUNTER (OUTPATIENT)
Age: 74
Discharge: HOME | End: 2024-09-07
Payer: MEDICARE

## 2024-09-04 VITALS — WEIGHT: 224 LBS | HEIGHT: 63 IN | BODY MASS INDEX: 39.69 KG/M2

## 2024-09-04 DIAGNOSIS — Z12.31 SCREENING MAMMOGRAM, ENCOUNTER FOR: ICD-10-CM

## 2024-09-04 PROCEDURE — 77063 BREAST TOMOSYNTHESIS BI: CPT

## 2024-09-06 ENCOUNTER — HOSPITAL ENCOUNTER (OUTPATIENT)
Age: 74
Discharge: HOME | End: 2024-09-09
Payer: MEDICARE

## 2024-09-06 ENCOUNTER — OFFICE VISIT (OUTPATIENT)
Age: 74
End: 2024-09-06
Payer: MEDICARE

## 2024-09-06 VITALS
BODY MASS INDEX: 39.55 KG/M2 | RESPIRATION RATE: 16 BRPM | OXYGEN SATURATION: 98 % | WEIGHT: 223.2 LBS | HEIGHT: 63 IN | HEART RATE: 74 BPM | TEMPERATURE: 97.5 F | SYSTOLIC BLOOD PRESSURE: 118 MMHG | DIASTOLIC BLOOD PRESSURE: 74 MMHG

## 2024-09-06 DIAGNOSIS — M25.561 ACUTE PAIN OF RIGHT KNEE: Primary | ICD-10-CM

## 2024-09-06 DIAGNOSIS — M25.561 ACUTE PAIN OF RIGHT KNEE: ICD-10-CM

## 2024-09-06 PROCEDURE — 73562 X-RAY EXAM OF KNEE 3: CPT

## 2024-09-06 PROCEDURE — G8399 PT W/DXA RESULTS DOCUMENT: HCPCS | Performed by: NURSE PRACTITIONER

## 2024-09-06 PROCEDURE — 1123F ACP DISCUSS/DSCN MKR DOCD: CPT | Performed by: NURSE PRACTITIONER

## 2024-09-06 PROCEDURE — 99213 OFFICE O/P EST LOW 20 MIN: CPT | Performed by: NURSE PRACTITIONER

## 2024-09-06 PROCEDURE — 3074F SYST BP LT 130 MM HG: CPT | Performed by: NURSE PRACTITIONER

## 2024-09-06 PROCEDURE — G8427 DOCREV CUR MEDS BY ELIG CLIN: HCPCS | Performed by: NURSE PRACTITIONER

## 2024-09-06 PROCEDURE — 1036F TOBACCO NON-USER: CPT | Performed by: NURSE PRACTITIONER

## 2024-09-06 PROCEDURE — 1090F PRES/ABSN URINE INCON ASSESS: CPT | Performed by: NURSE PRACTITIONER

## 2024-09-06 PROCEDURE — 3078F DIAST BP <80 MM HG: CPT | Performed by: NURSE PRACTITIONER

## 2024-09-06 PROCEDURE — G8417 CALC BMI ABV UP PARAM F/U: HCPCS | Performed by: NURSE PRACTITIONER

## 2024-09-06 RX ORDER — METHYLPREDNISOLONE 4 MG
TABLET, DOSE PACK ORAL
Qty: 21 TABLET | Refills: 0 | Status: SHIPPED | OUTPATIENT
Start: 2024-09-06 | End: 2024-09-06

## 2024-09-06 RX ORDER — METHYLPREDNISOLONE 4 MG
TABLET, DOSE PACK ORAL
Qty: 21 TABLET | Refills: 0 | Status: SHIPPED | OUTPATIENT
Start: 2024-09-06 | End: 2024-09-11

## 2024-09-06 ASSESSMENT — PATIENT HEALTH QUESTIONNAIRE - PHQ9
SUM OF ALL RESPONSES TO PHQ QUESTIONS 1-9: 0
1. LITTLE INTEREST OR PLEASURE IN DOING THINGS: NOT AT ALL
SUM OF ALL RESPONSES TO PHQ QUESTIONS 1-9: 0
2. FEELING DOWN, DEPRESSED OR HOPELESS: NOT AT ALL
SUM OF ALL RESPONSES TO PHQ9 QUESTIONS 1 & 2: 0
SUM OF ALL RESPONSES TO PHQ QUESTIONS 1-9: 0
SUM OF ALL RESPONSES TO PHQ QUESTIONS 1-9: 0

## 2024-09-06 NOTE — PROGRESS NOTES
Cole Velasquez (:  1950) is a 74 y.o. female,here for evaluation of the following chief complaint(s):  Joint Pain    /74   Pulse 74   Temp 97.5 °F (36.4 °C) (Temporal)   Resp 16   Ht 1.6 m (5' 3\")   Wt 101.2 kg (223 lb 3.2 oz)   SpO2 98%   BMI 39.54 kg/m²       SUBJECTIVE/OBJECTIVE:    HPI:Patient reports right knee pain x 1.5 weeks. She was having issues with plantar fasciitis last month in both feet. That seems better today. She notes mild swelling of inner right knee. She is using a crutch today to walk due to pain. No swelling of right calf or pain of right calf. No redness. She tried aleve with no relief.    Review of Systems   Musculoskeletal:         Right knee pain         Physical Exam  Constitutional:       Appearance: Normal appearance.   Musculoskeletal:      Right knee: Effusion and crepitus present. Tenderness present over the medial joint line.   Neurological:      Mental Status: She is alert.        No calf pain or swelling  ASSESSMENT/PLAN:  1. Acute pain of right knee  -     External Referral To Orthopedic Surgery  -     XR KNEE RIGHT (3 VIEWS); Future  Xray ordered  Medrol as prescribed  Follow-up with ortho      --FAROOQ Vega - NP

## 2024-09-20 ENCOUNTER — TELEPHONE (OUTPATIENT)
Dept: PHARMACY | Facility: CLINIC | Age: 74
End: 2024-09-20

## 2024-12-07 DIAGNOSIS — I10 PRIMARY HYPERTENSION: ICD-10-CM

## 2024-12-09 RX ORDER — LISINOPRIL 40 MG/1
40 TABLET ORAL DAILY
Qty: 90 TABLET | Refills: 1 | Status: SHIPPED | OUTPATIENT
Start: 2024-12-09

## 2024-12-09 NOTE — TELEPHONE ENCOUNTER
Last office visit 9/6/24  NEXT APPT  With Internal Medicine (Jona Mead DO)  12/12/2024 at 11:30 AM    Last fill on lisinopril 3/4/24 #90 with 1 additional refills

## 2024-12-11 SDOH — ECONOMIC STABILITY: FOOD INSECURITY: WITHIN THE PAST 12 MONTHS, THE FOOD YOU BOUGHT JUST DIDN'T LAST AND YOU DIDN'T HAVE MONEY TO GET MORE.: NEVER TRUE

## 2024-12-11 SDOH — ECONOMIC STABILITY: FOOD INSECURITY: WITHIN THE PAST 12 MONTHS, YOU WORRIED THAT YOUR FOOD WOULD RUN OUT BEFORE YOU GOT MONEY TO BUY MORE.: NEVER TRUE

## 2024-12-11 SDOH — ECONOMIC STABILITY: INCOME INSECURITY: HOW HARD IS IT FOR YOU TO PAY FOR THE VERY BASICS LIKE FOOD, HOUSING, MEDICAL CARE, AND HEATING?: NOT HARD AT ALL

## 2024-12-12 ENCOUNTER — OFFICE VISIT (OUTPATIENT)
Age: 74
End: 2024-12-12
Payer: MEDICARE

## 2024-12-12 VITALS
DIASTOLIC BLOOD PRESSURE: 84 MMHG | WEIGHT: 224 LBS | HEART RATE: 74 BPM | TEMPERATURE: 97.8 F | BODY MASS INDEX: 39.69 KG/M2 | SYSTOLIC BLOOD PRESSURE: 137 MMHG | OXYGEN SATURATION: 96 % | RESPIRATION RATE: 16 BRPM | HEIGHT: 63 IN

## 2024-12-12 DIAGNOSIS — F41.9 ANXIETY: Primary | ICD-10-CM

## 2024-12-12 DIAGNOSIS — I10 PRIMARY HYPERTENSION: ICD-10-CM

## 2024-12-12 DIAGNOSIS — E66.01 OBESITY, CLASS III, BMI 40-49.9 (MORBID OBESITY): ICD-10-CM

## 2024-12-12 DIAGNOSIS — R73.03 PRE-DIABETES: ICD-10-CM

## 2024-12-12 DIAGNOSIS — E78.2 MIXED HYPERLIPIDEMIA: ICD-10-CM

## 2024-12-12 DIAGNOSIS — G47.00 INSOMNIA, UNSPECIFIED TYPE: ICD-10-CM

## 2024-12-12 DIAGNOSIS — G89.29 CHRONIC LOW BACK PAIN WITHOUT SCIATICA, UNSPECIFIED BACK PAIN LATERALITY: ICD-10-CM

## 2024-12-12 DIAGNOSIS — M54.50 CHRONIC LOW BACK PAIN WITHOUT SCIATICA, UNSPECIFIED BACK PAIN LATERALITY: ICD-10-CM

## 2024-12-12 PROCEDURE — 99214 OFFICE O/P EST MOD 30 MIN: CPT | Performed by: STUDENT IN AN ORGANIZED HEALTH CARE EDUCATION/TRAINING PROGRAM

## 2024-12-12 RX ORDER — DULOXETIN HYDROCHLORIDE 30 MG/1
30 CAPSULE, DELAYED RELEASE ORAL DAILY
Qty: 14 CAPSULE | Refills: 0 | Status: SHIPPED | OUTPATIENT
Start: 2024-12-12

## 2024-12-12 RX ORDER — DULOXETIN HYDROCHLORIDE 60 MG/1
60 CAPSULE, DELAYED RELEASE ORAL DAILY
Qty: 90 CAPSULE | Refills: 1 | Status: SHIPPED | OUTPATIENT
Start: 2024-12-12

## 2024-12-12 NOTE — PROGRESS NOTES
Cole Velasquez is a 74 y.o. year old female who presents today (12/12/24) for follow-up/ Hypertension      Assessment & Plan:     Assessment & Plan  1. Prediabetes.  Metformin stopped due to GI side effects. Consider alternative medications if A1c remains elevated. She is interested in weight loss, would consider a GLP1 if A1c elevated. She will have labs done prior to her follow up in 3 months    2. Back pain.  Referral to [facility] for home-based physical therapy. Encourage stretching exercises.    3. Other chronic pain  4. Anxiety.  Transition from sertraline to duloxetine over the next few weeks. Initial dose of duloxetine 30 mg for 2 weeks, then increase to 60 mg daily. Prescriptions sent to local [facility] and mail order pharmacy. Reduce sertraline dosage by half for first few weeks of starting duloxetine, then discontinue. Continue alprazolam as needed.    5. Insomnia  Practice good sleep hygiene, avoid screen time before bed, engage in relaxing activities if unable to sleep. Consider medication if sleep issues persist.    6. Hypertension  Chronic, at goal, continue current medications    7. HLD  Chronic, stable, on statin. Update labs prior to next appointment    Follow-up in 3 months for medication followup    Subjective:     History of Present Illness  74-year-old female presents for follow up of prediabetes, back pain, plantar fasciitis, anxiety, and sleep issues.    Prediabetes  Managing prediabetes with metformin, initially 2 tablets daily, reduced to 1 tablet daily, now discontinued. No significant improvement. Balanced diet with occasional sweets. Interested in other treatment options.    HTN  Does not check BP at home usually. Compliant with BP medications. No reported symptoms of orthostasis    Back Pain and Plantar Fasciitis  Reports worsening back pain over the past month, but has been present for several years, attributed to age. No medical intervention or physical therapy sought

## 2024-12-12 NOTE — PROGRESS NOTES
Verified name and birth date for privacy precautions.   Chart reviewed in preparation for today's visit.     Chief Complaint   Patient presents with    Hypertension          Health Maintenance Due   Topic    Lipids          Wt Readings from Last 3 Encounters:   12/12/24 101.6 kg (224 lb)   09/04/24 101.6 kg (224 lb)   09/06/24 101.2 kg (223 lb 3.2 oz)     Temp Readings from Last 3 Encounters:   12/12/24 97.8 °F (36.6 °C)   09/06/24 97.5 °F (36.4 °C) (Temporal)   06/04/24 98 °F (36.7 °C) (Oral)     BP Readings from Last 3 Encounters:   12/12/24 137/84   09/06/24 118/74   06/04/24 118/73     Pulse Readings from Last 3 Encounters:   12/12/24 74   09/06/24 74   06/04/24 77       Social Determinants of Health     Tobacco Use: Low Risk  (12/12/2024)    Patient History     Smoking Tobacco Use: Never     Smokeless Tobacco Use: Never     Passive Exposure: Not on file   Alcohol Use: Not At Risk (6/2/2024)    AUDIT-C     Frequency of Alcohol Consumption: Never     Average Number of Drinks: Patient does not drink     Frequency of Binge Drinking: Never   Financial Resource Strain: Low Risk  (12/11/2024)    Overall Financial Resource Strain (CARDIA)     Difficulty of Paying Living Expenses: Not hard at all   Food Insecurity: No Food Insecurity (12/11/2024)    Hunger Vital Sign     Worried About Running Out of Food in the Last Year: Never true     Ran Out of Food in the Last Year: Never true   Transportation Needs: Unknown (12/11/2024)    PRAPARE - Transportation     Lack of Transportation (Medical): Not on file     Lack of Transportation (Non-Medical): No   Physical Activity: Unknown (6/2/2024)    Exercise Vital Sign     Days of Exercise per Week: 0 days     Minutes of Exercise per Session: Not on file   Stress: Not on file   Social Connections: Not on file   Intimate Partner Violence: Not on file   Depression: Not at risk (9/6/2024)    PHQ-2     PHQ-2 Score: 0   Housing Stability: Unknown (12/11/2024)    Housing Stability Vital

## 2024-12-12 NOTE — PATIENT INSTRUCTIONS
Instructions:   START taking duloxetine 30mg daily for 14 days. At the same time, DECREASE your sertraline to 25mg (1/2 tab) daily  Then INCREASE your duloxetine to 60mg daily. When you do this, STOP the fluoxetine      Have your labs done about 3-4 days before your next appointment with Dr Mead    Work on turning off your computer or do something boring around the house when you can't sleep (like folding some laundry or read a book). You can also try melatonin 5mg (available over the counter) to help you wind down to sleep. Take this about 2 hours before you want to go to bed (for example, if you want to go to sleep around 10pm, take the melatonin around 8pm)

## 2025-01-23 ENCOUNTER — HOSPITAL ENCOUNTER (INPATIENT)
Facility: HOSPITAL | Age: 75
LOS: 13 days | Discharge: HOME HEALTH | DRG: 871 | End: 2025-02-05
Attending: EMERGENCY MEDICINE | Admitting: INTERNAL MEDICINE
Payer: MEDICARE

## 2025-01-23 ENCOUNTER — APPOINTMENT (OUTPATIENT)
Facility: HOSPITAL | Age: 75
DRG: 871 | End: 2025-01-23
Payer: MEDICARE

## 2025-01-23 DIAGNOSIS — G93.40 ENCEPHALOPATHY ACUTE: Primary | ICD-10-CM

## 2025-01-23 DIAGNOSIS — R56.9 NEW ONSET SEIZURE (HCC): ICD-10-CM

## 2025-01-23 DIAGNOSIS — R56.9 GENERALIZED SEIZURES (HCC): ICD-10-CM

## 2025-01-23 PROBLEM — R41.82 ACUTE ALTERATION IN MENTAL STATUS: Status: ACTIVE | Noted: 2025-01-23

## 2025-01-23 PROBLEM — R41.82 AMS (ALTERED MENTAL STATUS): Status: ACTIVE | Noted: 2025-01-23

## 2025-01-23 LAB
ALBUMIN SERPL-MCNC: 3.9 G/DL (ref 3.5–5)
ALBUMIN/GLOB SERPL: 0.9 (ref 1.1–2.2)
ALP SERPL-CCNC: 100 U/L (ref 45–117)
ALT SERPL-CCNC: 16 U/L (ref 12–78)
AMPHET UR QL SCN: NEGATIVE
ANION GAP SERPL CALC-SCNC: 5 MMOL/L (ref 2–12)
ANION GAP SERPL CALC-SCNC: 9 MMOL/L (ref 2–12)
APAP SERPL-MCNC: <2 UG/ML (ref 10–30)
APPEARANCE UR: CLEAR
ARTERIAL PATENCY WRIST A: POSITIVE
ARTERIAL PATENCY WRIST A: YES
AST SERPL-CCNC: 17 U/L (ref 15–37)
B PERT DNA SPEC QL NAA+PROBE: NOT DETECTED
BACTERIA URNS QL MICRO: NEGATIVE /HPF
BARBITURATES UR QL SCN: NEGATIVE
BASE EXCESS BLD CALC-SCNC: 0 MMOL/L
BASE EXCESS BLD CALC-SCNC: 0.1 MMOL/L
BASE EXCESS BLDA CALC-SCNC: 1.1 MMOL/L
BASOPHILS # BLD: 0.04 K/UL (ref 0–0.1)
BASOPHILS NFR BLD: 0.4 % (ref 0–1)
BDY SITE: ABNORMAL
BDY SITE: NORMAL
BENZODIAZ UR QL: NEGATIVE
BILIRUB SERPL-MCNC: 1.4 MG/DL (ref 0.2–1)
BILIRUB UR QL: NEGATIVE
BORDETELLA PARAPERTUSSIS BY PCR: NOT DETECTED
BUN SERPL-MCNC: 9 MG/DL (ref 6–20)
BUN SERPL-MCNC: 9 MG/DL (ref 6–20)
BUN/CREAT SERPL: 11 (ref 12–20)
BUN/CREAT SERPL: 9 (ref 12–20)
C PNEUM DNA SPEC QL NAA+PROBE: NOT DETECTED
CALCIUM SERPL-MCNC: 8 MG/DL (ref 8.5–10.1)
CALCIUM SERPL-MCNC: 9.4 MG/DL (ref 8.5–10.1)
CANNABINOIDS UR QL SCN: NEGATIVE
CHLORIDE SERPL-SCNC: 104 MMOL/L (ref 97–108)
CHLORIDE SERPL-SCNC: 108 MMOL/L (ref 97–108)
CK SERPL-CCNC: 116 U/L (ref 26–192)
CO2 SERPL-SCNC: 25 MMOL/L (ref 21–32)
CO2 SERPL-SCNC: 27 MMOL/L (ref 21–32)
COCAINE UR QL SCN: NEGATIVE
COLOR UR: ABNORMAL
CREAT SERPL-MCNC: 0.85 MG/DL (ref 0.55–1.02)
CREAT SERPL-MCNC: 1 MG/DL (ref 0.55–1.02)
DIFFERENTIAL METHOD BLD: ABNORMAL
EOSINOPHIL # BLD: 0.01 K/UL (ref 0–0.4)
EOSINOPHIL NFR BLD: 0.1 % (ref 0–7)
EPITH CASTS URNS QL MICRO: ABNORMAL /LPF
ERYTHROCYTE [DISTWIDTH] IN BLOOD BY AUTOMATED COUNT: 12.5 % (ref 11.5–14.5)
ETHANOL SERPL-MCNC: <10 MG/DL (ref 0–0.08)
FLUAV RNA SPEC QL NAA+PROBE: NOT DETECTED
FLUAV SUBTYP SPEC NAA+PROBE: NOT DETECTED
FLUBV RNA SPEC QL NAA+PROBE: NOT DETECTED
FLUBV RNA SPEC QL NAA+PROBE: NOT DETECTED
GAS FLOW.O2 O2 DELIVERY SYS: NORMAL
GAS FLOW.O2 SETTING OXYMISER: 14 BPM
GLOBULIN SER CALC-MCNC: 4.2 G/DL (ref 2–4)
GLUCOSE BLD STRIP.AUTO-MCNC: 123 MG/DL (ref 65–117)
GLUCOSE BLD STRIP.AUTO-MCNC: 165 MG/DL (ref 65–117)
GLUCOSE SERPL-MCNC: 132 MG/DL (ref 65–100)
GLUCOSE SERPL-MCNC: 205 MG/DL (ref 65–100)
GLUCOSE UR STRIP.AUTO-MCNC: NEGATIVE MG/DL
HADV DNA SPEC QL NAA+PROBE: NOT DETECTED
HCO3 BLD-SCNC: 25.7 MMOL/L (ref 21–28)
HCO3 BLD-SCNC: 25.8 MMOL/L (ref 21–28)
HCO3 BLDA-SCNC: 26 MMOL/L (ref 22–26)
HCOV 229E RNA SPEC QL NAA+PROBE: NOT DETECTED
HCOV HKU1 RNA SPEC QL NAA+PROBE: NOT DETECTED
HCOV NL63 RNA SPEC QL NAA+PROBE: NOT DETECTED
HCOV OC43 RNA SPEC QL NAA+PROBE: NOT DETECTED
HCT VFR BLD AUTO: 46.3 % (ref 35–47)
HGB BLD-MCNC: 15.5 G/DL (ref 11.5–16)
HGB UR QL STRIP: NEGATIVE
HMPV RNA SPEC QL NAA+PROBE: NOT DETECTED
HPIV1 RNA SPEC QL NAA+PROBE: NOT DETECTED
HPIV2 RNA SPEC QL NAA+PROBE: NOT DETECTED
HPIV3 RNA SPEC QL NAA+PROBE: NOT DETECTED
HPIV4 RNA SPEC QL NAA+PROBE: NOT DETECTED
IMM GRANULOCYTES # BLD AUTO: 0.08 K/UL (ref 0–0.04)
IMM GRANULOCYTES NFR BLD AUTO: 0.7 % (ref 0–0.5)
KETONES UR QL STRIP.AUTO: ABNORMAL MG/DL
LACTATE BLD-SCNC: 1.81 MMOL/L (ref 0.4–2)
LACTATE BLD-SCNC: 2.24 MMOL/L (ref 0.4–2)
LEUKOCYTE ESTERASE UR QL STRIP.AUTO: NEGATIVE
LYMPHOCYTES # BLD: 1.09 K/UL (ref 0.8–3.5)
LYMPHOCYTES NFR BLD: 9.7 % (ref 12–49)
Lab: NORMAL
M PNEUMO DNA SPEC QL NAA+PROBE: NOT DETECTED
MCH RBC QN AUTO: 31 PG (ref 26–34)
MCHC RBC AUTO-ENTMCNC: 33.5 G/DL (ref 30–36.5)
MCV RBC AUTO: 92.6 FL (ref 80–99)
METHADONE UR QL: NEGATIVE
MONOCYTES # BLD: 0.32 K/UL (ref 0–1)
MONOCYTES NFR BLD: 2.8 % (ref 5–13)
NEUTS SEG # BLD: 9.72 K/UL (ref 1.8–8)
NEUTS SEG NFR BLD: 86.3 % (ref 32–75)
NITRITE UR QL STRIP.AUTO: NEGATIVE
NRBC # BLD: 0 K/UL (ref 0–0.01)
NRBC BLD-RTO: 0 PER 100 WBC
O2/TOTAL GAS SETTING VFR VENT: 100 %
OPIATES UR QL: NEGATIVE
PCO2 BLD: 43.5 MMHG (ref 35–48)
PCO2 BLD: 44.7 MMHG (ref 35–48)
PCO2 BLDA: 40 MMHG (ref 35–45)
PCP UR QL: NEGATIVE
PEEP RESPIRATORY: 5 CMH2O
PH BLD: 7.37 (ref 7.35–7.45)
PH BLD: 7.38 (ref 7.35–7.45)
PH BLDA: 7.42 (ref 7.35–7.45)
PH UR STRIP: 7 (ref 5–8)
PLATELET # BLD AUTO: 159 K/UL (ref 150–400)
PMV BLD AUTO: 10.7 FL (ref 8.9–12.9)
PO2 BLD: 37 MMHG (ref 83–108)
PO2 BLD: 90 MMHG (ref 83–108)
PO2 BLDA: 66 MMHG (ref 80–100)
POTASSIUM SERPL-SCNC: 3.4 MMOL/L (ref 3.5–5.1)
POTASSIUM SERPL-SCNC: 3.8 MMOL/L (ref 3.5–5.1)
PROCALCITONIN SERPL-MCNC: <0.05 NG/ML
PROT SERPL-MCNC: 8.1 G/DL (ref 6.4–8.2)
PROT UR STRIP-MCNC: 30 MG/DL
RBC # BLD AUTO: 5 M/UL (ref 3.8–5.2)
RBC #/AREA URNS HPF: ABNORMAL /HPF (ref 0–5)
RSV RNA SPEC QL NAA+PROBE: NOT DETECTED
RV+EV RNA SPEC QL NAA+PROBE: NOT DETECTED
SALICYLATES SERPL-MCNC: <1.7 MG/DL (ref 2.8–20)
SAO2 % BLD: 68.7 % (ref 92–97)
SAO2 % BLD: 94 % (ref 92–97)
SAO2 % BLD: 96.6 % (ref 92–97)
SAO2% DEVICE SAO2% SENSOR NAME: ABNORMAL
SARS-COV-2 RNA RESP QL NAA+PROBE: NOT DETECTED
SARS-COV-2 RNA RESP QL NAA+PROBE: NOT DETECTED
SERVICE CMNT-IMP: ABNORMAL
SODIUM SERPL-SCNC: 138 MMOL/L (ref 136–145)
SODIUM SERPL-SCNC: 140 MMOL/L (ref 136–145)
SOURCE: NORMAL
SP GR UR REFRACTOMETRY: 1.02
SPECIMEN SITE: ABNORMAL
SPECIMEN TYPE: ABNORMAL
SPECIMEN TYPE: NORMAL
URINE CULTURE IF INDICATED: ABNORMAL
UROBILINOGEN UR QL STRIP.AUTO: 1 EU/DL (ref 0.2–1)
VENTILATION MODE VENT: NORMAL
VT SETTING VENT: 350 ML
WBC # BLD AUTO: 11.3 K/UL (ref 3.6–11)
WBC URNS QL MICRO: ABNORMAL /HPF (ref 0–4)

## 2025-01-23 PROCEDURE — 2500000003 HC RX 250 WO HCPCS: Performed by: NURSE PRACTITIONER

## 2025-01-23 PROCEDURE — 6360000002 HC RX W HCPCS: Performed by: EMERGENCY MEDICINE

## 2025-01-23 PROCEDURE — 87040 BLOOD CULTURE FOR BACTERIA: CPT

## 2025-01-23 PROCEDURE — 82077 ASSAY SPEC XCP UR&BREATH IA: CPT

## 2025-01-23 PROCEDURE — 74018 RADEX ABDOMEN 1 VIEW: CPT

## 2025-01-23 PROCEDURE — 2700000000 HC OXYGEN THERAPY PER DAY

## 2025-01-23 PROCEDURE — 0202U NFCT DS 22 TRGT SARS-COV-2: CPT

## 2025-01-23 PROCEDURE — 2580000003 HC RX 258: Performed by: INTERNAL MEDICINE

## 2025-01-23 PROCEDURE — 2500000003 HC RX 250 WO HCPCS: Performed by: INTERNAL MEDICINE

## 2025-01-23 PROCEDURE — 80307 DRUG TEST PRSMV CHEM ANLYZR: CPT

## 2025-01-23 PROCEDURE — 36600 WITHDRAWAL OF ARTERIAL BLOOD: CPT

## 2025-01-23 PROCEDURE — 6370000000 HC RX 637 (ALT 250 FOR IP): Performed by: INTERNAL MEDICINE

## 2025-01-23 PROCEDURE — 6360000002 HC RX W HCPCS: Performed by: NURSE PRACTITIONER

## 2025-01-23 PROCEDURE — 2580000003 HC RX 258: Performed by: NURSE PRACTITIONER

## 2025-01-23 PROCEDURE — 82962 GLUCOSE BLOOD TEST: CPT

## 2025-01-23 PROCEDURE — 0BH17EZ INSERTION OF ENDOTRACHEAL AIRWAY INTO TRACHEA, VIA NATURAL OR ARTIFICIAL OPENING: ICD-10-PCS | Performed by: INTERNAL MEDICINE

## 2025-01-23 PROCEDURE — 6360000002 HC RX W HCPCS: Performed by: INTERNAL MEDICINE

## 2025-01-23 PROCEDURE — 2000000000 HC ICU R&B

## 2025-01-23 PROCEDURE — 96374 THER/PROPH/DIAG INJ IV PUSH: CPT

## 2025-01-23 PROCEDURE — 95819 EEG AWAKE AND ASLEEP: CPT | Performed by: PSYCHIATRY & NEUROLOGY

## 2025-01-23 PROCEDURE — 85025 COMPLETE CBC W/AUTO DIFF WBC: CPT

## 2025-01-23 PROCEDURE — 36415 COLL VENOUS BLD VENIPUNCTURE: CPT

## 2025-01-23 PROCEDURE — 2500000003 HC RX 250 WO HCPCS

## 2025-01-23 PROCEDURE — 83605 ASSAY OF LACTIC ACID: CPT

## 2025-01-23 PROCEDURE — 6370000000 HC RX 637 (ALT 250 FOR IP)

## 2025-01-23 PROCEDURE — 71045 X-RAY EXAM CHEST 1 VIEW: CPT

## 2025-01-23 PROCEDURE — 6360000002 HC RX W HCPCS

## 2025-01-23 PROCEDURE — 87636 SARSCOV2 & INF A&B AMP PRB: CPT

## 2025-01-23 PROCEDURE — 5A1945Z RESPIRATORY VENTILATION, 24-96 CONSECUTIVE HOURS: ICD-10-PCS | Performed by: INTERNAL MEDICINE

## 2025-01-23 PROCEDURE — 2580000003 HC RX 258: Performed by: EMERGENCY MEDICINE

## 2025-01-23 PROCEDURE — 80143 DRUG ASSAY ACETAMINOPHEN: CPT

## 2025-01-23 PROCEDURE — 81001 URINALYSIS AUTO W/SCOPE: CPT

## 2025-01-23 PROCEDURE — 31500 INSERT EMERGENCY AIRWAY: CPT

## 2025-01-23 PROCEDURE — 72125 CT NECK SPINE W/O DYE: CPT

## 2025-01-23 PROCEDURE — 80179 DRUG ASSAY SALICYLATE: CPT

## 2025-01-23 PROCEDURE — 99285 EMERGENCY DEPT VISIT HI MDM: CPT

## 2025-01-23 PROCEDURE — 70450 CT HEAD/BRAIN W/O DYE: CPT

## 2025-01-23 PROCEDURE — 95819 EEG AWAKE AND ASLEEP: CPT

## 2025-01-23 PROCEDURE — 94002 VENT MGMT INPAT INIT DAY: CPT

## 2025-01-23 PROCEDURE — 80053 COMPREHEN METABOLIC PANEL: CPT

## 2025-01-23 PROCEDURE — 84145 PROCALCITONIN (PCT): CPT

## 2025-01-23 PROCEDURE — 82803 BLOOD GASES ANY COMBINATION: CPT

## 2025-01-23 PROCEDURE — 82550 ASSAY OF CK (CPK): CPT

## 2025-01-23 RX ORDER — MIDAZOLAM HYDROCHLORIDE 5 MG/5ML
5 INJECTION, SOLUTION INTRAMUSCULAR; INTRAVENOUS ONCE
Status: COMPLETED | OUTPATIENT
Start: 2025-01-24 | End: 2025-01-23

## 2025-01-23 RX ORDER — ACETAMINOPHEN 650 MG/1
650 SUPPOSITORY RECTAL EVERY 4 HOURS PRN
Status: DISCONTINUED | OUTPATIENT
Start: 2025-01-23 | End: 2025-02-05 | Stop reason: HOSPADM

## 2025-01-23 RX ORDER — LEVETIRACETAM 500 MG/5ML
1000 INJECTION, SOLUTION, CONCENTRATE INTRAVENOUS ONCE
Status: COMPLETED | OUTPATIENT
Start: 2025-01-23 | End: 2025-01-23

## 2025-01-23 RX ORDER — PROPOFOL 10 MG/ML
5-50 INJECTION, EMULSION INTRAVENOUS CONTINUOUS
Status: DISCONTINUED | OUTPATIENT
Start: 2025-01-23 | End: 2025-01-24

## 2025-01-23 RX ORDER — POLYETHYLENE GLYCOL 3350 17 G/17G
17 POWDER, FOR SOLUTION ORAL DAILY PRN
Status: DISCONTINUED | OUTPATIENT
Start: 2025-01-23 | End: 2025-02-05 | Stop reason: HOSPADM

## 2025-01-23 RX ORDER — MIDAZOLAM HYDROCHLORIDE 1 MG/ML
INJECTION, SOLUTION INTRAMUSCULAR; INTRAVENOUS
Status: COMPLETED
Start: 2025-01-23 | End: 2025-01-23

## 2025-01-23 RX ORDER — ALPRAZOLAM 0.5 MG
0.5 TABLET ORAL 2 TIMES DAILY PRN
Status: ON HOLD | COMMUNITY
End: 2025-02-03 | Stop reason: HOSPADM

## 2025-01-23 RX ORDER — ATORVASTATIN CALCIUM 20 MG/1
20 TABLET, FILM COATED ORAL DAILY
Status: DISCONTINUED | OUTPATIENT
Start: 2025-01-24 | End: 2025-01-23

## 2025-01-23 RX ORDER — PROPOFOL 10 MG/ML
INJECTION, EMULSION INTRAVENOUS
Status: COMPLETED
Start: 2025-01-23 | End: 2025-01-23

## 2025-01-23 RX ORDER — CHLORHEXIDINE GLUCONATE ORAL RINSE 1.2 MG/ML
15 SOLUTION DENTAL 2 TIMES DAILY
Status: DISCONTINUED | OUTPATIENT
Start: 2025-01-24 | End: 2025-01-28

## 2025-01-23 RX ORDER — ETOMIDATE 2 MG/ML
15 INJECTION INTRAVENOUS ONCE
Status: COMPLETED | OUTPATIENT
Start: 2025-01-23 | End: 2025-01-23

## 2025-01-23 RX ORDER — LEVETIRACETAM 500 MG/5ML
2000 INJECTION, SOLUTION, CONCENTRATE INTRAVENOUS EVERY 12 HOURS
Status: DISCONTINUED | OUTPATIENT
Start: 2025-01-24 | End: 2025-01-27

## 2025-01-23 RX ORDER — MIDAZOLAM HYDROCHLORIDE 5 MG/5ML
4 INJECTION, SOLUTION INTRAMUSCULAR; INTRAVENOUS ONCE
Status: COMPLETED | OUTPATIENT
Start: 2025-01-23 | End: 2025-01-23

## 2025-01-23 RX ORDER — CARVEDILOL 12.5 MG/1
6.25 TABLET ORAL 2 TIMES DAILY WITH MEALS
Status: DISCONTINUED | OUTPATIENT
Start: 2025-01-23 | End: 2025-01-23

## 2025-01-23 RX ORDER — SODIUM CHLORIDE, SODIUM LACTATE, POTASSIUM CHLORIDE, AND CALCIUM CHLORIDE .6; .31; .03; .02 G/100ML; G/100ML; G/100ML; G/100ML
1000 INJECTION, SOLUTION INTRAVENOUS ONCE
Status: COMPLETED | OUTPATIENT
Start: 2025-01-23 | End: 2025-01-24

## 2025-01-23 RX ORDER — LEVETIRACETAM 500 MG/5ML
500 INJECTION, SOLUTION, CONCENTRATE INTRAVENOUS EVERY 12 HOURS
Status: DISCONTINUED | OUTPATIENT
Start: 2025-01-23 | End: 2025-01-23

## 2025-01-23 RX ORDER — LISINOPRIL 20 MG/1
40 TABLET ORAL DAILY
Status: DISCONTINUED | OUTPATIENT
Start: 2025-01-24 | End: 2025-01-23

## 2025-01-23 RX ORDER — HYDRALAZINE HYDROCHLORIDE 20 MG/ML
10 INJECTION INTRAMUSCULAR; INTRAVENOUS EVERY 6 HOURS PRN
Status: DISCONTINUED | OUTPATIENT
Start: 2025-01-23 | End: 2025-02-05 | Stop reason: HOSPADM

## 2025-01-23 RX ORDER — VANCOMYCIN 1.75 G/350ML
1250 INJECTION, SOLUTION INTRAVENOUS EVERY 24 HOURS
Status: DISCONTINUED | OUTPATIENT
Start: 2025-01-24 | End: 2025-01-26

## 2025-01-23 RX ORDER — 0.9 % SODIUM CHLORIDE 0.9 %
1000 INTRAVENOUS SOLUTION INTRAVENOUS ONCE
Status: COMPLETED | OUTPATIENT
Start: 2025-01-23 | End: 2025-01-23

## 2025-01-23 RX ORDER — DULOXETIN HYDROCHLORIDE 60 MG/1
60 CAPSULE, DELAYED RELEASE ORAL DAILY
COMMUNITY
Start: 2024-12-26 | End: 2025-01-23 | Stop reason: ALTCHOICE

## 2025-01-23 RX ORDER — MIDAZOLAM HYDROCHLORIDE 2 MG/2ML
2 INJECTION, SOLUTION INTRAMUSCULAR; INTRAVENOUS ONCE
Status: COMPLETED | OUTPATIENT
Start: 2025-01-23 | End: 2025-01-23

## 2025-01-23 RX ORDER — MIDAZOLAM HYDROCHLORIDE 5 MG/ML
2 INJECTION, SOLUTION INTRAMUSCULAR; INTRAVENOUS ONCE
Status: DISCONTINUED | OUTPATIENT
Start: 2025-01-23 | End: 2025-01-27

## 2025-01-23 RX ORDER — ONDANSETRON 2 MG/ML
4 INJECTION INTRAMUSCULAR; INTRAVENOUS EVERY 4 HOURS PRN
Status: DISCONTINUED | OUTPATIENT
Start: 2025-01-23 | End: 2025-02-05 | Stop reason: HOSPADM

## 2025-01-23 RX ORDER — FENTANYL CITRATE-0.9 % NACL/PF 10 MCG/ML
25-200 PLASTIC BAG, INJECTION (ML) INTRAVENOUS CONTINUOUS
Status: DISCONTINUED | OUTPATIENT
Start: 2025-01-23 | End: 2025-01-26

## 2025-01-23 RX ORDER — LISINOPRIL 20 MG/1
40 TABLET ORAL DAILY
Status: DISCONTINUED | OUTPATIENT
Start: 2025-01-24 | End: 2025-02-05 | Stop reason: HOSPADM

## 2025-01-23 RX ORDER — SODIUM CHLORIDE 9 MG/ML
INJECTION, SOLUTION INTRAVENOUS PRN
Status: DISCONTINUED | OUTPATIENT
Start: 2025-01-23 | End: 2025-02-05 | Stop reason: HOSPADM

## 2025-01-23 RX ORDER — SODIUM CHLORIDE, SODIUM LACTATE, POTASSIUM CHLORIDE, CALCIUM CHLORIDE 600; 310; 30; 20 MG/100ML; MG/100ML; MG/100ML; MG/100ML
INJECTION, SOLUTION INTRAVENOUS CONTINUOUS
Status: DISCONTINUED | OUTPATIENT
Start: 2025-01-23 | End: 2025-01-24

## 2025-01-23 RX ORDER — AMLODIPINE BESYLATE 5 MG/1
10 TABLET ORAL DAILY
Status: DISCONTINUED | OUTPATIENT
Start: 2025-01-24 | End: 2025-01-23

## 2025-01-23 RX ORDER — ALLOPURINOL 100 MG/1
100 TABLET ORAL DAILY
Status: DISCONTINUED | OUTPATIENT
Start: 2025-01-24 | End: 2025-01-28

## 2025-01-23 RX ORDER — NOREPINEPHRINE BITARTRATE 0.06 MG/ML
.5-2 INJECTION, SOLUTION INTRAVENOUS CONTINUOUS
Status: ACTIVE | OUTPATIENT
Start: 2025-01-23 | End: 2025-01-24

## 2025-01-23 RX ORDER — ROCURONIUM BROMIDE 50 MG/5 ML
100 SYRINGE (ML) INTRAVENOUS ONCE
Status: COMPLETED | OUTPATIENT
Start: 2025-01-23 | End: 2025-01-23

## 2025-01-23 RX ORDER — ENOXAPARIN SODIUM 100 MG/ML
30 INJECTION SUBCUTANEOUS 2 TIMES DAILY
Status: DISCONTINUED | OUTPATIENT
Start: 2025-01-24 | End: 2025-02-05 | Stop reason: HOSPADM

## 2025-01-23 RX ORDER — ALLOPURINOL 100 MG/1
100 TABLET ORAL DAILY
Status: DISCONTINUED | OUTPATIENT
Start: 2025-01-24 | End: 2025-01-23

## 2025-01-23 RX ORDER — CHLORHEXIDINE GLUCONATE ORAL RINSE 1.2 MG/ML
15 SOLUTION DENTAL 2 TIMES DAILY
Status: DISCONTINUED | OUTPATIENT
Start: 2025-01-23 | End: 2025-01-23

## 2025-01-23 RX ORDER — ALBUTEROL SULFATE 0.83 MG/ML
2.5 SOLUTION RESPIRATORY (INHALATION) EVERY 4 HOURS PRN
Status: DISCONTINUED | OUTPATIENT
Start: 2025-01-23 | End: 2025-01-23

## 2025-01-23 RX ORDER — NOREPINEPHRINE BITARTRATE 0.06 MG/ML
INJECTION, SOLUTION INTRAVENOUS
Status: COMPLETED
Start: 2025-01-23 | End: 2025-01-23

## 2025-01-23 RX ORDER — ACETAMINOPHEN 650 MG/1
SUPPOSITORY RECTAL
Status: COMPLETED
Start: 2025-01-23 | End: 2025-01-23

## 2025-01-23 RX ORDER — CARVEDILOL 6.25 MG/1
6.25 TABLET ORAL 2 TIMES DAILY WITH MEALS
Status: DISCONTINUED | OUTPATIENT
Start: 2025-01-24 | End: 2025-02-05

## 2025-01-23 RX ORDER — SODIUM CHLORIDE 0.9 % (FLUSH) 0.9 %
5-40 SYRINGE (ML) INJECTION EVERY 12 HOURS SCHEDULED
Status: DISCONTINUED | OUTPATIENT
Start: 2025-01-23 | End: 2025-02-05 | Stop reason: HOSPADM

## 2025-01-23 RX ORDER — SODIUM CHLORIDE 0.9 % (FLUSH) 0.9 %
5-40 SYRINGE (ML) INJECTION PRN
Status: DISCONTINUED | OUTPATIENT
Start: 2025-01-23 | End: 2025-02-05 | Stop reason: HOSPADM

## 2025-01-23 RX ORDER — MIDAZOLAM HYDROCHLORIDE 5 MG/5ML
5 INJECTION, SOLUTION INTRAMUSCULAR; INTRAVENOUS ONCE
Status: COMPLETED | OUTPATIENT
Start: 2025-01-23 | End: 2025-01-23

## 2025-01-23 RX ORDER — AMLODIPINE BESYLATE 5 MG/1
10 TABLET ORAL DAILY
Status: DISCONTINUED | OUTPATIENT
Start: 2025-01-24 | End: 2025-02-05

## 2025-01-23 RX ORDER — ATORVASTATIN CALCIUM 20 MG/1
20 TABLET, FILM COATED ORAL DAILY
Status: DISCONTINUED | OUTPATIENT
Start: 2025-01-24 | End: 2025-01-29

## 2025-01-23 RX ORDER — PANTOPRAZOLE SODIUM 40 MG/1
40 TABLET, DELAYED RELEASE ORAL
Status: DISCONTINUED | OUTPATIENT
Start: 2025-01-24 | End: 2025-01-23

## 2025-01-23 RX ORDER — ACETAMINOPHEN 325 MG/1
650 TABLET ORAL EVERY 6 HOURS PRN
Status: DISCONTINUED | OUTPATIENT
Start: 2025-01-23 | End: 2025-02-05 | Stop reason: HOSPADM

## 2025-01-23 RX ORDER — ONDANSETRON 2 MG/ML
4 INJECTION INTRAMUSCULAR; INTRAVENOUS ONCE
Status: COMPLETED | OUTPATIENT
Start: 2025-01-23 | End: 2025-01-23

## 2025-01-23 RX ORDER — LANSOPRAZOLE 30 MG/1
30 TABLET, ORALLY DISINTEGRATING, DELAYED RELEASE ORAL
Status: DISCONTINUED | OUTPATIENT
Start: 2025-01-24 | End: 2025-01-28

## 2025-01-23 RX ADMIN — SODIUM CHLORIDE, PRESERVATIVE FREE 10 ML: 5 INJECTION INTRAVENOUS at 20:45

## 2025-01-23 RX ADMIN — MIDAZOLAM HYDROCHLORIDE 5 MG: 5 INJECTION, SOLUTION INTRAMUSCULAR; INTRAVENOUS at 23:17

## 2025-01-23 RX ADMIN — ACYCLOVIR SODIUM 1000 MG: 1000 INJECTION, SOLUTION INTRAVENOUS at 18:04

## 2025-01-23 RX ADMIN — MIDAZOLAM HYDROCHLORIDE 5 MG: 1 INJECTION, SOLUTION INTRAMUSCULAR; INTRAVENOUS at 23:37

## 2025-01-23 RX ADMIN — ETOMIDATE 15 MG: 2 INJECTION, SOLUTION INTRAVENOUS at 20:06

## 2025-01-23 RX ADMIN — MIDAZOLAM HYDROCHLORIDE 2 MG: 1 INJECTION, SOLUTION INTRAMUSCULAR; INTRAVENOUS at 23:04

## 2025-01-23 RX ADMIN — MIDAZOLAM HYDROCHLORIDE 4 MG: 5 INJECTION, SOLUTION INTRAMUSCULAR; INTRAVENOUS at 19:59

## 2025-01-23 RX ADMIN — PROPOFOL 50 MCG/KG/MIN: 10 INJECTION, EMULSION INTRAVENOUS at 20:11

## 2025-01-23 RX ADMIN — NOREPINEPHRINE BITARTRATE 5 MCG/MIN: 64 SOLUTION INTRAVENOUS at 20:03

## 2025-01-23 RX ADMIN — ACETAMINOPHEN 650 MG: 650 SUPPOSITORY RECTAL at 14:19

## 2025-01-23 RX ADMIN — SODIUM CHLORIDE, POTASSIUM CHLORIDE, SODIUM LACTATE AND CALCIUM CHLORIDE: 600; 310; 30; 20 INJECTION, SOLUTION INTRAVENOUS at 14:19

## 2025-01-23 RX ADMIN — SODIUM CHLORIDE 1000 ML: 0.9 INJECTION, SOLUTION INTRAVENOUS at 02:11

## 2025-01-23 RX ADMIN — WATER 2000 MG: 1 INJECTION INTRAMUSCULAR; INTRAVENOUS; SUBCUTANEOUS at 14:43

## 2025-01-23 RX ADMIN — SODIUM CHLORIDE 1000 ML: 0.9 INJECTION, SOLUTION INTRAVENOUS at 08:14

## 2025-01-23 RX ADMIN — ACETAMINOPHEN 650 MG: 650 SUPPOSITORY RECTAL at 18:39

## 2025-01-23 RX ADMIN — LEVETIRACETAM 500 MG: 100 INJECTION INTRAVENOUS at 22:22

## 2025-01-23 RX ADMIN — 0.12% CHLORHEXIDINE GLUCONATE 15 ML: 1.2 RINSE ORAL at 23:10

## 2025-01-23 RX ADMIN — MIDAZOLAM HYDROCHLORIDE 4 MG: 1 INJECTION, SOLUTION INTRAMUSCULAR; INTRAVENOUS at 19:59

## 2025-01-23 RX ADMIN — MIDAZOLAM HYDROCHLORIDE 5 MG: 1 INJECTION, SOLUTION INTRAMUSCULAR; INTRAVENOUS at 23:17

## 2025-01-23 RX ADMIN — MIDAZOLAM HYDROCHLORIDE 5 MG: 5 INJECTION, SOLUTION INTRAMUSCULAR; INTRAVENOUS at 23:37

## 2025-01-23 RX ADMIN — Medication 2500 MG: at 17:57

## 2025-01-23 RX ADMIN — MIDAZOLAM HYDROCHLORIDE 2 MG: 1 INJECTION, SOLUTION INTRAMUSCULAR; INTRAVENOUS at 09:30

## 2025-01-23 RX ADMIN — LEVETIRACETAM 1000 MG: 100 INJECTION INTRAVENOUS at 10:15

## 2025-01-23 RX ADMIN — Medication 100 MG: at 20:04

## 2025-01-23 RX ADMIN — AMPICILLIN SODIUM AND SULBACTAM SODIUM 3000 MG: 2; 1 INJECTION, POWDER, FOR SOLUTION INTRAMUSCULAR; INTRAVENOUS at 23:09

## 2025-01-23 RX ADMIN — SODIUM CHLORIDE, POTASSIUM CHLORIDE, SODIUM LACTATE AND CALCIUM CHLORIDE 1000 ML: 600; 310; 30; 20 INJECTION, SOLUTION INTRAVENOUS at 22:59

## 2025-01-23 RX ADMIN — Medication 1 AMPULE: at 20:45

## 2025-01-23 RX ADMIN — ONDANSETRON 4 MG: 2 INJECTION INTRAMUSCULAR; INTRAVENOUS at 02:09

## 2025-01-23 RX ADMIN — PROPOFOL 50 MCG/KG/MIN: 10 INJECTION, EMULSION INTRAVENOUS at 22:54

## 2025-01-23 RX ADMIN — SODIUM CHLORIDE 50 MCG/HR: 900 INJECTION, SOLUTION INTRAVENOUS at 21:11

## 2025-01-23 ASSESSMENT — PULMONARY FUNCTION TESTS
PIF_VALUE: 39
PIF_VALUE: 29

## 2025-01-23 ASSESSMENT — LIFESTYLE VARIABLES
HOW OFTEN DO YOU HAVE A DRINK CONTAINING ALCOHOL: PATIENT UNABLE TO ANSWER
HOW MANY STANDARD DRINKS CONTAINING ALCOHOL DO YOU HAVE ON A TYPICAL DAY: PATIENT UNABLE TO ANSWER

## 2025-01-23 NOTE — PROCEDURES
[facility]              [address] [place]  [address]                                   EEG      PATIENT NAME: ANNY CONTRERAS            : 1950  MED REC NO: [id number]                       ROOM: ER11  ACCOUNT NO: [id number]                       ADMIT DATE: 2025  PROVIDER: Андрей Sue MD    DATE OF SERVICE:  2025    EEG CLASSIFICATION:  The patient's EEG classification is Dysrhythmia grade 1 generalized.    DESCRIPTION OF THE RECORD:  This is a 16-channel EEG recording on the patient to evaluate for possible new onset of seizures.    This is a 16-channel EEG recording on the patient with EKG monitoring to evaluate for possible seizures.  The patient had a mild increase in generalized 2-6 Hz activity seen and the patient had considerable movement, muscle and electrode artifact and 60 Hz cycle artifact seen throughout the recording.  The recording was somewhat compromised by this activity.  There were no clear areas of focal slowing, and no clear spike or spike-and-wave discharges seen, and no recorded dysrhythmic or electrographic spells of any type.  Photic stimulation produced little change in the background recording.  The patient was briefly asleep, had K complexes and sleep spindles seen in central head regions in the recording.  Hyperventilation was not performed.    INTERPRETATION:  This is a mildly abnormal electroencephalogram due to the generalized slowing seen most consistent with diffuse encephalopathy of toxic, metabolic, or degenerative type.  No clear focal process or spike or spike-and-wave discharges were seen.  Clinical correlation recommended.        АНДРЕЙ SUE MD      TAS/AQS  D:  2025 12:50:59  T:  2025 13:22:55  JOB #:  [id number]    CC:   Андрей Sue MD

## 2025-01-23 NOTE — ED NOTES
Patient EEG complete.  Patient tachypnic with some expiratory wheezing.  Perfectserve sent to Dr. Alivia HASSAN to place new orders.

## 2025-01-23 NOTE — ED NOTES
Patient remains resting in bed with eyes closed at this time.  Patient remains on 3L nasal cannula. Sat at 96% and patient tachypnic. Perfectserve sent to Dr. Marina.  Orders for labs at this time.

## 2025-01-23 NOTE — ED NOTES
Patient continues to be tachypnic and not responsive to painful stimuli. Dr. Marina at bedside to evaluate patient.

## 2025-01-23 NOTE — ED PROVIDER NOTES
EMERGENCY DEPARTMENT HISTORY AND PHYSICAL EXAM     ----------------------------------------------------------------------------  Please note that this dictation was completed with [employer], the [employer] voice recognition software.  Quite often unanticipated grammatical, syntax, homophones, and other interpretive errors are inadvertently transcribed by the computer software.  Please disregard these errors.  Please excuse any errors that have escaped final proofreading  ----------------------------------------------------------------------------      Date: 1/23/2025  Patient Name: Cole Velasquez      HISTORY OF PRESENT ILLNESS     Chief Complaint   Patient presents with    Fall     Pt via EMS for cc of GLF. Per EMS, pt found by her  on the bathroom floor unresponsive. Per pt , LKW was 0700. Per EMS, pt withdraws from pain. GCS 10 PTA.        History obtainted from: EMS    HPI: Cole Velasquez is a 74 y.o. female, with significant pmhx of reflux, hypertension, gallstones, who presents via EMS to the ED with report from EMS that patient's  found her lying on the ground for unknown amount of time.  Intermittently responsive to painful stimuli.  Patient's  notes that her last known well was around 7 AM yesterday but may have talked to her around dinnertime(?) maintaining airway with normal vital signs en route.      PCP: Jona Mead DO    Allergy List:   Allergies   Allergen Reactions    Hydrochlorothiazide Other (See Comments)     Gout     Codeine Nausea And Vomiting    Minocycline Nausea Only         CURRENT MEDICATIONS      Previous Medications    ALLOPURINOL (ZYLOPRIM) 100 MG TABLET    Take 1 tablet by mouth daily    AMLODIPINE (NORVASC) 10 MG TABLET    Take 1 tablet by mouth daily Need to establish with a new primary care provider for further refills    ATORVASTATIN (LIPITOR) 20 MG TABLET    Take 1 tablet by mouth daily    CARVEDILOL (COREG) 6.25 MG TABLET    Take 1 tablet by mouth

## 2025-01-23 NOTE — PROGRESS NOTES
MRI ON HOLD - SCREENING SHEET    MRI screening must be completed and signed before patient is considered eligible for MRI.    Please complete and sign electronically or fax to [phone].    Please call [phone] when complete.    Thank You

## 2025-01-23 NOTE — SIGNIFICANT EVENT
Problem: Developmental Delay, Risk of (PT/OT)  Goal: *Acute Goals and Plan of Care  Description: PT/OT Weekly Reassessment (2/11/21) (all goals ongoing and remain appropriate) Weekly Reassessment 2/18/21  1. Infant will tolerate full developmental assessment within 7 days. (ongoing 2/18) (met)  2. Infant will hold head in midline when positioned in supine position without support within 7 days. (ongoing 2/18)(ongoing 3/9)  3. Infant will independently bring hands to midline within 7 days. (met 2/18)  4. Infant will maintain eye contact with caregiver x 10 sec within 7 days. (ongoing 2/18) (ongoing 3/9)  5. Infant will visually track 10 degrees to either side within 7 days. (ongoing 2/18) (ongoing 3/9)  6. Infant will tolerate infant massage with stable vitals and no stress signals within 7 days. (ongoing 2/18) (ongoing 3/9)  7. Parents will identify at least 3 signs and signals of stress within 7 days. (ongoing 2/18) (ongoing 3/9)  8. Parents will demonstrate good understanding of and perform infant massage within 7 days. (ongoing 2/18) (ongoing 3/9)  9. Infant will tolerate prone for one minute or more with less than 3 stress signals within 7 days. (set 2/18) (met)   Weekly Reassessment 3/2/21  Upgraded OT/PT Goals 2021  Weekly Reassessment 3/9/21  1. Infant will clear airway in prone 45 degrees in each direction within 7 days. (met 3/9)  2. Infant will bring arms to midline with no facilitation within 7 days. (met 3/9)  3. Infant will track 45 degrees in both directions to caregiver voice within 7 days. (ongoing 3/9)  4. Infant will maintain head at midline for greater than 15 seconds with visual stimulation within 7 days. (ongoing 3/9)       Outcome: Progressing Towards Goal   PHYSICAL THERAPY TREATMENT/WEEKLY REASSESSMENT  Patient: Male Randi Herrera   YOB: 2023  Premenstrual age: 37w2d   Gestational Age: 32w2d   Age: 10 wk.o.   Sex: male  Date: 2023    ASSESSMENT:  Patient continues with [facility]  RAPID RESPONSE TEAM   Rapid Response NOTE       Overhead rapid response called to room # 2158  @ 1739 for patient Cole Velasquez , MRN# [id number]      S- Reason for rapid response: Unresponsive/concern for airway protection  Per primary RN:   Background: patient found by  altered in the bathroom, was brought to the ed, had a witnessed seizure, and continued to remain post ectal/ams and unresponsive. Imaging done, was for LP and MRI. Transferred to stepdown      O/A- Focused Assessment:   Cardiac- sinus to sinus tachy, bp stable, pulses present, febrile 102.8F aux. Rectal tylenol given x1   Respiratory- nasal canula with nasal trumpet for airway protection. Rr in the 20s.abdominal breathing.   Nuero- unresponsive to sternal rub, does not follow, right gaze preference, pupils equal and reactive.   GI/- voiding via purewick. Npo as patient not able to swallow at this time.   MSK/Skin- generalized edema noted.   Lines/drain- PIV x2  Labs/chart reviewed-  Yes.  Blood, respiratory cultures pending, patient on abx. And ice packs.       Interventions:      Dr. Marina at bedside, orders received for the following Interventions:   - Stat abg  - CCU consult  - MRI pending   -     P- Outcome:   Patient stable, transferred to CCU 2512 for close monitoring and airway watch.   Primary RN to inform MRI when able to take patient down considering patient condition/situation.   Patient son (Joni) at the bedside and was updated on patient condition and support was provided.     Patient Disposition:   Patient transferred to higher level of care following RRT?: Yes.       Thank you for this RRT call. Patient/family education provided as applicable.   Care Collaborated with primary RN, CRN, RT, and attending MD.   Please refer to the flowsheet for detailed vital signs during this event.   See MD notes for details and plan of care.     Code Status: Full Code   Attending MD: Danielle Kruger MD  skilled PT services and is progressing towards goals. Infant is now 39 4/7 corrected age at 7 weeks of life. He remains in NICU primarily for feeding and consistent weight gait. He continues to exhibit stress signals with diaper change but does calm when contained. Rosina Trujillo makes only fleeting eye contact; primarily with averted gaze. Gets hands to midline. In prone, he cleared airway to left with good head lift. When supine, he seems to prefer right turn head preference. Discussed  continued use of tortle cap with nursing. PT had opportunity to meet MOB yesterday. Infant appearing hungry. Left in alert state. Goals updated. PLAN:  Patient continues to benefit from skilled intervention to address the above impairments. Continue treatment per established plan of care. Discharge Recommendations:  EI and NCCC     OBJECTIVE DATA SUMMARY:   NEUROBEHAVIORAL:  Behavioral State Organization  Range of States: Drowsy; Active alert  Quality of State Transition: Appropriate  Self Regulation: Searching for boundaries  Stress Reactions: Looking away; Saluting;Arching  Physiologic/Autonomic  Skin Color: Pale;Pink;Mottled (comment)(mottling especially face/neck)  Change in Vitals: Vital signs remain stable  NEUROMOTOR:  Tone: Appropriate for gestational age  Quality of Movement: Flailing;Jittery; Smooth  SENSORY SYSTEMS:  Visual  Eye Contact: Averted gaze  Tracking: Absent  Visual Regard: Fleeting  Light Sensitive: Functional  Auditory  Response To Voice: Opens eyes  Location To Sound: None noted  Vestibular  Response To Movement: Tolerates well  Tactile  Response To Light Touch: Startles;Stress signals noted  Response To Deep Pressure: Calms; Increased organization;Prefers deep pressure through large joints  Response To Firm Stroking: Calms;Prefers circular strokes to large joints  MOTOR/REFLEX DEVELOPMENT:  Positioning  Position: Prone;Supine  Head Control from Prone: Clears airway, 45 degrees(cleared airway to left only today)  Duration (min): 2  Motor Development  Head Control: Appropriate for gestational age  Upper Extremity Posture: Good midline orientation;Open hands;Elevated scapula  Lower Extremity Posture: Legs in hip flexion and external rotation  Neck Posture: No torticollis noted  Reflex Development  Rooting: Present bilaterally  Head to Sit: Neck/Head flexion  Palmar Grasp: Present    COMMUNICATION/COLLABORATION:   The patients plan of care was discussed with: Occupational therapist and Registered nurse.      Geoffry Lefort, PT   Time Calculation: 18 mins

## 2025-01-23 NOTE — ED NOTES
Nasal trumpet inserted by Dr. Beckman in R nostril.  Patient also on 3L nasal cannula.  Sats 94%.  Dr. Marina at bedside to evaluate patient.

## 2025-01-23 NOTE — ED NOTES
Patient turned for incontinence care onto R side.  Patient appears to be having a seizure.  Patient became tachycardic and sats dropped.  Patient started to drool.  Rapid response called.  ED doctors at bedside  Patient placed on NRB mask.  Sats up to 99%.

## 2025-01-23 NOTE — ED NOTES
Received call from x-ray regarding orders for lumbar puncture.  Per x-ray department radiologist leaves at 1530 and will be unable to do procedure today.  Multiple perfectserve sent to Dr. Marina.  Awaiting response.

## 2025-01-23 NOTE — PROGRESS NOTES
Pharmacy Medication History Review    Medication history obtained by Marilin Pike while patient was in room ER11/ and was completed based on information available during current patient encounter. Medication history was completed after home meds were reconciled by provider.    Pharmacist Admission Medication Reconciliation Recommendations:   Notify provider of changes         PTA medication list was corrected to the following:   Prior to Admission Medications   Prescriptions Last Dose Informant   ALPRAZolam (XANAX) 0.5 MG tablet  Other   Sig: Take 1 tablet by mouth 2 times daily as needed for Sleep or Anxiety.   DESONIDE EX  Other   Sig: Apply topically as needed   DULoxetine (CYMBALTA) 30 MG extended release capsule  Other   Sig: Take 1 capsule by mouth daily   Patient taking differently: Take 1 capsule by mouth daily For 14 days   DULoxetine (CYMBALTA) 60 MG extended release capsule  Other   Sig: Take 1 capsule by mouth daily   Patient taking differently: Take 1 capsule by mouth daily Start 24   allopurinol (ZYLOPRIM) 100 MG tablet  Other   Sig: Take 1 tablet by mouth daily   amLODIPine (NORVASC) 10 MG tablet  Other   Sig: Take 1 tablet by mouth daily Need to establish with a new primary care provider for further refills   Patient taking differently: Take 1 tablet by mouth daily   atorvastatin (LIPITOR) 20 MG tablet  Other   Sig: Take 1 tablet by mouth daily   carvedilol (COREG) 6.25 MG tablet  Other   Sig: Take 1 tablet by mouth 2 times daily (with meals)   clotrimazole (LOTRIMIN) 1 % cream  Other   fluticasone (FLONASE) 50 MCG/ACT nasal spray  Other   Si spray by Nasal route daily   lisinopril (PRINIVIL;ZESTRIL) 40 MG tablet  Other   Sig: TAKE 1 TABLET BY MOUTH DAILY   metFORMIN (GLUCOPHAGE) 500 MG tablet Past Week Other, Spouse/Significant Other   Sig: Take 1 tablet by mouth 2 times daily (with meals)   pantoprazole (PROTONIX) 40 MG tablet  Other   Sig: TAKE 1 TABLET BY MOUTH IN THE  MORNING

## 2025-01-23 NOTE — ED NOTES
Spoke with Dr. Marina who is aware of patient current condition and inability of x-ray to complete LP today.  OK with LP being done tomorrow.  Patient ok to go to step down level of care at this time.

## 2025-01-23 NOTE — ED NOTES
Patient continues to be tachypnic.  Perfectserve sent to Dr. Marina.  Patient temp now 102.4 axillary.  Dr. Marina aware.

## 2025-01-23 NOTE — ED NOTES
Report given to ONIEL Ibarra by Rex Reilly RN. Nurse was informed of reason for arrival, vitals, labs, medications, orders, procedures, results, anything left pending and current plan of action. Questions were asked and received prior to departure from the patient.

## 2025-01-23 NOTE — PROGRESS NOTES
0930: Rapid Response called overhead for Seizure activity with subsequent oxygen de-saturations into the 60's. 2 Mg of Versed given by primary nurse. Nasal trumpet being placed by ER physician. Pt.'s oxygen saturation is now 95% on NRB 15 Liters.      0935: Dr. Marina now at the bedside. Per MD, Transfer orders upgrade to SD. Primary nurse to recheck vitals, Blood sugar, and wean patient from NRB to NC. Primary nurse denies needing assistance at this time. Please call if further help can be provided from the CCU.     Ext [phone].

## 2025-01-23 NOTE — PROGRESS NOTES
1820: Pt arrives to CCU by rapid RN and CPC RN    1830: Pt bathed, dual skin performed with ONIEL Tejeda, skin is intact. Pt does not response to painful stimuli. Pts eyes are devated left. Pt has a nasal trumpet in with a nasal cannula on, o2 at 6L. Pt grunting and snoring respirations with scant foaming at the mouth. Jaw is clinched at this time. Vital signs stable at this time, besides temperature of 103.1F. Ice packs placed on patient and rectal suppository given.     1842: Dr Kruger at the bedside. Rapid eeg ordered to place on patient at this time. MD went to talk to family in waiting room.     1845: EEG tech bedside to start putting pt on 24 hour EEG    1900: Bedside report given to ONIEL Sánchez

## 2025-01-23 NOTE — CONSULTS
HYSTERECTOMY (CERVIX STATUS UNKNOWN)  1989    [facility] MAMMOGRAM SCREEN BILAT  3/2014    PAP SMEAR  2000    SINUS SURGERY PROC UNLISTED      TONSILLECTOMY         Social History     Tobacco Use   Smoking Status Never   Smokeless Tobacco Never        Immunization History   Administered Date(s) Administered    COVID-19, PFIZER Bivalent, DO NOT Dilute, (age 12y+), IM, 30 mcg/0.3 mL 10/04/2022    COVID-19, PFIZER PURPLE top, DILUTE for use, (age 12 y+), 30mcg/0.3mL 03/10/2021, 04/03/2021, 10/09/2021, 05/17/2022, 10/02/2023    COVID-19, PFIZER, (age 12y+), IM, 30mcg/0.3mL 09/26/2024    Influenza Virus Vaccine 11/01/2009, 09/26/2024    Influenza, FLUAD, (age 65 y+), IM, Trivalent PF, 0.5mL 09/27/2018    Influenza, FLUZONE High Dose (age 65 y+), IM, Quadv, 0.7mL 11/09/2021    Influenza, FLUZONE High Dose, (age 65 y+), IM, Trivalent PF, 0.5mL 10/07/2014, 09/23/2015, 10/27/2016, 08/22/2017, 10/29/2019    Pneumococcal, PCV-13, PREVNAR 13, (age 6w+), IM, 0.5mL 08/22/2017    Pneumococcal, PCV20, PREVNAR 20, (age 6w+), IM, 0.5mL 12/29/2023    Pneumococcal, PPSV23, PNEUMOVAX 23, (age 2y+), SC/IM, 0.5mL 09/27/2018    RSV, ABRYSVO, (Pregnant or age 60y+), PF, IM, 0.5mL 12/29/2023    TDaP, ADACEL (age 10y-64y), BOOSTRIX (age 10y+), IM, 0.5mL 06/03/2011, 07/15/2021    Zoster Recombinant (Shingrix) 08/22/2019, 12/09/2019       Family History   Problem Relation Age of Onset    Dementia Father     Cancer Mother         breast, 50s     Diabetes Mother     Heart Disease Mother         MI, CAD    Breast Cancer Mother         onset: late 50 early 60    Stroke Father     Heart Disease Father         CAD       Social History     Socioeconomic History    Marital status:      Spouse name: None    Number of children: None    Years of education: None    Highest education level: None   Tobacco Use    Smoking status: Never    Smokeless tobacco: Never   Substance and Sexual Activity    Alcohol use: Yes    Drug use: No    Sexual activity: Defer      are clear of acute process. No pneumonia Electronically signed by SPRING PACK    XR CHEST PORTABLE    Result Date: 1/23/2025  EXAM:  XR CHEST PORTABLE INDICATION: Altered mental status COMPARISON: 3/1/2023 TECHNIQUE: Portable AP upright chest view at 0231 hours FINDINGS: The cardiac silhouette is within normal limits. The pulmonary vasculature is within normal limits. There are low lung volumes with mild bibasilar opacities. There is no pleural effusion or pneumothorax. The visualized bones and upper abdomen are age-appropriate.     Low lung volumes with mild bibasilar atelectasis. No other acute process. Electronically signed by Renato Ross    CT CERVICAL SPINE WO CONTRAST    Result Date: 1/23/2025  EXAM:  CT CERVICAL SPINE WITHOUT CONTRAST INDICATION: Fall with head injury COMPARISON: None. CONTRAST:  None. TECHNIQUE: Multislice helical CT of the cervical spine was performed without intravenous contrast administration.  Sagittal and coronal reformats were generated.  CT dose reduction was achieved through use of a standardized protocol tailored for this examination and automatic exposure control for dose modulation. FINDINGS: There is no acute fracture or subluxation. Vertebral body heights and intervertebral disc spaces are maintained. There are multilevel degenerative disc and facet changes most prominent at C5-6 and C6-7 with mild to moderate right and moderate left neural foraminal narrowing at these levels. There is no abnormality in alignment. The paraspinal soft tissues are unremarkable. The visualized lung apices are clear.     No acute abnormality. Multilevel degenerative changes. Electronically signed by Renato Ross    CT HEAD WO CONTRAST    Result Date: 1/23/2025  EXAM:  CT HEAD WO CONTRAST INDICATION: Fall with head injury COMPARISON: CT 6/4/2024 TECHNIQUE: Noncontrast head CT. Coronal and sagittal reformats. CT dose reduction was achieved through use of a standardized protocol tailored for

## 2025-01-23 NOTE — PROGRESS NOTES
Sent for patient @ 1500 for MRI, transport was told to not bring patient for scan. Unable to speak with RN about reason MRI was deferred.

## 2025-01-23 NOTE — H&P
Hospitalist Admission Note    NAME: Cole Velasquez   :  1950   MRN:  [id number]     Date/Time:  2025 9:52 AM    Patient PCP: Jona Mead DO  ______________________________________________________________________  Given the patient's current clinical presentation, I have a high level of concern for decompensation if discharged from the emergency department.  Given the patient's current clinical presentation, I have a high level of concern for decompensation if patient is discharged from the emergency department.  Patient will be admitted as an inpatient with an estimated LOS of 2 days    My assessment of this patient's clinical condition and my plan of care is as follows.    Assessment / Plan:    New onset seizure (witnessed in ED)  Acute encephalopathy  S/P fall at home  -CT head nothing acute  -CT C spine: No acute abnormality  -get MRI brain  -EEG  -ativan prn   -will load with keppra IV 1gram pending neuro eval  -consult neurology  -seizure precautions   -consult IR for LP given fever.  Start acyclovir, vanco, rocephin.  Discussed with Neurology    Hypoxemia  -persistent post ictal.  Risk for aspiration.  Patient vomited at home and in ED  -NRB --> 3LNC  -get stat CXR    Sepsis  -fever 102F, tachy 105, RR 30s.  Vomited earlier in ED and at home.  CXR clear but BS coarse with scattered wheezing.    -Blood cultures sent  -lactic acid 2.2-->1.8  -start rocephin, vanco and acyclovir as above.   -IVF LR    HTN  -restart PTA norvasc, lisinopril in AM  -restart PTA coreg this evening  -IV hydralazine prn    HLD  -restart lipitor in AM    Prediabetes  -check A1c in AM    Chronic pain syndrome  Back pain  Anxiety  Insomnia  -recently (Dec) transitioned from sertraline to duloxetine.  Also on xanax prn.  -hold doloxetine for now - seizures listed in adverse reactions      Medical Decision Making:    [x] High (any 2)     A. Problems (any 1)  [x] Acute/Chronic Illness/injury posing threat to life or  significant deterioration, which requires the highest level of preparedness to intervene urgently. I participated in the decision-making and personally managed or directed the management of the following life and organ supporting interventions that required my frequent assessment to treat or prevent imminent deterioration.    I have provided     65   minutes of critical care time.  During this entire length of time I was immediately available to the patient.        Signed: Riley Marina MD    Discussion/MDM: Patient with multiple medical comorbidities, each with high likelihood for morbidity and mortality if left untreated.  I have reviewed patient's presenting subjective and objective findings, as well as all laboratory studies, imaging studies, and vital signs to date as well as treatment rendered and patient's response to those treatments.  In addition, prior medical, surgical and relevant social and family histories were reviewed.

## 2025-01-24 ENCOUNTER — APPOINTMENT (OUTPATIENT)
Facility: HOSPITAL | Age: 75
DRG: 871 | End: 2025-01-24
Payer: MEDICARE

## 2025-01-24 ENCOUNTER — HOSPITAL ENCOUNTER (INPATIENT)
Facility: HOSPITAL | Age: 75
Discharge: HOME | DRG: 871 | End: 2025-01-27
Payer: MEDICARE

## 2025-01-24 LAB
ANION GAP SERPL CALC-SCNC: 7 MMOL/L (ref 2–12)
APPEARANCE CSF: ABNORMAL
APPEARANCE CSF: ABNORMAL
ARTERIAL PATENCY WRIST A: POSITIVE
BASE EXCESS BLD CALC-SCNC: 1.8 MMOL/L
BDY SITE: ABNORMAL
BUN SERPL-MCNC: 7 MG/DL (ref 6–20)
BUN/CREAT SERPL: 9 (ref 12–20)
C GATTII+NEOFOR DNA CSF QL NAA+NON-PROBE: NOT DETECTED
CALCIUM SERPL-MCNC: 7.9 MG/DL (ref 8.5–10.1)
CHLORIDE SERPL-SCNC: 108 MMOL/L (ref 97–108)
CMV DNA CSF QL NAA+NON-PROBE: NOT DETECTED
CO2 SERPL-SCNC: 25 MMOL/L (ref 21–32)
COLOR CSF: ABNORMAL
COLOR CSF: ABNORMAL
COLOR SPUN CSF: COLORLESS
COLOR SPUN CSF: COLORLESS
CREAT SERPL-MCNC: 0.79 MG/DL (ref 0.55–1.02)
E COLI K1 DNA CSF QL NAA+NON-PROBE: NOT DETECTED
EKG ATRIAL RATE: 93 BPM
EKG DIAGNOSIS: NORMAL
EKG P AXIS: 90 DEGREES
EKG P-R INTERVAL: 158 MS
EKG Q-T INTERVAL: 398 MS
EKG QRS DURATION: 130 MS
EKG QTC CALCULATION (BAZETT): 494 MS
EKG R AXIS: -7 DEGREES
EKG T AXIS: 44 DEGREES
EKG VENTRICULAR RATE: 93 BPM
EOSINOPHIL NFR CSF MANUAL: 1 %
EOSINOPHIL NFR CSF MANUAL: 1 %
ERYTHROCYTE [DISTWIDTH] IN BLOOD BY AUTOMATED COUNT: 13 % (ref 11.5–14.5)
EST. AVERAGE GLUCOSE BLD GHB EST-MCNC: 143 MG/DL
EV RNA CSF QL NAA+NON-PROBE: NOT DETECTED
GAS FLOW.O2 O2 DELIVERY SYS: ABNORMAL
GAS FLOW.O2 SETTING OXYMISER: 14 BPM
GLUCOSE BLD STRIP.AUTO-MCNC: 124 MG/DL (ref 65–117)
GLUCOSE BLD STRIP.AUTO-MCNC: 124 MG/DL (ref 65–117)
GLUCOSE BLD STRIP.AUTO-MCNC: 128 MG/DL (ref 65–117)
GLUCOSE BLD STRIP.AUTO-MCNC: 153 MG/DL (ref 65–117)
GLUCOSE CSF-MCNC: 76 MG/DL (ref 40–70)
GLUCOSE SERPL-MCNC: 147 MG/DL (ref 65–100)
GP B STREP DNA CSF QL NAA+NON-PROBE: NOT DETECTED
HAEM INFLU DNA CSF QL NAA+NON-PROBE: NOT DETECTED
HBA1C MFR BLD: 6.6 % (ref 4–5.6)
HCO3 BLD-SCNC: 27.7 MMOL/L (ref 21–28)
HCT VFR BLD AUTO: 41.2 % (ref 35–47)
HGB BLD-MCNC: 13.6 G/DL (ref 11.5–16)
HHV6 DNA CSF QL NAA+NON-PROBE: NOT DETECTED
HSV1 DNA CSF QL NAA+PROBE: NOT DETECTED
HSV2 DNA CSF QL NAA+NON-PROBE: NOT DETECTED
L MONOCYTOG DNA CSF QL NAA+NON-PROBE: NOT DETECTED
LYMPHOCYTES NFR CSF MANUAL: 3 % (ref 28–96)
LYMPHOCYTES NFR CSF MANUAL: 4 % (ref 28–96)
MAGNESIUM SERPL-MCNC: 1.4 MG/DL (ref 1.6–2.4)
MCH RBC QN AUTO: 31.1 PG (ref 26–34)
MCHC RBC AUTO-ENTMCNC: 33 G/DL (ref 30–36.5)
MCV RBC AUTO: 94.1 FL (ref 80–99)
N MEN DNA CSF QL NAA+NON-PROBE: NOT DETECTED
NEUTROPHILS NFR CSF MANUAL: 95 % (ref 0–7)
NEUTROPHILS NFR CSF MANUAL: 96 % (ref 0–7)
NRBC # BLD: 0 K/UL (ref 0–0.01)
NRBC BLD-RTO: 0 PER 100 WBC
O2/TOTAL GAS SETTING VFR VENT: 100 %
PARECHOVIRUS A RNA CSF QL NAA+NON-PROBE: NOT DETECTED
PCO2 BLD: 47.5 MMHG (ref 35–48)
PEEP RESPIRATORY: 12 CMH2O
PH BLD: 7.37 (ref 7.35–7.45)
PHOSPHATE SERPL-MCNC: 2.1 MG/DL (ref 2.6–4.7)
PLATELET # BLD AUTO: 158 K/UL (ref 150–400)
PMV BLD AUTO: 10.7 FL (ref 8.9–12.9)
PO2 BLD: 209 MMHG (ref 83–108)
POTASSIUM SERPL-SCNC: 3.4 MMOL/L (ref 3.5–5.1)
PROT CSF-MCNC: 212 MG/DL (ref 15–45)
RBC # BLD AUTO: 4.38 M/UL (ref 3.8–5.2)
RBC # CSF: ABNORMAL /CU MM
RBC # CSF: ABNORMAL /CU MM
S PNEUM DNA CSF QL NAA+NON-PROBE: NOT DETECTED
SAO2 % BLD: 99.7 % (ref 92–97)
SERVICE CMNT-IMP: ABNORMAL
SODIUM SERPL-SCNC: 140 MMOL/L (ref 136–145)
SPECIMEN TYPE: ABNORMAL
TUBE # CSF: 1
TUBE # CSF: 4
VENTILATION MODE VENT: ABNORMAL
VT SETTING VENT: 350 ML
VZV DNA CSF QL NAA+NON-PROBE: NOT DETECTED
WBC # BLD AUTO: 15.3 K/UL (ref 3.6–11)
WBC # CSF: 111 /CU MM (ref 0–5)
WBC # CSF: 224 /CU MM (ref 0–5)

## 2025-01-24 PROCEDURE — 6360000004 HC RX CONTRAST MEDICATION: Performed by: PSYCHIATRY & NEUROLOGY

## 2025-01-24 PROCEDURE — 80048 BASIC METABOLIC PNL TOTAL CA: CPT

## 2025-01-24 PROCEDURE — 2000000000 HC ICU R&B

## 2025-01-24 PROCEDURE — 84100 ASSAY OF PHOSPHORUS: CPT

## 2025-01-24 PROCEDURE — 95700 EEG CONT REC W/VID EEG TECH: CPT

## 2025-01-24 PROCEDURE — A9579 GAD-BASE MR CONTRAST NOS,1ML: HCPCS | Performed by: PSYCHIATRY & NEUROLOGY

## 2025-01-24 PROCEDURE — 6360000002 HC RX W HCPCS: Performed by: NURSE PRACTITIONER

## 2025-01-24 PROCEDURE — 2500000003 HC RX 250 WO HCPCS: Performed by: NURSE PRACTITIONER

## 2025-01-24 PROCEDURE — 95720 EEG PHY/QHP EA INCR W/VEEG: CPT | Performed by: PSYCHIATRY & NEUROLOGY

## 2025-01-24 PROCEDURE — 82962 GLUCOSE BLOOD TEST: CPT

## 2025-01-24 PROCEDURE — 36415 COLL VENOUS BLD VENIPUNCTURE: CPT

## 2025-01-24 PROCEDURE — 6370000000 HC RX 637 (ALT 250 FOR IP): Performed by: NURSE PRACTITIONER

## 2025-01-24 PROCEDURE — 2580000003 HC RX 258: Performed by: INTERNAL MEDICINE

## 2025-01-24 PROCEDURE — 94003 VENT MGMT INPAT SUBQ DAY: CPT

## 2025-01-24 PROCEDURE — 89050 BODY FLUID CELL COUNT: CPT

## 2025-01-24 PROCEDURE — 2500000003 HC RX 250 WO HCPCS: Performed by: INTERNAL MEDICINE

## 2025-01-24 PROCEDURE — 87529 HSV DNA AMP PROBE: CPT

## 2025-01-24 PROCEDURE — 87015 SPECIMEN INFECT AGNT CONCNTJ: CPT

## 2025-01-24 PROCEDURE — 87483 CNS DNA AMP PROBE TYPE 12-25: CPT

## 2025-01-24 PROCEDURE — 84157 ASSAY OF PROTEIN OTHER: CPT

## 2025-01-24 PROCEDURE — 87899 AGENT NOS ASSAY W/OPTIC: CPT

## 2025-01-24 PROCEDURE — 71045 X-RAY EXAM CHEST 1 VIEW: CPT

## 2025-01-24 PROCEDURE — 87449 NOS EACH ORGANISM AG IA: CPT

## 2025-01-24 PROCEDURE — 62329 THER SPI PNXR CSF FLUOR/CT: CPT

## 2025-01-24 PROCEDURE — 36600 WITHDRAWAL OF ARTERIAL BLOOD: CPT

## 2025-01-24 PROCEDURE — 83735 ASSAY OF MAGNESIUM: CPT

## 2025-01-24 PROCEDURE — 82945 GLUCOSE OTHER FLUID: CPT

## 2025-01-24 PROCEDURE — 6370000000 HC RX 637 (ALT 250 FOR IP): Performed by: INTERNAL MEDICINE

## 2025-01-24 PROCEDURE — 6360000002 HC RX W HCPCS: Performed by: INTERNAL MEDICINE

## 2025-01-24 PROCEDURE — 70553 MRI BRAIN STEM W/O & W/DYE: CPT

## 2025-01-24 PROCEDURE — 009U3ZX DRAINAGE OF SPINAL CANAL, PERCUTANEOUS APPROACH, DIAGNOSTIC: ICD-10-PCS | Performed by: INTERNAL MEDICINE

## 2025-01-24 PROCEDURE — 82803 BLOOD GASES ANY COMBINATION: CPT

## 2025-01-24 PROCEDURE — 85027 COMPLETE CBC AUTOMATED: CPT

## 2025-01-24 PROCEDURE — 87205 SMEAR GRAM STAIN: CPT

## 2025-01-24 PROCEDURE — 83036 HEMOGLOBIN GLYCOSYLATED A1C: CPT

## 2025-01-24 PROCEDURE — 95714 VEEG EA 12-26 HR UNMNTR: CPT

## 2025-01-24 PROCEDURE — 2580000003 HC RX 258: Performed by: NURSE PRACTITIONER

## 2025-01-24 PROCEDURE — 6360000002 HC RX W HCPCS: Performed by: PSYCHIATRY & NEUROLOGY

## 2025-01-24 PROCEDURE — 87070 CULTURE OTHR SPECIMN AEROBIC: CPT

## 2025-01-24 PROCEDURE — 93005 ELECTROCARDIOGRAM TRACING: CPT | Performed by: NURSE PRACTITIONER

## 2025-01-24 RX ORDER — PROPOFOL 10 MG/ML
5-50 INJECTION, EMULSION INTRAVENOUS CONTINUOUS
Status: DISCONTINUED | OUTPATIENT
Start: 2025-01-24 | End: 2025-01-25

## 2025-01-24 RX ORDER — MIDAZOLAM HYDROCHLORIDE 1 MG/ML
1-10 INJECTION, SOLUTION INTRAVENOUS CONTINUOUS
Status: DISCONTINUED | OUTPATIENT
Start: 2025-01-24 | End: 2025-01-25

## 2025-01-24 RX ORDER — MIDAZOLAM HYDROCHLORIDE 1 MG/ML
5 INJECTION, SOLUTION INTRAVENOUS ONCE
Status: DISCONTINUED | OUTPATIENT
Start: 2025-01-24 | End: 2025-01-24

## 2025-01-24 RX ORDER — MIDAZOLAM HYDROCHLORIDE 2 MG/2ML
2 INJECTION, SOLUTION INTRAMUSCULAR; INTRAVENOUS ONCE
Status: DISCONTINUED | OUTPATIENT
Start: 2025-01-24 | End: 2025-01-27

## 2025-01-24 RX ORDER — GLUCAGON 1 MG/ML
1 KIT INJECTION PRN
Status: DISCONTINUED | OUTPATIENT
Start: 2025-01-24 | End: 2025-02-05 | Stop reason: HOSPADM

## 2025-01-24 RX ORDER — MIDAZOLAM HYDROCHLORIDE 2 MG/2ML
2 INJECTION, SOLUTION INTRAMUSCULAR; INTRAVENOUS EVERY 5 MIN PRN
Status: DISCONTINUED | OUTPATIENT
Start: 2025-01-24 | End: 2025-01-27

## 2025-01-24 RX ORDER — DEXTROSE MONOHYDRATE 100 MG/ML
INJECTION, SOLUTION INTRAVENOUS CONTINUOUS PRN
Status: DISCONTINUED | OUTPATIENT
Start: 2025-01-24 | End: 2025-02-05 | Stop reason: HOSPADM

## 2025-01-24 RX ORDER — MIDAZOLAM HYDROCHLORIDE 5 MG/5ML
5 INJECTION, SOLUTION INTRAMUSCULAR; INTRAVENOUS ONCE
Status: COMPLETED | OUTPATIENT
Start: 2025-01-24 | End: 2025-01-24

## 2025-01-24 RX ORDER — INSULIN LISPRO 100 [IU]/ML
0-4 INJECTION, SOLUTION INTRAVENOUS; SUBCUTANEOUS EVERY 6 HOURS
Status: DISCONTINUED | OUTPATIENT
Start: 2025-01-24 | End: 2025-01-29

## 2025-01-24 RX ORDER — INSULIN LISPRO 100 [IU]/ML
0-4 INJECTION, SOLUTION INTRAVENOUS; SUBCUTANEOUS EVERY 6 HOURS
Status: DISCONTINUED | OUTPATIENT
Start: 2025-01-24 | End: 2025-01-24

## 2025-01-24 RX ORDER — LIDOCAINE HYDROCHLORIDE 10 MG/ML
5 INJECTION, SOLUTION EPIDURAL; INFILTRATION; INTRACAUDAL; PERINEURAL ONCE
Status: DISCONTINUED | OUTPATIENT
Start: 2025-01-24 | End: 2025-01-28 | Stop reason: HOSPADM

## 2025-01-24 RX ORDER — MAGNESIUM SULFATE IN WATER 40 MG/ML
2000 INJECTION, SOLUTION INTRAVENOUS ONCE
Status: COMPLETED | OUTPATIENT
Start: 2025-01-24 | End: 2025-01-24

## 2025-01-24 RX ADMIN — SODIUM CHLORIDE 100 MCG/HR: 900 INJECTION, SOLUTION INTRAVENOUS at 15:30

## 2025-01-24 RX ADMIN — DIBASIC SODIUM PHOSPHATE, MONOBASIC POTASSIUM PHOSPHATE AND MONOBASIC SODIUM PHOSPHATE 2 TABLET: 852; 155; 130 TABLET ORAL at 04:15

## 2025-01-24 RX ADMIN — MAGNESIUM SULFATE HEPTAHYDRATE 2000 MG: 40 INJECTION, SOLUTION INTRAVENOUS at 03:25

## 2025-01-24 RX ADMIN — NOREPINEPHRINE BITARTRATE 4 MCG/MIN: 64 SOLUTION INTRAVENOUS at 06:38

## 2025-01-24 RX ADMIN — ACYCLOVIR SODIUM 1000 MG: 1000 INJECTION, SOLUTION INTRAVENOUS at 03:23

## 2025-01-24 RX ADMIN — PROPOFOL 50 MCG/KG/MIN: 10 INJECTION, EMULSION INTRAVENOUS at 13:18

## 2025-01-24 RX ADMIN — ACYCLOVIR SODIUM 1000 MG: 1000 INJECTION, SOLUTION INTRAVENOUS at 11:07

## 2025-01-24 RX ADMIN — ATORVASTATIN CALCIUM 20 MG: 20 TABLET, FILM COATED ORAL at 08:21

## 2025-01-24 RX ADMIN — AMPICILLIN SODIUM 2000 MG: 2 INJECTION, POWDER, FOR SOLUTION INTRAMUSCULAR; INTRAVENOUS at 16:58

## 2025-01-24 RX ADMIN — SODIUM CHLORIDE, PRESERVATIVE FREE 10 ML: 5 INJECTION INTRAVENOUS at 08:21

## 2025-01-24 RX ADMIN — AMPICILLIN SODIUM 2000 MG: 2 INJECTION, POWDER, FOR SOLUTION INTRAMUSCULAR; INTRAVENOUS at 21:01

## 2025-01-24 RX ADMIN — LEVETIRACETAM 2000 MG: 100 INJECTION INTRAVENOUS at 21:59

## 2025-01-24 RX ADMIN — SODIUM CHLORIDE 100 MCG/HR: 900 INJECTION, SOLUTION INTRAVENOUS at 05:21

## 2025-01-24 RX ADMIN — DOXYCYCLINE 100 MG: 100 INJECTION, POWDER, LYOPHILIZED, FOR SOLUTION INTRAVENOUS at 21:59

## 2025-01-24 RX ADMIN — VANCOMYCIN 1250 MG: 1.75 INJECTION, SOLUTION INTRAVENOUS at 17:12

## 2025-01-24 RX ADMIN — AMPICILLIN SODIUM AND SULBACTAM SODIUM 3000 MG: 2; 1 INJECTION, POWDER, FOR SOLUTION INTRAMUSCULAR; INTRAVENOUS at 11:06

## 2025-01-24 RX ADMIN — CHLORHEXIDINE GLUCONATE 15 ML: 1.2 RINSE ORAL at 21:02

## 2025-01-24 RX ADMIN — POTASSIUM BICARBONATE 40 MEQ: 782 TABLET, EFFERVESCENT ORAL at 04:15

## 2025-01-24 RX ADMIN — Medication 1 AMPULE: at 21:02

## 2025-01-24 RX ADMIN — PROPOFOL 50 MCG/KG/MIN: 10 INJECTION, EMULSION INTRAVENOUS at 08:19

## 2025-01-24 RX ADMIN — ACYCLOVIR SODIUM 700 MG: 50 INJECTION, SOLUTION INTRAVENOUS at 21:16

## 2025-01-24 RX ADMIN — MIDAZOLAM HYDROCHLORIDE 5 MG/HR: 1 INJECTION, SOLUTION INTRAVENOUS at 00:21

## 2025-01-24 RX ADMIN — PROPOFOL 50 MCG/KG/MIN: 10 INJECTION, EMULSION INTRAVENOUS at 17:13

## 2025-01-24 RX ADMIN — WATER 2000 MG: 1 INJECTION INTRAMUSCULAR; INTRAVENOUS; SUBCUTANEOUS at 16:56

## 2025-01-24 RX ADMIN — LANSOPRAZOLE 30 MG: 30 TABLET, ORALLY DISINTEGRATING, DELAYED RELEASE ORAL at 05:22

## 2025-01-24 RX ADMIN — ALLOPURINOL 100 MG: 100 TABLET ORAL at 08:21

## 2025-01-24 RX ADMIN — SODIUM CHLORIDE, PRESERVATIVE FREE 10 ML: 5 INJECTION INTRAVENOUS at 21:02

## 2025-01-24 RX ADMIN — Medication 1 AMPULE: at 08:20

## 2025-01-24 RX ADMIN — LEVETIRACETAM 2000 MG: 100 INJECTION INTRAVENOUS at 11:07

## 2025-01-24 RX ADMIN — LEVETIRACETAM 4000 MG: 100 INJECTION INTRAVENOUS at 00:01

## 2025-01-24 RX ADMIN — MIDAZOLAM HYDROCHLORIDE 5 MG/HR: 1 INJECTION, SOLUTION INTRAVENOUS at 13:20

## 2025-01-24 RX ADMIN — PROPOFOL 50 MCG/KG/MIN: 10 INJECTION, EMULSION INTRAVENOUS at 20:57

## 2025-01-24 RX ADMIN — MIDAZOLAM HYDROCHLORIDE 5 MG: 1 INJECTION, SOLUTION INTRAMUSCULAR; INTRAVENOUS at 00:23

## 2025-01-24 RX ADMIN — DOXYCYCLINE 100 MG: 100 INJECTION, POWDER, LYOPHILIZED, FOR SOLUTION INTRAVENOUS at 12:05

## 2025-01-24 RX ADMIN — PROPOFOL 50 MCG/KG/MIN: 10 INJECTION, EMULSION INTRAVENOUS at 15:30

## 2025-01-24 RX ADMIN — PROPOFOL 50 MCG/KG/MIN: 10 INJECTION, EMULSION INTRAVENOUS at 05:08

## 2025-01-24 RX ADMIN — CHLORHEXIDINE GLUCONATE 15 ML: 1.2 RINSE ORAL at 08:21

## 2025-01-24 RX ADMIN — AMPICILLIN SODIUM AND SULBACTAM SODIUM 3000 MG: 2; 1 INJECTION, POWDER, FOR SOLUTION INTRAMUSCULAR; INTRAVENOUS at 03:20

## 2025-01-24 RX ADMIN — GADOTERIDOL 20 ML: 279.3 INJECTION, SOLUTION INTRAVENOUS at 10:24

## 2025-01-24 RX ADMIN — PROPOFOL 50 MCG/KG/MIN: 10 INJECTION, EMULSION INTRAVENOUS at 01:56

## 2025-01-24 RX ADMIN — SODIUM CHLORIDE, POTASSIUM CHLORIDE, SODIUM LACTATE AND CALCIUM CHLORIDE: 600; 310; 30; 20 INJECTION, SOLUTION INTRAVENOUS at 02:30

## 2025-01-24 ASSESSMENT — PULMONARY FUNCTION TESTS
PIF_VALUE: 28
PIF_VALUE: 30
PIF_VALUE: 36
PIF_VALUE: 30
PIF_VALUE: 32

## 2025-01-24 NOTE — PROCEDURES
PROCEDURE NOTE  Date: 1/23/2025   Name: Cole Velasquez  YOB: 1950    Procedures         [facility]      Procedure Note - Intubation:   Performed by FAROOQ Larsen NP .     Immediately prior to the procedure, the patient was reevaluated and found suitable for the planned procedure and any planned medications.    Immediately prior to the procedure a time out was called to verify the correct patient, procedure, equipment, staff, and marking as appropriate.    Medications given: Per MAR  A number 7.5 cuffed   ETT was placed to 26 cm at the teeth.   Placement was evaluated by noting bilateral, symmetric breath sounds and good end-tidal CO2 detector color change .    Attempts required: 1.    Complications: none.    The procedure was tolerated well.     ETT was noted to enter R mainstem on CXR, retracted 3cm.    Renata Hubbard NP

## 2025-01-24 NOTE — PLAN OF CARE
Discussed with ICU provider, FAROOQ Petit.   Patient is currently in ICU, intubated.  On propofol gtt., 50 mcg/kg/min.  Noted to have intermittent clinical seizure like activity. Versed 2 mg PRN doses were given.  Advised a stat dose of 4000 mg IV Keppra now.   Will increase the maintenance dose to 2000 mg every 12 hours from tomorrow morning.  Started on continuous EEG.

## 2025-01-24 NOTE — PROCEDURES
[facility]              [address] [place] [address]                                   EEG      PATIENT NAME: ANNY CONTRERAS            : 1950  MED REC NO: [id number]                       ROOM: 2512  ACCOUNT NO: [id number]                       ADMIT DATE: 2025  PROVIDER: Андрей Sue MD    DATE OF SERVICE:  2025    24-hour video EEG monitor.    DATE OF STUDY:  2025 to 2025.    CLINICAL INDICATIONS:  The patient is a 74-year-old female with a history of new onset seizures, altered mental status, collapse.  EEG to rule out seizures, rule out cortical abnormality, rule out epilepsy.    DESCRIPTION OF THE RECORD:  This is a 16-channel prolonged EEG monitor on the patient with video monitoring.  This study began on 2025 at approximately 6:55 p.m. and ended on 2025 at approximately 7:47 a.m. for a total time of study of 12 hours and 47 minutes.  During this time, the patient had a posteriorly located occipital alpha rhythm of 8-9 hertz that was somewhat poorly formed.  Throughout the recording, there were no clear areas of focal slowing, no clear areas of spike or spike-and-wave discharges seen, no recorded electrographic or dysrhythmic spells seen.  The patient seemed to be in a state of sleep at times with K complexes and sleep spindles seen in central head regions.  The patient had a mild decrease in her amplitude of the recording and borderline increase in 2-6 hertz theta activity seen throughout the recording.  There is considerable movement in muscle and electrode artifact in the recording at times.  On the video monitor, the patient had no video evidence of any recorded spells.  Neither hyperventilation nor photic stimulation were performed.    INTERPRETATION:  This is a somewhat borderline recording showing just a slight increase generalized slowing, which may be consistent with the generalized encephalopathy of

## 2025-01-24 NOTE — PROGRESS NOTES
01/24/25 0437   B: Both Spontaneous Awakening and Breathing Trials   Was Patient Receiving Mechanical Ventilation Yes   Safety Screening Spontaneous Breathing Trial (SBT) FIO2 is greater than 50%

## 2025-01-24 NOTE — PROGRESS NOTES
0720 Bedside and Verbal shift change report given to Ileana RN (oncoming nurse) by Princess RN (offgoing nurse). Report included the following information Nurse Handoff Report, Intake/Output, MAR, Cardiac Rhythm nsr, and Quality Measures.     0800 Shift assessment done, see flowsheet for detail. IR staff called and inquired if LP will be done today, updated her that she is intubated, per staff Masood she will call back and confirm with the radiologist.    1030 Transported patient to MRI    1311 Dr. Palma informed that MRI result in    [facility] the [facility], per staff it would not be done today.    1405 Radiologist/ NP number given to Dr. Kruger about LP    1445 Transported patient to IR for LP    1845 Spoke to Dr. Kruger, keep on droplet precaution for now until LP results come back    1910 Bedside and Verbal shift change report given to Princess RN (oncoming nurse) by Ileana RN (offgoing nurse). Report included the following information Nurse Handoff Report, Intake/Output, MAR, Cardiac Rhythm NSR, and Quality Measures.

## 2025-01-24 NOTE — PROGRESS NOTES
Critical Care Progress Note  Danielle Kruger MD          Date of Service:  2025  NAME:  Cole Velasquez  :  1950  MRN:  [id number]      Subjective/Hospital course:      Cole Velasquez is a 74 female with a PMH of HTN, HLD, preDM and chronic pain who presented to the ED via EMS after a GLF at home.  Her  found her unresponsive on the bathroom floor with unknown down time. GCS 10 upon hospital arrival.  CT head/C spine negative. After ED arrival, she had a witnessed seizure with R gaze deviation c/b hypoxia that was aborted with versed. She received 1g keppra and was started on 500mg keppra BID.  She has no known history of seizures. She was febrile to 102.8. Subsequent spot 16 channel EEG did not reveal seizure activity. Patient transferred to the ICU.     Upon transfer to the ICU, cEEG applied.  Patient noted to have (by ICU interpretation) diffuse spike/wave discharges across multiple EEG leads.  She was unresponsive to noxious stimuli with R gaze deviation.  She had a clenched jaw and sonorous respirations.  The decision was made to intubate.  She was given IV versed with improvement in spike/wave discharges and sedated with propofol for mechanical ventilation.   - seizure overnight, now on propofol and versed        Assessment/Plan:     NEUROLOGICAL:     Acute encephalopathy  Possible status epilepticus  Possible meningitis:   - Continue keppra  - wean versed   - Sedate with propofol  - Hold SAT/SBT in am given above  - Full meningitis workup pending   LP bedside unsuccessful   IR consult   - Empiric acyclovir, unasyn, vanc     PULMONOLOGY:      Respiratory insufficiency: Required intubation/MV due to respiratory insufficiency  Patchy airspace disease   Viral panel negative  ?aspiration due to seizure / decrease LOC  - lung protective ventilation   - Sputum culture    - doxy for atypical coverage        CARDIOVASCULAR:      Hypotension: Likely related to sedation rather

## 2025-01-24 NOTE — CARE COORDINATION
Care Management Initial Assessment       RUR:  12%  Readmission? No  1st IM letter given? Given by patient access on 1/23/25.  1st  letter given: No--N/A          01/24/25 1428   Service Assessment   Patient Orientation Other (see comment)  (Patient is intubated.)   Cognition Other (see comment)  (Patient is intubated.)   History Provided By Spouse   Primary Caregiver Self   Support Systems Children;Spouse/Significant Other   Patient's Healthcare Decision Maker is: Legal Next of Kin   PCP Verified by CM Yes   Last Visit to PCP   (Patient last saw her pcp 2-3 months ago.)   Prior Functional Level Independent in ADLs/IADLs   Current Functional Level Assistance with the following:;Bathing;Dressing;Toileting;Feeding;Mobility   Can patient return to prior living arrangement Other (see comment)  (Family wants to see how the patient progresses.)   Family able to assist with home care needs: Other (comment)  (Family wants to see how the patient progresses.)   Would you like for me to discuss the discharge plan with any other family members/significant others, and if so, who?   (Patient has a supportive .)   Financial Resources Other (Comment)  ([facility] Medicare.)   Community Resources None   Social/Functional History   Lives With Spouse   Type of Home House  (One story.)   Home Equipment None   Active  Yes   Discharge Planning   Type of Residence Other (Comment)  (Spouse wants to see how the patient progresses.)   Current Services Prior To Admission None   Patient expects to be discharged to: Other (comment)  (Spouse wants to see how the patient progresses.)           Advance Care Planning     General Advance Care Planning (ACP) Conversation    Date of Conversation: 1/24/2025  Conducted with: Patient with Decision Making Capacity and Legal next of kin  Other persons present: None    Healthcare Decision Maker: No healthcare decision makers have been documented.     Today we documented Decision Maker(s)

## 2025-01-24 NOTE — CONSENT
Informed Consent for Blood Component Transfusion Note    I have discussed with the spouse and son the rationale for blood component transfusion; its benefits in treating or preventing fatigue, organ damage, or death; and its risk which includes mild transfusion reactions, rare risk of blood borne infection, or more serious but rare reactions. I have discussed the alternatives to transfusion, including the risk and consequences of not receiving transfusion. The son and spouse had an opportunity to ask questions and had agreed to proceed with transfusion of blood components.    Of note, the patient does not need a transfusion at this time, but the family was ok with providing consent in case it is needed during the hospital stay.    Electronically signed by FAROOQ Larsen NP on 1/23/25 at 10:35 PM EST

## 2025-01-24 NOTE — PROGRESS NOTES
Pharmacy Antimicrobial Kinetic Dosing    Indication for Antimicrobials: Sepsis, probably meningitis      Current Regimen of Each Antimicrobial:  Vancomycin pharmacy to dose; Start Date ; Day # 2  Ceftriaxone 2 g Q12H; Start Date ; Day #2  Acyclovir 1 g Q8H; Start Date ; Day #2  Unasyn 3gm IV q6h - started     Previous Antimicrobial Therapy:  none     Goal Level: Vancomycin -600    Date Dose & Interval Measured (mcg/mL) Predicted AUC 24-48 Predicted AUC 24,ss    16:00                       Significant Cultures:   : Blood x2; IP  : Blood X2: IP     Labs:  Recent Labs     Units 25  0051 25  1331 25  0659 25  0452 25  0145   CREATININE MG/DL 0.79 0.85  --   --  1.00   BUN MG/DL 7 9  --   --  9   PROCAL ng/mL  --   --  <0.05  --   --    WBC K/uL 15.3*  --   --  11.3*  --      Temp (24hrs), Av.4 °F (36.9 °C), Min:95.7 °F (35.4 °C), Max:103.1 °F (39.5 °C)      Conditions for Dosing Consideration: None    Creatinine Clearance (mL/min): Estimated Creatinine Clearance: 69 mL/min (based on SCr of 0.79 mg/dL).       Impression/Plan:   Scr stable, afebrile, leukocytosis, procall on  < 0.05  LP bedside unsuccessful; LP per IR  afternoon  Acyclovir Dose= 10mg/kg Adjusted BW for BMI > 30  BMI = 39.68  Continue empiric Vanc, ceftriaxone and acyclovir for possible meningitis   Ok to change unasyn to ampicillin 2g IV q4h  Doxy for atypical coverage  Predicted [id number] = 525, Predicted AUC24,ss = 500  Plan to order Vanc level  16:00  Antimicrobial stop date      Pharmacy will follow daily and adjust medications as appropriate for renal function and/or serum levels.    Thank you,  LAST DEMARCO, [facility]

## 2025-01-24 NOTE — PLAN OF CARE
Problem: Safety - Medical Restraint  Goal: Remains free of injury from restraints (Restraint for Interference with Medical Device)  Description: INTERVENTIONS:  1. Determine that other, less restrictive measures have been tried or would not be effective before applying the restraint  2. Evaluate the patient's condition at the time of restraint application  3. Inform patient/family regarding the reason for restraint  4. Q2H: Monitor safety, psychosocial status, comfort, nutrition and hydration  1/24/2025 1222 by Ileana Wolff, RN  Outcome: Progressing  1/24/2025 0127 by Princess Gibbs, RN  Outcome: Progressing

## 2025-01-24 NOTE — INTERDISCIPLINARY ROUNDS
Critical care interdisciplinary rounds today.  Following members present: Case Management, Nutrition, Pharmacy, and Physician. Patient currently in MRI. To wean sedation when returned from test. Intubated for airway protection per Intensivist.   Plan of care discussed.  See clinical pathway for plan of care and interventions and desired outcomes.

## 2025-01-24 NOTE — PROGRESS NOTES
1600 patient up to CPC floor from ED; report received from ONIEL Ibarra     1630 patient temp noted at 102.7 axillary, on assessment not responsive to painful stimuli (this was baseline from ED after seizure per ED report), eyes deviated and fixed to the right, patient unable to follow any commands    1643 paged Dr. Marina, too soon to give Tylenol, asked for Toradol. Provider suggested cooling blanket. Informed charge nurse, per charge this is CCU level of care, provider notified. Antibiotics not available from pharmacy yet to hang, messaged pharmacy. Placed ice packs to bilateral armpits and groining. Respiratory panel swab completed and sent to lab.     2741 RRT called; needed orders. Family not happy with level of care being received. MRI was contacted and would be an hour or so before patient could be brought down. Asked for patient to be transferred to CCU, due to unsure if patient will be able to protect airway during MRI. Dr. Marina to bedside and contacted intensivist for transfer to CCU. Vanc and Acyclovir hung prior to transfer.     1810 report called to CCU [id number], report given to ONIEL Ramírez

## 2025-01-24 NOTE — PROGRESS NOTES
Comprehensive Nutrition Assessment    Type and Reason for Visit:  Initial, Consult    Nutrition Recommendations/Plan:   Initiate trophic TF via OGT:  Start Glucerna 1.5 @ 15mL/h + 100mL flush q 4h (provides 540kcals/29gPro/47gCHO/873mL)   If tolerated and pressor needs go down, would advance rate 10mL to goal of 25mL/h + Prosource TID + 100mL flush q 4h (provides 1140kcals/109gPro/79gCHO/1055mL)   If no BM by tomorrow would add daily scheduled glycolax and pericolace     Malnutrition Assessment:  Malnutrition Status:  Insufficient data (01/24/25 1608)    Context:  Acute Illness     Findings of the 6 clinical characteristics of malnutrition:  Energy Intake:  Unable to assess  Weight Loss:  Unable to assess     Body Fat Loss:  Unable to assess     Muscle Mass Loss:  Unable to assess    Fluid Accumulation:  Mild Extremities   Strength:  Not Performed    Nutrition Assessment:  Pt admitted with AMS + seizures.  PMH: HTN, PreDM.  Chart reviewed, case discussed during CCU rounds.  Pt intubated and sedated with propofol @ 30.5mL/h, which provides 805 kcals daily.  Levo is at 7.  Pt CLAUDETTE in MRI this morning during IDR's.  Noted concern for meninginitis.  OGT in place.  Per IDR discussion with Intensivist, luci to start trophic TF (see above).  Provided goal rate recommendations as well.  K 3.4, phos 2.1 and Mag 1.4, lytes being repleted.  MST negative for previous nutritional risk factors.  -153.    Wt Readings from Last 10 Encounters:   01/23/25 101.6 kg (224 lb)   12/12/24 101.6 kg (224 lb)   09/04/24 101.6 kg (224 lb)   09/06/24 101.2 kg (223 lb 3.2 oz)   06/04/24 101.6 kg (224 lb)   03/04/24 102.5 kg (226 lb)   08/15/23 99.8 kg (220 lb)   08/16/23 101.4 kg (223 lb 9.6 oz)   07/20/23 99.8 kg (220 lb)   03/01/23 99.8 kg (220 lb)            Nutrition Related Findings:    Meds: acyclovir, unasyn, doxycycline, lispro, vancomycin, prevacid, MgSO4, efferK;   Drips: levo, fentanyl, versed, propofol.    Edema: +1-all

## 2025-01-24 NOTE — CONSULTS
CRITICAL CARE NOTE    Name: Cole Velasquez   : 1950   MRN: [id number]   Date: 2025      REASON FOR ICU ADMISSION:  Acute encephalopathy; possible SE     PRINCIPAL ICU DIAGNOSIS     Acute encephalopathy  Possible status epilepticus     BRIEF PATIENT SUMMARY     Cole Velasquez is a 74 female with a PMH of HTN, HLD, preDM and chronic pain who presented to the ED via EMS after a GLF at home.  Her  found her unresponsive on the bathroom floor with unknown down time. GCS 10 upon hospital arrival.  CT head/C spine negative. After ED arrival, she had a witnessed seizure with R gaze deviation c/b hypoxia that was aborted with versed. She received 1g keppra and was started on 500mg keppra BID.  She has no known history of seizures. She was febrile to 102.8. Subsequent spot 16 channel EEG did not reveal seizure activity. Patient transferred to the ICU.    Upon transfer to the ICU, cEEG applied.  Patient noted to have (by ICU interpretation) diffuse spike/wave discharges across multiple EEG leads.  She was unresponsive to noxious stimuli with R gaze deviation.  She had a clenched jaw and sonorous respirations.  The decision was made to intubate.  She was given IV versed with improvement in spike/wave discharges and sedated with propofol for mechanical ventilation.    COMPREHENSIVE ASSESSMENT & PLAN:SYSTEM BASED     24 HOUR EVENTS: As above    NEUROLOGICAL:     Acute encephalopathy  Possible status epilepticus  Possible meningitis: Suspect SE given spike/wave discharges seen on cEEG that dissipated after administration of versed with associated clinical improvement of jaw rigidity and resolution of gaze deviation.  Await read from neurologist. Treat empirically.  - Continue keppra  - Sedate with propofol  - Will reach to neurology overnight if suspect breakthrough SE despite above treatments  - NO SAT/SBT in am given above  - Full meningitis workup pending  - Will attempt bedside LP tonight; IR LP  ordered for tomorrow if this is not successful  - Procal negative  - Empiric acyclovir, unasyn, vanc    PULMONOLOGY:     Respiratory insufficiency: Required intubation/MV due to respiratory insufficiency  - Continue MV  - Repeat ABG 1h following intubation  - Checking full RVP given fever; negative flu and COVID    CARDIOVASCULAR:     Hypotension: Likely related to sedation rather than associated with septic shock. Hypertensive prior to administration of sedation. May also be under-resuscitated.  - IVF administration  - Cultures pending  - Abx as above  - Pressors for MAP >65    GASTROINTESTINAL     NPO; OGT     RENAL/ELECTROLYTE/FLUIDS:     Daily BMP    ENDOCRINE:     Maintain euglycemia    HEMATOLOGY/ONCOLOGY:     Daily CBC    INFECTIOUS DISEASE:     Possible meningitis:  - See above under neurology    ANTIBIOTICS TO DATE:  Acyclovir, unasyn, vanc    CULTURES TO DATE:      ICU DAILY CHECKLIST     Code Status:F  DVT Prophylaxis:Heparin  T/L/D: ETT  Lines:N  Drains:N  SUP: PPI  Diet: NPO  Activity Level:As maria l  ABCDEF Bundle ICU  Multidisciplinary Rounds Completed:Pending  Goals of Care Discussion/Palli: Full code  Patient/Family Updated:      Peripheral Intravenous Line:    Peripheral IV 01/23/25 Proximal;Right Forearm (Active)       Peripheral IV 01/23/25 Left Antecubital (Active)           Drain(s):    NG/OG/NJ/NE Tube Orogastric Center mouth (Active)     Airway:    ETT  (Active)   $ Intubation Emergent  $ Yes 01/23/25 2013   Secured At 25 cm 01/23/25 2013   Measured From Teeth 01/23/25 2013   ETT Placement Center 01/23/25 2013   Secured By Commercial tube carreon 01/23/25 2013   Site Assessment Dry 01/23/25 2013          HOSPITAL COURSE/DAILY EVENT LOG     As above    SUBJECTIVE   Review of Systems   Unable      OBJECTIVE   Physical Exam  Vitals and nursing note reviewed.   Constitutional:       Appearance: She is ill-appearing.   HENT:      Head: Normocephalic.      Nose: Nose normal.      Mouth/Throat:

## 2025-01-24 NOTE — PROGRESS NOTES
1905 - Bedside and Verbal shift change report given to ONIEL Sánchez (oncoming nurse) by ONIEL Ramírez (offgoing nurse). Report included the following information Nurse Handoff Report, Adult Overview, Intake/Output, MAR, Recent Results, Cardiac Rhythm Sinus Rhythm, and Alarm Parameters. ONIEL Ramírez stated that the patient is not protecting her airway well, Dr. Kruger is already aware and talking with the family about intubation.    1945 - NP has decided to intubate at this time. Charge, RN aware and at bedside. See charge RN notes for intubation details.    2005 - Patient intubated at this time, CXR ordered.      2218 - 100 mcg Fentanyl bolus and increased Fentanyl rate to 100 mcg per SUSANNAH Hubbard.    2304 - Patient seizing at this time. NP at bedside, give 2 mg Versed per NP.    2330 - NP contacted the on call neurologist that stated to give 4,000 mg Keppra. Waiting for pharmacy to send it.     2337 - Patient seizing again at this time. Give 5 mg Versed per NP.     2349 - Pharmacy called in reference to the patient's Keppra drip, it is being sent now.    0000 - Reassessment completed at this time. See flowsheets for details.    0020 - Patient is seizing again. 2cc Propofol pushed by SUSANNAH Hubbard. Start Versed drip at 5mg/hr and give 5mg from the bag at the start of the drip per SUSANNAH Hubbard.    0400 - Reassessment completed at this time. Patient desatting to the mid 80's. RT at bedside, PEEP increased to 12. NP notified. CXR and ABG ordered per NP. See flowsheets for details.    0659 - [employer] coordinator called back at this time.     0715 - Bedside and Verbal shift change report given to ONIEL Tejeda (oncoming nurse) by ONIEL Sánchez (offgoing nurse). Report included the following information Nurse Handoff Report, Adult Overview, Intake/Output, MAR, Recent Results, Cardiac Rhythm Sinus Arrhythmia/RBBB, and Alarm Parameters.      Notes passed on to day shift:    - RASS goal -4 to -5 per NP. No SAT/SBT done because of RASS

## 2025-01-24 NOTE — SIGNIFICANT EVENT
2002: 200mcg Arben push given.  2003: Levo started @ 5mcg/min per verbal order from              SUSANNAH Hubbard.             7.5mg Etomidate given  2004: 100mg Rocuronium  2005: Intubated; 26 @ the teeth. Positive for color              change, bilateral lung sounds auscultated.  2006: 7.5mg Etomidate given.

## 2025-01-25 PROBLEM — J96.01 ACUTE RESPIRATORY FAILURE WITH HYPOXIA: Status: ACTIVE | Noted: 2025-01-25

## 2025-01-25 PROBLEM — G03.9 ACUTE MENINGITIS: Status: ACTIVE | Noted: 2025-01-25

## 2025-01-25 PROBLEM — E66.9 OBESITY (BMI 30-39.9): Status: ACTIVE | Noted: 2025-01-25

## 2025-01-25 PROBLEM — R65.20 SEVERE SEPSIS (HCC): Status: ACTIVE | Noted: 2025-01-25

## 2025-01-25 PROBLEM — J69.0 ASPIRATION PNEUMONIA OF BOTH UPPER LOBES DUE TO GASTRIC SECRETIONS (HCC): Status: ACTIVE | Noted: 2025-01-25

## 2025-01-25 PROBLEM — A41.9 SEVERE SEPSIS (HCC): Status: ACTIVE | Noted: 2025-01-25

## 2025-01-25 LAB
ANION GAP SERPL CALC-SCNC: 7 MMOL/L (ref 2–12)
BUN SERPL-MCNC: 6 MG/DL (ref 6–20)
BUN/CREAT SERPL: 11 (ref 12–20)
CALCIUM SERPL-MCNC: 7 MG/DL (ref 8.5–10.1)
CHLORIDE SERPL-SCNC: 110 MMOL/L (ref 97–108)
CK SERPL-CCNC: 273 U/L (ref 26–192)
CO2 SERPL-SCNC: 23 MMOL/L (ref 21–32)
CREAT SERPL-MCNC: 0.57 MG/DL (ref 0.55–1.02)
EKG ATRIAL RATE: 93 BPM
EKG DIAGNOSIS: NORMAL
EKG P AXIS: -20 DEGREES
EKG P-R INTERVAL: 154 MS
EKG Q-T INTERVAL: 394 MS
EKG QRS DURATION: 120 MS
EKG QTC CALCULATION (BAZETT): 489 MS
EKG R AXIS: -17 DEGREES
EKG T AXIS: 26 DEGREES
EKG VENTRICULAR RATE: 93 BPM
ERYTHROCYTE [DISTWIDTH] IN BLOOD BY AUTOMATED COUNT: 13.1 % (ref 11.5–14.5)
GLUCOSE BLD STRIP.AUTO-MCNC: 104 MG/DL (ref 65–117)
GLUCOSE BLD STRIP.AUTO-MCNC: 105 MG/DL (ref 65–117)
GLUCOSE BLD STRIP.AUTO-MCNC: 120 MG/DL (ref 65–117)
GLUCOSE SERPL-MCNC: 119 MG/DL (ref 65–100)
HCT VFR BLD AUTO: 40 % (ref 35–47)
HGB BLD-MCNC: 13 G/DL (ref 11.5–16)
MAGNESIUM SERPL-MCNC: 1.9 MG/DL (ref 1.6–2.4)
MCH RBC QN AUTO: 30.7 PG (ref 26–34)
MCHC RBC AUTO-ENTMCNC: 32.5 G/DL (ref 30–36.5)
MCV RBC AUTO: 94.6 FL (ref 80–99)
NRBC # BLD: 0 K/UL (ref 0–0.01)
NRBC BLD-RTO: 0 PER 100 WBC
PHOSPHATE SERPL-MCNC: 1.7 MG/DL (ref 2.6–4.7)
PLATELET # BLD AUTO: 146 K/UL (ref 150–400)
PMV BLD AUTO: 11.2 FL (ref 8.9–12.9)
POTASSIUM SERPL-SCNC: 3.1 MMOL/L (ref 3.5–5.1)
RBC # BLD AUTO: 4.23 M/UL (ref 3.8–5.2)
SERVICE CMNT-IMP: ABNORMAL
SERVICE CMNT-IMP: NORMAL
SERVICE CMNT-IMP: NORMAL
SODIUM SERPL-SCNC: 140 MMOL/L (ref 136–145)
TRIGL SERPL-MCNC: 280 MG/DL
VANCOMYCIN SERPL-MCNC: 8.1 UG/ML
WBC # BLD AUTO: 8.6 K/UL (ref 3.6–11)

## 2025-01-25 PROCEDURE — 83735 ASSAY OF MAGNESIUM: CPT

## 2025-01-25 PROCEDURE — 36415 COLL VENOUS BLD VENIPUNCTURE: CPT

## 2025-01-25 PROCEDURE — 99223 1ST HOSP IP/OBS HIGH 75: CPT | Performed by: INTERNAL MEDICINE

## 2025-01-25 PROCEDURE — 6360000002 HC RX W HCPCS: Performed by: INTERNAL MEDICINE

## 2025-01-25 PROCEDURE — 6370000000 HC RX 637 (ALT 250 FOR IP): Performed by: NURSE PRACTITIONER

## 2025-01-25 PROCEDURE — 2500000003 HC RX 250 WO HCPCS: Performed by: INTERNAL MEDICINE

## 2025-01-25 PROCEDURE — 2580000003 HC RX 258: Performed by: NURSE PRACTITIONER

## 2025-01-25 PROCEDURE — 84100 ASSAY OF PHOSPHORUS: CPT

## 2025-01-25 PROCEDURE — 2580000003 HC RX 258: Performed by: INTERNAL MEDICINE

## 2025-01-25 PROCEDURE — 2000000000 HC ICU R&B

## 2025-01-25 PROCEDURE — 85027 COMPLETE CBC AUTOMATED: CPT

## 2025-01-25 PROCEDURE — 80048 BASIC METABOLIC PNL TOTAL CA: CPT

## 2025-01-25 PROCEDURE — 80202 ASSAY OF VANCOMYCIN: CPT

## 2025-01-25 PROCEDURE — 6360000002 HC RX W HCPCS: Performed by: PSYCHIATRY & NEUROLOGY

## 2025-01-25 PROCEDURE — 94003 VENT MGMT INPAT SUBQ DAY: CPT

## 2025-01-25 PROCEDURE — 82550 ASSAY OF CK (CPK): CPT

## 2025-01-25 PROCEDURE — 2500000003 HC RX 250 WO HCPCS: Performed by: NURSE PRACTITIONER

## 2025-01-25 PROCEDURE — 82962 GLUCOSE BLOOD TEST: CPT

## 2025-01-25 PROCEDURE — 84478 ASSAY OF TRIGLYCERIDES: CPT

## 2025-01-25 PROCEDURE — 6370000000 HC RX 637 (ALT 250 FOR IP): Performed by: INTERNAL MEDICINE

## 2025-01-25 PROCEDURE — 6360000002 HC RX W HCPCS: Performed by: NURSE PRACTITIONER

## 2025-01-25 RX ORDER — PROPOFOL 10 MG/ML
5-40 INJECTION, EMULSION INTRAVENOUS CONTINUOUS
Status: DISCONTINUED | OUTPATIENT
Start: 2025-01-25 | End: 2025-01-26

## 2025-01-25 RX ADMIN — Medication 1 AMPULE: at 06:37

## 2025-01-25 RX ADMIN — Medication 1 AMPULE: at 08:47

## 2025-01-25 RX ADMIN — PROPOFOL 50 MCG/KG/MIN: 10 INJECTION, EMULSION INTRAVENOUS at 06:35

## 2025-01-25 RX ADMIN — WATER 2000 MG: 1 INJECTION INTRAMUSCULAR; INTRAVENOUS; SUBCUTANEOUS at 03:17

## 2025-01-25 RX ADMIN — LEVETIRACETAM 2000 MG: 100 INJECTION INTRAVENOUS at 21:03

## 2025-01-25 RX ADMIN — AMPICILLIN SODIUM 2000 MG: 2 INJECTION, POWDER, FOR SOLUTION INTRAMUSCULAR; INTRAVENOUS at 11:44

## 2025-01-25 RX ADMIN — POTASSIUM PHOSPHATE, MONOBASIC AND POTASSIUM PHOSPHATE, DIBASIC 30 MMOL: 224; 236 INJECTION, SOLUTION, CONCENTRATE INTRAVENOUS at 06:37

## 2025-01-25 RX ADMIN — SODIUM CHLORIDE, PRESERVATIVE FREE 10 ML: 5 INJECTION INTRAVENOUS at 21:08

## 2025-01-25 RX ADMIN — SODIUM CHLORIDE 100 MCG/HR: 900 INJECTION, SOLUTION INTRAVENOUS at 01:23

## 2025-01-25 RX ADMIN — LEVETIRACETAM 2000 MG: 100 INJECTION INTRAVENOUS at 11:08

## 2025-01-25 RX ADMIN — VANCOMYCIN 1250 MG: 1.75 INJECTION, SOLUTION INTRAVENOUS at 18:27

## 2025-01-25 RX ADMIN — ACYCLOVIR SODIUM 700 MG: 50 INJECTION, SOLUTION INTRAVENOUS at 13:00

## 2025-01-25 RX ADMIN — AMPICILLIN SODIUM 2000 MG: 2 INJECTION, POWDER, FOR SOLUTION INTRAMUSCULAR; INTRAVENOUS at 06:39

## 2025-01-25 RX ADMIN — PROPOFOL 25 MCG/KG/MIN: 10 INJECTION, EMULSION INTRAVENOUS at 11:07

## 2025-01-25 RX ADMIN — ACYCLOVIR SODIUM 700 MG: 50 INJECTION, SOLUTION INTRAVENOUS at 04:16

## 2025-01-25 RX ADMIN — DOXYCYCLINE 100 MG: 100 INJECTION, POWDER, LYOPHILIZED, FOR SOLUTION INTRAVENOUS at 22:08

## 2025-01-25 RX ADMIN — CHLORHEXIDINE GLUCONATE 15 ML: 1.2 RINSE ORAL at 21:07

## 2025-01-25 RX ADMIN — DOXYCYCLINE 100 MG: 100 INJECTION, POWDER, LYOPHILIZED, FOR SOLUTION INTRAVENOUS at 08:46

## 2025-01-25 RX ADMIN — ACYCLOVIR SODIUM 700 MG: 50 INJECTION, SOLUTION INTRAVENOUS at 21:02

## 2025-01-25 RX ADMIN — ATORVASTATIN CALCIUM 20 MG: 20 TABLET, FILM COATED ORAL at 08:47

## 2025-01-25 RX ADMIN — CHLORHEXIDINE GLUCONATE 15 ML: 1.2 RINSE ORAL at 08:50

## 2025-01-25 RX ADMIN — AMPICILLIN SODIUM 2000 MG: 2 INJECTION, POWDER, FOR SOLUTION INTRAMUSCULAR; INTRAVENOUS at 16:30

## 2025-01-25 RX ADMIN — WATER 2000 MG: 1 INJECTION INTRAMUSCULAR; INTRAVENOUS; SUBCUTANEOUS at 16:27

## 2025-01-25 RX ADMIN — ALLOPURINOL 100 MG: 100 TABLET ORAL at 08:47

## 2025-01-25 RX ADMIN — PROPOFOL 40 MCG/KG/MIN: 10 INJECTION, EMULSION INTRAVENOUS at 08:38

## 2025-01-25 RX ADMIN — POTASSIUM BICARBONATE 40 MEQ: 782 TABLET, EFFERVESCENT ORAL at 06:38

## 2025-01-25 RX ADMIN — Medication 1 AMPULE: at 21:07

## 2025-01-25 RX ADMIN — LANSOPRAZOLE 30 MG: 30 TABLET, ORALLY DISINTEGRATING, DELAYED RELEASE ORAL at 06:38

## 2025-01-25 RX ADMIN — AMPICILLIN SODIUM 2000 MG: 2 INJECTION, POWDER, FOR SOLUTION INTRAMUSCULAR; INTRAVENOUS at 20:09

## 2025-01-25 RX ADMIN — SODIUM CHLORIDE, PRESERVATIVE FREE 10 ML: 5 INJECTION INTRAVENOUS at 08:47

## 2025-01-25 RX ADMIN — AMPICILLIN SODIUM 2000 MG: 2 INJECTION, POWDER, FOR SOLUTION INTRAMUSCULAR; INTRAVENOUS at 03:32

## 2025-01-25 RX ADMIN — SODIUM CHLORIDE 25 MCG/HR: 900 INJECTION, SOLUTION INTRAVENOUS at 13:46

## 2025-01-25 RX ADMIN — AMPICILLIN SODIUM 2000 MG: 2 INJECTION, POWDER, FOR SOLUTION INTRAMUSCULAR; INTRAVENOUS at 23:16

## 2025-01-25 RX ADMIN — PROPOFOL 50 MCG/KG/MIN: 10 INJECTION, EMULSION INTRAVENOUS at 03:15

## 2025-01-25 RX ADMIN — ENOXAPARIN SODIUM 30 MG: 100 INJECTION SUBCUTANEOUS at 21:08

## 2025-01-25 RX ADMIN — PROPOFOL 50 MCG/KG/MIN: 10 INJECTION, EMULSION INTRAVENOUS at 00:24

## 2025-01-25 ASSESSMENT — PULMONARY FUNCTION TESTS
PIF_VALUE: 30
PIF_VALUE: 31
PIF_VALUE: 29
PIF_VALUE: 27
PIF_VALUE: 30

## 2025-01-25 NOTE — PROGRESS NOTES
0710: Bedside shift report receive from ONIEL Sánchez.     0800: Shift assessment complete. Pt intubated/sedated. RASS -4, pt withdrawaling to pain. On propofol @ 50mcg and Fent @ 100mcg. Tolerating current vent settings. Present cough ad gag. Lungs coarse EMELY. NSR to sinus arrhythmia per telemetry. BP WNL. Pulses palpable throughout. Diaz in place for I&O's. OGT in place w/ trickle feeds ongoing. S/P LP, results pending. See flowsheets/MAR for details.       1200: Reassessment complete. Pt RASS -3, weaning sedation. Prop @ 20mcg and Fent @ 75mcg. Pt responding to painful stimuli. Family at bedside.    1405: Propofol stopped. Fent remains at 25mcg. Pt RASS -3, opening eyes occasionally to stimulation.     1600: Reassessment complete. No acute changes. Remains on fent gtt @ 25mcg. Spouse and son at bedside. Opening eyes briefly to stimulation. See flowsheets for details.     1900: Bedside shift report given to ONIEL Thomson.

## 2025-01-25 NOTE — CASE COMMUNICATION
COMMUNICATION NOTE - Neurology Service    Name:  Cole Velasquez     MRN: [id number]         Communication Note:   I discussed with the ICU team  EEG did not show active seizure activity  MRI brain - No acute intracranial abnormality.   Delirium recommendations:   - Correction of any underlying infectious and/or metabolic abnormalities per primary team.   - Avoid use of any brain impairing medications as much as possible, including benzodiazepines, anticholinergic medications, opioid pain medications.  - Avoid use of antipsychotics for agitation, which have an increased risk of death (cardiovascular, stroke) especially in an elderly population. Instead, behavioral and non-pharmacologic modification should be used as first line treatment for any agitation/aggressive behaviors. Can also consider starting patient on sertraline (Zoloft) or duloxetine (Cymbalta) for long term use if patient has behaviors or mood concerns as an outpatient as well, as these medications are not as brain impairing and are safer for an elderly population than antipsychotic medications. If an antipsychotic is absolutely warranted, the best option would be quetiapine (Seroquel), which still comes with an increased risk of death in an elderly population. Other potential options according to 2019 Beer's Criteria would be clozapine or pimavanserin.  - Patient should be kept awake during the day, with lights on and curtains/blinds open. Allow for sleep at night with minimal and ideally no interruptions when possible. Lights should be kept off and curtains/blinds closed during the night.   - Melatonin 3-6 mg 2 hours before bed every night to help promote sleep. Recommed avoiding use of sedatives for sleep, which do not promote good quality sleep.  - Causes or potential contributors to hospital delirium can include but are not limited to the following: prolonged hospitalization, lack of adequate sleep, infection, recent surgery, use of brain

## 2025-01-25 NOTE — PROGRESS NOTES
1905 - Bedside and Verbal shift change report given to ONIEL Sánchez (oncoming nurse) by ONIEL Tejeda (offgoing nurse). Report included the following information Nurse Handoff Report, Adult Overview, Intake/Output, MAR, Recent Results, Cardiac Rhythm Sinus Rhythm, and Alarm Parameters.      2000 - Shift assessment completed at this time. NP at bedside rounding and stated to start weaning Versed by 1 mg every hour unless the patient begins to have seizures. See flowsheets for details.    2119 - Aiden with [employer] called to say that the patient is not a candidate at this time but to call back if there are signs of brain death or plans to talk about comfort care.     0015 - Versed drip off at this time. NP aware, leave Propofol and Fentanyl at the current rate for the remainder of shift per NP.     0030 - Reassessment completed at this time. See flowsheets for details.    0315 - Reassessment completed at this time. See flowsheets for details.    0715 - Bedside and Verbal shift change report given to ONIEL Lyons (oncoming nurse) by ONIEL Sánchez (offgoing nurse). Report included the following information Nurse Handoff Report, Adult Overview, Intake/Output, MAR, Recent Results, Cardiac Rhythm Sinus Rhythm, and Alarm Parameters.      Notes passed on to day shift RN:    - Versed off, Propofol @ 50, Fentanyl @ 100. Levophed off since 0325.    - [employer] stated the patient is not a candidate but to call back there is mention of comfort measures or signs of brain death.    - Meningitis screening negative but keep Droplet precautions until all CSF tests result per SUSANNAH Hubbard.

## 2025-01-25 NOTE — PROGRESS NOTES
Neurology/Neurohospitalist Follow-up progress note    Patient identification    Name: Cole Velasquez   : 1950    Gender: female   MRN: [id number]  Room/bed  : [address] [place] [address]    Admission date:  2025      Attending provider: Danielle Kruger MD  Admitting provider: Danielle Kruger MD     ---------------------------------------------------------------------------    Chief complaint:   Chief Complaint   Patient presents with    Fall     Pt via EMS for cc of GLF. Per EMS, pt found by her  on the bathroom floor unresponsive. Per pt , LKW was 0700. Per EMS, pt withdraws from pain. GCS 10 PTA.        Principal hospital problem: AMS (altered mental status)      Reason for Follow Up: Acute encephalopathy, seizure    Pertinent neurological interval history, review of neurodiagnostic results and neurological care      Patient's neurological presentation has been described in detail the prior consult note.  Briefly, she presented with acute mental status changes, had a witnessed seizure like event in the ER and was given 1 g of Keppra.  She continued to remain altered, and was noted to have right gaze deviation.  Subsequently had respiratory distress, required intubation.  Due to concern for subclinical seizures, was given a loading dose of Keppra 4 g, and currently on maintenance dose of Keppra 2000 mg twice a day.  A initial stat EEG in the ER was negative for abnormal discharges or seizures.  Encephalopathy changes noted.  Long-term EEG monitoring was done overnight, which also remained negative for abnormal epileptiform discharges or seizures.  Nonspecific encephalopathy changes noted.  Patient has been on propofol, received as needed Versed doses and Keppra loading dose prior to this LTM EEG.  Clinically, the right gaze deviation is also resolved after his medications.    At the time of evaluation today, patient is in ICU, intubated, sedated with propofol, and also on Versed gtt.  Mid gaze, no

## 2025-01-25 NOTE — PLAN OF CARE
Problem: Safety - Medical Restraint  Goal: Remains free of injury from restraints (Restraint for Interference with Medical Device)  Description: INTERVENTIONS:  1. Determine that other, less restrictive measures have been tried or would not be effective before applying the restraint  2. Evaluate the patient's condition at the time of restraint application  3. Inform patient/family regarding the reason for restraint  4. Q2H: Monitor safety, psychosocial status, comfort, nutrition and hydration  Outcome: Progressing  Flowsheets (Taken 1/25/2025 1027 by Jenn Nails RN)  Remains free of injury from restraints (restraint for interference with medical device):   Determine that other, less restrictive measures have been tried or would not be effective before applying the restraint   Evaluate the patient's condition at the time of restraint application   Inform patient/family regarding the reason for restraint   Every 2 hours: Monitor safety, psychosocial status, comfort, nutrition and hydration

## 2025-01-25 NOTE — PROGRESS NOTES
Critical Care Progress Note  Danielle Kruger MD          Date of Service:  2025  NAME:  Cole Velasquez  :  1950  MRN:  [id number]      Subjective/Hospital course:      Cole Velasquez is a 74 female with a PMH of HTN, HLD, preDM and chronic pain who presented to the ED via EMS after a GLF at home.  Her  found her unresponsive on the bathroom floor with unknown down time. GCS 10 upon hospital arrival.  CT head/C spine negative. After ED arrival, she had a witnessed seizure with R gaze deviation c/b hypoxia that was aborted with versed. She received 1g keppra and was started on 500mg keppra BID.  She has no known history of seizures. She was febrile to 102.8. Subsequent spot 16 channel EEG did not reveal seizure activity. Patient transferred to the ICU.     Upon transfer to the ICU, cEEG applied.  Patient noted to have (by ICU interpretation) diffuse spike/wave discharges across multiple EEG leads.  She was unresponsive to noxious stimuli with R gaze deviation.  She had a clenched jaw and sonorous respirations.  The decision was made to intubate.  She was given IV versed with improvement in spike/wave discharges and sedated with propofol for mechanical ventilation.   - seizure overnight, now on propofol and versed        Assessment/Plan:     NEUROLOGICAL:     Acute encephalopathy  Possible status epilepticus  Possible meningitis:   CFS initial PCR negative   Hemorrhagic tap   Acyclovir stopped   - Continue keppra  - Wean propofol / fent  - Hold SAT/SBT in am given above  - Full meningitis workup pending  - Empiric ampicillin, vanc doxy     PULMONOLOGY:      Respiratory insufficiency: Required intubation/MV due to respiratory insufficiency  Patchy airspace disease   Viral panel negative  ?aspiration due to seizure / decrease LOC  - lung protective ventilation   - Sputum culture NGTD  - doxy for atypical coverage        CARDIOVASCULAR:      Hypotension: Likely related to

## 2025-01-25 NOTE — CONSULTS
Infectious Disease Consult    Date of Consultation:  January 25, 2025  Reason for Consultation:?  Meningitis  Referring Physician: Dr Kruger  Date of Admission:1/23/2025      Impression    Severe sepsis    New onset seizures  Acute encephalopathy  H/o fall PTA    Acute meningitis  Fever Tmax 103.1 on 1/23  S/p LP 1/24  CSF turbid, CSF , N-95%, RBC 49,000  Traumatic tap  Meningitis panel negative  CSF culture- pending, HSV PCR pending    Acute hypoxic respiratory failure  Intubated  H/o vomiting at home  CXR+ for stable patchy diffuse interstitial lung opacities  ?  Aspiration  Respiratory culture-pending  Strep pneumo antigen, Legionella antigen pending  HSV DNA- pending.    H/o prediabetes  Stable blood sugars    Obesity  BMI 39.5.    Plan    Continue empiric meningitis/encephalitis therapy   -vancomycin, ceftriaxone, ampicillin ,acyclovir  Await CSF cultures, HSV DNA  Adequate fluids, probiotic  Discussed with family at length    ID service to follow.      Abx  Vancomycin-1/23  Ceftriaxone-1/23  Doxycycline 1/24  Unasyn 1/23  Ampicillin 1/24  Acyclovir 1/23    Temp  1/.1  1/24-99.5  1/25-98.6      Extensive review of chart notes, labs, imaging, cultures done  Additionally review of done: Recent reports-Labs, cultures, imaging  D/w -Dr. Slick Galvan CLEMENTE Velasquez is seen for?  Meningitis  Cole Velasquez is a 74 female with a PMH of HTN, HLD, preDM and chronic pain who presented to the ED via EMS after a GLF at home.  Her  found her unresponsive on the bathroom floor with unknown down time. GCS 10 upon hospital arrival.  CT head/C spine negative. After ED arrival, she had a witnessed seizure with R gaze deviation c/b hypoxia that was aborted with versed. She received 1g keppra and was started on 500mg keppra BID.  She has no known history of seizures. She was febrile to 102.8. Subsequent spot 16 channel EEG did not reveal seizure activity. Patient transferred to the ICU and has been

## 2025-01-26 LAB
ANION GAP SERPL CALC-SCNC: 4 MMOL/L (ref 2–12)
ANION GAP SERPL CALC-SCNC: 4 MMOL/L (ref 2–12)
ANION GAP SERPL CALC-SCNC: 9 MMOL/L (ref 2–12)
BACTERIA SPEC CULT: NORMAL
BUN SERPL-MCNC: 6 MG/DL (ref 6–20)
BUN SERPL-MCNC: 7 MG/DL (ref 6–20)
BUN SERPL-MCNC: 7 MG/DL (ref 6–20)
BUN/CREAT SERPL: 10 (ref 12–20)
BUN/CREAT SERPL: 12 (ref 12–20)
BUN/CREAT SERPL: 16 (ref 12–20)
CALCIUM SERPL-MCNC: 6.3 MG/DL (ref 8.5–10.1)
CALCIUM SERPL-MCNC: 7.4 MG/DL (ref 8.5–10.1)
CALCIUM SERPL-MCNC: 8.2 MG/DL (ref 8.5–10.1)
CHLORIDE SERPL-SCNC: 107 MMOL/L (ref 97–108)
CHLORIDE SERPL-SCNC: 110 MMOL/L (ref 97–108)
CHLORIDE SERPL-SCNC: 111 MMOL/L (ref 97–108)
CO2 SERPL-SCNC: 27 MMOL/L (ref 21–32)
CO2 SERPL-SCNC: 29 MMOL/L (ref 21–32)
CO2 SERPL-SCNC: 29 MMOL/L (ref 21–32)
CREAT SERPL-MCNC: 0.44 MG/DL (ref 0.55–1.02)
CREAT SERPL-MCNC: 0.5 MG/DL (ref 0.55–1.02)
CREAT SERPL-MCNC: 0.68 MG/DL (ref 0.55–1.02)
ERYTHROCYTE [DISTWIDTH] IN BLOOD BY AUTOMATED COUNT: 13.3 % (ref 11.5–14.5)
ERYTHROCYTE [SEDIMENTATION RATE] IN BLOOD: 62 MM/HR (ref 0–30)
GLUCOSE BLD STRIP.AUTO-MCNC: 101 MG/DL (ref 65–117)
GLUCOSE BLD STRIP.AUTO-MCNC: 109 MG/DL (ref 65–117)
GLUCOSE BLD STRIP.AUTO-MCNC: 111 MG/DL (ref 65–117)
GLUCOSE BLD STRIP.AUTO-MCNC: 112 MG/DL (ref 65–117)
GLUCOSE BLD STRIP.AUTO-MCNC: 119 MG/DL (ref 65–117)
GLUCOSE SERPL-MCNC: 109 MG/DL (ref 65–100)
GLUCOSE SERPL-MCNC: 118 MG/DL (ref 65–100)
GLUCOSE SERPL-MCNC: 89 MG/DL (ref 65–100)
GRAM STN SPEC: NORMAL
HCT VFR BLD AUTO: 41.4 % (ref 35–47)
HGB BLD-MCNC: 13.4 G/DL (ref 11.5–16)
L PNEUMO1 AG UR QL IA: NEGATIVE
L PNEUMO1 AG UR QL IA: NEGATIVE
MAGNESIUM SERPL-MCNC: 1.2 MG/DL (ref 1.6–2.4)
MAGNESIUM SERPL-MCNC: 1.7 MG/DL (ref 1.6–2.4)
MAGNESIUM SERPL-MCNC: NORMAL MG/DL (ref 1.6–2.4)
MCH RBC QN AUTO: 31 PG (ref 26–34)
MCHC RBC AUTO-ENTMCNC: 32.4 G/DL (ref 30–36.5)
MCV RBC AUTO: 95.8 FL (ref 80–99)
NRBC # BLD: 0 K/UL (ref 0–0.01)
NRBC BLD-RTO: 0 PER 100 WBC
PHOSPHATE SERPL-MCNC: 2.3 MG/DL (ref 2.6–4.7)
PHOSPHATE SERPL-MCNC: 2.4 MG/DL (ref 2.6–4.7)
PHOSPHATE SERPL-MCNC: 3 MG/DL (ref 2.6–4.7)
PLATELET # BLD AUTO: 165 K/UL (ref 150–400)
PMV BLD AUTO: 10.9 FL (ref 8.9–12.9)
POTASSIUM SERPL-SCNC: 2.7 MMOL/L (ref 3.5–5.1)
POTASSIUM SERPL-SCNC: 3.3 MMOL/L (ref 3.5–5.1)
POTASSIUM SERPL-SCNC: ABNORMAL MMOL/L (ref 3.5–5.1)
RBC # BLD AUTO: 4.32 M/UL (ref 3.8–5.2)
SERVICE CMNT-IMP: ABNORMAL
SERVICE CMNT-IMP: NORMAL
SODIUM SERPL-SCNC: 140 MMOL/L (ref 136–145)
SODIUM SERPL-SCNC: 143 MMOL/L (ref 136–145)
SODIUM SERPL-SCNC: 147 MMOL/L (ref 136–145)
SPECIMEN SOURCE: NORMAL
SPECIMEN SOURCE: NORMAL
WBC # BLD AUTO: 8.7 K/UL (ref 3.6–11)

## 2025-01-26 PROCEDURE — 83516 IMMUNOASSAY NONANTIBODY: CPT

## 2025-01-26 PROCEDURE — 6360000002 HC RX W HCPCS: Performed by: NURSE PRACTITIONER

## 2025-01-26 PROCEDURE — 85027 COMPLETE CBC AUTOMATED: CPT

## 2025-01-26 PROCEDURE — 6360000002 HC RX W HCPCS: Performed by: PSYCHIATRY & NEUROLOGY

## 2025-01-26 PROCEDURE — 80048 BASIC METABOLIC PNL TOTAL CA: CPT

## 2025-01-26 PROCEDURE — 86225 DNA ANTIBODY NATIVE: CPT

## 2025-01-26 PROCEDURE — 83735 ASSAY OF MAGNESIUM: CPT

## 2025-01-26 PROCEDURE — 2500000003 HC RX 250 WO HCPCS: Performed by: INTERNAL MEDICINE

## 2025-01-26 PROCEDURE — 86235 NUCLEAR ANTIGEN ANTIBODY: CPT

## 2025-01-26 PROCEDURE — 6360000002 HC RX W HCPCS: Performed by: INTERNAL MEDICINE

## 2025-01-26 PROCEDURE — 6370000000 HC RX 637 (ALT 250 FOR IP): Performed by: NURSE PRACTITIONER

## 2025-01-26 PROCEDURE — 2000000000 HC ICU R&B

## 2025-01-26 PROCEDURE — 6370000000 HC RX 637 (ALT 250 FOR IP): Performed by: EMERGENCY MEDICINE

## 2025-01-26 PROCEDURE — 94003 VENT MGMT INPAT SUBQ DAY: CPT

## 2025-01-26 PROCEDURE — 86038 ANTINUCLEAR ANTIBODIES: CPT

## 2025-01-26 PROCEDURE — 84100 ASSAY OF PHOSPHORUS: CPT

## 2025-01-26 PROCEDURE — 6370000000 HC RX 637 (ALT 250 FOR IP): Performed by: INTERNAL MEDICINE

## 2025-01-26 PROCEDURE — 82962 GLUCOSE BLOOD TEST: CPT

## 2025-01-26 PROCEDURE — 85652 RBC SED RATE AUTOMATED: CPT

## 2025-01-26 PROCEDURE — 36415 COLL VENOUS BLD VENIPUNCTURE: CPT

## 2025-01-26 PROCEDURE — 2580000003 HC RX 258: Performed by: INTERNAL MEDICINE

## 2025-01-26 RX ORDER — FUROSEMIDE 10 MG/ML
40 INJECTION INTRAMUSCULAR; INTRAVENOUS 3 TIMES DAILY
Status: DISCONTINUED | OUTPATIENT
Start: 2025-01-26 | End: 2025-01-28

## 2025-01-26 RX ORDER — MAGNESIUM SULFATE IN WATER 40 MG/ML
2000 INJECTION, SOLUTION INTRAVENOUS
Status: COMPLETED | OUTPATIENT
Start: 2025-01-27 | End: 2025-01-27

## 2025-01-26 RX ORDER — VANCOMYCIN 1.5 G/300ML
1500 INJECTION, SOLUTION INTRAVENOUS EVERY 24 HOURS
Status: DISCONTINUED | OUTPATIENT
Start: 2025-01-26 | End: 2025-01-27

## 2025-01-26 RX ORDER — CALCIUM GLUCONATE 20 MG/ML
2000 INJECTION, SOLUTION INTRAVENOUS ONCE
Status: COMPLETED | OUTPATIENT
Start: 2025-01-26 | End: 2025-01-26

## 2025-01-26 RX ADMIN — CHLORHEXIDINE GLUCONATE 15 ML: 1.2 RINSE ORAL at 08:34

## 2025-01-26 RX ADMIN — ACETAMINOPHEN 650 MG: 325 TABLET ORAL at 14:53

## 2025-01-26 RX ADMIN — LANSOPRAZOLE 30 MG: 30 TABLET, ORALLY DISINTEGRATING, DELAYED RELEASE ORAL at 06:05

## 2025-01-26 RX ADMIN — WATER 2000 MG: 1 INJECTION INTRAMUSCULAR; INTRAVENOUS; SUBCUTANEOUS at 04:31

## 2025-01-26 RX ADMIN — CALCIUM GLUCONATE 2000 MG: 20 INJECTION, SOLUTION INTRAVENOUS at 23:21

## 2025-01-26 RX ADMIN — AMPICILLIN SODIUM 2000 MG: 2 INJECTION, POWDER, FOR SOLUTION INTRAMUSCULAR; INTRAVENOUS at 23:15

## 2025-01-26 RX ADMIN — VANCOMYCIN 1500 MG: 1.5 INJECTION, SOLUTION INTRAVENOUS at 17:39

## 2025-01-26 RX ADMIN — ENOXAPARIN SODIUM 30 MG: 100 INJECTION SUBCUTANEOUS at 20:23

## 2025-01-26 RX ADMIN — Medication 1 AMPULE: at 08:34

## 2025-01-26 RX ADMIN — SODIUM CHLORIDE, PRESERVATIVE FREE 10 ML: 5 INJECTION INTRAVENOUS at 08:34

## 2025-01-26 RX ADMIN — MAGNESIUM SULFATE HEPTAHYDRATE 2000 MG: 40 INJECTION, SOLUTION INTRAVENOUS at 23:19

## 2025-01-26 RX ADMIN — DOXYCYCLINE 100 MG: 100 INJECTION, POWDER, LYOPHILIZED, FOR SOLUTION INTRAVENOUS at 08:33

## 2025-01-26 RX ADMIN — CHLORHEXIDINE GLUCONATE 15 ML: 1.2 RINSE ORAL at 20:23

## 2025-01-26 RX ADMIN — AMPICILLIN SODIUM 2000 MG: 2 INJECTION, POWDER, FOR SOLUTION INTRAMUSCULAR; INTRAVENOUS at 10:52

## 2025-01-26 RX ADMIN — Medication 1 AMPULE: at 20:21

## 2025-01-26 RX ADMIN — POTASSIUM BICARBONATE 20 MEQ: 782 TABLET, EFFERVESCENT ORAL at 23:13

## 2025-01-26 RX ADMIN — WATER 2000 MG: 1 INJECTION INTRAMUSCULAR; INTRAVENOUS; SUBCUTANEOUS at 16:21

## 2025-01-26 RX ADMIN — AMPICILLIN SODIUM 2000 MG: 2 INJECTION, POWDER, FOR SOLUTION INTRAMUSCULAR; INTRAVENOUS at 06:39

## 2025-01-26 RX ADMIN — ACYCLOVIR SODIUM 700 MG: 50 INJECTION, SOLUTION INTRAVENOUS at 12:45

## 2025-01-26 RX ADMIN — AMPICILLIN SODIUM 2000 MG: 2 INJECTION, POWDER, FOR SOLUTION INTRAMUSCULAR; INTRAVENOUS at 14:44

## 2025-01-26 RX ADMIN — LEVETIRACETAM 2000 MG: 100 INJECTION INTRAVENOUS at 10:05

## 2025-01-26 RX ADMIN — AMPICILLIN SODIUM 2000 MG: 2 INJECTION, POWDER, FOR SOLUTION INTRAMUSCULAR; INTRAVENOUS at 18:13

## 2025-01-26 RX ADMIN — ACYCLOVIR SODIUM 700 MG: 50 INJECTION, SOLUTION INTRAVENOUS at 20:38

## 2025-01-26 RX ADMIN — SODIUM CHLORIDE, PRESERVATIVE FREE 10 ML: 5 INJECTION INTRAVENOUS at 20:24

## 2025-01-26 RX ADMIN — POTASSIUM BICARBONATE 20 MEQ: 782 TABLET, EFFERVESCENT ORAL at 23:12

## 2025-01-26 RX ADMIN — AMPICILLIN SODIUM 2000 MG: 2 INJECTION, POWDER, FOR SOLUTION INTRAMUSCULAR; INTRAVENOUS at 03:09

## 2025-01-26 RX ADMIN — LEVETIRACETAM 2000 MG: 100 INJECTION INTRAVENOUS at 20:23

## 2025-01-26 RX ADMIN — Medication 1 AMPULE: at 08:35

## 2025-01-26 RX ADMIN — ENOXAPARIN SODIUM 30 MG: 100 INJECTION SUBCUTANEOUS at 08:34

## 2025-01-26 RX ADMIN — DOXYCYCLINE 100 MG: 100 INJECTION, POWDER, LYOPHILIZED, FOR SOLUTION INTRAVENOUS at 23:16

## 2025-01-26 RX ADMIN — FUROSEMIDE 40 MG: 10 INJECTION, SOLUTION INTRAMUSCULAR; INTRAVENOUS at 14:54

## 2025-01-26 RX ADMIN — ALLOPURINOL 100 MG: 100 TABLET ORAL at 08:35

## 2025-01-26 RX ADMIN — FUROSEMIDE 40 MG: 10 INJECTION, SOLUTION INTRAMUSCULAR; INTRAVENOUS at 10:05

## 2025-01-26 RX ADMIN — ACYCLOVIR SODIUM 700 MG: 50 INJECTION, SOLUTION INTRAVENOUS at 04:29

## 2025-01-26 RX ADMIN — DIBASIC SODIUM PHOSPHATE, MONOBASIC POTASSIUM PHOSPHATE AND MONOBASIC SODIUM PHOSPHATE 2 TABLET: 852; 155; 130 TABLET ORAL at 23:13

## 2025-01-26 RX ADMIN — FUROSEMIDE 40 MG: 10 INJECTION, SOLUTION INTRAMUSCULAR; INTRAVENOUS at 20:23

## 2025-01-26 RX ADMIN — POTASSIUM BICARBONATE 40 MEQ: 782 TABLET, EFFERVESCENT ORAL at 06:39

## 2025-01-26 RX ADMIN — ATORVASTATIN CALCIUM 20 MG: 20 TABLET, FILM COATED ORAL at 08:34

## 2025-01-26 ASSESSMENT — PULMONARY FUNCTION TESTS
PIF_VALUE: 25
PIF_VALUE: 21
PIF_VALUE: 28
PIF_VALUE: 26
PIF_VALUE: 26
PIF_VALUE: 21

## 2025-01-26 NOTE — PROGRESS NOTES
Pharmacy Antimicrobial Kinetic Dosing    Indication for Antimicrobials: Sepsis, probably meningitis      Current Regimen of Each Antimicrobial:  Vancomycin pharmacy to dose; Start Date ; Day # 4  Ceftriaxone 2 g Q12H; Start Date ; Day #4  Acyclovir 700 g Q8H; Start Date ; Day #4  Ampicillin 2gm IV q4h - started ; Day #4    Previous Antimicrobial Therapy:  none     Goal Level: Vancomycin -600    Date Dose & Interval Measured (mcg/mL) Predicted AUC 24-48 Predicted AUC 24,ss    16:00 1250mg IV q24h 8.1 380 399                   Significant Cultures:   : Blood x2; NGTD2  : Blood X2: NGTD2   CSF PCR negative   Resp panel negative   Resp cx: NGTD2   CSF cx: NGTD    Labs:  Recent Labs     Units 25  0455 25  0342 25  0418 25  0051   CREATININE MG/DL 0.50* 0.68 0.57 0.79   BUN MG/DL 6 7 6 7   WBC K/uL  --  8.7 8.6 15.3*     Temp (24hrs), Av.5 °F (37.5 °C), Min:98.6 °F (37 °C), Max:100.1 °F (37.8 °C)      Conditions for Dosing Consideration: None    Creatinine Clearance (mL/min): Estimated Creatinine Clearance: 109 mL/min (A) (based on SCr of 0.5 mg/dL (L)).       Impression/Plan:   Change vancomycin to 1500mg q24h  Continue empiric Vancomycin, ampicillin, ceftriaxone and acyclovir for possible meningitis  Doxy for atypical coverage for PNA  Predicted SYY13-24 = 429, Predicted AUC24,ss = 466  Antimicrobial stop date      Pharmacy will follow daily and adjust medications as appropriate for renal function and/or serum levels.    Thank you,  Jhon Clifton [facility]

## 2025-01-26 NOTE — PROGRESS NOTES
Critical Care Progress Notes  Terry FINN MD          Date of Service:  2025  NAME:  Cole Velasquez  :  1950  MRN:  [id number]      Subjective/Hospital course:      Cole Velasquez is a 74 female with a PMH of HTN, HLD, preDM and chronic pain who presented to the ED via EMS after a GLF at home.  Her  found her unresponsive on the bathroom floor with unknown down time. GCS 10 upon hospital arrival.  CT head/C spine negative. After ED arrival, she had a witnessed seizure with R gaze deviation c/b hypoxia that was aborted with versed. She received 1g keppra and was started on 500mg keppra BID.  She has no known history of seizures. She was febrile to 102.8. Subsequent spot 16 channel EEG did not reveal seizure activity. Patient transferred to the ICU.     Upon transfer to the ICU, cEEG applied.  Patient noted to have (by ICU interpretation) diffuse spike/wave discharges across multiple EEG leads.  She was unresponsive to noxious stimuli with R gaze deviation.  She had a clenched jaw and sonorous respirations.  The decision was made to intubate.  She was given IV versed with improvement in spike/wave discharges and sedated with propofol for mechanical ventilation.   - seizure overnight, now on propofol and versed     : no more seizures    Assessment/Plan:     NEUROLOGICAL:     Acute encephalopathy  Possible status epilepticus  Possible meningitis:   CFS initial PCR negative   Hemorrhagic tap   Acyclovir stopped   - Continue keppra  - Wean propofol / fent  - Hold SAT/SBT in am given above  - Full meningitis workup pending  - Empiric ampicillin, vanc doxy   hold fentanyl  PULMONOLOGY:      Respiratory insufficiency: Required intubation/MV due to respiratory insufficiency  Patchy airspace disease   Viral panel negative  ?aspiration due to seizure / decrease LOC  - lung protective ventilation   - Sputum culture NGTD  - doxy for atypical coverage   SBT TODAY     CARDIOVASCULAR:

## 2025-01-26 NOTE — PROGRESS NOTES
0730: Bedside and Verbal shift change report given to Alo EVANS RN (oncoming nurse) by Berto JOHNSON RN (offgoing nurse). Report included the following information Nurse Handoff Report, ED Encounter Summary, ED SBAR, Adult Overview, Intake/Output, MAR, Recent Results, and Cardiac Rhythm afib .   0800: Shift assessment complete, patient is intubated and sedated on fentanyl at this time. Patient responds to painful stimulus moving upper extremities and grimacing. Cough and gag are present, patient is afebrile but temp is increasing. Sheets removed and room temperature decreased. Skin assessed, patient is incontinent of urine and stool. Diaz in place, BM to note. Patient receiving tube feeds through OG site, tolerating at this time. Patient restrained for safety, assessed and documented. See flowsheets for more details.    MD Olivier at bedside, Fentanyl and TF held for SAT.   1200: Reassessment complete, no acute changes to note.   1453: Patient febrile, tylenol given, MD aware. New orders noted.   1600: Reassessment complete, patient temperature increasing, MD aware. Patient alerting apnea on ventilator, switched back to rate at this time.   1900: Shift report given to ONIEL Barrientos.

## 2025-01-27 PROBLEM — G03.0 ASEPTIC MENINGITIS: Status: ACTIVE | Noted: 2025-01-27

## 2025-01-27 LAB
ANION GAP SERPL CALC-SCNC: 5 MMOL/L (ref 2–12)
BUN SERPL-MCNC: 9 MG/DL (ref 6–20)
BUN/CREAT SERPL: 14 (ref 12–20)
CALCIUM SERPL-MCNC: 8.5 MG/DL (ref 8.5–10.1)
CHLORIDE SERPL-SCNC: 99 MMOL/L (ref 97–108)
CO2 SERPL-SCNC: 35 MMOL/L (ref 21–32)
CREAT SERPL-MCNC: 0.63 MG/DL (ref 0.55–1.02)
ERYTHROCYTE [DISTWIDTH] IN BLOOD BY AUTOMATED COUNT: 13.3 % (ref 11.5–14.5)
FLUID CULTURE: NORMAL
GLUCOSE BLD STRIP.AUTO-MCNC: 114 MG/DL (ref 65–117)
GLUCOSE BLD STRIP.AUTO-MCNC: 87 MG/DL (ref 65–117)
GLUCOSE SERPL-MCNC: 109 MG/DL (ref 65–100)
HCT VFR BLD AUTO: 38.2 % (ref 35–47)
HGB BLD-MCNC: 12.7 G/DL (ref 11.5–16)
HSV 1 DNA: NEGATIVE
HSV 2 DNA: NEGATIVE
Lab: NORMAL
MAGNESIUM SERPL-MCNC: 3.2 MG/DL (ref 1.6–2.4)
MCH RBC QN AUTO: 30.6 PG (ref 26–34)
MCHC RBC AUTO-ENTMCNC: 33.2 G/DL (ref 30–36.5)
MCV RBC AUTO: 92 FL (ref 80–99)
NRBC # BLD: 0 K/UL (ref 0–0.01)
NRBC BLD-RTO: 0 PER 100 WBC
ORGANISM ID: NORMAL
PHOSPHATE SERPL-MCNC: 3.2 MG/DL (ref 2.6–4.7)
PLATELET # BLD AUTO: 177 K/UL (ref 150–400)
PMV BLD AUTO: 11.1 FL (ref 8.9–12.9)
POTASSIUM SERPL-SCNC: 3.6 MMOL/L (ref 3.5–5.1)
RBC # BLD AUTO: 4.15 M/UL (ref 3.8–5.2)
S PNEUM AG SPEC QL LA: NEGATIVE
SERVICE CMNT-IMP: NORMAL
SERVICE CMNT-IMP: NORMAL
SODIUM SERPL-SCNC: 139 MMOL/L (ref 136–145)
SPECIMEN SOURCE: NORMAL
SPECIMEN: NORMAL
WBC # BLD AUTO: 8.7 K/UL (ref 3.6–11)

## 2025-01-27 PROCEDURE — 2500000003 HC RX 250 WO HCPCS: Performed by: INTERNAL MEDICINE

## 2025-01-27 PROCEDURE — 6360000002 HC RX W HCPCS: Performed by: INTERNAL MEDICINE

## 2025-01-27 PROCEDURE — 99233 SBSQ HOSP IP/OBS HIGH 50: CPT | Performed by: INTERNAL MEDICINE

## 2025-01-27 PROCEDURE — 36415 COLL VENOUS BLD VENIPUNCTURE: CPT

## 2025-01-27 PROCEDURE — 85027 COMPLETE CBC AUTOMATED: CPT

## 2025-01-27 PROCEDURE — 80048 BASIC METABOLIC PNL TOTAL CA: CPT

## 2025-01-27 PROCEDURE — 83735 ASSAY OF MAGNESIUM: CPT

## 2025-01-27 PROCEDURE — 82962 GLUCOSE BLOOD TEST: CPT

## 2025-01-27 PROCEDURE — 84100 ASSAY OF PHOSPHORUS: CPT

## 2025-01-27 PROCEDURE — 6370000000 HC RX 637 (ALT 250 FOR IP): Performed by: NURSE PRACTITIONER

## 2025-01-27 PROCEDURE — 6370000000 HC RX 637 (ALT 250 FOR IP): Performed by: INTERNAL MEDICINE

## 2025-01-27 PROCEDURE — 94003 VENT MGMT INPAT SUBQ DAY: CPT

## 2025-01-27 PROCEDURE — 2580000003 HC RX 258: Performed by: INTERNAL MEDICINE

## 2025-01-27 PROCEDURE — 2000000000 HC ICU R&B

## 2025-01-27 PROCEDURE — 5A0935A ASSISTANCE WITH RESPIRATORY VENTILATION, LESS THAN 24 CONSECUTIVE HOURS, HIGH NASAL FLOW/VELOCITY: ICD-10-PCS | Performed by: INTERNAL MEDICINE

## 2025-01-27 PROCEDURE — 6360000002 HC RX W HCPCS: Performed by: NURSE PRACTITIONER

## 2025-01-27 RX ORDER — LEVETIRACETAM 500 MG/5ML
1500 INJECTION, SOLUTION, CONCENTRATE INTRAVENOUS EVERY 12 HOURS
Status: DISCONTINUED | OUTPATIENT
Start: 2025-01-27 | End: 2025-01-29

## 2025-01-27 RX ORDER — POTASSIUM CHLORIDE 7.45 MG/ML
10 INJECTION INTRAVENOUS
Status: COMPLETED | OUTPATIENT
Start: 2025-01-27 | End: 2025-01-27

## 2025-01-27 RX ADMIN — POTASSIUM CHLORIDE 10 MEQ: 7.45 INJECTION INTRAVENOUS at 11:13

## 2025-01-27 RX ADMIN — SODIUM CHLORIDE, PRESERVATIVE FREE 10 ML: 5 INJECTION INTRAVENOUS at 09:26

## 2025-01-27 RX ADMIN — AMPICILLIN SODIUM 2000 MG: 2 INJECTION, POWDER, FOR SOLUTION INTRAMUSCULAR; INTRAVENOUS at 01:06

## 2025-01-27 RX ADMIN — CHLORHEXIDINE GLUCONATE 15 ML: 1.2 RINSE ORAL at 09:26

## 2025-01-27 RX ADMIN — CHLORHEXIDINE GLUCONATE 15 ML: 1.2 RINSE ORAL at 20:07

## 2025-01-27 RX ADMIN — SODIUM CHLORIDE 40 MG: 9 INJECTION INTRAMUSCULAR; INTRAVENOUS; SUBCUTANEOUS at 09:26

## 2025-01-27 RX ADMIN — Medication 1 AMPULE: at 20:07

## 2025-01-27 RX ADMIN — LEVETIRACETAM 1500 MG: 100 INJECTION INTRAVENOUS at 22:13

## 2025-01-27 RX ADMIN — MAGNESIUM SULFATE HEPTAHYDRATE 2000 MG: 40 INJECTION, SOLUTION INTRAVENOUS at 01:04

## 2025-01-27 RX ADMIN — POTASSIUM CHLORIDE 10 MEQ: 7.45 INJECTION INTRAVENOUS at 14:56

## 2025-01-27 RX ADMIN — ACYCLOVIR SODIUM 700 MG: 50 INJECTION, SOLUTION INTRAVENOUS at 03:39

## 2025-01-27 RX ADMIN — LEVETIRACETAM 1500 MG: 100 INJECTION INTRAVENOUS at 11:01

## 2025-01-27 RX ADMIN — ACETAMINOPHEN 650 MG: 650 SUPPOSITORY RECTAL at 19:53

## 2025-01-27 RX ADMIN — POTASSIUM CHLORIDE 10 MEQ: 7.45 INJECTION INTRAVENOUS at 12:35

## 2025-01-27 RX ADMIN — WATER 2000 MG: 1 INJECTION INTRAMUSCULAR; INTRAVENOUS; SUBCUTANEOUS at 03:38

## 2025-01-27 RX ADMIN — POTASSIUM CHLORIDE 10 MEQ: 7.45 INJECTION INTRAVENOUS at 13:55

## 2025-01-27 RX ADMIN — LANSOPRAZOLE 30 MG: 30 TABLET, ORALLY DISINTEGRATING, DELAYED RELEASE ORAL at 06:36

## 2025-01-27 RX ADMIN — Medication 1 AMPULE: at 06:38

## 2025-01-27 RX ADMIN — ENOXAPARIN SODIUM 30 MG: 100 INJECTION SUBCUTANEOUS at 09:26

## 2025-01-27 RX ADMIN — SODIUM CHLORIDE: 9 INJECTION, SOLUTION INTRAVENOUS at 12:40

## 2025-01-27 RX ADMIN — AMPICILLIN SODIUM 2000 MG: 2 INJECTION, POWDER, FOR SOLUTION INTRAMUSCULAR; INTRAVENOUS at 06:37

## 2025-01-27 RX ADMIN — ENOXAPARIN SODIUM 30 MG: 100 INJECTION SUBCUTANEOUS at 20:33

## 2025-01-27 RX ADMIN — SODIUM CHLORIDE, PRESERVATIVE FREE 20 ML: 5 INJECTION INTRAVENOUS at 20:14

## 2025-01-27 RX ADMIN — ACYCLOVIR SODIUM 700 MG: 50 INJECTION, SOLUTION INTRAVENOUS at 20:12

## 2025-01-27 RX ADMIN — ACYCLOVIR SODIUM 700 MG: 50 INJECTION, SOLUTION INTRAVENOUS at 12:41

## 2025-01-27 RX ADMIN — POTASSIUM BICARBONATE 20 MEQ: 782 TABLET, EFFERVESCENT ORAL at 00:57

## 2025-01-27 ASSESSMENT — PAIN DESCRIPTION - DESCRIPTORS: DESCRIPTORS: ACHING

## 2025-01-27 ASSESSMENT — PAIN SCALES - GENERAL
PAINLEVEL_OUTOF10: 0
PAINLEVEL_OUTOF10: 0
PAINLEVEL_OUTOF10: 3
PAINLEVEL_OUTOF10: 0

## 2025-01-27 ASSESSMENT — PAIN DESCRIPTION - LOCATION: LOCATION: HEAD

## 2025-01-27 ASSESSMENT — PAIN DESCRIPTION - ORIENTATION: ORIENTATION: RIGHT

## 2025-01-27 ASSESSMENT — PULMONARY FUNCTION TESTS
PIF_VALUE: 27
PIF_VALUE: 27

## 2025-01-27 ASSESSMENT — PAIN - FUNCTIONAL ASSESSMENT: PAIN_FUNCTIONAL_ASSESSMENT: ACTIVITIES ARE NOT PREVENTED

## 2025-01-27 NOTE — INTERDISCIPLINARY ROUNDS
Critical care interdisciplinary rounds today.  Following members present: Case Management, ,  Nursing, Nutrition, Pharmacy, and Physician. Family invited to participate. Extubated today. Diaz in for I/O management.   Plan of care discussed.  See clinical pathway for plan of care and interventions and desired outcomes.

## 2025-01-27 NOTE — PLAN OF CARE
Problem: Safety - Medical Restraint  Goal: Remains free of injury from restraints (Restraint for Interference with Medical Device)  Description: INTERVENTIONS:  1. Determine that other, less restrictive measures have been tried or would not be effective before applying the restraint  2. Evaluate the patient's condition at the time of restraint application  3. Inform patient/family regarding the reason for restraint  4. Q2H: Monitor safety, psychosocial status, comfort, nutrition and hydration  1/27/2025 0837 by Alo Mcclellan, RN  Outcome: Completed  1/27/2025 0238 by Renata Caldera, RN  Outcome: Progressing

## 2025-01-27 NOTE — PLAN OF CARE
Problem: Safety - Adult  Goal: Free from fall injury  Outcome: Progressing     Problem: Respiratory - Adult  Goal: Achieves optimal ventilation and oxygenation  1/27/2025 0025 by Candelario Calvin RCP  Outcome: Progressing

## 2025-01-27 NOTE — PLAN OF CARE
Problem: Safety - Adult  Goal: Free from fall injury  Outcome: Progressing     Problem: Safety - Medical Restraint  Goal: Remains free of injury from restraints (Restraint for Interference with Medical Device)  Description: INTERVENTIONS:  1. Determine that other, less restrictive measures have been tried or would not be effective before applying the restraint  2. Evaluate the patient's condition at the time of restraint application  3. Inform patient/family regarding the reason for restraint  4. Q2H: Monitor safety, psychosocial status, comfort, nutrition and hydration  Outcome: Progressing     Problem: Respiratory - Adult  Goal: Achieves optimal ventilation and oxygenation  1/27/2025 0025 by Candelario Calvin RCP  Outcome: Progressing

## 2025-01-27 NOTE — PROGRESS NOTES
0730: Bedside and Verbal shift change report given to Alo EVANS RN/Rosario BERKOWITZ RN (oncoming nurse) by Renata ZAMORA RN (offgoing nurse). Report included the following information Nurse Handoff Report, ED Encounter Summary, ED SBAR, Adult Overview, Intake/Output, MAR, Recent Results, and Cardiac Rhythm SR .   0800: This RN and Rosario BERKOWITZ RN at bedside to shift assessment. ONIEL Ponce on as primary nurse with this RN following as preceptor, see note for more details.   0854: RT at bedside, patient extubated to [facility].

## 2025-01-27 NOTE — PROGRESS NOTES
Attended Interdisciplinary rounds in CCU where patient care was discussed.      Visited by: Chaplain Pete Boggs M.Div., [facility].   Paging Service: [phone])

## 2025-01-27 NOTE — PROGRESS NOTES
Infectious Disease progress        Impression    Severe sepsis    New onset seizures  Acute encephalopathy  H/o fall PTA    Fever Tmax 103.1 on 1/23  ? Aseptic meningitis vs postictal changes  S/p LP 1/24  CSF turbid, CSF , N-95%, RBC 49,000  Traumatic tap  Meningitis panel negative  CSF culture- NG x 48 hours  HSV PCR pending    Acute hypoxic respiratory failure  Extubated.      H/o vomiting at home  CXR+ for stable patchy diffuse interstitial lung opacities  ?  Aspiration  Respiratory culture-pending  Strep pneumo antigen, Legionella antigen negative.  HSV DNA- pending.    H/o prediabetes  Stable blood sugars    Obesity  BMI 39.5.    Plan  DC antibiotics  Continue acyclovir pending HSV DNA as   HSV PCR on meningitis panel is not adequately sensitive  ID service to follow.      Abx  Vancomycin-1/23  Ceftriaxone-1/23  Doxycycline 1/24  Unasyn 1/23  Ampicillin 1/24  Acyclovir 1/23    Temp  1/.1  1/24-99.5  1/25-98.6      Extensive review of chart notes, labs, imaging, cultures done  Additionally review of done: Recent reports-Labs, cultures, imaging  D/w -Dr. Slick CORNEJO Eva is seen for?  Meningitis  Cole Velasquez is a 74 female with a PMH of HTN, HLD, preDM and chronic pain who presented to the ED via EMS after a GLF at home.  Her  found her unresponsive on the bathroom floor with unknown down time. GCS 10 upon hospital arrival.  CT head/C spine negative. After ED arrival, she had a witnessed seizure with R gaze deviation c/b hypoxia that was aborted with versed. She received 1g keppra and was started on 500mg keppra BID.  She has no known history of seizures. She was febrile to 102.8. Subsequent spot 16 channel EEG did not reveal seizure activity. Patient transferred to the ICU and has been intubated..  S/p lumbar puncture last evening .CSF turbid, CSF , N-95%, RBC 49,000  Negative meningitis panel.  CSF culture negative.  Patient seen today remains in ICU.  Currently

## 2025-01-27 NOTE — PLAN OF CARE
Problem: Safety - Adult  Goal: Free from fall injury  1/27/2025 1521 by Rosario Jovel, RN  Outcome: Progressing  Flowsheets (Taken 1/27/2025 1141)  Free From Fall Injury: Instruct family/caregiver on patient safety  1/27/2025 0237 by Renata Caldera, RN  Outcome: Progressing     Problem: Pain  Goal: Verbalizes/displays adequate comfort level or baseline comfort level  Outcome: Progressing  Flowsheets (Taken 1/27/2025 0730 by Alo Mcclellan, RN)  Verbalizes/displays adequate comfort level or baseline comfort level: Assess pain using appropriate pain scale

## 2025-01-27 NOTE — PROGRESS NOTES
Yes  Fall Risk Interventions  Nursing Judgement-Fall Risk High(Add Comments): Yes  Toilet Every 2 Hours-In Advance of Need: Yes  Hourly Visual Checks: In bed, Awake  Fall Visual Posted: Fall sign posted  Room Door Open: Yes  Alarm On: Bed  Patient Moved Closer to Nursing Station: No    Active Consults:   IP CONSULT TO INTENSIVIST  IP CONSULT TO DIETITIAN  IP CONSULT TO INFECTIOUS DISEASES    Length of Stay:  Expected LOS: 3  Actual LOS: 4    Rosario Jovel RN

## 2025-01-27 NOTE — PROGRESS NOTES
SUMMARY:  74-year-old female with history of hypertension, hyperlipidemia, prediabetes, anxiety, chronic pain admitted to hospital medicine service 1/23 after apparent fall at home.  Patient was found unresponsive.  She was transported to the emergency department where she remained confused.  While in the emergency department she suffered a witnessed generalized seizure and received intravenous midazolam.  CT head revealed no acute findings.  CT C-spine was negative.  She was loaded with Keppra.  She was noted to be febrile.  She was subsequently transferred to the ICU under the care of of critical care medicine and required intubation.  She was treated empirically for meningitis with antibacterial antibiotics and acyclovir.  She underwent LP 1/24 which was a traumatic tap.  All PCR studies and cultures remain negative from that specimen.  She has been seen in consultation by neurology, infectious diseases.  A prolonged EEG was performed overnight 1/23 into a.m. 1/24 without electrical seizures.  Video recording also demonstrated no evidence of convulsive seizures.    SUBJ:  Passed SBT this a.m. and RASS 0, F/C.  Extubated under my supervision to [facility] and tolerating well.  Cognition appears to be intact.  Strong cough.  Voice quality is hoarse.  Antibacterial antibiotics discontinued by Dr. Herron    OBJ:  Vitals:    01/27/25 1146 01/27/25 1200 01/27/25 1230 01/27/25 1300   BP:  109/70 125/74 137/64   Pulse: 84 82 83 88   Resp: 22 22 28 21   Temp:       TempSrc:       SpO2: 97% 93% 93% 95%   Weight:       Height:         Physical Exam:  GEN: Appears somewhat weak but in no distress  HEENT: NCAT, sclerae white  NECK: No JVD noted  CHEST: Clear to auscultation anteriorly  CARDIAC: sinus rhythm, regular, no murmur noted  ABD: Soft, NT, +BS  EXT: Warm.  BUE, BLE symmetric edema  NEURO: Cranial nerves intact, symmetric strength, no focal deficits noted  DERM: No lesions noted    I have reviewed the lab results

## 2025-01-28 ENCOUNTER — APPOINTMENT (OUTPATIENT)
Facility: HOSPITAL | Age: 75
DRG: 871 | End: 2025-01-28
Payer: MEDICARE

## 2025-01-28 LAB
ANA SER QL: NEGATIVE
ANION GAP SERPL CALC-SCNC: 6 MMOL/L (ref 2–12)
BACTERIA SPEC CULT: NORMAL
BACTERIA SPEC CULT: NORMAL
BUN SERPL-MCNC: 10 MG/DL (ref 6–20)
BUN/CREAT SERPL: 18 (ref 12–20)
CALCIUM SERPL-MCNC: 8.4 MG/DL (ref 8.5–10.1)
CENTROMERE B AB SER-ACNC: <0.2 AI (ref 0–0.9)
CHLORIDE SERPL-SCNC: 100 MMOL/L (ref 97–108)
CHROMATIN AB SERPL-ACNC: <0.2 AI (ref 0–0.9)
CO2 SERPL-SCNC: 28 MMOL/L (ref 21–32)
CREAT SERPL-MCNC: 0.56 MG/DL (ref 0.55–1.02)
DSDNA AB SER-ACNC: <1 IU/ML (ref 0–9)
ENA JO1 AB SER-ACNC: <0.2 AI (ref 0–0.9)
ENA RNP AB SER-ACNC: <0.2 AI (ref 0–0.9)
ENA SCL70 AB SER-ACNC: <0.2 AI (ref 0–0.9)
ENA SM AB SER-ACNC: <0.2 AI (ref 0–0.9)
ENA SM+RNP AB SER-ACNC: <0.2 AI (ref 0–0.9)
ENA SS-A AB SER-ACNC: <0.2 AI (ref 0–0.9)
ENA SS-B AB SER-ACNC: <0.2 AI (ref 0–0.9)
ERYTHROCYTE [DISTWIDTH] IN BLOOD BY AUTOMATED COUNT: 13.2 % (ref 11.5–14.5)
GLUCOSE BLD STRIP.AUTO-MCNC: 113 MG/DL (ref 65–117)
GLUCOSE BLD STRIP.AUTO-MCNC: 78 MG/DL (ref 65–117)
GLUCOSE BLD STRIP.AUTO-MCNC: 81 MG/DL (ref 65–117)
GLUCOSE BLD STRIP.AUTO-MCNC: 81 MG/DL (ref 65–117)
GLUCOSE BLD STRIP.AUTO-MCNC: 90 MG/DL (ref 65–117)
GLUCOSE BLD STRIP.AUTO-MCNC: 96 MG/DL (ref 65–117)
GLUCOSE SERPL-MCNC: 86 MG/DL (ref 65–100)
HCT VFR BLD AUTO: 39.6 % (ref 35–47)
HGB BLD-MCNC: 13 G/DL (ref 11.5–16)
MAGNESIUM SERPL-MCNC: 2 MG/DL (ref 1.6–2.4)
MCH RBC QN AUTO: 31 PG (ref 26–34)
MCHC RBC AUTO-ENTMCNC: 32.8 G/DL (ref 30–36.5)
MCV RBC AUTO: 94.5 FL (ref 80–99)
NRBC # BLD: 0 K/UL (ref 0–0.01)
NRBC BLD-RTO: 0 PER 100 WBC
PLATELET # BLD AUTO: 186 K/UL (ref 150–400)
PMV BLD AUTO: 10.6 FL (ref 8.9–12.9)
POTASSIUM SERPL-SCNC: 4 MMOL/L (ref 3.5–5.1)
RBC # BLD AUTO: 4.19 M/UL (ref 3.8–5.2)
RIBOSOMAL P AB SER-ACNC: <0.2 AI (ref 0–0.9)
SEE BELOW:, 164879: NORMAL
SERVICE CMNT-IMP: NORMAL
SODIUM SERPL-SCNC: 134 MMOL/L (ref 136–145)
VIT B12 SERPL-MCNC: 299 PG/ML (ref 193–986)
WBC # BLD AUTO: 9.1 K/UL (ref 3.6–11)

## 2025-01-28 PROCEDURE — 70498 CT ANGIOGRAPHY NECK: CPT

## 2025-01-28 PROCEDURE — 71045 X-RAY EXAM CHEST 1 VIEW: CPT

## 2025-01-28 PROCEDURE — 76937 US GUIDE VASCULAR ACCESS: CPT

## 2025-01-28 PROCEDURE — 2060000000 HC ICU INTERMEDIATE R&B

## 2025-01-28 PROCEDURE — 6360000004 HC RX CONTRAST MEDICATION: Performed by: INTERNAL MEDICINE

## 2025-01-28 PROCEDURE — 36415 COLL VENOUS BLD VENIPUNCTURE: CPT

## 2025-01-28 PROCEDURE — 82962 GLUCOSE BLOOD TEST: CPT

## 2025-01-28 PROCEDURE — 2580000003 HC RX 258: Performed by: INTERNAL MEDICINE

## 2025-01-28 PROCEDURE — 82306 VITAMIN D 25 HYDROXY: CPT

## 2025-01-28 PROCEDURE — 2500000003 HC RX 250 WO HCPCS: Performed by: INTERNAL MEDICINE

## 2025-01-28 PROCEDURE — 92523 SPEECH SOUND LANG COMPREHEN: CPT

## 2025-01-28 PROCEDURE — 0042T CT BRAIN PERFUSION: CPT

## 2025-01-28 PROCEDURE — 6370000000 HC RX 637 (ALT 250 FOR IP): Performed by: INTERNAL MEDICINE

## 2025-01-28 PROCEDURE — 97530 THERAPEUTIC ACTIVITIES: CPT

## 2025-01-28 PROCEDURE — 6370000000 HC RX 637 (ALT 250 FOR IP): Performed by: NURSE PRACTITIONER

## 2025-01-28 PROCEDURE — 6360000002 HC RX W HCPCS: Performed by: INTERNAL MEDICINE

## 2025-01-28 PROCEDURE — 97167 OT EVAL HIGH COMPLEX 60 MIN: CPT

## 2025-01-28 PROCEDURE — 70450 CT HEAD/BRAIN W/O DYE: CPT

## 2025-01-28 PROCEDURE — 99233 SBSQ HOSP IP/OBS HIGH 50: CPT | Performed by: INTERNAL MEDICINE

## 2025-01-28 PROCEDURE — 97162 PT EVAL MOD COMPLEX 30 MIN: CPT

## 2025-01-28 PROCEDURE — 97535 SELF CARE MNGMENT TRAINING: CPT

## 2025-01-28 PROCEDURE — 80048 BASIC METABOLIC PNL TOTAL CA: CPT

## 2025-01-28 PROCEDURE — 84425 ASSAY OF VITAMIN B-1: CPT

## 2025-01-28 PROCEDURE — 82607 VITAMIN B-12: CPT

## 2025-01-28 PROCEDURE — 4A03X5D MEASUREMENT OF ARTERIAL FLOW, INTRACRANIAL, EXTERNAL APPROACH: ICD-10-PCS | Performed by: INTERNAL MEDICINE

## 2025-01-28 PROCEDURE — 85027 COMPLETE CBC AUTOMATED: CPT

## 2025-01-28 PROCEDURE — 92610 EVALUATE SWALLOWING FUNCTION: CPT

## 2025-01-28 PROCEDURE — 83735 ASSAY OF MAGNESIUM: CPT

## 2025-01-28 PROCEDURE — 2700000000 HC OXYGEN THERAPY PER DAY

## 2025-01-28 RX ORDER — IOPAMIDOL 755 MG/ML
100 INJECTION, SOLUTION INTRAVASCULAR
Status: COMPLETED | OUTPATIENT
Start: 2025-01-28 | End: 2025-01-28

## 2025-01-28 RX ORDER — PANTOPRAZOLE SODIUM 40 MG/1
40 TABLET, DELAYED RELEASE ORAL
Status: DISCONTINUED | OUTPATIENT
Start: 2025-01-29 | End: 2025-02-05 | Stop reason: HOSPADM

## 2025-01-28 RX ADMIN — IOPAMIDOL 100 ML: 755 INJECTION, SOLUTION INTRAVENOUS at 03:31

## 2025-01-28 RX ADMIN — LEVETIRACETAM 1500 MG: 100 INJECTION INTRAVENOUS at 09:46

## 2025-01-28 RX ADMIN — CHLORHEXIDINE GLUCONATE 15 ML: 1.2 RINSE ORAL at 09:14

## 2025-01-28 RX ADMIN — ENOXAPARIN SODIUM 30 MG: 100 INJECTION SUBCUTANEOUS at 09:13

## 2025-01-28 RX ADMIN — ACYCLOVIR SODIUM 700 MG: 50 INJECTION, SOLUTION INTRAVENOUS at 12:34

## 2025-01-28 RX ADMIN — ENOXAPARIN SODIUM 30 MG: 100 INJECTION SUBCUTANEOUS at 20:36

## 2025-01-28 RX ADMIN — ACYCLOVIR SODIUM 700 MG: 50 INJECTION, SOLUTION INTRAVENOUS at 20:41

## 2025-01-28 RX ADMIN — Medication 1 AMPULE: at 09:14

## 2025-01-28 RX ADMIN — SODIUM CHLORIDE, PRESERVATIVE FREE 10 ML: 5 INJECTION INTRAVENOUS at 20:41

## 2025-01-28 RX ADMIN — LEVETIRACETAM 1500 MG: 100 INJECTION INTRAVENOUS at 21:59

## 2025-01-28 RX ADMIN — SODIUM CHLORIDE, PRESERVATIVE FREE 10 ML: 5 INJECTION INTRAVENOUS at 09:14

## 2025-01-28 RX ADMIN — SODIUM CHLORIDE: 9 INJECTION, SOLUTION INTRAVENOUS at 12:33

## 2025-01-28 RX ADMIN — Medication 1 AMPULE: at 20:41

## 2025-01-28 RX ADMIN — ACYCLOVIR SODIUM 700 MG: 50 INJECTION, SOLUTION INTRAVENOUS at 04:00

## 2025-01-28 ASSESSMENT — PAIN SCALES - GENERAL
PAINLEVEL_OUTOF10: 0
PAINLEVEL_OUTOF10: 7

## 2025-01-28 ASSESSMENT — PAIN DESCRIPTION - ONSET: ONSET: OTHER (COMMENT)

## 2025-01-28 ASSESSMENT — PAIN DESCRIPTION - LOCATION: LOCATION: HEAD;GENERALIZED

## 2025-01-28 ASSESSMENT — PAIN DESCRIPTION - DESCRIPTORS: DESCRIPTORS: ACHING

## 2025-01-28 ASSESSMENT — PAIN DESCRIPTION - PAIN TYPE: TYPE: ACUTE PAIN

## 2025-01-28 ASSESSMENT — PAIN DESCRIPTION - ORIENTATION: ORIENTATION: OTHER (COMMENT)

## 2025-01-28 ASSESSMENT — PAIN DESCRIPTION - FREQUENCY: FREQUENCY: INTERMITTENT

## 2025-01-28 ASSESSMENT — PAIN - FUNCTIONAL ASSESSMENT: PAIN_FUNCTIONAL_ASSESSMENT: ACTIVITIES ARE NOT PREVENTED

## 2025-01-28 NOTE — PROGRESS NOTES
0734- Bedside and Verbal shift change report given to Alo.ONIEL/ONIEL Ponce (oncoming nurse) by ONIEL Harrison (offgoing nurse). Report included the following information Nurse Handoff Report, MAR, Recent Results, and Cardiac Rhythm NSR/ Afib .     0800- Shift assessment complete.    0921- Mccray cathter removed.    1228- Reassessment completed.    1511- Reassessment completed.     End of Shift Note    Bedside shift change report given to ONIEL Harrison (oncoming nurse) by ONIEL Prajapati/ Rosario Jovel RN (offgoing nurse).  Report included the following information SBAR, MAR, Recent Results, and Cardiac Rhythm NSR/ Afib    Shift worked:  3604-1476     Shift summary and any significant changes:     Patient on 3 L  Nasal Cannula, Q6 blood glucose checks performed, mccray removed, patient tolerating diet      Concerns for physician to address:  None      Zone phone for oncoming shift:          Activity:  Level of Assistance: Maximum assist, patient does 25-49%  Number times ambulated in hallways past shift: 0  Number of times OOB to chair past shift: 1    Cardiac:   Cardiac Monitoring: Yes      Cardiac Rhythm: Sinus rhythm, Atrial fib    Access:  Current line(s): PIV     Genitourinary:   Urinary Status: Mccray, Draining, Patent    Respiratory:   O2 Device: Nasal cannula  Chronic home O2 use?: NO  Incentive spirometer at bedside: NO    GI:  Last BM (including prior to admit): 01/28/25  Current diet:  ADULT DIET; Dysphagia - Soft and Bite Sized  ADULT ORAL NUTRITION SUPPLEMENT; Lunch, Dinner; Diabetic Oral Supplement  DIET ONE TIME MESSAGE;  Passing flatus: YES    Pain Management:   Patient states pain is manageable on current regimen: YES    Skin:  Berlin Scale Score: 10  Interventions: Wound Offloading (Prevention Methods): Bed, pressure reduction mattress, Pillows, Repositioning, Turning, Wedge pillows, Elevate heels    Patient Safety:  Fall Risk: Nursing Judgement-Fall Risk High(Add Comments): Yes  Fall Risk Interventions  Nursing

## 2025-01-28 NOTE — PROGRESS NOTES
alternatives to the procedure. The patient was brought to the procedure area and positively identified. A timeout was performed. A preprocedure fluoroscopic image of the lumbar spine was obtained and the proposed access level was marked on the skin. The skin overlying the lumbar spine was prepped and draped using Betadine and sterile barrier technique which includes: cap and mask, sterile gloves, and sterile site drape. The skin and underlying soft tissues were anesthetized with 5 cc of 1% lidocaine. Using intermittent fluoroscopic guidance, a 20 gauge needle was advanced into the thecal sac. Immediate return of blood-tinged clear cerebral spinal fluid was identified. Opening pressure was 19 cm of H2O. Approximately 12 cc of blood tinged spinal fluid spinal fluid obtained. Blood secondary to traumatic venipuncture closing pressure was 13 cm of H2O. The spinal needle was removed. A sterile dressing was applied. Patient tolerated procedure well. RADIATION: Fluoroscopy time: 1.5 min. Air Kerma: 90.11 mGy COMPLICATIONS: None     Technically successful lumbar puncture performed using fluoroscopic guidance. Electronically signed by DORI ASCENCIO    MRI BRAIN W WO CONTRAST    Result Date: 1/24/2025  EXAM:  MRI BRAIN W WO CONTRAST Clinical history: acute AMS, Seizure, fever INDICATION:   acute AMS, Seizure, fever COMPARISON: 1/23/2025 TECHNIQUE: MR examination of the brain includes axial and sagittal T1 , axial T2, axial FLAIR, axial gradient echo, axial DWI, coronal T1 . Pre and post contrast axial T1-weighted imaging. Postcontrast T1-weighted imaging coronal plane. CONTRAST: ProHance FINDINGS: There is no intracranial mass, hemorrhage or acute infarction. There are few scattered foci of increased T2 signal intensity demonstrated in the corona radiata and the centrum semiovale. There is minimal fluid in the mastoid air cells on the right and on the left. IACs are symmetric. Otherwise; There is no abnormal parenchymal  CERVICAL SPINE WO CONTRAST    Result Date: 1/23/2025  EXAM:  CT CERVICAL SPINE WITHOUT CONTRAST INDICATION: Fall with head injury COMPARISON: None. CONTRAST:  None. TECHNIQUE: Multislice helical CT of the cervical spine was performed without intravenous contrast administration.  Sagittal and coronal reformats were generated.  CT dose reduction was achieved through use of a standardized protocol tailored for this examination and automatic exposure control for dose modulation. FINDINGS: There is no acute fracture or subluxation. Vertebral body heights and intervertebral disc spaces are maintained. There are multilevel degenerative disc and facet changes most prominent at C5-6 and C6-7 with mild to moderate right and moderate left neural foraminal narrowing at these levels. There is no abnormality in alignment. The paraspinal soft tissues are unremarkable. The visualized lung apices are clear.     No acute abnormality. Multilevel degenerative changes. Electronically signed by Renato Ross    CT HEAD WO CONTRAST    Result Date: 1/23/2025  EXAM:  CT HEAD WO CONTRAST INDICATION: Fall with head injury COMPARISON: CT 6/4/2024 TECHNIQUE: Noncontrast head CT. Coronal and sagittal reformats. CT dose reduction was achieved through use of a standardized protocol tailored for this examination and automatic exposure control for dose modulation. FINDINGS: The ventricles and sulci are age-appropriate without hydrocephalus. There is no mass effect or midline shift. There is no intracranial hemorrhage or extra-axial fluid collection. There is no abnormal parenchymal attenuation. The gray-white matter differentiation is maintained. The basal cisterns are patent. The osseous structures are intact. The visualized paranasal sinuses and mastoid air cells are clear.     No acute intracranial abnormality. Electronically signed by Renato Ross        A total time of 75 minutes was spent on today's encounter.  Greater than 50% of the

## 2025-01-28 NOTE — WOUND CARE
Wound care consult for the \"gluteal cleft skin tear\" present on admission.  Chart reviewed including the photo taken on admission. Family is at the bedside and unable to see the patient at this time.   Gluteal cleft fissure:   Recommended Treatment: Keep the skin clean and dry and attend to incontinence episodes promptly. Use the zinc oxide cream to treat the skin and protect it from excess moisture.   Plan: Pressure injury prevention with significant turns and float the heels.   Lashawn Renee RN, BSN, CWON

## 2025-01-28 NOTE — PLAN OF CARE
Problem: Safety - Adult  Goal: Free from fall injury  Outcome: Progressing     Problem: Pain  Goal: Verbalizes/displays adequate comfort level or baseline comfort level  Outcome: Progressing  Flowsheets (Taken 1/28/2025 0800)  Verbalizes/displays adequate comfort level or baseline comfort level: Assess pain using appropriate pain scale     Problem: Skin/Tissue Integrity  Goal: Skin integrity remains intact  Description: 1.  Monitor for areas of redness and/or skin breakdown  2.  Assess vascular access sites hourly  3.  Every 4-6 hours minimum:  Change oxygen saturation probe site  4.  Every 4-6 hours:  If on nasal continuous positive airway pressure, respiratory therapy assess nares and determine need for appliance change or resting period  Recent Flowsheet Documentation  Taken 1/28/2025 0800 by Rosario Jovel RN  Skin Integrity Remains Intact: Monitor for areas of redness and/or skin breakdown     Problem: ABCDS Injury Assessment  Goal: Absence of physical injury  Outcome: Progressing     Problem: Respiratory - Adult  Goal: Achieves optimal ventilation and oxygenation  Outcome: Progressing     Problem: Nutrition Deficit:  Goal: Optimize nutritional status  Outcome: Progressing  Flowsheets (Taken 1/28/2025 1442 by Odette Escobedo)  Nutrient intake appropriate for improving, restoring, or maintaining nutritional needs:   Monitor oral intake, labs, and treatment plans   Recommend appropriate diets, oral nutritional supplements, and vitamin/mineral supplements

## 2025-01-28 NOTE — PROGRESS NOTES
Comprehensive Nutrition Assessment    Type and Reason for Visit:  Reassess    Nutrition Recommendations/Plan:   Start glucerna BID  Monitor intakes and BM's     Malnutrition Assessment:  Malnutrition Status:  No malnutrition (01/28/25 1439)    Context:  Acute Illness     Findings of the 6 clinical characteristics of malnutrition:  Energy Intake:  Mild decrease in energy intake  Weight Loss:  No weight loss     Body Fat Loss:  No body fat loss     Muscle Mass Loss:  No muscle mass loss    Fluid Accumulation:  No fluid accumulation Extremities   Strength:  Not Performed    Nutrition Assessment:     73 y/o female medically presenting with altered mental status, acute encephalopathy, new onset seizure, acute meningitis, acute renal failure w/ hypoxia, severe sepsis, obesity, aseptic meningitis. Pt awake and alert at time of interview with family/friends in pt room. Pt stated PTA she has always been a very good eater. Noted untouched meal tray in room at time of visit, pt claimed she did not want to eat right now even if meal was reheated and she will eat when dinner comes around. Pt agreeable to trying glucerna in any flavor to sip on between meals. Family expressed concern that pt has not eaten po in several days though was receiving tube feeding. Pt denies prediabetes however family on the phone confirmed pt has a hx of prediabetes and has used glucerna prior. Pt claimed she has had intentional wt loss of around 10 lbs but no unintentional wt loss related to appetite or intake issues.     Nutrition Related Findings:    Meds: acyclovir, humalog, lovenox, protonix. Edema: RUE +2, LUE, +1, RLE +2, LLE +2. BM: 1/28 Labs: Na 134, Ca 8.4, Wound Type: None       Current Nutrition Intake & Therapies:    Average Meal Intake: Unable to assess  Average Supplements Intake: Unable to assess  ADULT DIET; Dysphagia - Soft and Bite Sized  ADULT ORAL NUTRITION SUPPLEMENT; Lunch, Dinner; Diabetic Oral Supplement  DIET ONE TIME  MESSAGE;    Anthropometric Measures:  Height: 160 cm (5' 2.99\")  Ideal Body Weight (IBW): 115 lbs (52 kg)    Admission Body Weight: 101.6 kg (224 lb)  Current Body Weight: 105.4 kg (232 lb 5.8 oz), 202.1 % IBW. Weight Source: Bed scale  Current BMI (kg/m2): 41.2           Weight Adjustment For: No Adjustment                 BMI Categories: Obese Class 3 (BMI 40.0 or greater)    Estimated Daily Nutrient Needs:  Energy Requirements Based On: Kcal/kg  Weight Used for Energy Requirements: Ideal  Energy (kcal/day): MSJ 1570 - 1830 kcal/day (30 - 35 kcal/kg IBW)  Weight Used for Protein Requirements: Ideal  Protein (g/day): 63 - 78 g (1.2 - 1.5g/kg IBW)  Method Used for Fluid Requirements: 1 ml/kcal  Fluid (ml/day): 1570 - 1830 mL/day    Nutrition Diagnosis:   Inadequate protein-energy intake related to impaired respiratory function as evidenced by NPO or clear liquid status due to medical condition, intubation  Resolving    Nutrition Interventions:   Food and/or Nutrient Delivery: Continue Current Diet, Start Oral Nutrition Supplement  Nutrition Education/Counseling: No recommendation at this time  Coordination of Nutrition Care: Continue to monitor while inpatient, Interdisciplinary Rounds       Goals:  Goals: PO intake 50% or greater, PO intake 75% or greater, by next RD assessment  Type of Goal: New goal  Previous Goal Met: Goal(s) Achieved    Nutrition Monitoring and Evaluation:   Behavioral-Environmental Outcomes: None Identified  Food/Nutrient Intake Outcomes: Food and Nutrient Intake, Supplement Intake  Physical Signs/Symptoms Outcomes: Biochemical Data, Nutrition Focused Physical Findings, Weight, Fluid Status or Edema, Hemodynamic Status, Skin    Discharge Planning:    Continue current diet, Continue Oral Nutrition Supplement     Odette Escobedo, Student Dietitian  Contact: x [phone]

## 2025-01-28 NOTE — PROGRESS NOTES
SUMMARY:  74-year-old female with history of hypertension, hyperlipidemia, prediabetes, anxiety, chronic pain admitted to hospital medicine service 1/23 after apparent fall at home.  Patient was found unresponsive.  She was transported to the emergency department where she remained confused.  While in the emergency department she suffered a witnessed generalized seizure and received intravenous midazolam.  CT head revealed no acute findings.  CT C-spine was negative.  She was loaded with Keppra.  She was noted to be febrile.  She was subsequently transferred to the ICU under the care of of critical care medicine and required intubation.  She was treated empirically for meningitis with antibacterial antibiotics and acyclovir.  She underwent LP 1/24 which was a traumatic tap.  All PCR studies and cultures remain negative from that specimen.  She has been seen in consultation by neurology, infectious diseases.  A prolonged EEG was performed overnight 1/23 into a.m. 1/24 without electrical seizures.  Video recording also demonstrated no evidence of convulsive seizures.    SUBJ:  Passed SBT this a.m. and RASS 0, F/C.  Extubated under my supervision to [facility] and tolerating well.  Cognition appears to be intact.  Strong cough.  Voice quality is hoarse.  Antibacterial antibiotics discontinued by Dr. Herron    01/28 Tolerating extubation well. Cognition improved. Worked with PT this morning. Anti-bacterial antibiotics discontinued. Remains on acyclovir - ID service managing. NAD and well oriented    OBJ:  Vitals:    01/28/25 0800 01/28/25 0858 01/28/25 0900 01/28/25 0938   BP: (!) 118/59  115/77 (!) 127/98   Pulse: 89 83 90 86   Resp: 24 21 22 23   Temp: 98.6 °F (37 °C)      TempSrc: Oral      SpO2: 96% 95% 98% 95%   Weight:       Height:         Physical Exam:  GEN: NAd  HEENT: NCAT, sclerae white  NECK: No JVD noted  CHEST: Clear to auscultation anteriorly  CARDIAC: sinus rhythm, regular, no murmur noted  ABD:

## 2025-01-28 NOTE — INTERDISCIPLINARY ROUNDS
Interdisciplinary team rounds were held 1/28/2025 with the following team members:  Physician, Nursing, Dietitian, Pharmacist, , , CCU Charge RN, and the patient's son.    Plan of care discussed. See clinical pathway and/or care plan for interventions and desired outcomes.    Goals of the Day: Remove mccray catheter and transfer out of CCU.

## 2025-01-28 NOTE — PROGRESS NOTES
Hospitalist Progress Note        Demographics    Patient Name  Cole Velasquez   Date of Birth 1950   Medical Record Number  [id number]      Age  74 y.o.   PCP Jona Mead DO   Admit date:  1/23/2025  1:22 AM     Room Number  2512/01  @ [facility]           Admission Diagnoses:  AMS (altered mental status)   Admission Summary:  \" 74-year-old female with history of hypertension, hyperlipidemia, prediabetes, anxiety, chronic pain admitted to hospital medicine service 1/23 after apparent fall at home. Patient was found unresponsive. She was transported to the emergency department where she remained confused. While in the emergency department she suffered a witnessed generalized seizure and received intravenous midazolam. CT head revealed no acute findings. CT C-spine was negative. She was loaded with Keppra. She was noted to be febrile. She was subsequently transferred to the ICU under the care of of critical care medicine and required intubation. She was treated empirically for meningitis with antibacterial antibiotics and acyclovir. She underwent LP 1/24 which was a traumatic tap. All PCR studies and cultures remain negative from that specimen. She has been seen in consultation by neurology, infectious diseases. A prolonged EEG was performed overnight 1/23 into a.m. 1/24 without electrical seizures. Video recording also demonstrated no evidence of convulsive seizures.  \"     Assessment and plan:     Found down/unwitnessed fall, PTA  Witnessed seizure activity, POA, resolved  Acute encephalopathy, POA  SIRS/Transient fever and tachycardia without clear infectious source, not POA, resolved- possible serotonin syndrome  Right sided weakness, not POA, improved        CT head and neck- NAD        MRI brain- NAD        EEG- no epileptic foci but study abnormal consistent with encephalopathy  Intubated 1/23 for AW protection. Extubated 1/27 and tolerating well. Now weaned to

## 2025-01-28 NOTE — PROGRESS NOTES
Orders received, chart reviewed and patient evaluated by occupational therapy. Pending progression with skilled acute occupational therapy, recommend:    High intensity/comprehensive skilled occupational therapy in a multidisciplinary setting as patient is working towards tolerating up to 3 hours of therapy/day 5-7x/week    Recommend with nursing patient to complete as able in order to maintain strength, endurance and independence: OOB to chair 3x/day, ADLs with up to total assist and performing toileting with total assist. Thank you for your assistance.     Full evaluation to follow.      Theresa Hartley, OTR/L, OTD

## 2025-01-28 NOTE — PROGRESS NOTES
RN assisting pt to turn when noted new onset slurred speech and pt not moving RUE. Neuro assessment completed, see flowsheet for details. Blood sugar checked. SUSANNAH Hubbard notified of neuro changes: slurred speech, movement to painful stimulus in RUE, pt reports no sensation in RUE, right gaze preference. NP at bedside assessing pt with order for CT scans and no stroke alert. Family at bedside updated on plan of care.     0330: Pt IV infiltrated with contrast in CT. Diaz cath with new onset bloody tinged urine. SUSANNAH Hubbard aware.

## 2025-01-28 NOTE — PROGRESS NOTES
Spiritual Health History and Assessment/Progress Note  [facility]    Initial Encounter,  , Life Adjustments, Adjustment to illness,      Name: Cole Velasquez MRN: [id number]    Age: 74 y.o.     Sex: female   Language: English   Yazidism: Buddhist   AMS (altered mental status)     Date: 1/28/2025            Total Time Calculated: 19 min              Spiritual Assessment began in [facility] CRITICAL CARE        Referral/Consult From: Rounding   Encounter Overview/Reason: Initial Encounter  Service Provided For: Patient and family together    Parvin, Belief, Meaning:   Patient is connected with a parvin tradition or spiritual practice and has beliefs or practices that help with coping during difficult times  Family/Friends are connected with a parvin tradition or spiritual practice and have beliefs or practices that help with coping during difficult times      Importance and Influence:  Patient has spiritual/personal beliefs that influence decisions regarding their health  Family/Friends have spiritual/personal beliefs that influence decisions regarding the patient's health    Community:  Patient is connected with a spiritual community and feels well-supported. Support system includes: Children, Parvin Community, Friends, and Extended family  Family/Friends are connected with a spiritual community:    Assessment and Plan of Care:     Patient Interventions include: Facilitated expression of thoughts and feelings, Explored spiritual coping/struggle/distress, Affirmed coping skills/support systems, and Facilitated life review and/ or legacy  Family/Friends Interventions include: Facilitated expression of thoughts and feelings, Explored spiritual coping/struggle/distress, Affirmed coping skills/support systems, and Facilitated life review and/or legacy    Patient Plan of Care: Spiritual Care available upon further referral  Family/Friends Plan of Care: Spiritual Care available upon further

## 2025-01-28 NOTE — PLAN OF CARE
Problem: Occupational Therapy - Adult  Goal: By Discharge: Performs self-care activities at highest level of function for planned discharge setting.  See evaluation for individualized goals.  Description: FUNCTIONAL STATUS PRIOR TO ADMISSION:  Patient questionable historian secondary to confusion but son present and reported patient was independent for ADLs and functional mobility. She was driving and completing IADLs without assist.    ,  ,  ,  ,  ,  ,  ,  ,  ,  , Active : Yes     HOME SUPPORT: Patient lived with her  and son.    Occupational Therapy Goals:  Initiated 1/28/2025  1.  Patient will perform grooming with Minimal Assist within 7 day(s).  2.  Patient will perform upper body dressing with Moderate Assist within 7 day(s).  3.  Patient will perform lower body dressing with Maximal Assist within 7 day(s).  4.  Patient will perform toilet transfers with Moderate Assist  within 7 day(s).  5.  Patient will perform all aspects of toileting with Moderate Assist within 7 day(s).  6.  Patient will participate in upper extremity therapeutic exercise/activities with Minimal Assist for 3 minutes within 7 day(s).    Outcome: Progressing   OCCUPATIONAL THERAPY EVALUATION    Patient: Cole Velasquez (74 y.o. female)  Date: 1/28/2025  Primary Diagnosis: Encephalopathy acute [G93.40]  AMS (altered mental status) [R41.82]         Precautions: Fall Risk, Bed Alarm                  ASSESSMENT :  The patient is limited by decreased functional mobility, independence in ADLs, high-level IADLs, ROM, strength, body mechanics, activity tolerance, endurance, safety awareness, cognition, command following, attention/concentration, coordination, balance, posture, fine-motor control.    Based on the impairments listed above patient is functioning significantly below her baseline for ADLs and functional mobility. She is now completing ADLs with mod to total assist and functional mobility with max to total assist.

## 2025-01-28 NOTE — PLAN OF CARE
Speech LAnguage Pathology EVALUATION    Patient: Cole Velasquez (74 y.o. female)  Date: 1/28/2025  Primary Diagnosis: Encephalopathy acute [G93.40]  AMS (altered mental status) [R41.82]       Precautions:  Fall Risk, Bed Alarm                  ASSESSMENT :  Patient presents with increased risk of aspiration s/p intubation. Patient upright in chair. Oral structures and functions seemingly WNL. SLP presented trials of ice chips, thin via spoon/cup/straw, puree, and solid. Patient with prolonged mastication of solid noted. Cough following consecutive sips thin via straw only. No overt clinical s/s aspiration noted across single sips thin via cup/straw. Additionally, patient presents with disorientation, reduced attention, reduced problem solving, and reduced recall. SLP provided therapeutic reorientation. SLP will continue to follow while in house.     Patient will benefit from skilled intervention to address the above impairments.     PLAN :  Recommendations and Planned Interventions:  Diet: Soft and bite sized and thin liquids  --Medications whole, one at a time with liquid or puree (I.e. applesauce)   --Upright all PO intake   --Single bites/sips   --Alternate solids & liquids   --Slow rate  --Oral hygiene 2-3x/day  --1:1 supervision/assistance     Implement Delirium Precautions:  1) Provide patient w/ frequent re-orientation (tell patient information, do not ask of patient)  Introduce yourself and your role with each new interaction  Provide calendar and clock in patient's room  Address weather outside and time of day  2) Normalize sensory deficits (i.e., place glasses, hearing aids, etc.)  3) Implement as much routine as possible  Lights on, windows open during the day  Out of bed as much as possible during the day to sit up in a chair (as medically and physically appropriate)  Normal ADL routine (i.e., brush teeth, shave, eat meals at normal times, as appropriate)  4) Communication should be calm and

## 2025-01-28 NOTE — PLAN OF CARE
safety  Problem Solving: Impaired;Decreased awareness of errors;Assistance required to identify errors made;Assistance required to correct errors made  Insights: Decreased awareness of deficits  Initiation: Requires cues for some  Sequencing: Requires cues for some    Skin: grossly intact    Edema: none apparent    Hearing:   Hearing  Hearing: Exceptions to WFL  Hearing Exceptions: Hard of hearing/hearing concerns    Vision/Perceptual:                Vision  Vision: Impaired  Vision Exceptions: Wears glasses at all times     Strength:    Strength: Generally decreased, functional    Tone & Sensation:   Tone: Normal  Sensation:  (unable to formally assess due to cognitive status)    Coordination:  Coordination: Grossly decreased, non-functional    Range Of Motion:  AROM: Generally decreased, functional (impaired bilateral shoulder AROM)       Functional Mobility:  Bed Mobility:     Bed Mobility Training  Bed Mobility Training: Yes  Supine to Sit: Maximum assistance  Scooting: Maximum assistance  Transfers:     Transfer Training  Transfer Training: Yes  Sit to Stand: Maximum assistance (pulling on chelsey stedy)  Stand to Sit: Maximum assistance  Bed to Chair: Total assistance (using chelsey stedy)  Toilet Transfer: Total assistance (using chelsey stedy)  Balance:               Balance  Sitting: Impaired  Sitting - Static: Fair (occasional)  Sitting - Dynamic: Fair (occasional);Poor (constant support)  Standing: Impaired  Standing - Static: Fair;Constant support  Standing - Dynamic: Poor;Constant support  Ambulation/Gait Training:                       Gait  Gait Training: Yes  Overall Level of Assistance: Maximum assistance  Distance (ft): 1 Feet (amberly on R LE)  Assistive Device:  (B HHA)

## 2025-01-29 ENCOUNTER — APPOINTMENT (OUTPATIENT)
Facility: HOSPITAL | Age: 75
DRG: 871 | End: 2025-01-29
Payer: MEDICARE

## 2025-01-29 PROBLEM — R56.9 GENERALIZED SEIZURES (HCC): Status: ACTIVE | Noted: 2025-01-29

## 2025-01-29 LAB
25(OH)D3 SERPL-MCNC: <9 NG/ML (ref 30–100)
ALBUMIN SERPL-MCNC: 2.5 G/DL (ref 3.5–5)
ALBUMIN/GLOB SERPL: 0.7 (ref 1.1–2.2)
ALP SERPL-CCNC: 55 U/L (ref 45–117)
ALT SERPL-CCNC: 37 U/L (ref 12–78)
ANION GAP SERPL CALC-SCNC: 9 MMOL/L (ref 2–12)
AST SERPL-CCNC: 30 U/L (ref 15–37)
BASOPHILS # BLD: 0.05 K/UL (ref 0–0.1)
BASOPHILS NFR BLD: 0.7 % (ref 0–1)
BILIRUB SERPL-MCNC: 0.6 MG/DL (ref 0.2–1)
BUN SERPL-MCNC: 9 MG/DL (ref 6–20)
BUN/CREAT SERPL: 16 (ref 12–20)
CALCIUM SERPL-MCNC: 8.1 MG/DL (ref 8.5–10.1)
CHLORIDE SERPL-SCNC: 105 MMOL/L (ref 97–108)
CO2 SERPL-SCNC: 27 MMOL/L (ref 21–32)
CREAT SERPL-MCNC: 0.55 MG/DL (ref 0.55–1.02)
DIFFERENTIAL METHOD BLD: ABNORMAL
EOSINOPHIL # BLD: 0.3 K/UL (ref 0–0.4)
EOSINOPHIL NFR BLD: 4.2 % (ref 0–7)
ERYTHROCYTE [DISTWIDTH] IN BLOOD BY AUTOMATED COUNT: 13.1 % (ref 11.5–14.5)
GLOBULIN SER CALC-MCNC: 3.7 G/DL (ref 2–4)
GLUCOSE BLD STRIP.AUTO-MCNC: 110 MG/DL (ref 65–117)
GLUCOSE BLD STRIP.AUTO-MCNC: 114 MG/DL (ref 65–117)
GLUCOSE BLD STRIP.AUTO-MCNC: 132 MG/DL (ref 65–117)
GLUCOSE BLD STRIP.AUTO-MCNC: 88 MG/DL (ref 65–117)
GLUCOSE BLD STRIP.AUTO-MCNC: 95 MG/DL (ref 65–117)
GLUCOSE SERPL-MCNC: 100 MG/DL (ref 65–100)
HCT VFR BLD AUTO: 39.2 % (ref 35–47)
HGB BLD-MCNC: 12.8 G/DL (ref 11.5–16)
IMM GRANULOCYTES # BLD AUTO: 0.05 K/UL (ref 0–0.04)
IMM GRANULOCYTES NFR BLD AUTO: 0.7 % (ref 0–0.5)
LYMPHOCYTES # BLD: 1.46 K/UL (ref 0.8–3.5)
LYMPHOCYTES NFR BLD: 20.4 % (ref 12–49)
MAGNESIUM SERPL-MCNC: 1.8 MG/DL (ref 1.6–2.4)
MCH RBC QN AUTO: 30.9 PG (ref 26–34)
MCHC RBC AUTO-ENTMCNC: 32.7 G/DL (ref 30–36.5)
MCV RBC AUTO: 94.7 FL (ref 80–99)
MONOCYTES # BLD: 0.7 K/UL (ref 0–1)
MONOCYTES NFR BLD: 9.8 % (ref 5–13)
NEUTS SEG # BLD: 4.61 K/UL (ref 1.8–8)
NEUTS SEG NFR BLD: 64.2 % (ref 32–75)
NRBC # BLD: 0 K/UL (ref 0–0.01)
NRBC BLD-RTO: 0 PER 100 WBC
PHOSPHATE SERPL-MCNC: 2.1 MG/DL (ref 2.6–4.7)
PLATELET # BLD AUTO: 200 K/UL (ref 150–400)
PMV BLD AUTO: 11.4 FL (ref 8.9–12.9)
POTASSIUM SERPL-SCNC: 3.1 MMOL/L (ref 3.5–5.1)
PROT SERPL-MCNC: 6.2 G/DL (ref 6.4–8.2)
RBC # BLD AUTO: 4.14 M/UL (ref 3.8–5.2)
SERVICE CMNT-IMP: ABNORMAL
SERVICE CMNT-IMP: NORMAL
SODIUM SERPL-SCNC: 141 MMOL/L (ref 136–145)
WBC # BLD AUTO: 7.2 K/UL (ref 3.6–11)

## 2025-01-29 PROCEDURE — 2580000003 HC RX 258: Performed by: PHYSICIAN ASSISTANT

## 2025-01-29 PROCEDURE — 2500000003 HC RX 250 WO HCPCS: Performed by: NURSE PRACTITIONER

## 2025-01-29 PROCEDURE — 83735 ASSAY OF MAGNESIUM: CPT

## 2025-01-29 PROCEDURE — 84100 ASSAY OF PHOSPHORUS: CPT

## 2025-01-29 PROCEDURE — 6370000000 HC RX 637 (ALT 250 FOR IP): Performed by: PHYSICIAN ASSISTANT

## 2025-01-29 PROCEDURE — 95706 EEG WO VID 2-12HR INTMT MNTR: CPT

## 2025-01-29 PROCEDURE — 92526 ORAL FUNCTION THERAPY: CPT

## 2025-01-29 PROCEDURE — 6360000002 HC RX W HCPCS: Performed by: NURSE PRACTITIONER

## 2025-01-29 PROCEDURE — 82962 GLUCOSE BLOOD TEST: CPT

## 2025-01-29 PROCEDURE — 2500000003 HC RX 250 WO HCPCS: Performed by: PHYSICIAN ASSISTANT

## 2025-01-29 PROCEDURE — 85025 COMPLETE CBC W/AUTO DIFF WBC: CPT

## 2025-01-29 PROCEDURE — C9254 INJECTION, LACOSAMIDE: HCPCS | Performed by: INTERNAL MEDICINE

## 2025-01-29 PROCEDURE — 6370000000 HC RX 637 (ALT 250 FOR IP): Performed by: INTERNAL MEDICINE

## 2025-01-29 PROCEDURE — 70450 CT HEAD/BRAIN W/O DYE: CPT

## 2025-01-29 PROCEDURE — 95717 EEG PHYS/QHP 2-12 HR W/O VID: CPT | Performed by: PSYCHIATRY & NEUROLOGY

## 2025-01-29 PROCEDURE — 2500000003 HC RX 250 WO HCPCS: Performed by: INTERNAL MEDICINE

## 2025-01-29 PROCEDURE — 99222 1ST HOSP IP/OBS MODERATE 55: CPT | Performed by: PSYCHIATRY & NEUROLOGY

## 2025-01-29 PROCEDURE — 2580000003 HC RX 258: Performed by: INTERNAL MEDICINE

## 2025-01-29 PROCEDURE — 36415 COLL VENOUS BLD VENIPUNCTURE: CPT

## 2025-01-29 PROCEDURE — 2000000000 HC ICU R&B

## 2025-01-29 PROCEDURE — 6360000002 HC RX W HCPCS: Performed by: INTERNAL MEDICINE

## 2025-01-29 PROCEDURE — 80053 COMPREHEN METABOLIC PANEL: CPT

## 2025-01-29 PROCEDURE — 92507 TX SP LANG VOICE COMM INDIV: CPT

## 2025-01-29 PROCEDURE — XX20X89 MONITORING OF BRAIN ELECTRICAL ACTIVITY, COMPUTER-AIDED DETECTION AND NOTIFICATION, NEW TECHNOLOGY GROUP 9: ICD-10-PCS | Performed by: PSYCHIATRY & NEUROLOGY

## 2025-01-29 RX ORDER — MULTIVITAMIN WITH IRON
5000 TABLET ORAL DAILY
Status: DISCONTINUED | OUTPATIENT
Start: 2025-01-29 | End: 2025-01-30

## 2025-01-29 RX ORDER — GAUZE BANDAGE 2" X 2"
100 BANDAGE TOPICAL DAILY
Status: DISCONTINUED | OUTPATIENT
Start: 2025-01-29 | End: 2025-02-05 | Stop reason: HOSPADM

## 2025-01-29 RX ORDER — IOPAMIDOL 755 MG/ML
100 INJECTION, SOLUTION INTRAVASCULAR
Status: DISCONTINUED | OUTPATIENT
Start: 2025-01-29 | End: 2025-02-03

## 2025-01-29 RX ORDER — HALOPERIDOL 5 MG/ML
2.5 INJECTION INTRAMUSCULAR ONCE
Status: COMPLETED | OUTPATIENT
Start: 2025-01-29 | End: 2025-01-29

## 2025-01-29 RX ORDER — MAGNESIUM SULFATE IN WATER 40 MG/ML
2000 INJECTION, SOLUTION INTRAVENOUS ONCE
Status: COMPLETED | OUTPATIENT
Start: 2025-01-29 | End: 2025-01-30

## 2025-01-29 RX ORDER — POTASSIUM CHLORIDE 1500 MG/1
40 TABLET, EXTENDED RELEASE ORAL PRN
Status: DISCONTINUED | OUTPATIENT
Start: 2025-01-29 | End: 2025-02-03

## 2025-01-29 RX ORDER — LORAZEPAM 2 MG/ML
0.5 INJECTION INTRAMUSCULAR ONCE
Status: DISCONTINUED | OUTPATIENT
Start: 2025-01-29 | End: 2025-01-29

## 2025-01-29 RX ORDER — LANOLIN ALCOHOL/MO/W.PET/CERES
100 CREAM (GRAM) TOPICAL DAILY
Status: DISCONTINUED | OUTPATIENT
Start: 2025-01-29 | End: 2025-01-29

## 2025-01-29 RX ORDER — ATORVASTATIN CALCIUM 20 MG/1
20 TABLET, FILM COATED ORAL DAILY
Status: DISCONTINUED | OUTPATIENT
Start: 2025-01-30 | End: 2025-01-30

## 2025-01-29 RX ORDER — POTASSIUM CHLORIDE 7.45 MG/ML
10 INJECTION INTRAVENOUS PRN
Status: DISCONTINUED | OUTPATIENT
Start: 2025-01-29 | End: 2025-02-03

## 2025-01-29 RX ORDER — LORAZEPAM 2 MG/ML
0.5 INJECTION INTRAMUSCULAR ONCE
Status: COMPLETED | OUTPATIENT
Start: 2025-01-29 | End: 2025-01-29

## 2025-01-29 RX ORDER — INSULIN LISPRO 100 [IU]/ML
0-4 INJECTION, SOLUTION INTRAVENOUS; SUBCUTANEOUS
Status: DISCONTINUED | OUTPATIENT
Start: 2025-01-29 | End: 2025-02-05 | Stop reason: HOSPADM

## 2025-01-29 RX ORDER — DEXMEDETOMIDINE HYDROCHLORIDE 4 UG/ML
.1-1.5 INJECTION, SOLUTION INTRAVENOUS CONTINUOUS
Status: DISCONTINUED | OUTPATIENT
Start: 2025-01-29 | End: 2025-01-31

## 2025-01-29 RX ORDER — LACOSAMIDE 10 MG/ML
50 INJECTION, SOLUTION INTRAVENOUS ONCE
Status: COMPLETED | OUTPATIENT
Start: 2025-01-29 | End: 2025-01-29

## 2025-01-29 RX ORDER — ERGOCALCIFEROL 1.25 MG/1
50000 CAPSULE, LIQUID FILLED ORAL WEEKLY
Status: DISCONTINUED | OUTPATIENT
Start: 2025-01-29 | End: 2025-02-05 | Stop reason: HOSPADM

## 2025-01-29 RX ORDER — POTASSIUM CHLORIDE 1.5 G/1.58G
40 POWDER, FOR SOLUTION ORAL PRN
Status: DISCONTINUED | OUTPATIENT
Start: 2025-01-29 | End: 2025-02-03

## 2025-01-29 RX ORDER — HALOPERIDOL 5 MG/ML
1 INJECTION INTRAMUSCULAR ONCE
Status: COMPLETED | OUTPATIENT
Start: 2025-01-29 | End: 2025-01-29

## 2025-01-29 RX ORDER — MAGNESIUM SULFATE IN WATER 40 MG/ML
2000 INJECTION, SOLUTION INTRAVENOUS PRN
Status: DISCONTINUED | OUTPATIENT
Start: 2025-01-29 | End: 2025-02-03

## 2025-01-29 RX ORDER — LEVETIRACETAM 500 MG/5ML
1000 INJECTION, SOLUTION, CONCENTRATE INTRAVENOUS EVERY 12 HOURS
Status: DISCONTINUED | OUTPATIENT
Start: 2025-01-29 | End: 2025-01-30

## 2025-01-29 RX ADMIN — Medication 1 AMPULE: at 09:15

## 2025-01-29 RX ADMIN — LEVETIRACETAM 1000 MG: 100 INJECTION INTRAVENOUS at 21:50

## 2025-01-29 RX ADMIN — LEVETIRACETAM 1500 MG: 100 INJECTION INTRAVENOUS at 09:06

## 2025-01-29 RX ADMIN — DEXMEDETOMIDINE HYDROCHLORIDE 0.2 MCG/KG/HR: 400 INJECTION, SOLUTION INTRAVENOUS at 22:50

## 2025-01-29 RX ADMIN — ENOXAPARIN SODIUM 30 MG: 100 INJECTION SUBCUTANEOUS at 20:35

## 2025-01-29 RX ADMIN — LACOSAMIDE 50 MG: 10 INJECTION INTRAVENOUS at 12:31

## 2025-01-29 RX ADMIN — HALOPERIDOL LACTATE 2.5 MG: 5 INJECTION, SOLUTION INTRAMUSCULAR at 21:50

## 2025-01-29 RX ADMIN — Medication 100 MG: at 08:58

## 2025-01-29 RX ADMIN — ATORVASTATIN CALCIUM 20 MG: 20 TABLET, FILM COATED ORAL at 08:58

## 2025-01-29 RX ADMIN — SODIUM CHLORIDE, PRESERVATIVE FREE 20 ML: 5 INJECTION INTRAVENOUS at 20:19

## 2025-01-29 RX ADMIN — Medication 1 AMPULE: at 20:18

## 2025-01-29 RX ADMIN — ENOXAPARIN SODIUM 30 MG: 100 INJECTION SUBCUTANEOUS at 09:13

## 2025-01-29 RX ADMIN — SODIUM CHLORIDE, PRESERVATIVE FREE 10 ML: 5 INJECTION INTRAVENOUS at 11:45

## 2025-01-29 RX ADMIN — SODIUM PHOSPHATE, MONOBASIC, MONOHYDRATE AND SODIUM PHOSPHATE, DIBASIC, ANHYDROUS 15 MMOL: 276; 142 INJECTION, SOLUTION INTRAVENOUS at 17:45

## 2025-01-29 RX ADMIN — LORAZEPAM 0.5 MG: 2 INJECTION INTRAMUSCULAR; INTRAVENOUS at 06:17

## 2025-01-29 RX ADMIN — ACYCLOVIR SODIUM 700 MG: 50 INJECTION, SOLUTION INTRAVENOUS at 04:40

## 2025-01-29 RX ADMIN — POTASSIUM BICARBONATE 40 MEQ: 782 TABLET, EFFERVESCENT ORAL at 16:56

## 2025-01-29 RX ADMIN — HALOPERIDOL LACTATE 2.5 MG: 5 INJECTION, SOLUTION INTRAMUSCULAR at 22:14

## 2025-01-29 RX ADMIN — LORAZEPAM 0.5 MG: 2 INJECTION INTRAMUSCULAR; INTRAVENOUS at 01:23

## 2025-01-29 RX ADMIN — ERGOCALCIFEROL 50000 UNITS: 1.25 CAPSULE ORAL at 08:58

## 2025-01-29 RX ADMIN — PANTOPRAZOLE SODIUM 40 MG: 40 TABLET, DELAYED RELEASE ORAL at 06:17

## 2025-01-29 RX ADMIN — MAGNESIUM SULFATE HEPTAHYDRATE 2000 MG: 40 INJECTION, SOLUTION INTRAVENOUS at 22:53

## 2025-01-29 RX ADMIN — HALOPERIDOL LACTATE 1 MG: 5 INJECTION, SOLUTION INTRAMUSCULAR at 13:13

## 2025-01-29 ASSESSMENT — PAIN SCALES - GENERAL
PAINLEVEL_OUTOF10: 0

## 2025-01-29 NOTE — PROGRESS NOTES
Chart reviewed. Pt s/p code stroke this AM and currently off the floor for additional imaging. Will defer however continue to follow.    Marquita Garcia, PT, DPT

## 2025-01-29 NOTE — PLAN OF CARE
Speech LAnguage Pathology TREATMENT    Patient: Cole Velasquez (74 y.o. female)  Date: 1/29/2025  Primary Diagnosis: Encephalopathy acute [G93.40]  AMS (altered mental status) [R41.82]       Precautions:  Fall Risk, Bed Alarm                  ASSESSMENT :  Note code stroke called this AM. CT Head: \"No acute intracranial abnormality\". Patient reclined in bed upon SLP arrival with  feeding patient lunch. Patient tolerating sips of thin via straw with no overt clinical s/s aspiration noted. SLP educated patient/family on importance of upright positioning with feeding/PO. Patient reports she is not hungry and pleasantly refused all other PO trials. Patient states she doesn't have an appetite. SLP reviewed aspiration precautions and swallow strategies. Additionally, patient continues to present with confusion, disorientation, reduced recall, and reduced sustained attention. Patient oriented to self and time (year) only. SLP completed therapeutic reorientation. SLP will continue to follow.     Patient will benefit from skilled intervention to address the above impairments.     PLAN :  Recommendations and Planned Interventions:  Diet: Soft and bite sized and thin liquids  --Medications whole, one at a time with liquid or puree (I.e. applesauce)   --Upright all PO intake   --Single bites/sips   --Alternate solids & liquids   --Slow rate  --Oral hygiene 2-3x/day  --1:1 supervision/assistance      Implement Delirium Precautions:  1) Provide patient w/ frequent re-orientation (tell patient information, do not ask of patient)  Introduce yourself and your role with each new interaction  Provide calendar and clock in patient's room  Address weather outside and time of day  2) Normalize sensory deficits (i.e., place glasses, hearing aids, etc.)  3) Implement as much routine as possible  Lights on, windows open during the day  Out of bed as much as possible during the day to sit up in a chair (as medically and physically  appropriate)  Normal ADL routine (i.e., brush teeth, shave, eat meals at normal times, as appropriate)  4) Communication should be calm and concise  Engage patient in conversation about well-known, meaningful topics  5) Encourage family/friends to visit patient  Ask family/friends to bring in supplies for enjoyable entertainment and comfort (i.e., pt's favorite books, music, pictures, etc.)  Ask family/friends to bring in familiar objects and pictures       Recommend next SLP session: ensure diet tolerance, continued patient/family/staff education, determine possible diet advancement. Delirium education     Acute SLP Services: Yes, SLP will continue to follow per plan of care.  Discharge Recommendations: Continue to assess pending progress     SUBJECTIVE:   Patient stated, “[place].”    OBJECTIVE:     Past Medical History:   Diagnosis Date    Gall stones     GERD (gastroesophageal reflux disease)     Hemorrhoid     Hypertension     Ill-defined condition     rosacea    Ill-defined condition     prediabetes - is on metformin/no longer on metformin    Ill-defined condition     gout    Ill-defined condition     obesity per pt    Sleep apnea     borderline - was put on a machine/not using a machine now     Past Surgical History:   Procedure Laterality Date    BREAST BIOPSY Right     15+ years ago. Benign (per patient) - as of 10/29/2021 pt states she thinks she has only had one breast bx    BREAST SURGERY      benign tumor right breast     SECTION      x 2    CHOLECYSTECTOMY      COLONOSCOPY N/A 2021    COLONOSCOPY   :- performed by Lang Parikh MD at [facility] ENDOSCOPY    COLONOSCOPY      COLONOSCOPY N/A 2016    COLONOSCOPY performed by Lang Parikh MD at [facility] ENDOSCOPY    DEXA BONE DENSITY AXIAL SKELETON      nml    HYSTERECTOMY (CERVIX STATUS UNKNOWN)      [facility] MAMMOGRAM SCREEN BILAT  3/2014    PAP SMEAR      SINUS SURGERY PROC UNLISTED      TONSILLECTOMY       Prior Level of

## 2025-01-29 NOTE — PROGRESS NOTES
PROGRESS NOTE - Neurology Service      Name:  Cole Velasquez       MRN: [id number]  Location: 2512/01    Date: 1/29/2025  Time:  2:02 PM      SUBJECTIVE:    Patient seen in the follow up. No acute distress. Resting in the bed.   I discussed with the ICU team. Episode of staring today and pt becoming combative and attempting to hit staff members, remains line pulling and agitation, states will defer labs to am team to obtain.     HPI  Cole Velasquez is a 74 female with a PMH of HTN, HLD, preDM and chronic pain who presented to the ED via EMS after a GLF at home.  Her  found her unresponsive on the bathroom floor with unknown down time. GCS 10 upon hospital arrival.  CT head/C spine negative. After ED arrival, she had a witnessed seizure with R gaze deviation c/b hypoxia that was aborted with versed. She received 1g keppra and was started on 500mg keppra BID.  She has no known history of seizures. She was febrile to 102.8. Subsequent spot 16 channel EEG did not reveal seizure activity. Patient transferred to the ICU and has been intubated..  MRI brain - No acute intracranial abnormality.   S/p lumbar puncture last evening .CSF turbid, CSF , N-95%, RBC 49,000  Negative meningitis panel.  CSF culture negative.    OBJECTIVE:  General Examination:   Constitutional: Appearance normally developed  Head and face: normocephalic   Eyes:  no dysconjugate gaze  Skin: no lesions noted  Carotids: No bruits.  Extremities: Negative for cyanosis,     Neurologic Examination:   Mental status: Alert, attentive, and oriented x1. Speech slow. Not following commands. CN: PERRL. EOMI. No nystagmus. Partial and limited fundoscopic exam as pupils are non-dilated. No gross abnormality noticed on fundoscopic exam. VF full to confrontation. Face symmetric with intact sensation. Hearing intact to finger rub bilaterally. Tongue & uvula midline. Palate elevates symmetrically. Symmetric shoulder shrug.  Motor: No drift. Normal tone,

## 2025-01-29 NOTE — BH NOTE
Psychiatry consult update:  NP contacted unit in attempt to complete consult via telehealth. NP spoke with nurse and waited to be connected online x ten minutes before signing off. Consult was not completed this attempt. If consult is still needed please contact psychiatry via consult line, thank you.

## 2025-01-29 NOTE — PROGRESS NOTES
SUMMARY:  74-year-old female with history of hypertension, hyperlipidemia, prediabetes, anxiety, chronic pain admitted to hospital medicine service 1/23 after apparent fall at home.  Patient was found unresponsive.  She was transported to the emergency department where she remained confused.  While in the emergency department she suffered a witnessed generalized seizure and received intravenous midazolam.  CT head revealed no acute findings.  CT C-spine was negative.  She was loaded with Keppra.  She was noted to be febrile.  She was subsequently transferred to the ICU under the care of of critical care medicine and required intubation.  She was treated empirically for meningitis with antibacterial antibiotics and acyclovir.  She underwent LP 1/24 which was a traumatic tap.  All PCR studies and cultures remain negative from that specimen.  She has been seen in consultation by neurology, infectious diseases.  A prolonged EEG was performed overnight 1/23 into a.m. 1/24 without electrical seizures.  Video recording also demonstrated no evidence of convulsive seizures.    SUBJ:  Passed SBT this a.m. and RASS 0, F/C.  Extubated under my supervision to [facility] and tolerating well.  Cognition appears to be intact.  Strong cough.  Voice quality is hoarse.  Antibacterial antibiotics discontinued by Dr. Herron    01/28 Tolerating extubation well. Cognition improved. Worked with PT this morning. Anti-bacterial antibiotics discontinued. Remains on acyclovir - ID service managing. NAD and well oriented  01/29 Acyclovir stopped yesterday and patient was transferred to SDU/ [facility].However, overnight, she developed agitated delirium and was difficult to manage byt the nursing staff. This morning, I evaluated her and she was confused/poorly oriented but very pleasant and in no distress. Later in morning, while speaking with her son, she developed a staring spell and appeared to be aphasic. Code S was called and CT revealed  electrolytes as needed  Avoid nephrotoxins  Nutritional support: Dysphagia diet  Glycemic control: SSI  Follow micro results to completion  Antibiotics: Per ID Service - Acyclovir  Follow CBC intermittently  Transfuse as needed to maintain Hgb > 7.0 or for hemodynamically significant bleeding  Pain management: Acetaminophen  Discussed with Dr Cavazos - decrease Keppra to 1000 mg BID, Add Vimpat  Delirium precautions  PT/OT/SLP ordered  Discussed with Dr Castillo. He will remain as attending for now but CCM Service will help monitor while in ICU with particular attention to nocturnal delirium mgmt    CCM time: 35 mins. This time includes time spent reviewing database, interviewing and examining patient, discussing care plan on MDRs and communicating with other providers    Phong Johnson MD  [facility]  [facility] 4th floor [facility] phone: [phone]  [facility] 7th floor [facility] phone: [phone]  [facility] phone: [phone]  1/29/2025

## 2025-01-29 NOTE — PROGRESS NOTES
0700: Bedsides shift report received from ONIEL Harrison    0800: Shift assessment completed. Pt oriented to person, disoriented to situation, time, and place, on 1L NC, clear diminished lung sounds, active bowel sounds, weak pedal pulses. Pt restless and confusion, tries to get out of bed. See flow sheet/MAR for details.     1000: Code stroke called on pt for unresponsiveness. Pt BG-114.     1030: Pt back to baseline    1100: Pt off the floor to CT, Nuero consult completed. No new orders,.     1200: Pt back to -2512. Rapid EEG started. Reassessment completed. Pt oriented to person and time. Still restless, and trying to get out of bed. No changes to other previous assessments. See flow sheet/MAR for details.     1313: pt still confused, restless, trying to get out of bed, Dr Johnson notified. One haldol ordered and given. Bedside  orders placed.     1535: Received a call from Physiatry.     1500: Set up zoom consult for pt, waiting for Tavon to talk to pt.    1523: Physiatry consult not completed. Called Tavon for the psychiatry consult. Tavon said she cannot wait more than 10mins  for a consult.  I  told her due to my other pt emergency, I was not able to set up the pt's zoom consult within 10min. Tavon says she will not be able to see pt today.     1600: Reassessment completed. Rapid EEG stopped. Pt oriented to person, disoriented to situation, time, situation. Sitter at bedside. No changes to previous assessment. See flow sheet/MAR for details.     1900: Bedside  shift report given to ONIEL Harrison

## 2025-01-29 NOTE — PROGRESS NOTES
Nursing contacted Nocturnist/cross cover provider via non-urgent messaging system [employer] and notified patient confused since being extubated, attempting to remove lines, get out of the bed repeatedly, not slept in 2 days since extubation, asking meds for help agitation and sleep. No other concerns reported. No acute distress reported. No other information provided by nurse. VS reported stable. Patient denies any further complaints or concerns. See prior hospitalist group notes for complete details of course of treatment. Recent lab work and documented vs reviewed.     Ordered ativan 0.5mg IM x1 due to pt line pulling risk for harm to herself with repeatedly attempting to get out of the bed and for agitation, consider additional meds overnight if needed. Fall precautions ordered, consider bedside/virtual sitter if needed if nurse requests. Continue remaining plan/orders as per dayshift team. Will defer further evaluation/management and timing of discontinuation of regimens to the day shift primary attending care team.      Nursing to notify dayshift Hospitalist team in the AM of overnight events and for further/continued concerns. Will remain available overnight cross coverage for further concerns if nursing/patient needs. Please note, there are RRT systems in this hospital in place that if nursing has acute or critical patient condition change or concern, this is to help facilitate and notify that patient needs immediate bedside evaluation by a provider.    Update  0457 nurse reported unable to obtain am labs as pt becoming combative and attempting to hit staff members, remains line pulling and agitation, states will defer labs to am team to obtain. Will add additional ativan 0.5mg IM x1 for safety staff and pt with agitation, line pulling, and combative behaviors, ok to defer labs until am due to pt behaviors and causing worse agitation and becoming combative towards staff. No other concerns reported at

## 2025-01-29 NOTE — PROGRESS NOTES
Hospitalist Progress Note        Demographics    Patient Name  Cole Velasquez   Date of Birth 1950   Medical Record Number  [id number]      Age  74 y.o.   PCP Jona Mead DO   Admit date:  1/23/2025  1:22 AM     Room Number  2512/01  @ [facility]           Admission Diagnoses:  AMS (altered mental status)   Admission Summary:  \" 74-year-old female with history of hypertension, hyperlipidemia, prediabetes, anxiety, chronic pain admitted to hospital medicine service 1/23 after apparent fall at home. Patient was found unresponsive. She was transported to the emergency department where she remained confused. While in the emergency department she suffered a witnessed generalized seizure and received intravenous midazolam. CT head revealed no acute findings. CT C-spine was negative. She was loaded with Keppra. She was noted to be febrile. She was subsequently transferred to the ICU under the care of of critical care medicine and required intubation. She was treated empirically for meningitis with antibacterial antibiotics and acyclovir. She underwent LP 1/24 which was a traumatic tap. All PCR studies and cultures remain negative from that specimen. She has been seen in consultation by neurology, infectious diseases. A prolonged EEG was performed overnight 1/23 into a.m. 1/24 without electrical seizures. Video recording also demonstrated no evidence of convulsive seizures.  \"      Assessment and plan:      Found down/unwitnessed fall, PTA  Witnessed seizure activity, POA,   Acute encephalopathy, POA  SIRS/Transient fever and tachycardia without clear infectious source, not POA, resolved- possible serotonin syndrome  Right sided weakness, not POA, improved        CT head and neck- NAD        MRI brain- NAD        EEG- no epileptic foci but study abnormal consistent with encephalopathy  Intubated 1/23 for AW protection. Extubated 1/27 and tolerating well. Now weaned to 5lpm.  Neurology

## 2025-01-29 NOTE — SIGNIFICANT EVENT
[facility]  RAPID RESPONSE TEAM  CODE STROKE NOTE      TIME CODE STROKE CALLED 1036   RRT ARRIVAL TIME TO ASSESS PATIENT 1039   PRIMARY RN Alma CORNEJO   PATIENT AND ROOM NUMBER CCU 2512   BLOOD GLUCOSE 114   BLOOD PRESSURE 141/89    LTKW 10 am   BEFAST S/SX Patient confused, aphasia,    ANTICOAGULANTS Lovenox 30 mg subq Q12   NIHSS 5   MD AT BEDSIDE 1036   TIME TO CT TABLE 1055   TIME TELE-NEURO CALLED 1039   TIME TELE-NEURO SEEN PATIENT 1100     BACKGROUND/HISTORY:  Admitted for encephalopathy and AMS, was intubated for airway protection and extubated on 1/27. This morning starting getting more confused and aphasic. Code stroke called by MD at the bedside.     ASSESSMENT/INTERVENTIONS:  Patient is awake, confused, aphasia present, slow to respond, provider concern for stroke as new onset aphasia. Bp stable, on 1lNC, blood sugar wnl. Patient had episodes of combativeness, agitation over night. Had gotten some medication to help with agitation.     OUTCOME:  CT head done, - for acute stroke per teleneuro MD. Patient mentation back to baseline, no aphasia, able to move all extremities equal hand , equal pupil. Back to room, cerribell (rapid EEG on). Seizure medication added for possible seizure.   Patient son was updated and support provided at the bedside. See MD notes/orders for further plan of care and details.       Rapid Response Team  MARKUS Gary, RN, CCRN, TCRN  Ext [phone]

## 2025-01-29 NOTE — PROGRESS NOTES
Occupational Therapy note:    Chart reviewed. Pt s/p code stroke this AM and currently off the floor for additional imaging. Will defer however continue to follow.    Theresa Hartley, OTR/L, OTD

## 2025-01-30 LAB
ALBUMIN SERPL-MCNC: 2.6 G/DL (ref 3.5–5)
ALBUMIN/GLOB SERPL: 0.7 (ref 1.1–2.2)
ALP SERPL-CCNC: 56 U/L (ref 45–117)
ALT SERPL-CCNC: 37 U/L (ref 12–78)
ANION GAP SERPL CALC-SCNC: 5 MMOL/L (ref 2–12)
AST SERPL-CCNC: 26 U/L (ref 15–37)
BASOPHILS # BLD: 0.05 K/UL (ref 0–0.1)
BASOPHILS NFR BLD: 0.8 % (ref 0–1)
BILIRUB SERPL-MCNC: 0.9 MG/DL (ref 0.2–1)
BUN SERPL-MCNC: 9 MG/DL (ref 6–20)
BUN/CREAT SERPL: 16 (ref 12–20)
CALCIUM SERPL-MCNC: 8.6 MG/DL (ref 8.5–10.1)
CHLORIDE SERPL-SCNC: 107 MMOL/L (ref 97–108)
CO2 SERPL-SCNC: 28 MMOL/L (ref 21–32)
CREAT SERPL-MCNC: 0.56 MG/DL (ref 0.55–1.02)
DIFFERENTIAL METHOD BLD: ABNORMAL
EOSINOPHIL # BLD: 0.36 K/UL (ref 0–0.4)
EOSINOPHIL NFR BLD: 5.7 % (ref 0–7)
ERYTHROCYTE [DISTWIDTH] IN BLOOD BY AUTOMATED COUNT: 13.1 % (ref 11.5–14.5)
GLOBULIN SER CALC-MCNC: 3.9 G/DL (ref 2–4)
GLUCOSE BLD STRIP.AUTO-MCNC: 103 MG/DL (ref 65–117)
GLUCOSE BLD STRIP.AUTO-MCNC: 105 MG/DL (ref 65–117)
GLUCOSE BLD STRIP.AUTO-MCNC: 113 MG/DL (ref 65–117)
GLUCOSE BLD STRIP.AUTO-MCNC: 113 MG/DL (ref 65–117)
GLUCOSE BLD STRIP.AUTO-MCNC: 119 MG/DL (ref 65–117)
GLUCOSE SERPL-MCNC: 120 MG/DL (ref 65–100)
HCT VFR BLD AUTO: 38.5 % (ref 35–47)
HGB BLD-MCNC: 12.6 G/DL (ref 11.5–16)
IMM GRANULOCYTES # BLD AUTO: 0.04 K/UL (ref 0–0.04)
IMM GRANULOCYTES NFR BLD AUTO: 0.6 % (ref 0–0.5)
LYMPHOCYTES # BLD: 1.39 K/UL (ref 0.8–3.5)
LYMPHOCYTES NFR BLD: 21.9 % (ref 12–49)
MAGNESIUM SERPL-MCNC: 2.7 MG/DL (ref 1.6–2.4)
MCH RBC QN AUTO: 30.9 PG (ref 26–34)
MCHC RBC AUTO-ENTMCNC: 32.7 G/DL (ref 30–36.5)
MCV RBC AUTO: 94.4 FL (ref 80–99)
MONOCYTES # BLD: 0.65 K/UL (ref 0–1)
MONOCYTES NFR BLD: 10.2 % (ref 5–13)
NEUTS SEG # BLD: 3.87 K/UL (ref 1.8–8)
NEUTS SEG NFR BLD: 60.8 % (ref 32–75)
NRBC # BLD: 0 K/UL (ref 0–0.01)
NRBC BLD-RTO: 0 PER 100 WBC
PHOSPHATE SERPL-MCNC: 3.5 MG/DL (ref 2.6–4.7)
PLATELET # BLD AUTO: 216 K/UL (ref 150–400)
PMV BLD AUTO: 10.8 FL (ref 8.9–12.9)
POTASSIUM SERPL-SCNC: 3.6 MMOL/L (ref 3.5–5.1)
PROT SERPL-MCNC: 6.5 G/DL (ref 6.4–8.2)
RBC # BLD AUTO: 4.08 M/UL (ref 3.8–5.2)
SERVICE CMNT-IMP: ABNORMAL
SERVICE CMNT-IMP: NORMAL
SODIUM SERPL-SCNC: 140 MMOL/L (ref 136–145)
WBC # BLD AUTO: 6.4 K/UL (ref 3.6–11)

## 2025-01-30 PROCEDURE — 6360000002 HC RX W HCPCS: Performed by: INTERNAL MEDICINE

## 2025-01-30 PROCEDURE — 80053 COMPREHEN METABOLIC PANEL: CPT

## 2025-01-30 PROCEDURE — 92526 ORAL FUNCTION THERAPY: CPT

## 2025-01-30 PROCEDURE — 6370000000 HC RX 637 (ALT 250 FOR IP): Performed by: INTERNAL MEDICINE

## 2025-01-30 PROCEDURE — 2000000000 HC ICU R&B

## 2025-01-30 PROCEDURE — 2500000003 HC RX 250 WO HCPCS: Performed by: INTERNAL MEDICINE

## 2025-01-30 PROCEDURE — 36415 COLL VENOUS BLD VENIPUNCTURE: CPT

## 2025-01-30 PROCEDURE — 2700000000 HC OXYGEN THERAPY PER DAY

## 2025-01-30 PROCEDURE — 2500000003 HC RX 250 WO HCPCS: Performed by: NURSE PRACTITIONER

## 2025-01-30 PROCEDURE — 83735 ASSAY OF MAGNESIUM: CPT

## 2025-01-30 PROCEDURE — 86618 LYME DISEASE ANTIBODY: CPT

## 2025-01-30 PROCEDURE — 82962 GLUCOSE BLOOD TEST: CPT

## 2025-01-30 PROCEDURE — 84100 ASSAY OF PHOSPHORUS: CPT

## 2025-01-30 PROCEDURE — 85025 COMPLETE CBC W/AUTO DIFF WBC: CPT

## 2025-01-30 PROCEDURE — 93005 ELECTROCARDIOGRAM TRACING: CPT | Performed by: INTERNAL MEDICINE

## 2025-01-30 PROCEDURE — C9254 INJECTION, LACOSAMIDE: HCPCS | Performed by: INTERNAL MEDICINE

## 2025-01-30 PROCEDURE — 86592 SYPHILIS TEST NON-TREP QUAL: CPT

## 2025-01-30 PROCEDURE — 99232 SBSQ HOSP IP/OBS MODERATE 35: CPT | Performed by: PSYCHIATRY & NEUROLOGY

## 2025-01-30 RX ORDER — LACOSAMIDE 10 MG/ML
50 INJECTION, SOLUTION INTRAVENOUS 2 TIMES DAILY
Status: DISCONTINUED | OUTPATIENT
Start: 2025-01-30 | End: 2025-01-30

## 2025-01-30 RX ORDER — ACETAMINOPHEN 160 MG/5ML
650 LIQUID ORAL EVERY 6 HOURS
Status: DISCONTINUED | OUTPATIENT
Start: 2025-01-30 | End: 2025-02-05 | Stop reason: HOSPADM

## 2025-01-30 RX ORDER — ATORVASTATIN CALCIUM 20 MG/1
20 TABLET, FILM COATED ORAL NIGHTLY
Status: DISCONTINUED | OUTPATIENT
Start: 2025-01-31 | End: 2025-02-05 | Stop reason: HOSPADM

## 2025-01-30 RX ORDER — FOLIC ACID 1 MG/1
1 TABLET ORAL DAILY
Status: DISCONTINUED | OUTPATIENT
Start: 2025-01-30 | End: 2025-02-05 | Stop reason: HOSPADM

## 2025-01-30 RX ORDER — LACOSAMIDE 50 MG/1
50 TABLET ORAL 2 TIMES DAILY
Status: DISCONTINUED | OUTPATIENT
Start: 2025-01-30 | End: 2025-01-30

## 2025-01-30 RX ORDER — LEVETIRACETAM 500 MG/5ML
750 INJECTION, SOLUTION, CONCENTRATE INTRAVENOUS EVERY 12 HOURS
Status: DISCONTINUED | OUTPATIENT
Start: 2025-01-30 | End: 2025-02-04

## 2025-01-30 RX ORDER — MULTIVITAMIN WITH IRON
1000 TABLET ORAL DAILY
Status: DISCONTINUED | OUTPATIENT
Start: 2025-01-31 | End: 2025-02-05 | Stop reason: HOSPADM

## 2025-01-30 RX ORDER — QUETIAPINE FUMARATE 25 MG/1
25 TABLET, FILM COATED ORAL NIGHTLY
Status: DISCONTINUED | OUTPATIENT
Start: 2025-01-30 | End: 2025-02-01

## 2025-01-30 RX ORDER — LACOSAMIDE 10 MG/ML
50 INJECTION, SOLUTION INTRAVENOUS 2 TIMES DAILY
Status: DISCONTINUED | OUTPATIENT
Start: 2025-01-30 | End: 2025-02-04

## 2025-01-30 RX ADMIN — FOLIC ACID 1 MG: 1 TABLET ORAL at 17:25

## 2025-01-30 RX ADMIN — PANTOPRAZOLE SODIUM 40 MG: 40 TABLET, DELAYED RELEASE ORAL at 08:21

## 2025-01-30 RX ADMIN — LACOSAMIDE 50 MG: 10 INJECTION INTRAVENOUS at 20:30

## 2025-01-30 RX ADMIN — LEVETIRACETAM 750 MG: 100 INJECTION INTRAVENOUS at 21:58

## 2025-01-30 RX ADMIN — Medication 1 AMPULE: at 08:32

## 2025-01-30 RX ADMIN — ACETAMINOPHEN 650 MG: 160 SOLUTION ORAL at 11:46

## 2025-01-30 RX ADMIN — SODIUM CHLORIDE, PRESERVATIVE FREE 10 ML: 5 INJECTION INTRAVENOUS at 20:26

## 2025-01-30 RX ADMIN — Medication 1 AMPULE: at 20:26

## 2025-01-30 RX ADMIN — ACETAMINOPHEN 650 MG: 160 SOLUTION ORAL at 21:57

## 2025-01-30 RX ADMIN — DEXMEDETOMIDINE HYDROCHLORIDE 0.2 MCG/KG/HR: 400 INJECTION, SOLUTION INTRAVENOUS at 09:40

## 2025-01-30 RX ADMIN — LEVETIRACETAM 1000 MG: 100 INJECTION INTRAVENOUS at 10:25

## 2025-01-30 RX ADMIN — ATORVASTATIN CALCIUM 20 MG: 20 TABLET, FILM COATED ORAL at 08:20

## 2025-01-30 RX ADMIN — ENOXAPARIN SODIUM 30 MG: 100 INJECTION SUBCUTANEOUS at 20:30

## 2025-01-30 RX ADMIN — SODIUM CHLORIDE, PRESERVATIVE FREE 10 ML: 5 INJECTION INTRAVENOUS at 08:32

## 2025-01-30 RX ADMIN — QUETIAPINE FUMARATE 25 MG: 25 TABLET ORAL at 20:26

## 2025-01-30 RX ADMIN — Medication 6 MG: at 20:26

## 2025-01-30 RX ADMIN — ACETAMINOPHEN 650 MG: 160 SOLUTION ORAL at 17:25

## 2025-01-30 RX ADMIN — ENOXAPARIN SODIUM 30 MG: 100 INJECTION SUBCUTANEOUS at 08:31

## 2025-01-30 ASSESSMENT — PAIN SCALES - GENERAL
PAINLEVEL_OUTOF10: 0

## 2025-01-30 NOTE — PROGRESS NOTES
0700: Bedside shift report received from ONIEL Harrison    0800: Shift assessment completed. Pt oriented to person, oriented to time. Disoriented to situation, and place. Diminished lung sounds, active bowel sounds, weak pedal pulses. Dex @0.2. See flow sheet/MAR for details.    1100: IDR completed. Pt goals  -MRI  -EKG  -Liquid tylenol  -PO sedatives    1200: Reassessment completed. Pt more alert, calm. Oriented to person and place. Disoriented to time and situation. No changes to previous assessment. See flow sheet/MAR for details.     1600: Reassessment completed. No changes to previous assessment. See flow sheet/MAR for details.     1613: Dex @0.1    1900: Bedside shift report  given to ONIEL Mcconnell

## 2025-01-30 NOTE — BSMART NOTE
Liaison attempted to meet face to face with the pt however she was too sedated per assigned nurse. RN stated that the family is usually at bedside but they had left. Liaison team will attempt to meet with pt tomorrow.

## 2025-01-30 NOTE — PROGRESS NOTES
PROGRESS NOTE - Neurology Service      Name:  Cole Velasquez       MRN: [id number]  Location: 2512/01    Date: 1/30/2025  Time:  3:01 PM      SUBJECTIVE:    Patient seen in the follow up. No acute distress. Resting in the bed.   I discussed with the ICU team. Episode of staring today and pt becoming combative and attempting to hit staff members, remains line pulling and agitation, states will defer labs to am team to obtain.     HPI  Cole Velasquez is a 74 female with a PMH of HTN, HLD, preDM and chronic pain who presented to the ED via EMS after a GLF at home.  Her  found her unresponsive on the bathroom floor with unknown down time. GCS 10 upon hospital arrival.  CT head/C spine negative. After ED arrival, she had a witnessed seizure with R gaze deviation c/b hypoxia that was aborted with versed. She received 1g keppra and was started on 500mg keppra BID.  She has no known history of seizures. She was febrile to 102.8. Subsequent spot 16 channel EEG did not reveal seizure activity. Patient transferred to the ICU and has been intubated..  MRI brain - No acute intracranial abnormality.   S/p lumbar puncture last evening .CSF turbid, CSF , N-95%, RBC 49,000  Negative meningitis panel.  CSF culture negative.    OBJECTIVE:  General Examination:   Constitutional: Appearance normally developed  Head and face: normocephalic   Eyes:  no dysconjugate gaze  Skin: no lesions noted  Carotids: No bruits.  Extremities: Negative for cyanosis,     Neurologic Examination:   Mental status: Alert, attentive, and oriented x2. Speech slow. following commands. CN: PERRL. EOMI. No nystagmus. Partial and limited fundoscopic exam as pupils are non-dilated. No gross abnormality noticed on fundoscopic exam. VF full to confrontation. Face symmetric with intact sensation. Hearing intact to finger rub bilaterally. Tongue & uvula midline. Palate elevates symmetrically. Symmetric shoulder shrug.  Motor: No drift. Normal tone,  medications (opioids, benzodiazepines), elderly patient population, baseline history of memory/cognitive disorders.  - Can consider outpatient neurology [facility] referral for further evaluation of baseline memory and thinking. This appointment should be no sooner than 3 months from the time of hospitalization and/or surgery.     - Please do not hesitate to call with questions or concerns.  - Thank you for the opportunity to participate in the care of your patient.Please let us know if we can provide any additional information.    Neurology will sign off.  Please do not hesitate to call with questions or concerns.  Please let us know if we can provide any additional information.      Chato Cavazos MD  1/30/2025    ?

## 2025-01-30 NOTE — CONSULTS
Rheumatology Consult    Subjective:     History obtained via chart reivew because patient is altered. Cole Velasquez is a 74 y.o. female admitted 25 after being found down for an unknown length of time by her . CT head on presentation to the ED did not reveal acute pathology. She was intubated for declining mental status and later extubated but continues to be confused. MRI head also did not show any acute pathology to explain confusion.   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    Past Medical History:   Diagnosis Date    Gall stones     GERD (gastroesophageal reflux disease)     Hemorrhoid     Hypertension     Ill-defined condition     rosacea    Ill-defined condition     prediabetes - is on metformin/no longer on metformin    Ill-defined condition     gout    Ill-defined condition     obesity per pt    Sleep apnea     borderline - was put on a machine/not using a machine now      Past Surgical History:   Procedure Laterality Date    BREAST BIOPSY Right     15+ years ago. Benign (per patient) - as of 10/29/2021 pt states she thinks she has only had one breast bx    BREAST SURGERY      benign tumor right breast     SECTION      x 2    CHOLECYSTECTOMY      COLONOSCOPY N/A 2021    COLONOSCOPY   :- performed by Lang Parikh MD at [facility] ENDOSCOPY    COLONOSCOPY      COLONOSCOPY N/A 2016    COLONOSCOPY performed by Lang Parikh MD at [facility] ENDOSCOPY    DEXA BONE DENSITY AXIAL SKELETON      nml    HYSTERECTOMY (CERVIX STATUS UNKNOWN)      [facility] MAMMOGRAM SCREEN BILAT  3/2014    PAP SMEAR      SINUS SURGERY PROC UNLISTED      TONSILLECTOMY       Family History   Problem Relation Age of Onset    Dementia Father     Cancer Mother         breast, 50s     Diabetes Mother     Heart Disease Mother         MI, CAD    Breast Cancer Mother         onset: late 50 early 60    Stroke Father     Heart Disease Father         CAD      Social History

## 2025-01-30 NOTE — PROGRESS NOTES
Hospitalist Progress Note        Demographics    Patient Name  Cole Velasquez   Date of Birth 1950   Medical Record Number  [id number]      Age  74 y.o.   PCP Jona Mead DO   Admit date:  1/23/2025  1:22 AM     Room Number  2512/01  @ [facility]           Admission Diagnoses:  AMS (altered mental status)   Admission Summary:  \" 74-year-old female with history of hypertension, hyperlipidemia, prediabetes, anxiety, chronic pain admitted to hospital medicine service 1/23 after apparent fall at home. Patient was found unresponsive. She was transported to the emergency department where she remained confused. While in the emergency department she suffered a witnessed generalized seizure and received intravenous midazolam. CT head revealed no acute findings. CT C-spine was negative. She was loaded with Keppra. She was noted to be febrile. She was subsequently transferred to the ICU under the care of of critical care medicine and required intubation. She was treated empirically for meningitis with antibacterial antibiotics and acyclovir. She underwent LP 1/24 which was a traumatic tap. All PCR studies and cultures remain negative from that specimen. She has been seen in consultation by neurology, infectious diseases. A prolonged EEG was performed overnight 1/23 into a.m. 1/24 without electrical seizures. Video recording also demonstrated no evidence of convulsive seizures.  \"      Assessment and plan:      Found down/unwitnessed fall, PTA  Witnessed seizure activity, POA,   Acute encephalopathy, POA  SIRS/Transient fever and tachycardia without clear infectious source, not POA, resolved- possible serotonin syndrome  Right sided weakness, not POA, improved        CT head and neck- NAD        MRI brain- NAD        EEG- no epileptic foci but study abnormal consistent with encephalopathy  Intubated 1/23 for AW protection. Extubated 1/27 and tolerating well. Now weaned to 5lpm.  Neurology  from SARS-CoV-2, Influenza A, and Influenza B in nasopharyngeal specimens.                 Recent Labs     01/28/25  0345 01/29/25  1144 01/30/25  0347   WBC 9.1 7.2 6.4   HGB 13.0 12.8 12.6   HCT 39.6 39.2 38.5    200 216     Recent Labs     01/28/25  0345 01/29/25  1144 01/30/25  0347   * 141 140   K 4.0 3.1* 3.6    105 107   CO2 28 27 28   BUN 10 9 9   MG 2.0 1.8 2.7*   PHOS  --  2.1* 3.5   ALT  --  37 37      Lab Results   Component Value Date/Time    TSH 3.31 03/04/2024 11:30 AM         Other medical conditions are listed in the active hospital problem list section; these and other pertinent data were taken into consideration when the treatment plan was developed and customized to this patient's unique overall circumstances and needs.  We have reviewed relevant medical records within the constraints of this admission process.   High complexity decision making was performed for this patient who is at high risk for decompensation with multiple organ involvement.   Today total floor/unit time was 50 minutes while caring for this patient and greater than 50% of that time was spent with patient (and/or family/responsible party) coordinating patient's clinical issues; this includes time spent during multidisciplinary rounds.        Tuyet Dave PA-C  1/30/2025

## 2025-01-30 NOTE — PLAN OF CARE
Speech LAnguage Pathology TREATMENT    Patient: Cole Velasquez (74 y.o. female)  Date: 1/30/2025  Primary Diagnosis: Encephalopathy acute [G93.40]  AMS (altered mental status) [R41.82]       Precautions:  Fall Risk, Bed Alarm                  ASSESSMENT :  PO intake remains limited due to patient refusal/poor appetite. Delayed coughing intermittently noted with PO intake while patient was grimacing and reporting feeling of discomfort with PO, however she was unable to elaborate. Suspect swallow function will mirror mental status.     Patient will benefit from skilled intervention to address the above impairments.     PLAN :  Recommendations and Planned Interventions:  Diet: Soft and bite sized and thin liquids  --Medications whole, one at a time with liquid or puree (I.e. applesauce) vs crushed as needed and able  --Upright all PO intake   --Single bites/sips   --Alternate solids & liquids   --Slow rate  --Oral hygiene 2-3x/day  --1:1 supervision/assistance      Prevention of ICU delirium:  -Frequent reorientation  -Cognitive stimulation  -Assess and treat pain  -Reduce sleep interruption  -Non-pharmacologic sleep enhancement  -Promote sleep/wake cycle (open blinds, turn lights on during the day)  -Early mobility per PT/OT  -If the patient wears glasses or hearing aids, make sure that they are on at appropriate times of day.  -If patient has dentures, please place in pt's mouth at appropriate times.  (Tate, N. E., & KOBE Stephenson. (2013). Preventing delirium in the intensive care unit. Critical Care Clinics, 29(1), 51-65. https://doi.org/10.1016/j.ccc.2012.10.007)           Acute SLP Services: Yes, SLP will continue to follow per plan of care.  Discharge Recommendations: Yes, recommend SLP treatment at next level of care     SUBJECTIVE:   Patient stated, “It just feels weird.” Patient now on precedex due to agitation.    OBJECTIVE:     Past Medical History:   Diagnosis Date    Gall stones     GERD

## 2025-01-30 NOTE — PROGRESS NOTES
Occupational Therapy note:    Chart reviewed and discussed with nursing. Patient agitated overnight, biting and attempting to hit staff requiring precedex. She remains lethargic and not appropriate for OT tx session at this time. Will defer and continue to follow when patient medically appropriate.     Theresa Hartley, OTR/L, OTD

## 2025-01-30 NOTE — PROGRESS NOTES
Chart reviewed. Per RN, pt agitated overnight, biting and hitting at staff, requiring Haldol as well as precedex. Pt not appropriate for participation in skilled intervention at this time. Will defer however continue to follow.    Marquita Garcia, PT, DPT

## 2025-01-30 NOTE — PROGRESS NOTES
Pt very agitated overnight and combative towards staff. Pt hit and bit sitter at bedside while sitter and RN attempting to reposition pt after pt had incontinent BM. Therapeutic communication and reorientation unsuccessful. Pt received haldol x2 doses, see MAR, without resolve of agitation. Pt constantly attempting to remove lines/tubes/monitors and get out of bed unassisted. Fall and safety precautions maintained. Qtc prolongation noted after haldol doses, NP Mattice updated of pt status and agitation, see MAR.

## 2025-01-30 NOTE — PROCEDURES
PROCEDURE NOTE  Date: 1/29/2025   Name: Cole Velasquez  YOB: 1950    Procedures        Test Date: January 29, 2025    History: Patient with sudden altered mental status, for rapid EEG to rule out seizures    Medications: See chart    Patient consent: Correct patient identified    Description of procedure: This EEG was obtained using a 10 lead, 8 channel system positioned circumferentially without any parasagittal coverage (rapid EEG). Computer selected EEG is reviewed as well as background features and all clinically significant events. Clarity algorithm with NTAP was utilized and implemented to provide analysis of underlying activity and seizure detection used to facilitate reading.    Description of recording: This is a rapid EEG on patient to rule out seizures in a patient with acute altered mental status, and this study began at 11:37 AM on January 29, 2025, and ended at 4:46 PM on January 29, 2025 for total time of the study of 5 hours and 9 minutes.  During this time the patient had a moderate increase in some generalized 2 to 6 Hz theta activity and had some poorly formed background rhythms and poorly formed alpha rhythms.  The patient appeared to be asleep frequently in the recording with K complexes and sleep spindles seen in the central head regions.  There were no epileptiform discharges, focal slowing, or electrographic seizures noted throughout the recording.  Hyperventilation was not performed.  Photic stimulation was not performed.    Impression: This study shows moderate increase in some generalized slow-wave activity, but no clear areas of focal slowing no clear spike or spike and wave discharges and no recorded to graphic or dysrhythmic spells of any type seen.  Clinical correlation recommended, and correlation with a standard 16 channel EEG may also be of further diagnostic benefit..    Comment: A normal EEG does not rule out the diagnosis of a seizure disorder;

## 2025-01-31 LAB
ALBUMIN SERPL-MCNC: 2.8 G/DL (ref 3.5–5)
ALBUMIN/GLOB SERPL: 0.7 (ref 1.1–2.2)
ALP SERPL-CCNC: 61 U/L (ref 45–117)
ALT SERPL-CCNC: 36 U/L (ref 12–78)
ANION GAP SERPL CALC-SCNC: 5 MMOL/L (ref 2–12)
AST SERPL-CCNC: 22 U/L (ref 15–37)
BACTERIA SPEC CULT: NORMAL
BASOPHILS # BLD: 0.05 K/UL (ref 0–0.1)
BASOPHILS NFR BLD: 0.7 % (ref 0–1)
BILIRUB SERPL-MCNC: 0.7 MG/DL (ref 0.2–1)
BUN SERPL-MCNC: 11 MG/DL (ref 6–20)
BUN/CREAT SERPL: 16 (ref 12–20)
CALCIUM SERPL-MCNC: 9.1 MG/DL (ref 8.5–10.1)
CHLORIDE SERPL-SCNC: 104 MMOL/L (ref 97–108)
CO2 SERPL-SCNC: 30 MMOL/L (ref 21–32)
CREAT SERPL-MCNC: 0.69 MG/DL (ref 0.55–1.02)
CRP SERPL-MCNC: 3.59 MG/DL (ref 0–0.3)
DIFFERENTIAL METHOD BLD: ABNORMAL
EKG ATRIAL RATE: 55 BPM
EKG DIAGNOSIS: NORMAL
EKG P-R INTERVAL: 158 MS
EKG Q-T INTERVAL: 488 MS
EKG QRS DURATION: 132 MS
EKG QTC CALCULATION (BAZETT): 466 MS
EKG R AXIS: -13 DEGREES
EKG T AXIS: 12 DEGREES
EKG VENTRICULAR RATE: 55 BPM
EOSINOPHIL # BLD: 0.43 K/UL (ref 0–0.4)
EOSINOPHIL NFR BLD: 6 % (ref 0–7)
ERYTHROCYTE [DISTWIDTH] IN BLOOD BY AUTOMATED COUNT: 13.1 % (ref 11.5–14.5)
ERYTHROCYTE [SEDIMENTATION RATE] IN BLOOD: 61 MM/HR (ref 0–30)
GLOBULIN SER CALC-MCNC: 4.1 G/DL (ref 2–4)
GLUCOSE BLD STRIP.AUTO-MCNC: 111 MG/DL (ref 65–117)
GLUCOSE BLD STRIP.AUTO-MCNC: 127 MG/DL (ref 65–117)
GLUCOSE BLD STRIP.AUTO-MCNC: 146 MG/DL (ref 65–117)
GLUCOSE SERPL-MCNC: 89 MG/DL (ref 65–100)
GRAM STN SPEC: NORMAL
HCT VFR BLD AUTO: 41.5 % (ref 35–47)
HGB BLD-MCNC: 13.7 G/DL (ref 11.5–16)
IMM GRANULOCYTES # BLD AUTO: 0.05 K/UL (ref 0–0.04)
IMM GRANULOCYTES NFR BLD AUTO: 0.7 % (ref 0–0.5)
LYME ANTIBODY: NEGATIVE
LYMPHOCYTES # BLD: 2.07 K/UL (ref 0.8–3.5)
LYMPHOCYTES NFR BLD: 29 % (ref 12–49)
MAGNESIUM SERPL-MCNC: 2.2 MG/DL (ref 1.6–2.4)
MCH RBC QN AUTO: 31.4 PG (ref 26–34)
MCHC RBC AUTO-ENTMCNC: 33 G/DL (ref 30–36.5)
MCV RBC AUTO: 95 FL (ref 80–99)
MONOCYTES # BLD: 0.55 K/UL (ref 0–1)
MONOCYTES NFR BLD: 7.7 % (ref 5–13)
NEUTS SEG # BLD: 3.98 K/UL (ref 1.8–8)
NEUTS SEG NFR BLD: 55.9 % (ref 32–75)
NRBC # BLD: 0 K/UL (ref 0–0.01)
NRBC BLD-RTO: 0 PER 100 WBC
PHOSPHATE SERPL-MCNC: 3.3 MG/DL (ref 2.6–4.7)
PLATELET # BLD AUTO: 224 K/UL (ref 150–400)
PMV BLD AUTO: 10.8 FL (ref 8.9–12.9)
POTASSIUM SERPL-SCNC: 3.7 MMOL/L (ref 3.5–5.1)
PROT SERPL-MCNC: 6.9 G/DL (ref 6.4–8.2)
RBC # BLD AUTO: 4.37 M/UL (ref 3.8–5.2)
RPR SER QL: NONREACTIVE
SERVICE CMNT-IMP: ABNORMAL
SERVICE CMNT-IMP: ABNORMAL
SERVICE CMNT-IMP: NORMAL
SERVICE CMNT-IMP: NORMAL
SODIUM SERPL-SCNC: 139 MMOL/L (ref 136–145)
VIT B1 BLD-SCNC: 84 NMOL/L (ref 66.5–200)
WBC # BLD AUTO: 7.1 K/UL (ref 3.6–11)

## 2025-01-31 PROCEDURE — C9254 INJECTION, LACOSAMIDE: HCPCS | Performed by: INTERNAL MEDICINE

## 2025-01-31 PROCEDURE — 82962 GLUCOSE BLOOD TEST: CPT

## 2025-01-31 PROCEDURE — 85652 RBC SED RATE AUTOMATED: CPT

## 2025-01-31 PROCEDURE — 97116 GAIT TRAINING THERAPY: CPT

## 2025-01-31 PROCEDURE — 6370000000 HC RX 637 (ALT 250 FOR IP): Performed by: INTERNAL MEDICINE

## 2025-01-31 PROCEDURE — 2500000003 HC RX 250 WO HCPCS: Performed by: INTERNAL MEDICINE

## 2025-01-31 PROCEDURE — 6360000002 HC RX W HCPCS: Performed by: INTERNAL MEDICINE

## 2025-01-31 PROCEDURE — 51798 US URINE CAPACITY MEASURE: CPT

## 2025-01-31 PROCEDURE — 84100 ASSAY OF PHOSPHORUS: CPT

## 2025-01-31 PROCEDURE — 97530 THERAPEUTIC ACTIVITIES: CPT

## 2025-01-31 PROCEDURE — 83735 ASSAY OF MAGNESIUM: CPT

## 2025-01-31 PROCEDURE — 36415 COLL VENOUS BLD VENIPUNCTURE: CPT

## 2025-01-31 PROCEDURE — 85025 COMPLETE CBC W/AUTO DIFF WBC: CPT

## 2025-01-31 PROCEDURE — 97535 SELF CARE MNGMENT TRAINING: CPT

## 2025-01-31 PROCEDURE — 86140 C-REACTIVE PROTEIN: CPT

## 2025-01-31 PROCEDURE — 1100000000 HC RM PRIVATE

## 2025-01-31 PROCEDURE — 2700000000 HC OXYGEN THERAPY PER DAY

## 2025-01-31 PROCEDURE — 86038 ANTINUCLEAR ANTIBODIES: CPT

## 2025-01-31 PROCEDURE — 86160 COMPLEMENT ANTIGEN: CPT

## 2025-01-31 PROCEDURE — 80053 COMPREHEN METABOLIC PANEL: CPT

## 2025-01-31 RX ADMIN — Medication 1 AMPULE: at 11:21

## 2025-01-31 RX ADMIN — Medication 6 MG: at 21:06

## 2025-01-31 RX ADMIN — ATORVASTATIN CALCIUM 20 MG: 20 TABLET, FILM COATED ORAL at 21:06

## 2025-01-31 RX ADMIN — LEVETIRACETAM 750 MG: 100 INJECTION INTRAVENOUS at 21:09

## 2025-01-31 RX ADMIN — SODIUM CHLORIDE, PRESERVATIVE FREE 10 ML: 5 INJECTION INTRAVENOUS at 09:03

## 2025-01-31 RX ADMIN — LACOSAMIDE 50 MG: 10 INJECTION INTRAVENOUS at 21:09

## 2025-01-31 RX ADMIN — ACETAMINOPHEN 650 MG: 160 SOLUTION ORAL at 06:11

## 2025-01-31 RX ADMIN — FOLIC ACID 1 MG: 1 TABLET ORAL at 08:58

## 2025-01-31 RX ADMIN — ACETAMINOPHEN 650 MG: 160 SOLUTION ORAL at 11:25

## 2025-01-31 RX ADMIN — CYANOCOBALAMIN TAB 500 MCG 1000 MCG: 500 TAB at 08:58

## 2025-01-31 RX ADMIN — ENOXAPARIN SODIUM 30 MG: 100 INJECTION SUBCUTANEOUS at 21:11

## 2025-01-31 RX ADMIN — QUETIAPINE FUMARATE 25 MG: 25 TABLET ORAL at 21:06

## 2025-01-31 RX ADMIN — ACETAMINOPHEN 650 MG: 160 SOLUTION ORAL at 22:53

## 2025-01-31 RX ADMIN — Medication 100 MG: at 08:58

## 2025-01-31 RX ADMIN — ACETAMINOPHEN 650 MG: 160 SOLUTION ORAL at 18:20

## 2025-01-31 RX ADMIN — LEVETIRACETAM 750 MG: 100 INJECTION INTRAVENOUS at 11:21

## 2025-01-31 RX ADMIN — PANTOPRAZOLE SODIUM 40 MG: 40 TABLET, DELAYED RELEASE ORAL at 06:11

## 2025-01-31 RX ADMIN — SODIUM CHLORIDE, PRESERVATIVE FREE 10 ML: 5 INJECTION INTRAVENOUS at 21:11

## 2025-01-31 RX ADMIN — LACOSAMIDE 50 MG: 10 INJECTION INTRAVENOUS at 09:03

## 2025-01-31 RX ADMIN — ENOXAPARIN SODIUM 30 MG: 100 INJECTION SUBCUTANEOUS at 09:06

## 2025-01-31 ASSESSMENT — PAIN SCALES - GENERAL
PAINLEVEL_OUTOF10: 1
PAINLEVEL_OUTOF10: 0
PAINLEVEL_OUTOF10: 2
PAINLEVEL_OUTOF10: 0
PAINLEVEL_OUTOF10: 2
PAINLEVEL_OUTOF10: 0
PAINLEVEL_OUTOF10: 3

## 2025-01-31 ASSESSMENT — PAIN DESCRIPTION - LOCATION
LOCATION: ABDOMEN
LOCATION: ABDOMEN

## 2025-01-31 NOTE — PLAN OF CARE
Problem: Physical Therapy - Adult  Goal: By Discharge: Performs mobility at highest level of function for planned discharge setting.  See evaluation for individualized goals.  Description: FUNCTIONAL STATUS PRIOR TO ADMISSION: Patient was independent and active without use of DME.    HOME SUPPORT PRIOR TO ADMISSION: The patient lived with  and son.    Physical Therapy Goals  Initiated 1/28/2025  1.  Patient will move from supine to sit and sit to supine, scoot up and down, and roll side to side in bed with minimal assistance within 7 day(s).    2.  Patient will perform sit to stand with minimal assistance within 7 day(s).  3.  Patient will transfer from bed to chair and chair to bed with moderate assistance using the least restrictive device within 7 day(s).  4.  Patient will ambulate with moderate assistance for 25 feet with the least restrictive device within 7 day(s).   Outcome: Progressing     PHYSICAL THERAPY TREATMENT    Patient: Cole Velasquez (74 y.o. female)  Date: 1/31/2025  Diagnosis: Encephalopathy acute [G93.40]  AMS (altered mental status) [R41.82] AMS (altered mental status)      Precautions: Fall Risk, Bed Alarm                      ASSESSMENT:  Patient continues to benefit from skilled PT services and is progressing towards goals. Pt encountered semi-reclined in bed in NAD on 2LNC, cleared by RN and agreeable to participate in therapy. Transferred to sit EOB with CGA, sit <> stand x multiple trials from EOB, toilet and chair with RW and Min A. Upon initial standing pt with postural sway, c/o lightheadedness and LE instability though no LOB and VSS, benefits from RW for UE support. Ambulated to toilet/sink with RW, required seated rest break after standing ADLs. Then ambulated in room with Min A, cueing to maintain RW near ADE. Pt left seated in bedside chair in NAD, all needs met.          PLAN:  Patient continues to benefit from skilled intervention to address the above impairments.

## 2025-01-31 NOTE — PROGRESS NOTES
0700: Bedside shift report received from ONIEL Mcconnell    0800: Shift assessment completed. Pt AxOX4, follows commands, On 2L NC, clear diminished lung sounds, active bowel sounds, palpable pulses in all extremities. See flow sheet/MAR for details.     1030: PT/OT at bedside.     1200: Reassessment completed. No changes to previous assessment. See flow sheet/MAR for details.     1300: Dr Bentley at bedside. Pt goals  -Tranfers.     1505: Transfer report given to ULYSSES Jerome    [id number]

## 2025-01-31 NOTE — PROGRESS NOTES
SUMMARY:  74-year-old female with history of hypertension, hyperlipidemia, prediabetes, anxiety, chronic pain admitted to hospital medicine service 1/23 after apparent fall at home.  Patient was found unresponsive.  She was transported to the emergency department where she remained confused.  While in the emergency department she suffered a witnessed generalized seizure and received intravenous midazolam.  CT head revealed no acute findings.  CT C-spine was negative.  She was loaded with Keppra.  She was noted to be febrile.  She was subsequently transferred to the ICU under the care of of critical care medicine and required intubation.  She was treated empirically for meningitis with antibacterial antibiotics and acyclovir.  She underwent LP 1/24 which was a traumatic tap.  All PCR studies and cultures remain negative from that specimen.  She has been seen in consultation by neurology, infectious diseases.  A prolonged EEG was performed overnight 1/23 into a.m. 1/24 without electrical seizures.  Video recording also demonstrated no evidence of convulsive seizures.    SUBJ:  Passed SBT this a.m. and RASS 0, F/C.  Extubated under my supervision to [facility] and tolerating well.  Cognition appears to be intact.  Strong cough.  Voice quality is hoarse.  Antibacterial antibiotics discontinued by Dr. Herron    01/28 Tolerating extubation well. Cognition improved. Worked with PT this morning. Anti-bacterial antibiotics discontinued. Remains on acyclovir - ID service managing. NAD and well oriented  01/29 Acyclovir stopped yesterday and patient was transferred to SDU/ Srvice.However, overnight, she developed agitated delirium and was difficult to manage byt the nursing staff. This morning, I evaluated her and she was confused/poorly oriented but very pleasant and in no distress. Later in morning, while speaking with her son, she developed a staring spell and appeared to be aphasic. Code S was called and CT revealed

## 2025-01-31 NOTE — PLAN OF CARE
Jayesh Cramer RN  Outcome: Progressing  Flowsheets  Taken 1/30/2025 2000 by Jayesh Vincent RN  Absence of seizures:   Monitor for seizure activity.  If seizure occurs, document type and location of movements and any associated apnea   If seizure occurs, turn head to side and suction secretions as needed   Administer anticonvulsants as ordered   Support airway/breathing, administer oxygen as needed    Goal: Remains free of injury related to seizures activity  1/30/2025 2144 by Jayesh Vincent RN  Outcome: Progressing  Flowsheets  Taken 1/30/2025 2000 by Jayesh Vincent RN  Remains free of injury related to seizure activity: Maintain airway, patient safety  and administer oxygen as ordered    Goal: Achieves maximal functionality and self care  1/30/2025 2144 by Jayesh Vincent RN  Outcome: Progressing  Flowsheets  Taken 1/30/2025 2000 by Jayesh Vincent RN  Achieves maximal functionality and self care: Monitor swallowing and airway patency with patient fatigue and changes in neurological status  Taken 1/30/2025 0800 by Alma Strong RN  Achieves maximal functionality and self care:   Monitor swallowing and airway patency with patient fatigue and changes in neurological status   Encourage and assist patient to increase activity and self care with guidance from physical therapy/occupational therapy   Encourage visually impaired, hearing impaired and aphasic patients to use assistive/communication devices     Problem: SLP Adult - Impaired Swallowing  Goal: By Discharge: Advance to least restrictive diet without signs or symptoms of aspiration for planned discharge setting.  See evaluation for individualized goals.  Description: Speech Pathology Goals:   Initiated 1/28/2025     1.  Pt will tolerate least restrictive diet without clinical s/s aspiration or respiratory decline within 7 days.  2. Patient will participate in continued cognitive-communication diagnostic treatment within 7 days.    1/30/2025 1422 by Solange Mosqueda, SLP  Outcome: Progressing

## 2025-01-31 NOTE — CONSULTS
Rheumatology Progress Note    Daily Progress Note: 1/31/2025 4:25 PM    History of the Present Illness    Pt admitted for encephalopathy after being found down and has been tx for Sz and empirically for viral encephalitis. We have been consulted to derek for a potential autoimmune cause.   She has improved drastically since yesterday. She is oriented x 3.   She denies any sx of inflammatory arthritis, dyspnea, CP, hx of pleuritis, photosensitivity, raynauds    Past Medical History:   Diagnosis Date    Gall stones     GERD (gastroesophageal reflux disease)     Hemorrhoid     Hypertension     Ill-defined condition     rosacea    Ill-defined condition     prediabetes - is on metformin/no longer on metformin    Ill-defined condition     gout    Ill-defined condition     obesity per pt    Sleep apnea     borderline - was put on a machine/not using a machine now       Current Facility-Administered Medications   Medication Dose Route Frequency    acetaminophen (TYLENOL) 160 MG/5ML solution 650 mg  650 mg Oral Q6H    atorvastatin (LIPITOR) tablet 20 mg  20 mg Oral Nightly    vitamin B-12 (CYANOCOBALAMIN) tablet 1,000 mcg  1,000 mcg Oral Daily    folic acid (FOLVITE) tablet 1 mg  1 mg Oral Daily    levETIRAcetam (KEPPRA) injection 750 mg  750 mg IntraVENous Q12H    QUEtiapine (SEROQUEL) tablet 25 mg  25 mg Oral Nightly    lacosamide (VIMPAT) injection 50 mg  50 mg IntraVENous BID    melatonin tablet 6 mg  6 mg Oral Nightly    [Held by provider] vitamin D (ERGOCALCIFEROL) capsule 50,000 Units  50,000 Units Oral Weekly    thiamine mononitrate tablet 100 mg  100 mg Oral Daily    iopamidol (ISOVUE-370) 76 % injection 100 mL  100 mL IntraVENous ONCE PRN    potassium chloride (KLOR-CON M) extended release tablet 40 mEq  40 mEq Oral PRN    Or    potassium chloride (KLOR-CON) 20 MEQ packet 40 mEq  40 mEq Oral PRN    Or    potassium chloride 10 mEq/100 mL IVPB (Peripheral Line)  10 mEq IntraVENous PRN    magnesium sulfate 2000 mg in  Glucose    Collection Time: 01/30/25  5:22 PM   Result Value Ref Range    POC Glucose 105 65 - 117 mg/dL    Performed by: Tae Marquez RN    POCT Glucose    Collection Time: 01/30/25  8:20 PM   Result Value Ref Range    POC Glucose 119 (H) 65 - 117 mg/dL    Performed by: IVETT Cramer RN    CBC with Auto Differential    Collection Time: 01/31/25  4:53 AM   Result Value Ref Range    WBC 7.1 3.6 - 11.0 K/uL    RBC 4.37 3.80 - 5.20 M/uL    Hemoglobin 13.7 11.5 - 16.0 g/dL    Hematocrit 41.5 35.0 - 47.0 %    MCV 95.0 80.0 - 99.0 FL    MCH 31.4 26.0 - 34.0 PG    MCHC 33.0 30.0 - 36.5 g/dL    RDW 13.1 11.5 - 14.5 %    Platelets 224 150 - 400 K/uL    MPV 10.8 8.9 - 12.9 FL    Nucleated RBCs 0.0 0  WBC    nRBC 0.00 0.00 - 0.01 K/uL    Neutrophils % 55.9 32.0 - 75.0 %    Lymphocytes % 29.0 12.0 - 49.0 %    Monocytes % 7.7 5.0 - 13.0 %    Eosinophils % 6.0 0.0 - 7.0 %    Basophils % 0.7 0.0 - 1.0 %    Immature Granulocytes % 0.7 (H) 0.0 - 0.5 %    Neutrophils Absolute 3.98 1.80 - 8.00 K/UL    Lymphocytes Absolute 2.07 0.80 - 3.50 K/UL    Monocytes Absolute 0.55 0.00 - 1.00 K/UL    Eosinophils Absolute 0.43 (H) 0.00 - 0.40 K/UL    Basophils Absolute 0.05 0.00 - 0.10 K/UL    Immature Granulocytes Absolute 0.05 (H) 0.00 - 0.04 K/UL    Differential Type AUTOMATED     Comprehensive Metabolic Panel    Collection Time: 01/31/25  4:53 AM   Result Value Ref Range    Sodium 139 136 - 145 mmol/L    Potassium 3.7 3.5 - 5.1 mmol/L    Chloride 104 97 - 108 mmol/L    CO2 30 21 - 32 mmol/L    Anion Gap 5 2 - 12 mmol/L    Glucose 89 65 - 100 mg/dL    BUN 11 6 - 20 MG/DL    Creatinine 0.69 0.55 - 1.02 MG/DL    BUN/Creatinine Ratio 16 12 - 20      Est, Glom Filt Rate 90 >60 ml/min/1.73m2    Calcium 9.1 8.5 - 10.1 MG/DL    Total Bilirubin 0.7 0.2 - 1.0 MG/DL    ALT 36 12 - 78 U/L    AST 22 15 - 37 U/L    Alk Phosphatase 61 45 - 117 U/L    Total Protein 6.9 6.4 - 8.2 g/dL    Albumin 2.8 (L) 3.5 - 5.0 g/dL    Globulin 4.1 (H) 2.0 - 4.0

## 2025-01-31 NOTE — PROGRESS NOTES
1920 Bedside and Verbal shift change report given to ONIEL Mcconnell (oncoming nurse) by ONIEL Marquez (offgoing nurse). Report included the following information Nurse Handoff Report, Intake/Output, MAR, Recent Results, and Cardiac Rhythm Sinus Rhythm with BBB .     2000 Shift assessment done. Patient oriented to person, time, place and situation. Calm and follows commands. On Precedex drip at 0.1mcg/kg/hr. RASS 0. On oxygen support at 2lpm/nasal cannula. With purewick connected to continuous suction. On bilateral SCD's below knee. See flowsheets for details.     2155 Patient's heart rate 53. Precedex drip stopped. BP 82/63, recheck was 93/63. Heart rate sustaining high 40s's to lower 50's. Patient's RASS -1. Emerson Huang NP made aware.    0000 Reassessment done. No acute changes noted.    0400 Reassessment done. No change to previous assessment.    0500 Failed attempt to extract morning labs. Notified ONIEL Mayberry. Ultrasound-guided extraction done and tubed specimen to lab.    0616 SUSANNAH Huang made aware of low urine output and bladder scan result of 210ml.     0720 Bedside and Verbal shift change report given to ONIEL Marquez (oncoming nurse) by ONIEL Mcconnell (offgoing nurse). Report included the following information Nurse Handoff Report, Intake/Output, MAR, Recent Results, and Cardiac Rhythm Sinus Bradycardia, NSR, BBB .

## 2025-01-31 NOTE — PROGRESS NOTES
Comprehensive Nutrition Assessment    Type and Reason for Visit:  Reassess    Nutrition Recommendations/Plan:   Continue diet and supplements as ordered  Please document % meals and supplements consumed in flowsheet I/O's under intake      Malnutrition Assessment:  Malnutrition Status:  No malnutrition (01/28/25 1439)    Context:  Acute Illness     Findings of the 6 clinical characteristics of malnutrition:  Energy Intake:  Mild decrease in energy intake  Weight Loss:  No weight loss     Body Fat Loss:  No body fat loss     Muscle Mass Loss:  No muscle mass loss    Fluid Accumulation:  No fluid accumulation Extremities   Strength:  Not Performed    Nutrition Assessment:  Chart reviewed, case discussed during CCU rounds.  Pt working with therapy at time of attempted visit.  She had a few days AMS but is now oriented x 4 again.  Appetite was poor during days of disorientation/agitation but seems better today.  Labs reviewed.  BM noted yesterday.  Would continue to encourage PO intake and supplements.   Patient Vitals for the past 120 hrs:   PO Meals Eaten (%)   01/31/25 0900 76 - 100%   01/30/25 1300 0%   01/30/25 1100 0%   01/29/25 1823 1 - 25%   01/29/25 1400 1 - 25%   01/29/25 1130 1 - 25%   01/28/25 2000 1 - 25%   01/28/25 1512 1 - 25%     Patient Vitals for the past 120 hrs:   PO Supplement (%)   01/29/25 1828 1 - 25%            Nutrition Related Findings:    Meds: folvite, lispro, protonix, thiamine, Vitamin D12.    Edema: +1 trace-generalized, +1-BUE, RLE, trace-LLE.    BM: 1/30   Wound Type: Skin Tears       Current Nutrition Intake & Therapies:    Average Meal Intake: %  Average Supplements Intake: Unable to assess  ADULT DIET; Dysphagia - Soft and Bite Sized  ADULT ORAL NUTRITION SUPPLEMENT; Lunch, Dinner; Diabetic Oral Supplement    Anthropometric Measures:  Height: 160 cm (5' 2.99\")  Ideal Body Weight (IBW): 115 lbs (52 kg)    Admission Body Weight: 101.6 kg (224 lb)  Current Body Weight: 106.7

## 2025-01-31 NOTE — PLAN OF CARE
Problem: Occupational Therapy - Adult  Goal: By Discharge: Performs self-care activities at highest level of function for planned discharge setting.  See evaluation for individualized goals.  Description: FUNCTIONAL STATUS PRIOR TO ADMISSION:  Patient questionable historian secondary to confusion but son present and reported patient was independent for ADLs and functional mobility. She was driving and completing IADLs without assist.    ,  ,  ,  ,  ,  ,  ,  ,  ,  , Active : Yes     HOME SUPPORT: Patient lived with her  and son.    Occupational Therapy Goals:  Initiated 1/28/2025  1.  Patient will perform grooming with Minimal Assist within 7 day(s).  2.  Patient will perform upper body dressing with Moderate Assist within 7 day(s).  3.  Patient will perform lower body dressing with Maximal Assist within 7 day(s).  4.  Patient will perform toilet transfers with Moderate Assist  within 7 day(s).  5.  Patient will perform all aspects of toileting with Moderate Assist within 7 day(s).  6.  Patient will participate in upper extremity therapeutic exercise/activities with Minimal Assist for 3 minutes within 7 day(s).    Outcome: Progressing   OCCUPATIONAL THERAPY TREATMENT  Patient: Cole Velasquez (74 y.o. female)  Date: 1/31/2025  Primary Diagnosis: Encephalopathy acute [G93.40]  AMS (altered mental status) [R41.82]       Precautions: Fall Risk, Bed Alarm                Chart, occupational therapy assessment, plan of care, and goals were reviewed.    ASSESSMENT  Patient continues to benefit from skilled OT services and is progressing towards goals. Patient received semisupine in bed on 3L O2 with son present in room and cleared for therapy by nursing. She appeared more alert and demonstrated improved cognition compared to last session. Patient required significantly less assist for bed mobility and tolerated transfer to recliner chair. Following seated rest break, patient tolerated walking to sink in

## 2025-01-31 NOTE — BSMART NOTE
Initial BSMART Liaison Assessment Form     Section I - Integrated Summary    Reason for consult is: AMS agitation .    LOS:  8     Presenting problem/Summary:    Pt was brought to the ED via EMS after being found by her  unresponsive on the floor. Pt was seen face to face on the medical unit at [facility]. Pt's son Joni was present at the bedside. Pt was sitting up and alert and was eating breakfast. Liaison introduced self and role. Pt was pleasant and willing to engage in assessment. Pt was alert and oriented x4. Pt is unable to recall what she was doing prior to passing out. Pt did report that she is taking medication for depression , prescribed by her PCP. No past psychiatric admissions. Pt reported she was feeling blessed and was not depressed. Per pt's son they are still trying to figure out what caused her to pass out. Pt continued to engage in pleasant conversation and there was no sign of any confusion. At this time continued SW intervention does not seem necessary however the pt was informed that should she want to be seen that she can request it.     Precipitant Factors are unknown .    The information is given by the patient.  Current Psychiatrist and/or  is none reported .  Previous Hospitalizations/Treatment: None reported     Plan: Liaison will monitor and support     Lethality Assessment:  The potential for suicide is not noted.    The potential for homicide is not noted.  The patient has not been a perpetrator of sexual or physical abuse.    There are not pending charges.  The patient is not felt to be at risk for self-harm or harm to others.      Section II - Psychosocial  The patient's overall mood and attitude is CALM .  Feelings of helplessness and hopelessness are not observed.  Generalized anxiety is not observed.  Panic is not observed. Phobias are not observed.  Obsessive compulsive tendencies are not observed.      Section III - Mental Status Exam  The patient's appearance

## 2025-02-01 LAB
ALBUMIN SERPL-MCNC: 2.8 G/DL (ref 3.5–5)
ALBUMIN/GLOB SERPL: 0.8 (ref 1.1–2.2)
ALP SERPL-CCNC: 56 U/L (ref 45–117)
ALT SERPL-CCNC: 33 U/L (ref 12–78)
ANION GAP SERPL CALC-SCNC: 6 MMOL/L (ref 2–12)
AST SERPL-CCNC: 18 U/L (ref 15–37)
BASOPHILS # BLD: 0.07 K/UL (ref 0–0.1)
BASOPHILS NFR BLD: 1.1 % (ref 0–1)
BILIRUB SERPL-MCNC: 0.5 MG/DL (ref 0.2–1)
BUN SERPL-MCNC: 13 MG/DL (ref 6–20)
BUN/CREAT SERPL: 18 (ref 12–20)
C3 SERPL-MCNC: 165 MG/DL (ref 82–167)
C4 SERPL-MCNC: 45 MG/DL (ref 12–38)
CALCIUM SERPL-MCNC: 8.8 MG/DL (ref 8.5–10.1)
CHLORIDE SERPL-SCNC: 108 MMOL/L (ref 97–108)
CO2 SERPL-SCNC: 29 MMOL/L (ref 21–32)
CREAT SERPL-MCNC: 0.71 MG/DL (ref 0.55–1.02)
DIFFERENTIAL METHOD BLD: ABNORMAL
EOSINOPHIL # BLD: 0.44 K/UL (ref 0–0.4)
EOSINOPHIL NFR BLD: 6.7 % (ref 0–7)
ERYTHROCYTE [DISTWIDTH] IN BLOOD BY AUTOMATED COUNT: 13.4 % (ref 11.5–14.5)
GLOBULIN SER CALC-MCNC: 3.7 G/DL (ref 2–4)
GLUCOSE BLD STRIP.AUTO-MCNC: 110 MG/DL (ref 65–117)
GLUCOSE BLD STRIP.AUTO-MCNC: 165 MG/DL (ref 65–117)
GLUCOSE BLD STRIP.AUTO-MCNC: 96 MG/DL (ref 65–117)
GLUCOSE BLD STRIP.AUTO-MCNC: 97 MG/DL (ref 65–117)
GLUCOSE SERPL-MCNC: 97 MG/DL (ref 65–100)
HCT VFR BLD AUTO: 40.3 % (ref 35–47)
HGB BLD-MCNC: 13 G/DL (ref 11.5–16)
IMM GRANULOCYTES # BLD AUTO: 0.04 K/UL (ref 0–0.04)
IMM GRANULOCYTES NFR BLD AUTO: 0.6 % (ref 0–0.5)
LYMPHOCYTES # BLD: 2.34 K/UL (ref 0.8–3.5)
LYMPHOCYTES NFR BLD: 35.4 % (ref 12–49)
MAGNESIUM SERPL-MCNC: 1.9 MG/DL (ref 1.6–2.4)
MCH RBC QN AUTO: 30.9 PG (ref 26–34)
MCHC RBC AUTO-ENTMCNC: 32.3 G/DL (ref 30–36.5)
MCV RBC AUTO: 95.7 FL (ref 80–99)
MONOCYTES # BLD: 0.6 K/UL (ref 0–1)
MONOCYTES NFR BLD: 9.1 % (ref 5–13)
NEUTS SEG # BLD: 3.12 K/UL (ref 1.8–8)
NEUTS SEG NFR BLD: 47.1 % (ref 32–75)
NRBC # BLD: 0 K/UL (ref 0–0.01)
NRBC BLD-RTO: 0 PER 100 WBC
PHOSPHATE SERPL-MCNC: 2.3 MG/DL (ref 2.6–4.7)
PLATELET # BLD AUTO: 228 K/UL (ref 150–400)
PMV BLD AUTO: 11.2 FL (ref 8.9–12.9)
POTASSIUM SERPL-SCNC: 3.4 MMOL/L (ref 3.5–5.1)
PROT SERPL-MCNC: 6.5 G/DL (ref 6.4–8.2)
RBC # BLD AUTO: 4.21 M/UL (ref 3.8–5.2)
SERVICE CMNT-IMP: ABNORMAL
SERVICE CMNT-IMP: NORMAL
SODIUM SERPL-SCNC: 143 MMOL/L (ref 136–145)
WBC # BLD AUTO: 6.6 K/UL (ref 3.6–11)

## 2025-02-01 PROCEDURE — 84100 ASSAY OF PHOSPHORUS: CPT

## 2025-02-01 PROCEDURE — 6370000000 HC RX 637 (ALT 250 FOR IP): Performed by: INTERNAL MEDICINE

## 2025-02-01 PROCEDURE — 85025 COMPLETE CBC W/AUTO DIFF WBC: CPT

## 2025-02-01 PROCEDURE — 94760 N-INVAS EAR/PLS OXIMETRY 1: CPT

## 2025-02-01 PROCEDURE — 83735 ASSAY OF MAGNESIUM: CPT

## 2025-02-01 PROCEDURE — 80053 COMPREHEN METABOLIC PANEL: CPT

## 2025-02-01 PROCEDURE — 6360000002 HC RX W HCPCS: Performed by: INTERNAL MEDICINE

## 2025-02-01 PROCEDURE — 2500000003 HC RX 250 WO HCPCS: Performed by: INTERNAL MEDICINE

## 2025-02-01 PROCEDURE — 2700000000 HC OXYGEN THERAPY PER DAY

## 2025-02-01 PROCEDURE — 36415 COLL VENOUS BLD VENIPUNCTURE: CPT

## 2025-02-01 PROCEDURE — 1100000000 HC RM PRIVATE

## 2025-02-01 PROCEDURE — 93005 ELECTROCARDIOGRAM TRACING: CPT | Performed by: INTERNAL MEDICINE

## 2025-02-01 PROCEDURE — C9254 INJECTION, LACOSAMIDE: HCPCS | Performed by: INTERNAL MEDICINE

## 2025-02-01 PROCEDURE — 82962 GLUCOSE BLOOD TEST: CPT

## 2025-02-01 RX ORDER — DIPHENHYDRAMINE HCL 25 MG
25 CAPSULE ORAL NIGHTLY PRN
Status: DISCONTINUED | OUTPATIENT
Start: 2025-02-01 | End: 2025-02-05 | Stop reason: HOSPADM

## 2025-02-01 RX ADMIN — Medication 1 AMPULE: at 21:51

## 2025-02-01 RX ADMIN — FOLIC ACID 1 MG: 1 TABLET ORAL at 10:04

## 2025-02-01 RX ADMIN — LACOSAMIDE 50 MG: 10 INJECTION INTRAVENOUS at 11:08

## 2025-02-01 RX ADMIN — PANTOPRAZOLE SODIUM 40 MG: 40 TABLET, DELAYED RELEASE ORAL at 06:08

## 2025-02-01 RX ADMIN — ACETAMINOPHEN 650 MG: 160 SOLUTION ORAL at 23:01

## 2025-02-01 RX ADMIN — ACETAMINOPHEN 650 MG: 160 SOLUTION ORAL at 10:04

## 2025-02-01 RX ADMIN — Medication 1 AMPULE: at 10:26

## 2025-02-01 RX ADMIN — ATORVASTATIN CALCIUM 20 MG: 20 TABLET, FILM COATED ORAL at 21:57

## 2025-02-01 RX ADMIN — ACETAMINOPHEN 650 MG: 160 SOLUTION ORAL at 18:31

## 2025-02-01 RX ADMIN — Medication 100 MG: at 10:04

## 2025-02-01 RX ADMIN — SODIUM CHLORIDE, PRESERVATIVE FREE 10 ML: 5 INJECTION INTRAVENOUS at 21:57

## 2025-02-01 RX ADMIN — LEVETIRACETAM 750 MG: 100 INJECTION INTRAVENOUS at 21:51

## 2025-02-01 RX ADMIN — Medication 6 MG: at 21:51

## 2025-02-01 RX ADMIN — ENOXAPARIN SODIUM 30 MG: 100 INJECTION SUBCUTANEOUS at 21:52

## 2025-02-01 RX ADMIN — LACOSAMIDE 50 MG: 10 INJECTION INTRAVENOUS at 21:51

## 2025-02-01 RX ADMIN — ENOXAPARIN SODIUM 30 MG: 100 INJECTION SUBCUTANEOUS at 10:26

## 2025-02-01 RX ADMIN — SODIUM CHLORIDE, PRESERVATIVE FREE 10 ML: 5 INJECTION INTRAVENOUS at 10:31

## 2025-02-01 RX ADMIN — LEVETIRACETAM 750 MG: 100 INJECTION INTRAVENOUS at 10:04

## 2025-02-01 RX ADMIN — CYANOCOBALAMIN TAB 500 MCG 1000 MCG: 500 TAB at 10:04

## 2025-02-01 ASSESSMENT — PAIN SCALES - GENERAL
PAINLEVEL_OUTOF10: 0
PAINLEVEL_OUTOF10: 3
PAINLEVEL_OUTOF10: 5
PAINLEVEL_OUTOF10: 0
PAINLEVEL_OUTOF10: 5

## 2025-02-01 ASSESSMENT — PAIN DESCRIPTION - ORIENTATION
ORIENTATION: ANTERIOR
ORIENTATION: MID

## 2025-02-01 ASSESSMENT — PAIN DESCRIPTION - DESCRIPTORS
DESCRIPTORS: ACHING
DESCRIPTORS: ACHING

## 2025-02-01 ASSESSMENT — PAIN - FUNCTIONAL ASSESSMENT: PAIN_FUNCTIONAL_ASSESSMENT: ACTIVITIES ARE NOT PREVENTED

## 2025-02-01 ASSESSMENT — PAIN DESCRIPTION - LOCATION
LOCATION: GENERALIZED
LOCATION: HEAD

## 2025-02-01 NOTE — PROGRESS NOTES
SUMMARY:  74-year-old female with history of hypertension, hyperlipidemia, prediabetes, anxiety, chronic pain admitted to hospital medicine service 1/23 after apparent fall at home.  Patient was found unresponsive.  She was transported to the emergency department where she remained confused.  While in the emergency department she suffered a witnessed generalized seizure and received intravenous midazolam.  CT head revealed no acute findings.  CT C-spine was negative.  She was loaded with Keppra.  She was noted to be febrile.  She was subsequently transferred to the ICU under the care of of critical care medicine and required intubation.  She was treated empirically for meningitis with antibacterial antibiotics and acyclovir.  She underwent LP 1/24 which was a traumatic tap.  All PCR studies and cultures remain negative from that specimen.  She has been seen in consultation by neurology, infectious diseases.  A prolonged EEG was performed overnight 1/23 into a.m. 1/24 without electrical seizures.  Video recording also demonstrated no evidence of convulsive seizures.    SUBJ:  Passed SBT this a.m. and RASS 0, F/C.  Extubated under my supervision to [facility] and tolerating well.  Cognition appears to be intact.  Strong cough.  Voice quality is hoarse.  Antibacterial antibiotics discontinued by Dr. Herron    01/28 Tolerating extubation well. Cognition improved. Worked with PT this morning. Anti-bacterial antibiotics discontinued. Remains on acyclovir - ID service managing. NAD and well oriented  01/29 Acyclovir stopped yesterday and patient was transferred to SDU/ Srvice.However, overnight, she developed agitated delirium and was difficult to manage byt the nursing staff. This morning, I evaluated her and she was confused/poorly oriented but very pleasant and in no distress. Later in morning, while speaking with her son, she developed a staring spell and appeared to be aphasic. Code S was called and CT revealed  goal < 160 mmHg  Monitor I/Os and Uo  Monitor renal function panel intermittently  Correct electrolytes as needed  Avoid nephrotoxins  Nutritional support: Dysphagia diet  Glycemic control: SSI  Follow micro results to completion  Antibiotics: off  Follow CBC intermittently  Transfuse as needed to maintain Hgb > 7.0 or for hemodynamically significant bleeding  Pain management: Acetaminophen  Keppra 750 mg BID, Vimpat 50 mg BID  Delirium precautions  Quetiapine 25 mg nightly -> low risk of serotonin syndrome but remain vigilant    Melatonin 6 mg qhs  Autoimmune workup per Rheum   PT/OT/SLP ordered      CCM time: 35 mins. This time includes time spent reviewing database, interviewing and examining patient, discussing care plan on MDRs and communicating with other providers    Rafael Bentley MD  Critical Care Medicine  [facility]  1/31/2025

## 2025-02-01 NOTE — PROGRESS NOTES
levETIRAcetam  750 mg IntraVENous Q12H    QUEtiapine  25 mg Oral Nightly    lacosamide  50 mg IntraVENous BID    melatonin  6 mg Oral Nightly    [Held by provider] vitamin D  50,000 Units Oral Weekly    thiamine mononitrate  100 mg Oral Daily    insulin lispro  0-4 Units SubCUTAneous 4x Daily AC & HS    pantoprazole  40 mg Oral QAM AC    sodium chloride flush  5-40 mL IntraVENous 2 times per day    enoxaparin  30 mg SubCUTAneous BID    alcohol 62%  1 ampule Nasal Q12H    [Held by provider] amLODIPine  10 mg Per NG tube Daily    [Held by provider] carvedilol  6.25 mg Per NG tube BID WC    [Held by provider] lisinopril  40 mg Per NG tube Daily     Continuous Infusions:   dextrose      sodium chloride 25 mL/hr at 01/28/25 1405     PRN Meds:.iopamidol, potassium chloride **OR** potassium alternative oral replacement **OR** potassium chloride, magnesium sulfate, sodium phosphate 15 mmol in sodium chloride 0.9 % 250 mL IVPB, glucose, dextrose bolus **OR** dextrose bolus, glucagon (rDNA), dextrose, acetaminophen, ondansetron, sodium chloride flush, sodium chloride, polyethylene glycol, hydrALAZINE, acetaminophen       Relevant other information:   Results       Procedure Component Value Units Date/Time    Culture, Respiratory [[id number]] Collected: 01/24/25 1733    Order Status: Completed Specimen: Endotracheal Updated: 01/26/25 1029     Special Requests NO SPECIAL REQUESTS        Gram Stain       RARE EPITHELIAL CELLS SEEN            2+ WBCS SEEN         NO ORGANISMS SEEN        Culture NO GROWTH 2 DAYS       Gram stain CSF [[id number]] Collected: 01/24/25 1626    Order Status: Canceled Specimen: CSF Shunt     Meningitis Encephalitis Panel CSF, Molecular [[id number]] Collected: 01/24/25 1610    Order Status: Completed Specimen: CSF Updated: 01/24/25 2251     Escherichia coli K1 CSF by PCR Not detected        Haemophilus influenzae CSF by PCR Not detected        Listeria monocytogenes CSF by PCR Not detected         recent or current infection. Legionnaires' disease  cannot be ruled out since other serogroups and species may also  cause disease.  Performed At: [facility]  [address] [place] [id number]  Samir Capellan MD Ph:[phone]         Culture, Respiratory [[id number]]     Order Status: Canceled Specimen: Endotracheal     Legionella antigen, urine [[id number]] Collected: 01/24/25 0705    Order Status: Completed Specimen: Urine Updated: 01/26/25 1535     Sample Site Urine        L pneumophila S1 Ag, Ur Negative        Comment: (NOTE)  Presumptive negative for L. pneumophila serogroup 1 antigen in urine,  suggesting no recent or current infection. Legionnaires' disease  cannot be ruled out since other serogroups and species may also  cause disease.  Performed At: [facility]  [address] [place] [id number]  Samir Capellan MD Ph:[phone]         S. pneumonia Antigen, Urine/CSF [[id number]] Collected: 01/23/25 2145    Order Status: Canceled Specimen: Urine     Respiratory Panel, Molecular, with COVID-19 (Restricted: peds pts or suitable admitted adults) [[id number]]     Order Status: Canceled Specimen: Nasopharyngeal     Respiratory Panel, Molecular, with COVID-19 (Restricted: peds pts or suitable admitted adults) [[id number]] Collected: 01/23/25 1704    Order Status: Completed Specimen: Nasopharyngeal Updated: 01/23/25 2326     Adenovirus by PCR Not detected        Coronavirus 229E by PCR Not detected        Coronavirus HKU1 by PCR Not detected        Coronavirus NL63 by PCR Not detected        Coronavirus OC43 by PCR Not detected        SARS-CoV-2, PCR Not detected        Human Metapneumovirus by PCR Not detected        Rhinovirus Enterovirus PCR Not detected        Influenza A by PCR Not detected        Influenza B PCR Not detected        Parainfluenza 1 PCR Not detected        Parainfluenza 2 PCR Not detected        Parainfluenza 3 PCR Not detected        Parainfluenza 4 PCR

## 2025-02-01 NOTE — PROGRESS NOTES
End of Shift Note    Bedside shift change report given to ONIEL Castillo (oncoming nurse) by Queenie Wallace LPN (offgoing nurse).  Report included the following information SBAR, Intake/Output, MAR, Cardiac Rhythm NSR, and Alarm Parameters     Shift worked:  2742-9920     Shift summary and any significant changes:     Pt A/O x4 but has moments of being a poor historian.  Ambulation to bathroom was tolerated fairly well so external catheter use was not necessary. Pt educated on use of walker and call light before getting up.  C/o very mild abdominal discomfort. Scheduled Acetaminophen given.  Pt left comfortable in bed with spuose at bedside  Plan of care ongoing     Concerns for physician to address:  None     Zone phone for oncoming shift:   [phone]       Activity:  Level of Assistance: Moderate assist, patient does 50-74%  Number times ambulated in hallways past shift: 0  Number of times OOB to chair past shift: 1    Cardiac:   Cardiac Monitoring: Yes      Cardiac Rhythm: Sinus rhythm    Access:  Current line(s): PIV     Genitourinary:   Urinary Status: Has not voided    Respiratory:   O2 Device: Nasal cannula  Chronic home O2 use?: NO  Incentive spirometer at bedside: NO    GI:  Last BM (including prior to admit): 01/30/25  Current diet:  ADULT DIET; Dysphagia - Soft and Bite Sized  ADULT ORAL NUTRITION SUPPLEMENT; Lunch, Dinner; Diabetic Oral Supplement  Passing flatus: YES    Pain Management:   Patient states pain is manageable on current regimen: YES    Skin:  Berlin Scale Score: 15  Interventions: Wound Offloading (Prevention Methods): Repositioning, Pillows    Patient Safety:  Fall Risk: Nursing Judgement-Fall Risk High(Add Comments): Yes  Fall Risk Interventions  Nursing Judgement-Fall Risk High(Add Comments): Yes  Toilet Every 2 Hours-In Advance of Need: Yes  Hourly Visual Checks: In bed, Awake  Fall Visual Posted: Socks, Fall sign posted  Room Door Open: Yes  Alarm On: Bed  Patient Moved Closer to Nursing

## 2025-02-01 NOTE — CONSULTS
PSYCHIATRY CONSULT NOTE    REASON FOR CONSULT:  Paranoia    HISTORY OF PRESENTING COMPLAINT:  Cole Velasquez is a 74 y.o. Black /  female who is currently admitted to the medical floor at [facility]. Her family is at the bedside. Ms. Velasquez is alert and oriented x 4. She reports physically, she has a headache but overall she feels \"fine\". She explains that yesterday she had felt that someone was watcing her. She has had this feeling before when she at home but this does not occur frequently. She reports that it felt like someone knew she had a computer and knew her sign-in. She reports feeling like \"it was an episode and not true\".    She feels that her symptoms are associated with a high tech computer she recently purchased around [place]. She adamantly denies experiencing paranoia, delusions and/or other psychotic symptoms prior to purchasing this computer.Mrs. Velasquez also endorses poor sleep for > two weeks.  Apetite: reports that she is eating less because she does not hsve an appetite, report I just dont feel like eating.   Mood: \"okay but sad\"  Contributing factors to this is the loss of two of her best friends last year. She explains that this is all fairly new and she never would have thought that she would lose both friends in the same year.   Collateral information obtained from son and :Reports poor sleep prior to these symptoms.  Mrs. Velasquez does not appear to remember the events that occurred in the hospital. She reportedly had AMS and was not herself. Some points during the recovery, Mrs. Velasquez did not recognize her son but that has since then, her mental status has steadily improved.   It was \"confusing\" and her family was unsure of what was going on. She got one real night of rest, reports that he tries to stay awake with her but there are times when he awakes his mother is also awake. He believes that her sleep is still affected. Sleep deprivation is noted.     PAST

## 2025-02-01 NOTE — PROGRESS NOTES
Hospitalist Progress Note        Demographics    Patient Name  Cole Velasquez   Date of Birth 1950   Medical Record Number  [id number]      Age  74 y.o.   PCP Jona Mead DO   Admit date:  1/23/2025  1:22 AM     Room Number  3239/01  @ [facility]           Admission Diagnoses:  AMS (altered mental status)   Admission Summary:  \" 74-year-old female with history of hypertension, hyperlipidemia, prediabetes, anxiety, chronic pain admitted to hospital medicine service 1/23 after apparent fall at home. Patient was found unresponsive. She was transported to the emergency department where she remained confused. While in the emergency department she suffered a witnessed generalized seizure and received intravenous midazolam. CT head revealed no acute findings. CT C-spine was negative. She was loaded with Keppra. She was noted to be febrile. She was subsequently transferred to the ICU under the care of of critical care medicine and required intubation. She was treated empirically for meningitis with antibacterial antibiotics and acyclovir. She underwent LP 1/24 which was a traumatic tap. All PCR studies and cultures remain negative from that specimen. She has been seen in consultation by neurology, infectious diseases. A prolonged EEG was performed overnight 1/23 into a.m. 1/24 without electrical seizures. Video recording also demonstrated no evidence of convulsive seizures.  \"      Assessment and plan:      Found down/unwitnessed fall, PTA  Witnessed seizure activity, POA,   Acute encephalopathy, POA  SIRS/Transient fever and tachycardia without clear infectious source, not POA, resolved- possible serotonin syndrome  Right sided weakness, not POA, improved        CT head and neck- NAD        MRI brain- NAD        EEG- no epileptic foci but study abnormal consistent with encephalopathy  Intubated 1/23 for AW protection. Extubated 1/27 and tolerating well. Now weaned to 5lpm.  Neurology      Bordetella pertussis by PCR Not detected        Chlamydophila Pneumonia PCR Not detected        Mycoplasma pneumo by PCR Not detected       Meningitis Encephalitis Panel CSF, Molecular [[id number]]     Order Status: Canceled Specimen: CSF     Culture, CSF (with Gram Stain) [[id number]]     Order Status: Canceled Specimen: CSF     Gram stain CSF [[id number]]     Order Status: Canceled Specimen: CSF Shunt     Culture, Blood 2 [[id number]] Collected: 01/23/25 0700    Order Status: Completed Specimen: Blood Updated: 01/28/25 1304     Special Requests --        LEFT  Antecubital       Culture NO GROWTH 5 DAYS       Culture, Blood 1 [[id number]] Collected: 01/23/25 0640    Order Status: Completed Specimen: Blood Updated: 01/28/25 1304     Special Requests --        LEFT  Antecubital       Culture NO GROWTH 5 DAYS       COVID-19 & Influenza Combo [[id number]] Collected: 01/23/25 0351    Order Status: Completed Specimen: Nasopharyngeal Updated: 01/23/25 0441     Source Nasopharyngeal        SARS-CoV-2, PCR Not detected        Comment: Not Detected results do not preclude SARS-CoV-2 infection and should not be used as the sole basis for patient management decisions.Results must be combined with clinical observations, patient history, and epidemiological information.        Rapid Influenza A By PCR Not detected        Rapid Influenza B By PCR Not detected        Comment:    Testing was performed using flower Kasandra SARS-CoV-2 and Influenza A/B nucleic acid assay.  This test is a multiplex Real-Time Reverse Transcriptase Polymerase Chain Reaction (RT-PCR) based in vitro diagnostic test intended for the qualitative detection of nucleic acids from SARS-CoV-2, Influenza A, and Influenza B in nasopharyngeal specimens.                 Recent Labs     01/30/25 0347 01/31/25  0453 02/01/25  0529   WBC 6.4 7.1 6.6   HGB 12.6 13.7 13.0   HCT 38.5 41.5 40.3    224 228     Recent Labs     01/30/25 0347 01/31/25  0453

## 2025-02-01 NOTE — PROGRESS NOTES
0200 received phone call from telemetry stating patient is on afib. VS taken /55, HR 88, RR 16, denies palpitation, pain or chest discomfort. EKG done showed Sinus rhythm with premature supraventricular complexes. EKG reading attached on patient's chart. Will continue to monitor

## 2025-02-02 LAB
ALBUMIN SERPL-MCNC: 2.7 G/DL (ref 3.5–5)
ALBUMIN/GLOB SERPL: 0.7 (ref 1.1–2.2)
ALP SERPL-CCNC: 57 U/L (ref 45–117)
ALT SERPL-CCNC: 30 U/L (ref 12–78)
ANION GAP SERPL CALC-SCNC: 4 MMOL/L (ref 2–12)
AST SERPL-CCNC: 15 U/L (ref 15–37)
BASOPHILS # BLD: 0.08 K/UL (ref 0–0.1)
BASOPHILS NFR BLD: 1.4 % (ref 0–1)
BILIRUB SERPL-MCNC: 0.5 MG/DL (ref 0.2–1)
BUN SERPL-MCNC: 6 MG/DL (ref 6–20)
BUN/CREAT SERPL: 9 (ref 12–20)
CALCIUM SERPL-MCNC: 8.7 MG/DL (ref 8.5–10.1)
CHLORIDE SERPL-SCNC: 109 MMOL/L (ref 97–108)
CO2 SERPL-SCNC: 28 MMOL/L (ref 21–32)
CREAT SERPL-MCNC: 0.68 MG/DL (ref 0.55–1.02)
DIFFERENTIAL METHOD BLD: ABNORMAL
EKG ATRIAL RATE: 81 BPM
EKG DIAGNOSIS: NORMAL
EKG P AXIS: 72 DEGREES
EKG P-R INTERVAL: 162 MS
EKG Q-T INTERVAL: 424 MS
EKG QRS DURATION: 134 MS
EKG QTC CALCULATION (BAZETT): 492 MS
EKG R AXIS: -11 DEGREES
EKG T AXIS: 27 DEGREES
EKG VENTRICULAR RATE: 81 BPM
EOSINOPHIL # BLD: 0.45 K/UL (ref 0–0.4)
EOSINOPHIL NFR BLD: 7.6 % (ref 0–7)
ERYTHROCYTE [DISTWIDTH] IN BLOOD BY AUTOMATED COUNT: 13.5 % (ref 11.5–14.5)
GLOBULIN SER CALC-MCNC: 3.7 G/DL (ref 2–4)
GLUCOSE BLD STRIP.AUTO-MCNC: 107 MG/DL (ref 65–117)
GLUCOSE BLD STRIP.AUTO-MCNC: 148 MG/DL (ref 65–117)
GLUCOSE BLD STRIP.AUTO-MCNC: 171 MG/DL (ref 65–117)
GLUCOSE BLD STRIP.AUTO-MCNC: 91 MG/DL (ref 65–117)
GLUCOSE SERPL-MCNC: 116 MG/DL (ref 65–100)
HCT VFR BLD AUTO: 41.4 % (ref 35–47)
HGB BLD-MCNC: 13.4 G/DL (ref 11.5–16)
IMM GRANULOCYTES # BLD AUTO: 0.03 K/UL (ref 0–0.04)
IMM GRANULOCYTES NFR BLD AUTO: 0.5 % (ref 0–0.5)
LYMPHOCYTES # BLD: 2.05 K/UL (ref 0.8–3.5)
LYMPHOCYTES NFR BLD: 34.7 % (ref 12–49)
MCH RBC QN AUTO: 31.1 PG (ref 26–34)
MCHC RBC AUTO-ENTMCNC: 32.4 G/DL (ref 30–36.5)
MCV RBC AUTO: 96.1 FL (ref 80–99)
MONOCYTES # BLD: 0.52 K/UL (ref 0–1)
MONOCYTES NFR BLD: 8.8 % (ref 5–13)
NEUTS SEG # BLD: 2.78 K/UL (ref 1.8–8)
NEUTS SEG NFR BLD: 47 % (ref 32–75)
NRBC # BLD: 0 K/UL (ref 0–0.01)
NRBC BLD-RTO: 0 PER 100 WBC
PLATELET # BLD AUTO: 231 K/UL (ref 150–400)
PMV BLD AUTO: 10.8 FL (ref 8.9–12.9)
POTASSIUM SERPL-SCNC: 3.4 MMOL/L (ref 3.5–5.1)
PROT SERPL-MCNC: 6.4 G/DL (ref 6.4–8.2)
RBC # BLD AUTO: 4.31 M/UL (ref 3.8–5.2)
SERVICE CMNT-IMP: ABNORMAL
SERVICE CMNT-IMP: ABNORMAL
SERVICE CMNT-IMP: NORMAL
SERVICE CMNT-IMP: NORMAL
SODIUM SERPL-SCNC: 141 MMOL/L (ref 136–145)
WBC # BLD AUTO: 5.9 K/UL (ref 3.6–11)

## 2025-02-02 PROCEDURE — 6360000002 HC RX W HCPCS: Performed by: INTERNAL MEDICINE

## 2025-02-02 PROCEDURE — 6370000000 HC RX 637 (ALT 250 FOR IP): Performed by: INTERNAL MEDICINE

## 2025-02-02 PROCEDURE — C9254 INJECTION, LACOSAMIDE: HCPCS | Performed by: INTERNAL MEDICINE

## 2025-02-02 PROCEDURE — 82962 GLUCOSE BLOOD TEST: CPT

## 2025-02-02 PROCEDURE — 2500000003 HC RX 250 WO HCPCS: Performed by: INTERNAL MEDICINE

## 2025-02-02 PROCEDURE — 1100000000 HC RM PRIVATE

## 2025-02-02 PROCEDURE — 85025 COMPLETE CBC W/AUTO DIFF WBC: CPT

## 2025-02-02 PROCEDURE — 36415 COLL VENOUS BLD VENIPUNCTURE: CPT

## 2025-02-02 PROCEDURE — 80053 COMPREHEN METABOLIC PANEL: CPT

## 2025-02-02 RX ADMIN — LACOSAMIDE 50 MG: 10 INJECTION INTRAVENOUS at 10:17

## 2025-02-02 RX ADMIN — Medication 6 MG: at 21:10

## 2025-02-02 RX ADMIN — FOLIC ACID 1 MG: 1 TABLET ORAL at 10:16

## 2025-02-02 RX ADMIN — ACETAMINOPHEN 650 MG: 160 SOLUTION ORAL at 05:15

## 2025-02-02 RX ADMIN — LEVETIRACETAM 750 MG: 100 INJECTION INTRAVENOUS at 10:17

## 2025-02-02 RX ADMIN — LACOSAMIDE 50 MG: 10 INJECTION INTRAVENOUS at 21:10

## 2025-02-02 RX ADMIN — ATORVASTATIN CALCIUM 20 MG: 20 TABLET, FILM COATED ORAL at 21:10

## 2025-02-02 RX ADMIN — Medication 1 AMPULE: at 21:00

## 2025-02-02 RX ADMIN — CYANOCOBALAMIN TAB 500 MCG 1000 MCG: 500 TAB at 10:16

## 2025-02-02 RX ADMIN — ACETAMINOPHEN 650 MG: 160 SOLUTION ORAL at 10:58

## 2025-02-02 RX ADMIN — Medication 1 AMPULE: at 10:58

## 2025-02-02 RX ADMIN — LEVETIRACETAM 750 MG: 100 INJECTION INTRAVENOUS at 22:29

## 2025-02-02 RX ADMIN — ENOXAPARIN SODIUM 30 MG: 100 INJECTION SUBCUTANEOUS at 21:11

## 2025-02-02 RX ADMIN — ACETAMINOPHEN 650 MG: 160 SOLUTION ORAL at 23:15

## 2025-02-02 RX ADMIN — Medication 100 MG: at 10:16

## 2025-02-02 RX ADMIN — SODIUM CHLORIDE, PRESERVATIVE FREE 10 ML: 5 INJECTION INTRAVENOUS at 10:28

## 2025-02-02 RX ADMIN — PANTOPRAZOLE SODIUM 40 MG: 40 TABLET, DELAYED RELEASE ORAL at 05:16

## 2025-02-02 RX ADMIN — ACETAMINOPHEN 650 MG: 160 SOLUTION ORAL at 18:06

## 2025-02-02 RX ADMIN — ENOXAPARIN SODIUM 30 MG: 100 INJECTION SUBCUTANEOUS at 10:28

## 2025-02-02 RX ADMIN — SODIUM CHLORIDE, PRESERVATIVE FREE 10 ML: 5 INJECTION INTRAVENOUS at 21:10

## 2025-02-02 ASSESSMENT — PAIN DESCRIPTION - DESCRIPTORS: DESCRIPTORS: ACHING

## 2025-02-02 ASSESSMENT — PAIN SCALES - GENERAL
PAINLEVEL_OUTOF10: 0
PAINLEVEL_OUTOF10: 3
PAINLEVEL_OUTOF10: 0

## 2025-02-02 ASSESSMENT — PAIN DESCRIPTION - LOCATION: LOCATION: GENERALIZED

## 2025-02-02 NOTE — PROGRESS NOTES
Legionella antigen, urine [[id number]] Collected: 01/24/25 0705    Order Status: Completed Specimen: Urine Updated: 01/26/25 1535     Sample Site Urine        L pneumophila S1 Ag, Ur Negative        Comment: (NOTE)  Presumptive negative for L. pneumophila serogroup 1 antigen in urine,  suggesting no recent or current infection. Legionnaires' disease  cannot be ruled out since other serogroups and species may also  cause disease.  Performed At: [address] [id number]  Samir Capellan MD Ph:[phone]         S. pneumonia Antigen, Urine/CSF [[id number]] Collected: 01/23/25 2145    Order Status: Canceled Specimen: Urine     Respiratory Panel, Molecular, with COVID-19 (Restricted: peds pts or suitable admitted adults) [[id number]]     Order Status: Canceled Specimen: Nasopharyngeal     Respiratory Panel, Molecular, with COVID-19 (Restricted: peds pts or suitable admitted adults) [[id number]] Collected: 01/23/25 1704    Order Status: Completed Specimen: Nasopharyngeal Updated: 01/23/25 2326     Adenovirus by PCR Not detected        Coronavirus 229E by PCR Not detected        Coronavirus HKU1 by PCR Not detected        Coronavirus NL63 by PCR Not detected        Coronavirus OC43 by PCR Not detected        SARS-CoV-2, PCR Not detected        Human Metapneumovirus by PCR Not detected        Rhinovirus Enterovirus PCR Not detected        Influenza A by PCR Not detected        Influenza B PCR Not detected        Parainfluenza 1 PCR Not detected        Parainfluenza 2 PCR Not detected        Parainfluenza 3 PCR Not detected        Parainfluenza 4 PCR Not detected        Respiratory Syncytial Virus by PCR Not detected        Bordetella parapertussis by PCR Not detected        Bordetella pertussis by PCR Not detected        Chlamydophila Pneumonia PCR Not detected        Mycoplasma pneumo by PCR Not detected       Meningitis Encephalitis Panel CSF, Molecular [[id number]]     Order Status:

## 2025-02-02 NOTE — PROGRESS NOTES
POST FALL MANAGEMENT    Cole Velasquez  MEDICAL RECORD NUMBER:  [id number]  AGE: 74 y.o.   GENDER: female  : 1950  TODAYS DATE:  2025    Details     Fall Occurred: Yes    Was the Fall Witnessed:  No      Brief Review of Event: Upon entering room, I saw the patient sitting on the floor near the bed.         Who found the patient: Barry ENGLE      Where was the patient at the time of the fall: floor      Patient Comments: Patient reported that she felt that she will fall/slip so she slid herself down to the floor.         Date Fall Occurred:  2025 .       Time Fall Occurred: 8:20p.m.     Assessment     Post Fall Head to Toe Assessment Completed: Yes    Post Fall Predictive Analytic Score Reviewed: Yes     Post Fall Vitals Completed: Yes    Post Fall Neuro Checks Completed: Yes    Injury Occurred(if yes, describe injury):  no           Did the Patient Experience:(Check Jhonny all that apply)    [] Patient hit head  [] Loss of consciousness  [] Change in mental status following the fall  [x] Patient is on an anticoagulant medication      CT Performed:  no    Follow-up     Persons Notified of Fall:  (Provide names of persons notified)   [] Physician:   [] JUAN:  [x] Nursing Supervisior:  [] Manager:  [] Pharmacist:  [] Family:  [x] Other: Charge  Nurse, NP Osbaldo Pryor, Security (Geena Biard)      Electronically signed by Barry Wolfe RN 2025 at 12:22 AM

## 2025-02-02 NOTE — PROGRESS NOTES
End of Shift Note    Bedside shift change report given to Queenie AROAR (oncoming nurse) by Barry Wolfe RN (offgoing nurse).  Report included the following information SBAR, Kardex, Intake/Output, MAR, and Quality Measures    Shift worked:  9567-1387     Shift summary and any significant changes:     Seen patient on bed, comfortable, not in distress. At 2020H, seen patient sitting on the floor,patient told me that she slid herself down because she felt that she was about to fall. Post fall assessment done,VS stable, no bruising, no abrasion , no complain of pain. Informed CN Frantz ENGLE, Nursing Supervisor, Security, NP Osbaldo and  of the unwitnessed fall, reeducate patient to use call bell when help is needed and don't go up on bed by herself,patient verbalized understanding. Hourly rounding done. Patient needs attended.     Concerns for physician to address: none     Zone phone for oncoming shift:   [phone]       Activity:  Level of Assistance: Minimal assist, patient does 75% or more  Number times ambulated in hallways past shift: 0  Number of times OOB to chair past shift: 2    Cardiac:   Cardiac Monitoring: No      Cardiac Rhythm: Sinus rhythm    Access:  Current line(s): PIV     Genitourinary:   Urinary Status: Voiding, Bathroom privileges    Respiratory:   O2 Device: None (Room air)  Chronic home O2 use?: NO  Incentive spirometer at bedside: NO    GI:  Last BM (including prior to admit): 01/30/25  Current diet:  ADULT DIET; Dysphagia - Soft and Bite Sized  ADULT ORAL NUTRITION SUPPLEMENT; Lunch, Dinner; Diabetic Oral Supplement  Passing flatus: NO    Pain Management:   Patient states pain is manageable on current regimen: YES    Skin:  Berlin Scale Score: 18  Interventions: Wound Offloading (Prevention Methods): Turning, Repositioning, Pillows    Patient Safety:  Fall Risk: Nursing Judgement-Fall Risk High(Add Comments): Yes  Fall Risk Interventions  Nursing Judgement-Fall Risk High(Add Comments):

## 2025-02-02 NOTE — PROGRESS NOTES
End of Shift Note    Bedside shift change report given to ONIEL Reddy (oncoming nurse) by Queenie Wallace LPN (offgoing nurse).  Report included the following information SBAR, Intake/Output, MAR, Recent Results, and Alarm Parameters     Shift worked:  1006-1942     Shift summary and any significant changes:     Pt pleasant and cooperative. Family present at bedside through the day.  Poor appetite had but blood sugars within target range.  C/o pain and scheduled Acetaminophen given.  Pt up to shower and still tolerated getting up to the restroom.   Pysch consult done. Awaiting note.  Pt removed from tele  Plan of care ongoing.     Concerns for physician to address:  None     Zone phone for oncoming shift:   [phone]       Activity:  Level of Assistance: Minimal assist, patient does 75% or more  Number times ambulated in hallways past shift: 0  Number of times OOB to chair past shift: 0    Cardiac:   Cardiac Monitoring: No      Cardiac Rhythm: Sinus rhythm    Access:  Current line(s): PIV     Genitourinary:   Urinary Status: Voiding, Bathroom privileges    Respiratory:   O2 Device: None (Room air)  Chronic home O2 use?: NO  Incentive spirometer at bedside: YES    GI:  Last BM (including prior to admit): 01/30/25  Current diet:  ADULT DIET; Dysphagia - Soft and Bite Sized  ADULT ORAL NUTRITION SUPPLEMENT; Lunch, Dinner; Diabetic Oral Supplement  Passing flatus: YES    Pain Management:   Patient states pain is manageable on current regimen: YES    Skin:  Berlin Scale Score: 17  Interventions: Wound Offloading (Prevention Methods): Turning, Repositioning, Pillows    Patient Safety:  Fall Risk: Nursing Judgement-Fall Risk High(Add Comments): Yes  Fall Risk Interventions  Nursing Judgement-Fall Risk High(Add Comments): Yes  Toilet Every 2 Hours-In Advance of Need: Yes  Hourly Visual Checks: In bed, Awake  Fall Visual Posted: Socks, Fall sign posted  Room Door Open: Yes  Alarm On: Bed  Patient Moved Closer to Nursing Station:

## 2025-02-02 NOTE — PROGRESS NOTES
Nursing contacted Nocturnist/cross cover provider via non-urgent messaging system [employer] and notified patient had an unwitnessed fall, sat herself on the floor, reported patient was on fall precautions and patient after nurse left the room temporarily and when nurse came back to the room found patient sitting on her buttocks without any bruising, abrasions, lacerations or any acute injury, states patient walked herself to the bathroom with a walker x 1 assist and no issues, states patient is alert and oriented x 4, cooperative, patient reeducated about not getting out of bed, however she is reported that she was upset because her mortgage had not yet been paid and got up and closed the door of her patient room for privacy, upon getting to the door she sat herself on the floor because she felt like she was afraid she was going to fall.  No reports of syncope, presyncope, lightheadedness, dizziness, no acute focal deficits reported, no reported head injury or hitting the head, patient alert and oriented without any changes in mentation.  No other concerns reported. No acute distress reported. No other information provided by nurse. VS reported stable. Patient denies any further complaints or concerns. See prior hospitalist group notes for complete details of course of treatment. Recent lab work and documented vs reviewed.     Ordered continue fall precautions, has a bedside  already ordered, nurse will continue to monitor. Continue remaining plan/orders as per dayshift team. Will defer further evaluation/management and timing of discontinuation of regimens to the day shift primary attending care team.      Nursing to notify dayshift Hospitalist team in the AM of overnight events and for further/continued concerns. Will remain available overnight cross coverage for further concerns if nursing/patient needs. Please note, there are RRT systems in this hospital in place that if nursing has acute or

## 2025-02-02 NOTE — PROGRESS NOTES
End of Shift Note    Bedside shift change report given to ONIEL Magana (oncoming nurse) by Queenie Wallace LPN (offgoing nurse).  Report included the following information SBAR, Intake/Output, MAR, Recent Results, and Alarm Parameters     Shift worked:  5200-9791     Shift summary and any significant changes:     Pt calm and cooperative. Pt called before getting out of bed and remembered walker use each time.  No c/o pain.   Pt tolerating medications but extra time is needed - pt gets anxious about swallowing pills whole but denied the crushing of them.  Discharge barrier is PT/OT eval. Pt and family made aware.  Pt left comfortable in bed with alarm on and visitor at bedside.     Concerns for physician to address:  None     Zone phone for oncoming shift:   [phone]       Activity:  Level of Assistance: Minimal assist, patient does 75% or more  Number times ambulated in hallways past shift: 0  Number of times OOB to chair past shift: 1    Cardiac:   Cardiac Monitoring: No      Cardiac Rhythm: Sinus rhythm    Access:  Current line(s): PIV     Genitourinary:   Urinary Status: Voiding, Bathroom privileges    Respiratory:   O2 Device: None (Room air)  Chronic home O2 use?: NO  Incentive spirometer at bedside: YES    GI:  Last BM (including prior to admit): 01/30/25  Current diet:  ADULT DIET; Dysphagia - Soft and Bite Sized  ADULT ORAL NUTRITION SUPPLEMENT; Lunch, Dinner; Diabetic Oral Supplement  Passing flatus: YES    Pain Management:   Patient states pain is manageable on current regimen: YES    Skin:  Berlin Scale Score: 17  Interventions: Wound Offloading (Prevention Methods): Pillows, Repositioning, Turning    Patient Safety:  Fall Risk: Nursing Judgement-Fall Risk High(Add Comments): Yes  Fall Risk Interventions  Nursing Judgement-Fall Risk High(Add Comments): Yes  Toilet Every 2 Hours-In Advance of Need: Yes  Hourly Visual Checks: In bed, Awake  Fall Visual Posted: Socks, Fall sign posted, Armband  Room Door Open:

## 2025-02-03 LAB
GLUCOSE BLD STRIP.AUTO-MCNC: 105 MG/DL (ref 65–117)
GLUCOSE BLD STRIP.AUTO-MCNC: 114 MG/DL (ref 65–117)
GLUCOSE BLD STRIP.AUTO-MCNC: 152 MG/DL (ref 65–117)
GLUCOSE BLD STRIP.AUTO-MCNC: 76 MG/DL (ref 65–117)
SERVICE CMNT-IMP: ABNORMAL
SERVICE CMNT-IMP: NORMAL

## 2025-02-03 PROCEDURE — 2500000003 HC RX 250 WO HCPCS: Performed by: INTERNAL MEDICINE

## 2025-02-03 PROCEDURE — 6360000002 HC RX W HCPCS: Performed by: INTERNAL MEDICINE

## 2025-02-03 PROCEDURE — 97530 THERAPEUTIC ACTIVITIES: CPT

## 2025-02-03 PROCEDURE — 97112 NEUROMUSCULAR REEDUCATION: CPT

## 2025-02-03 PROCEDURE — 6370000000 HC RX 637 (ALT 250 FOR IP): Performed by: INTERNAL MEDICINE

## 2025-02-03 PROCEDURE — C9254 INJECTION, LACOSAMIDE: HCPCS | Performed by: INTERNAL MEDICINE

## 2025-02-03 PROCEDURE — 97535 SELF CARE MNGMENT TRAINING: CPT

## 2025-02-03 PROCEDURE — 1100000000 HC RM PRIVATE

## 2025-02-03 PROCEDURE — 97116 GAIT TRAINING THERAPY: CPT

## 2025-02-03 PROCEDURE — 82962 GLUCOSE BLOOD TEST: CPT

## 2025-02-03 PROCEDURE — 92526 ORAL FUNCTION THERAPY: CPT

## 2025-02-03 RX ORDER — THIAMINE MONONITRATE (VIT B1) 100 MG
100 TABLET ORAL DAILY
Qty: 90 TABLET | Refills: 0 | Status: SHIPPED | OUTPATIENT
Start: 2025-02-04 | End: 2025-05-05

## 2025-02-03 RX ORDER — FOLIC ACID 1 MG/1
1 TABLET ORAL DAILY
Qty: 30 TABLET | Refills: 3 | Status: SHIPPED | OUTPATIENT
Start: 2025-02-04

## 2025-02-03 RX ORDER — LEVETIRACETAM 750 MG/1
750 TABLET ORAL 2 TIMES DAILY
Qty: 60 TABLET | Refills: 0 | Status: SHIPPED | OUTPATIENT
Start: 2025-02-03 | End: 2025-02-05 | Stop reason: HOSPADM

## 2025-02-03 RX ORDER — LACOSAMIDE 50 MG/1
50 TABLET ORAL 2 TIMES DAILY
Qty: 60 TABLET | Refills: 0 | Status: SHIPPED | OUTPATIENT
Start: 2025-02-03 | End: 2025-02-05 | Stop reason: HOSPADM

## 2025-02-03 RX ADMIN — PANTOPRAZOLE SODIUM 40 MG: 40 TABLET, DELAYED RELEASE ORAL at 06:06

## 2025-02-03 RX ADMIN — ACETAMINOPHEN 650 MG: 160 SOLUTION ORAL at 13:33

## 2025-02-03 RX ADMIN — ATORVASTATIN CALCIUM 20 MG: 20 TABLET, FILM COATED ORAL at 20:42

## 2025-02-03 RX ADMIN — SODIUM CHLORIDE, PRESERVATIVE FREE 10 ML: 5 INJECTION INTRAVENOUS at 20:43

## 2025-02-03 RX ADMIN — ACETAMINOPHEN 650 MG: 160 SOLUTION ORAL at 06:06

## 2025-02-03 RX ADMIN — ENOXAPARIN SODIUM 30 MG: 100 INJECTION SUBCUTANEOUS at 20:42

## 2025-02-03 RX ADMIN — Medication 6 MG: at 20:42

## 2025-02-03 RX ADMIN — FOLIC ACID 1 MG: 1 TABLET ORAL at 11:15

## 2025-02-03 RX ADMIN — LEVETIRACETAM 750 MG: 100 INJECTION INTRAVENOUS at 21:42

## 2025-02-03 RX ADMIN — LACOSAMIDE 50 MG: 10 INJECTION INTRAVENOUS at 11:16

## 2025-02-03 RX ADMIN — Medication 100 MG: at 11:15

## 2025-02-03 RX ADMIN — ENOXAPARIN SODIUM 30 MG: 100 INJECTION SUBCUTANEOUS at 11:15

## 2025-02-03 RX ADMIN — SODIUM CHLORIDE, PRESERVATIVE FREE 10 ML: 5 INJECTION INTRAVENOUS at 11:20

## 2025-02-03 RX ADMIN — LACOSAMIDE 50 MG: 10 INJECTION INTRAVENOUS at 20:42

## 2025-02-03 RX ADMIN — LEVETIRACETAM 750 MG: 100 INJECTION INTRAVENOUS at 11:15

## 2025-02-03 RX ADMIN — CYANOCOBALAMIN TAB 500 MCG 1000 MCG: 500 TAB at 11:15

## 2025-02-03 RX ADMIN — Medication 1 AMPULE: at 11:24

## 2025-02-03 ASSESSMENT — PAIN SCALES - GENERAL
PAINLEVEL_OUTOF10: 6
PAINLEVEL_OUTOF10: 0

## 2025-02-03 ASSESSMENT — PAIN DESCRIPTION - LOCATION: LOCATION: HEAD

## 2025-02-03 ASSESSMENT — PAIN DESCRIPTION - DESCRIPTORS: DESCRIPTORS: ACHING

## 2025-02-03 NOTE — PLAN OF CARE
Problem: Safety - Adult  Goal: Free from fall injury  Outcome: Progressing     Problem: Discharge Planning  Goal: Discharge to home or other facility with appropriate resources  2/2/2025 2227 by Thiago Butler RN  Outcome: Progressing  2/2/2025 1837 by Queenie Wallace LPN  Outcome: Progressing     Problem: Pain  Goal: Verbalizes/displays adequate comfort level or baseline comfort level  2/2/2025 2227 by Thiago Butler RN  Outcome: Progressing  2/2/2025 1837 by Queenie Wallace LPN  Outcome: Progressing     Problem: Confusion  Goal: Confusion, delirium, dementia, or psychosis is improved or at baseline  Description: INTERVENTIONS:  1. Assess for possible contributors to thought disturbance, including medications, impaired vision or hearing, underlying metabolic abnormalities, dehydration, psychiatric diagnoses, and notify attending LIP  2. [place] high risk fall precautions, as indicated  3. Provide frequent short contacts to provide reality reorientation, refocusing and direction  4. Decrease environmental stimuli, including noise as appropriate  5. Monitor and intervene to maintain adequate nutrition, hydration, elimination, sleep and activity  6. If unable to ensure safety without constant attention obtain sitter and review sitter guidelines with assigned personnel  7. Initiate Psychosocial CNS and Spiritual Care consult, as indicated  2/2/2025 2227 by Thiago Butler RN  Outcome: Progressing  2/2/2025 1837 by Queenie Wallace LPN  Outcome: Progressing     Problem: Skin/Tissue Integrity  Goal: Skin integrity remains intact  Description: 1.  Monitor for areas of redness and/or skin breakdown  2.  Assess vascular access sites hourly  3.  Every 4-6 hours minimum:  Change oxygen saturation probe site  4.  Every 4-6 hours:  If on nasal continuous positive airway pressure, respiratory therapy assess nares and determine need for appliance change or resting period  2/2/2025 2227 by Thiago Butler

## 2025-02-03 NOTE — BSMART NOTE
BSMART Liaison Team Note     LOS:  11 days     Patient goal(s) for today: take medication as prescribed, communicating needs to staff in an appropriate manner   BSMART Liaison team focus/goals: assess needs, provide education and support     Progress note: Liaison met with patient face to face. Pt was alert, oriented and cooperative. Presented with euthymic mood. Pt stated this morning she was \"a little wobbly\" but is getting stronger. Pt reported she walked with the therapist this morning. Pt described her mood to be \"okay.\" She has been seeing her family daily. Denies any feelings of loneliness this morning. Pt rated her depression to be a 3/10. Pt reports feeling this way due to \"what could have happened to me.\" Discussed with pt the power of the mind in regards to these statements, she was receptive. Pt denied SI/HI/AHVH. Pt shared she is trying to eat because her son and  encourage her to, but her appetite is \"not good.\" Last night, pt stated she slept \"pretty good.\" Pt reported she was supposed to discharge but fell on Saturday. She used the walker to go to the bathroom, but did not call for assistance. Pt shared she is used to being independent, that she forgot to call a nurse for help. Pt denied additional concerns or issues. She was smiling appropriately. Liaison to continue to follow, monitor and support.     Barriers to Discharge: medical clearance     Outpatient provider(s):  none reported  Insurance info/prescription coverage:   [facility] MEDICARE COMPLETE     Diagnosis:   Past Medical History:   Diagnosis Date    Gall stones     GERD (gastroesophageal reflux disease)     Hemorrhoid     Hypertension     Ill-defined condition     rosacea    Ill-defined condition     prediabetes - is on metformin/no longer on metformin    Ill-defined condition     gout    Ill-defined condition     obesity per pt    Sleep apnea     borderline - was put on a machine/not using a machine now        Plan:  defer to most

## 2025-02-03 NOTE — PLAN OF CARE
Speech LAnguage Pathology TREATMENT/DISCHARGE    Patient: Cole Velasquez (74 y.o. female)  Date: 2/3/2025  Primary Diagnosis: Encephalopathy acute [G93.40]  AMS (altered mental status) [R41.82]       Precautions:  Fall Risk, Bed Alarm                  ASSESSMENT :  Patient continues to be followed by SLP acutely. Patient alert and Ox4 on this date. Patient observed with a functional oropharyngeal swallow. No overt difficulty or overt clinical signs of aspiration observed with any consistency trialed. Patient and family educated re: menu and ordering preferred options via phone.    Patient will be discharged from skilled speech-language pathology services at this time.     PLAN :  Recommendations and Planned Interventions:  Diet: Regular and thin liquids  -- Medication as tolerated       Acute SLP Services: No, patient will be discharged from acute skilled speech-language pathology at this time.  Discharge Recommendations: No, additional SLP treatment not indicated at discharge     SUBJECTIVE:   Patient stated, “I just don't have as much of an appetite here.”    OBJECTIVE:     Past Medical History:   Diagnosis Date    Gall stones     GERD (gastroesophageal reflux disease)     Hemorrhoid     Hypertension     Ill-defined condition     rosacea    Ill-defined condition     prediabetes - is on metformin/no longer on metformin    Ill-defined condition     gout    Ill-defined condition     obesity per pt    Sleep apnea     borderline - was put on a machine/not using a machine now     Past Surgical History:   Procedure Laterality Date    BREAST BIOPSY Right     15+ years ago. Benign (per patient) - as of 10/29/2021 pt states she thinks she has only had one breast bx    BREAST SURGERY      benign tumor right breast     SECTION      x 2    CHOLECYSTECTOMY      COLONOSCOPY N/A 2021    COLONOSCOPY   :- performed by [facility] at [facility]OSCOPY    COLONOSCOPY      COLONOSCOPY N/A 2016     Registered nurse    Patient/family have participated as able in goal setting and plan of care and Patient/family agree to work toward stated goals and plan of care    Thank you,  Alexander Barreto, SLP    SLP Individual Minutes  Time In: 1430  Time Out: 1440  Minutes: 10      Problem: SLP Adult - Impaired Swallowing  Goal: By Discharge: Advance to least restrictive diet without signs or symptoms of aspiration for planned discharge setting.  See evaluation for individualized goals.  Description: Speech Pathology Goals:   Initiated 1/28/2025     1.  Pt will tolerate least restrictive diet without clinical s/s aspiration or respiratory decline within 7 days. MET 2/3/2025  2. Patient will participate in continued cognitive-communication diagnostic treatment within 7 days. MET 2/3/2025  Outcome: Completed

## 2025-02-03 NOTE — PLAN OF CARE
Problem: Occupational Therapy - Adult  Goal: By Discharge: Performs self-care activities at highest level of function for planned discharge setting.  See evaluation for individualized goals.  Description: FUNCTIONAL STATUS PRIOR TO ADMISSION:  Patient questionable historian secondary to confusion but son present and reported patient was independent for ADLs and functional mobility. She was driving and completing IADLs without assist.    ,  ,  ,  ,  ,  ,  ,  ,  ,  , Active : Yes     HOME SUPPORT: Patient lived with her  and son.    Occupational Therapy Goals:  Initiated 1/28/2025  1.  Patient will perform grooming with Minimal Assist within 7 day(s).  2.  Patient will perform upper body dressing with Moderate Assist within 7 day(s).  3.  Patient will perform lower body dressing with Maximal Assist within 7 day(s).  4.  Patient will perform toilet transfers with Moderate Assist  within 7 day(s).  5.  Patient will perform all aspects of toileting with Moderate Assist within 7 day(s).  6.  Patient will participate in upper extremity therapeutic exercise/activities with Minimal Assist for 3 minutes within 7 day(s).    Outcome: Progressing   OCCUPATIONAL THERAPY TREATMENT  Patient: Cole Velasquez (74 y.o. female)  Date: 2/3/2025  Primary Diagnosis: Encephalopathy acute [G93.40]  AMS (altered mental status) [R41.82]       Precautions: Fall Risk, Bed Alarm                Chart, occupational therapy assessment, plan of care, and goals were reviewed.    ASSESSMENT  Patient continues to benefit from skilled OT services and is progressing towards goals. Patient received sitting up in chair, readily amenable to session. She utilizes RW for bathroom mobility and ADL item retrieval tasks today (no DME used at baseline), benefiting from overall CGA throughout with occasional cues for safety and DME management. During standing oral hygiene, patient utilizes single UE support of sink for increased stability.  Sit: Contact-guard assistance;Additional time (cues for RW management, squaring off at surface, hand placement; cues for controlled descent)         Balance:  Balance  Sitting: Intact  Sitting - Static: Good (unsupported)  Sitting - Dynamic: Good (unsupported)  Standing: Impaired  Standing - Static: Good  Standing - Dynamic: Fair;Good;Constant support    ADL Intervention:    Grooming: Contact guard assistance;Increased time to complete   Grooming Skilled Clinical Factors: standing at bathroom sink for oral hygiene    LE Dressing: Stand by assistance  LE Dressing Skilled Clinical Factors: adjusting socks in tailor-sitting, seated in chair    Functional Mobility: Contact guard assistance;Minimal assistance; Increased time to complete;Adaptive equipment (ADL item retrieval, reaching beyond base of support)    Pain Rating:  No reports.     Pain Intervention(s):   rest and repositioning    Activity Tolerance:   Fair  and requires rest breaks  Please refer to the flowsheet for vital signs taken during this treatment.    After treatment:   Patient left in no apparent distress sitting up in chair, Call bell within reach, and Bed/ chair alarm activated    COMMUNICATION/EDUCATION:   The patient's plan of care was discussed with: physical therapist and registered nurse    Patient Education  Education Given To: Patient  Education Provided: Role of Therapy;Plan of Care;Equipment;ADL Adaptive Strategies;Transfer Training;Fall Prevention Strategies;Energy Conservation  Education Method: Verbal;Demonstration  Education Outcome: Verbalized understanding;Demonstrated understanding;Continued education needed    Thank you for this referral.  Lourdes Beckham OT  Minutes: 15

## 2025-02-03 NOTE — CARE COORDINATION
Transition of Care Plan:    RUR: 11%  Prior Level of Functioning: Independent  Disposition: IPR- referrals pending  GUILLAUME: 2/4  If SNF or IPR: Date FOC offered: 2/3  Date FOC received: 2/3  Accepting facility: [facility]  Date authorization started with reference number: will need auth   Date authorization received and expires:   Follow up appointments: after rehab  DME needed: available at facility   Transportation at discharge: medical transport   IM/IMM Medicare/ letter given: to be given  Is patient a  and connected with VA? no   If yes, was [place] transfer form completed and VA notified?   Caregiver Contact: spouse  Discharge Caregiver contacted prior to discharge? To be contacted   Care Conference needed? no  Barriers to discharge:  IPR, ins auth    Chart reviewed. CM aware of discharge order. Noted recommendations from medical team for IPR placement. MD in agreement. Met with pt at the bedside this AM to obtain choice. Attempted [facility] as 1st choice however they do not participate with pt's insurance plan. Pt provided permission to send additional referrals to in network facilities. Referrals sent to [facility] and [facility]. [facility] has accepted pending MD approval and will start insurance authorization today. Will continue to follow    SUSAN Alonzo   Care Manager, [facility]  [phone]

## 2025-02-03 NOTE — PLAN OF CARE
Problem: Physical Therapy - Adult  Goal: By Discharge: Performs mobility at highest level of function for planned discharge setting.  See evaluation for individualized goals.  Description: FUNCTIONAL STATUS PRIOR TO ADMISSION: Patient was independent and active without use of DME.    HOME SUPPORT PRIOR TO ADMISSION: The patient lived with  and son.    Physical Therapy Goals  Initiated 1/28/2025  1.  Patient will move from supine to sit and sit to supine, scoot up and down, and roll side to side in bed with minimal assistance within 7 day(s).    2.  Patient will perform sit to stand with minimal assistance within 7 day(s).  3.  Patient will transfer from bed to chair and chair to bed with moderate assistance using the least restrictive device within 7 day(s).  4.  Patient will ambulate with moderate assistance for 25 feet with the least restrictive device within 7 day(s).   Outcome: Progressing   PHYSICAL THERAPY TREATMENT    Patient: Cole Velasquez (74 y.o. female)  Date: 2/3/2025  Diagnosis: Encephalopathy acute [G93.40]  AMS (altered mental status) [R41.82] AMS (altered mental status)      Precautions: Fall Risk, Bed Alarm                      ASSESSMENT:  Patient continues to benefit from skilled PT services and is progressing towards goals. Patient received resting in bed, agreeable and motivated for therapy. Patient required overall min A for all mobility, specifically with ambulation. She is very cautious and hesitant since fall over the weekend, noting slow, step to gait and increased trunk sway noted. She completed gait training this date without AD, as patient does not use walker at baseline. Patient is very active and independent at baseline. Currently, she has made excellent progress with therapy during her 11 day hospital stay, but continues to be functioning well below her independent baseline.     Patient scored 14/56 on Taylor Balance Test, objectively indicating patient is a high fall risk.  noted BP stable.   Sittin/81  Standin/87    After treatment:   Patient left in no apparent distress sitting up in chair, Call bell within reach, Bed/ chair alarm activated, and Updated patient's board on functional status and mobility recommendations      COMMUNICATION/EDUCATION:   The patient's plan of care was discussed with: registered nurse and     Patient Education  Education Given To: Patient  Education Provided: Role of Therapy;Plan of Care;Mobility Training  Education Method: Verbal  Barriers to Learning: None  Education Outcome: Continued education needed;Verbalized understanding      Mercedes Aguilar, PT  Minutes: 34

## 2025-02-03 NOTE — PROGRESS NOTES
End of Shift Note    Bedside shift change report given to Montserrat ENGLE (oncoming nurse) by Thiago Butler RN (offgoing nurse).  Report included the following information SBAR, Intake/Output, MAR, and Recent Results    Shift worked:  7P-7A     Shift summary and any significant changes:     Patient slept fairly, during shift.  AO x4, pleasant and cooperative.  No complaints at this time, not on respiratory distress.  Patient takes time swallowing pills, offered to take meds with apple sauce, which she tolerated well.  Caring rounds done.  Plan of care ongoing.   Concerns for physician to address:       Zone phone for oncoming shift:   [phone]       Activity:  Level of Assistance: Minimal assist, patient does 75% or more  Number times ambulated in hallways past shift: 0  Number of times OOB to chair past shift: 1    Cardiac:   Cardiac Monitoring: Yes      Cardiac Rhythm: Sinus rhythm    Access:  Current line(s): PIV     Genitourinary:   Urinary Status: Voiding, Bathroom privileges    Respiratory:   O2 Device: None (Room air)  Chronic home O2 use?: NO  Incentive spirometer at bedside: NO    GI:  Last BM (including prior to admit): 01/30/25  Current diet:  ADULT DIET; Dysphagia - Soft and Bite Sized  ADULT ORAL NUTRITION SUPPLEMENT; Lunch, Dinner; Diabetic Oral Supplement  Passing flatus: YES    Pain Management:   Patient states pain is manageable on current regimen: N/A    Skin:  Berlin Scale Score: 17  Interventions: Wound Offloading (Prevention Methods): Pillows, Repositioning    Patient Safety:  Fall Risk: Nursing Judgement-Fall Risk High(Add Comments): Yes  Fall Risk Interventions  Nursing Judgement-Fall Risk High(Add Comments): Yes  Toilet Every 2 Hours-In Advance of Need: Yes  Hourly Visual Checks: In bed, Awake  Fall Visual Posted: Socks, Fall sign posted, Armband  Room Door Open: Yes  Alarm On: Bed  Patient Moved Closer to Nursing Station: No    Active Consults:   IP CONSULT TO INTENSIVIST  IP CONSULT TO

## 2025-02-03 NOTE — PROGRESS NOTES
End of Shift Note    Bedside shift change report given to *** (oncoming nurse) by Montserrat Gomez RN (offgoing nurse).  Report included the following information SBAR, Recent Results, Cardiac Rhythm ***, and Quality Measures    Shift worked:  7a-7p     Shift summary and any significant changes:     Pt had no significant changes during shift. Pt up in chair for breakfast and lunch. Pt had no complaints of care. Care is still progressing.      Concerns for physician to address:         Zone phone for oncoming shift:            Activity:  Level of Assistance: Minimal assist, patient does 75% or more  Number times ambulated in hallways past shift: 0  Number of times OOB to chair past shift: 1    Cardiac:   Cardiac Monitoring: No      Cardiac Rhythm: Sinus rhythm    Access:  Current line(s): PIV     Genitourinary:   Urinary Status: Voiding, Bathroom privileges    Respiratory:   O2 Device: None (Room air)  Chronic home O2 use?: NO  Incentive spirometer at bedside: NO    GI:  Last BM (including prior to admit): 02/03/25  Current diet:  ADULT ORAL NUTRITION SUPPLEMENT; Lunch, Dinner; Diabetic Oral Supplement  ADULT DIET; Regular  Passing flatus: NO    Pain Management:   Patient states pain is manageable on current regimen: YES    Skin:  Berlin Scale Score: 17  Interventions: Wound Offloading (Prevention Methods): Repositioning, Pillows    Patient Safety:  Fall Risk: Nursing Judgement-Fall Risk High(Add Comments): Yes  Fall Risk Interventions  Nursing Judgement-Fall Risk High(Add Comments): Yes  Toilet Every 2 Hours-In Advance of Need: Yes  Hourly Visual Checks: In bed, Awake  Fall Visual Posted: Socks, Fall cloth/blanket  Room Door Open: Deferred to promote rest  Alarm On: Bed  Patient Moved Closer to Nursing Station: No    Active Consults:   IP CONSULT TO INTENSIVIST  IP CONSULT TO DIETITIAN  IP CONSULT TO INFECTIOUS DISEASES  IP CONSULT TO RHEUMATOLOGY  IP CONSULT TO PSYCHIATRY  IP CONSULT TO CASE MANAGEMENT    Length of

## 2025-02-04 LAB
ALBUMIN SERPL-MCNC: 2.8 G/DL (ref 3.5–5)
ALBUMIN/GLOB SERPL: 0.9 (ref 1.1–2.2)
ALP SERPL-CCNC: 61 U/L (ref 45–117)
ALT SERPL-CCNC: 25 U/L (ref 12–78)
ANA TITR SER IF: NEGATIVE
ANION GAP SERPL CALC-SCNC: 5 MMOL/L (ref 2–12)
AST SERPL-CCNC: 9 U/L (ref 15–37)
BASOPHILS # BLD: 0.1 K/UL (ref 0–0.1)
BASOPHILS NFR BLD: 1.3 % (ref 0–1)
BILIRUB SERPL-MCNC: 0.6 MG/DL (ref 0.2–1)
BUN SERPL-MCNC: 7 MG/DL (ref 6–20)
BUN/CREAT SERPL: 10 (ref 12–20)
CALCIUM SERPL-MCNC: 9 MG/DL (ref 8.5–10.1)
CHLORIDE SERPL-SCNC: 110 MMOL/L (ref 97–108)
CO2 SERPL-SCNC: 27 MMOL/L (ref 21–32)
CREAT SERPL-MCNC: 0.67 MG/DL (ref 0.55–1.02)
DIFFERENTIAL METHOD BLD: ABNORMAL
EOSINOPHIL # BLD: 0.54 K/UL (ref 0–0.4)
EOSINOPHIL NFR BLD: 7 % (ref 0–7)
ERYTHROCYTE [DISTWIDTH] IN BLOOD BY AUTOMATED COUNT: 14 % (ref 11.5–14.5)
GLOBULIN SER CALC-MCNC: 3.2 G/DL (ref 2–4)
GLUCOSE BLD STRIP.AUTO-MCNC: 100 MG/DL (ref 65–117)
GLUCOSE BLD STRIP.AUTO-MCNC: 110 MG/DL (ref 65–117)
GLUCOSE BLD STRIP.AUTO-MCNC: 115 MG/DL (ref 65–117)
GLUCOSE BLD STRIP.AUTO-MCNC: 127 MG/DL (ref 65–117)
GLUCOSE SERPL-MCNC: 102 MG/DL (ref 65–100)
HCT VFR BLD AUTO: 42.6 % (ref 35–47)
HGB BLD-MCNC: 13.6 G/DL (ref 11.5–16)
IMM GRANULOCYTES # BLD AUTO: 0.05 K/UL (ref 0–0.04)
IMM GRANULOCYTES NFR BLD AUTO: 0.6 % (ref 0–0.5)
LABORATORY COMMENT REPORT: NORMAL
LYMPHOCYTES # BLD: 2.15 K/UL (ref 0.8–3.5)
LYMPHOCYTES NFR BLD: 27.9 % (ref 12–49)
MCH RBC QN AUTO: 31.1 PG (ref 26–34)
MCHC RBC AUTO-ENTMCNC: 31.9 G/DL (ref 30–36.5)
MCV RBC AUTO: 97.5 FL (ref 80–99)
MONOCYTES # BLD: 0.51 K/UL (ref 0–1)
MONOCYTES NFR BLD: 6.6 % (ref 5–13)
NEUTS SEG # BLD: 4.35 K/UL (ref 1.8–8)
NEUTS SEG NFR BLD: 56.6 % (ref 32–75)
NRBC # BLD: 0 K/UL (ref 0–0.01)
NRBC BLD-RTO: 0 PER 100 WBC
PLATELET # BLD AUTO: 251 K/UL (ref 150–400)
PMV BLD AUTO: 10.9 FL (ref 8.9–12.9)
POTASSIUM SERPL-SCNC: 3.5 MMOL/L (ref 3.5–5.1)
PROT SERPL-MCNC: 6 G/DL (ref 6.4–8.2)
RBC # BLD AUTO: 4.37 M/UL (ref 3.8–5.2)
SERVICE CMNT-IMP: ABNORMAL
SERVICE CMNT-IMP: NORMAL
SODIUM SERPL-SCNC: 142 MMOL/L (ref 136–145)
WBC # BLD AUTO: 7.7 K/UL (ref 3.6–11)

## 2025-02-04 PROCEDURE — 2500000003 HC RX 250 WO HCPCS: Performed by: INTERNAL MEDICINE

## 2025-02-04 PROCEDURE — 1100000000 HC RM PRIVATE

## 2025-02-04 PROCEDURE — 6370000000 HC RX 637 (ALT 250 FOR IP): Performed by: INTERNAL MEDICINE

## 2025-02-04 PROCEDURE — 85025 COMPLETE CBC W/AUTO DIFF WBC: CPT

## 2025-02-04 PROCEDURE — 80053 COMPREHEN METABOLIC PANEL: CPT

## 2025-02-04 PROCEDURE — C9254 INJECTION, LACOSAMIDE: HCPCS | Performed by: INTERNAL MEDICINE

## 2025-02-04 PROCEDURE — 36415 COLL VENOUS BLD VENIPUNCTURE: CPT

## 2025-02-04 PROCEDURE — 6360000002 HC RX W HCPCS: Performed by: INTERNAL MEDICINE

## 2025-02-04 PROCEDURE — 82962 GLUCOSE BLOOD TEST: CPT

## 2025-02-04 RX ORDER — LEVETIRACETAM 500 MG/1
750 TABLET ORAL 2 TIMES DAILY
Status: DISCONTINUED | OUTPATIENT
Start: 2025-02-04 | End: 2025-02-05 | Stop reason: HOSPADM

## 2025-02-04 RX ORDER — LACOSAMIDE 50 MG/1
50 TABLET ORAL 2 TIMES DAILY
Status: DISCONTINUED | OUTPATIENT
Start: 2025-02-04 | End: 2025-02-05 | Stop reason: HOSPADM

## 2025-02-04 RX ADMIN — ENOXAPARIN SODIUM 30 MG: 100 INJECTION SUBCUTANEOUS at 20:56

## 2025-02-04 RX ADMIN — CYANOCOBALAMIN TAB 500 MCG 1000 MCG: 500 TAB at 09:18

## 2025-02-04 RX ADMIN — ACETAMINOPHEN 650 MG: 160 SOLUTION ORAL at 22:29

## 2025-02-04 RX ADMIN — PANTOPRAZOLE SODIUM 40 MG: 40 TABLET, DELAYED RELEASE ORAL at 06:24

## 2025-02-04 RX ADMIN — LEVETIRACETAM 750 MG: 500 TABLET, FILM COATED ORAL at 20:54

## 2025-02-04 RX ADMIN — Medication 6 MG: at 20:55

## 2025-02-04 RX ADMIN — LEVETIRACETAM 750 MG: 100 INJECTION INTRAVENOUS at 09:17

## 2025-02-04 RX ADMIN — SODIUM CHLORIDE, PRESERVATIVE FREE 10 ML: 5 INJECTION INTRAVENOUS at 09:19

## 2025-02-04 RX ADMIN — SODIUM CHLORIDE, PRESERVATIVE FREE 10 ML: 5 INJECTION INTRAVENOUS at 20:56

## 2025-02-04 RX ADMIN — ACETAMINOPHEN 650 MG: 160 SOLUTION ORAL at 13:09

## 2025-02-04 RX ADMIN — ATORVASTATIN CALCIUM 20 MG: 20 TABLET, FILM COATED ORAL at 20:55

## 2025-02-04 RX ADMIN — LACOSAMIDE 50 MG: 10 INJECTION INTRAVENOUS at 09:18

## 2025-02-04 RX ADMIN — ACETAMINOPHEN 650 MG: 160 SOLUTION ORAL at 19:32

## 2025-02-04 RX ADMIN — Medication 100 MG: at 09:18

## 2025-02-04 RX ADMIN — FOLIC ACID 1 MG: 1 TABLET ORAL at 09:18

## 2025-02-04 RX ADMIN — ENOXAPARIN SODIUM 30 MG: 100 INJECTION SUBCUTANEOUS at 09:19

## 2025-02-04 RX ADMIN — LACOSAMIDE 50 MG: 50 TABLET, FILM COATED ORAL at 20:55

## 2025-02-04 ASSESSMENT — PAIN DESCRIPTION - DESCRIPTORS
DESCRIPTORS: ACHING

## 2025-02-04 ASSESSMENT — PAIN DESCRIPTION - LOCATION
LOCATION: HEAD

## 2025-02-04 ASSESSMENT — PAIN SCALES - GENERAL
PAINLEVEL_OUTOF10: 3
PAINLEVEL_OUTOF10: 2
PAINLEVEL_OUTOF10: 5

## 2025-02-04 ASSESSMENT — PAIN DESCRIPTION - ORIENTATION: ORIENTATION: MID

## 2025-02-04 ASSESSMENT — PAIN - FUNCTIONAL ASSESSMENT: PAIN_FUNCTIONAL_ASSESSMENT: ACTIVITIES ARE NOT PREVENTED

## 2025-02-04 NOTE — PROGRESS NOTES
End of Shift Note    Bedside shift change report given to Thiago ENGLE (oncoming nurse) by Montserrat Gomez RN (offgoing nurse).  Report included the following information SBAR, Recent Results, and Quality Measures    Shift worked:  7a-7p     Shift summary and any significant changes:     Pt had no significant  changes during shift. Pt had no complaints of care. Pt bathed by , Care is still progressing.    Concerns for physician to address:       Zone phone for oncoming shift:          Activity:  Level of Assistance: Minimal assist, patient does 75% or more  Number times ambulated in hallways past shift: 0  Number of times OOB to chair past shift: 0    Cardiac:   Cardiac Monitoring: No      Cardiac Rhythm: Sinus rhythm    Access:  Current line(s): PIV     Genitourinary:   Urinary Status: Voiding, Bathroom privileges    Respiratory:   O2 Device: None (Room air)  Chronic home O2 use?: NO  Incentive spirometer at bedside: NO    GI:  Last BM (including prior to admit): 02/03/25  Current diet:  ADULT ORAL NUTRITION SUPPLEMENT; Lunch, Dinner; Diabetic Oral Supplement  ADULT DIET; Regular  Passing flatus: YES    Pain Management:   Patient states pain is manageable on current regimen: NO    Skin:  Berlin Scale Score: 17  Interventions: Wound Offloading (Prevention Methods): Repositioning, Pillows    Patient Safety:  Fall Risk: Nursing Judgement-Fall Risk High(Add Comments): Yes  Fall Risk Interventions  Nursing Judgement-Fall Risk High(Add Comments): Yes  Toilet Every 2 Hours-In Advance of Need: Yes  Hourly Visual Checks: In bed, Awake  Fall Visual Posted: Socks, Fall cloth/blanket  Room Door Open: Deferred to promote rest  Alarm On: Bed  Patient Moved Closer to Nursing Station: No    Active Consults:   IP CONSULT TO INTENSIVIST  IP CONSULT TO DIETITIAN  IP CONSULT TO INFECTIOUS DISEASES  IP CONSULT TO RHEUMATOLOGY  IP CONSULT TO PSYCHIATRY  IP CONSULT TO CASE MANAGEMENT    Length of Stay:  Expected LOS: 12  Actual LOS:

## 2025-02-04 NOTE — CARE COORDINATION
Transition of Care Plan:     RUR: 13%  Prior Level of Functioning: Independent  Disposition: IPR- [facility]  P2P due 1130 tomorrow morning   GUILLAUME: 2/5  If SNF or IPR: Date FOC offered: 2/3  Date FOC received: 2/3  Accepting facility: [facility]  Date authorization started with reference number: will need auth   Date authorization received and expires:   Follow up appointments: after rehab  DME needed: available at facility   Transportation at discharge: medical transport   IM/IMM Medicare/ letter given: to be given  Is patient a  and connected with VA? no              If yes, was [place] transfer form completed and VA notified?   Caregiver Contact: spouse  Discharge Caregiver contacted prior to discharge? To be contacted   Care Conference needed? no  Barriers to discharge:  IPR, ins auth    Insurance is offering a P2P review for IPR- notified physician. Deadline is 2/5 @ 11:30AM. Provider to call [phone] option 5. Will continue to follow.    SUSAN Alonzo   Care Manager, [facility]  [phone]

## 2025-02-04 NOTE — PROGRESS NOTES
2/4/25 4:50 PM     At the request of case management I completed a telephone conversation with [employer]'s doctor.    The doctor would not provide her name according to their company policy.    I provided all the information that are available.  I informed her that the patient does not have any focal neurological deficits.  The patient is suffering from global neurological deficits; she has gone through a complicated hospital course including intubation ICU to, prolonged hospitalization and she has significant neurological deficits compared to her baseline functional status.    I am afraid the insurance doctor was unable to comprehend the gravity of the patient's illness and she requested that she will review more data from the patient's chart.  She informed me that she will communicate to the hospital with her decision.     I informed the doctor that as far as I am concerned that this [id number] has not been completed and the patient has the right to pursue further discussion with the doctor who would provide their identification including the credentials.    Wendy Castillo MD MD MPH FACP CHCQM -PhyAdv  Director, Hospital Medicine    Acute [facility]   Cell: [phone]

## 2025-02-04 NOTE — PROGRESS NOTES
Hospitalist Progress Note        Demographics    Patient Name  Cole Velasquez   Date of Birth 1950   Medical Record Number  [id number]      Age  74 y.o.   PCP Jona Mead DO   Admit date:  1/23/2025  1:22 AM     Room Number  3239/01  @ [facility]           Admission Diagnoses:  AMS (altered mental status)   Admission Summary:  \" 74-year-old female with history of hypertension, hyperlipidemia, prediabetes, anxiety, chronic pain admitted to hospital medicine service 1/23 after apparent fall at home. Patient was found unresponsive. She was transported to the emergency department where she remained confused. While in the emergency department she suffered a witnessed generalized seizure and received intravenous midazolam. CT head revealed no acute findings. CT C-spine was negative. She was loaded with Keppra. She was noted to be febrile. She was subsequently transferred to the ICU under the care of of critical care medicine and required intubation. She was treated empirically for meningitis with antibacterial antibiotics and acyclovir. She underwent LP 1/24 which was a traumatic tap. All PCR studies and cultures remain negative from that specimen. She has been seen in consultation by neurology, infectious diseases. A prolonged EEG was performed overnight 1/23 into a.m. 1/24 without electrical seizures. Video recording also demonstrated no evidence of convulsive seizures  \"     Assessment and plan:     Found down/unwitnessed fall, PTA  Witnessed seizure activity, POA,   Acute encephalopathy, POA  SIRS/Transient fever and tachycardia without clear infectious source, not POA, resolved- possible serotonin syndrome  Right sided weakness, not POA, improved        CT head and neck- NAD        MRI brain- NAD        EEG- no epileptic foci but study abnormal consistent with encephalopathy  Intubated 1/23 for AW protection. Extubated 1/27 and tolerating well. Now weaned to 5lpm.  Neurology

## 2025-02-04 NOTE — PLAN OF CARE
Problem: Safety - Adult  Goal: Free from fall injury  Outcome: Progressing     Problem: Discharge Planning  Goal: Discharge to home or other facility with appropriate resources  Outcome: Progressing     Problem: Pain  Goal: Verbalizes/displays adequate comfort level or baseline comfort level  Outcome: Progressing     Problem: Confusion  Goal: Confusion, delirium, dementia, or psychosis is improved or at baseline  Description: INTERVENTIONS:  1. Assess for possible contributors to thought disturbance, including medications, impaired vision or hearing, underlying metabolic abnormalities, dehydration, psychiatric diagnoses, and notify attending LIP  2. [place] high risk fall precautions, as indicated  3. Provide frequent short contacts to provide reality reorientation, refocusing and direction  4. Decrease environmental stimuli, including noise as appropriate  5. Monitor and intervene to maintain adequate nutrition, hydration, elimination, sleep and activity  6. If unable to ensure safety without constant attention obtain sitter and review sitter guidelines with assigned personnel  7. Initiate Psychosocial CNS and Spiritual Care consult, as indicated  Outcome: Progressing     Problem: Skin/Tissue Integrity  Goal: Skin integrity remains intact  Description: 1.  Monitor for areas of redness and/or skin breakdown  2.  Assess vascular access sites hourly  3.  Every 4-6 hours minimum:  Change oxygen saturation probe site  4.  Every 4-6 hours:  If on nasal continuous positive airway pressure, respiratory therapy assess nares and determine need for appliance change or resting period  Outcome: Progressing     Problem: ABCDS Injury Assessment  Goal: Absence of physical injury  Outcome: Progressing     Problem: Respiratory - Adult  Goal: Achieves optimal ventilation and oxygenation  Outcome: Progressing     Problem: Nutrition Deficit:  Goal: Optimize nutritional status  Outcome: Progressing     Problem: Neurosensory -

## 2025-02-04 NOTE — PROGRESS NOTES
Occupational Therapy    Patient received resting in bed. She politely declines activity/mobility at this time, reporting she feels extremely fatigued today. She has been mobilizing to bathroom as needed with nursing throughout the day. Patient remains highly motivated. Will continue to follow.     Lourdes Beckham OTR/L

## 2025-02-04 NOTE — CONSULTS
MARGAUX IF and MARGAUX direct negative  C3 nml and C4 elevated.  low is concerning for lupus.     Sx have improved with time  No evidence of Lupus or CNS vasculitis at this time. Will sign off

## 2025-02-04 NOTE — PLAN OF CARE
Problem: Safety - Adult  Goal: Free from fall injury  Outcome: Progressing     Problem: Discharge Planning  Goal: Discharge to home or other facility with appropriate resources  Outcome: Progressing     Problem: Pain  Goal: Verbalizes/displays adequate comfort level or baseline comfort level  Outcome: Progressing     Problem: Confusion  Goal: Confusion, delirium, dementia, or psychosis is improved or at baseline  Description: INTERVENTIONS:  1. Assess for possible contributors to thought disturbance, including medications, impaired vision or hearing, underlying metabolic abnormalities, dehydration, psychiatric diagnoses, and notify attending LIP  2. [place] high risk fall precautions, as indicated  3. Provide frequent short contacts to provide reality reorientation, refocusing and direction  4. Decrease environmental stimuli, including noise as appropriate  5. Monitor and intervene to maintain adequate nutrition, hydration, elimination, sleep and activity  6. If unable to ensure safety without constant attention obtain sitter and review sitter guidelines with assigned personnel  7. Initiate Psychosocial CNS and Spiritual Care consult, as indicated  Outcome: Progressing     Problem: Skin/Tissue Integrity  Goal: Skin integrity remains intact  Description: 1.  Monitor for areas of redness and/or skin breakdown  2.  Assess vascular access sites hourly  3.  Every 4-6 hours minimum:  Change oxygen saturation probe site  4.  Every 4-6 hours:  If on nasal continuous positive airway pressure, respiratory therapy assess nares and determine need for appliance change or resting period  Outcome: Progressing     Problem: ABCDS Injury Assessment  Goal: Absence of physical injury  Outcome: Progressing     Problem: Respiratory - Adult  Goal: Achieves optimal ventilation and oxygenation  Outcome: Progressing     Problem: Nutrition Deficit:  Goal: Optimize nutritional status  Outcome: Progressing     Problem: Neurosensory -  sit to supine, scoot up and down, and roll side to side in bed with minimal assistance within 7 day(s).    2.  Patient will perform sit to stand with minimal assistance within 7 day(s).  3.  Patient will transfer from bed to chair and chair to bed with moderate assistance using the least restrictive device within 7 day(s).  4.  Patient will ambulate with moderate assistance for 25 feet with the least restrictive device within 7 day(s).   2/3/2025 0927 by Mercedes Aguilar, PT  Outcome: Progressing     Problem: Skin/Tissue Integrity - Adult  Goal: Skin integrity remains intact  Description: 1.  Monitor for areas of redness and/or skin breakdown  2.  Assess vascular access sites hourly  3.  Every 4-6 hours minimum:  Change oxygen saturation probe site  4.  Every 4-6 hours:  If on nasal continuous positive airway pressure, respiratory therapy assess nares and determine need for appliance change or resting period  Outcome: Progressing  Goal: Incisions, wounds, or drain sites healing without S/S of infection  Outcome: Progressing  Goal: Oral mucous membranes remain intact  Outcome: Progressing     Problem: Musculoskeletal - Adult  Goal: Return mobility to safest level of function  Outcome: Progressing  Goal: Return ADL status to a safe level of function  Outcome: Progressing     Problem: Gastrointestinal - Adult  Goal: Minimal or absence of nausea and vomiting  Outcome: Progressing  Goal: Maintains or returns to baseline bowel function  Outcome: Progressing  Goal: Maintains adequate nutritional intake  Outcome: Progressing     Problem: Genitourinary - Adult  Goal: Absence of urinary retention  Outcome: Progressing

## 2025-02-05 ENCOUNTER — TELEPHONE (OUTPATIENT)
Age: 75
End: 2025-02-05

## 2025-02-05 VITALS
DIASTOLIC BLOOD PRESSURE: 61 MMHG | TEMPERATURE: 97.7 F | BODY MASS INDEX: 41.68 KG/M2 | HEART RATE: 75 BPM | HEIGHT: 63 IN | RESPIRATION RATE: 18 BRPM | WEIGHT: 235.23 LBS | OXYGEN SATURATION: 100 % | SYSTOLIC BLOOD PRESSURE: 137 MMHG

## 2025-02-05 LAB
GLUCOSE BLD STRIP.AUTO-MCNC: 103 MG/DL (ref 65–117)
GLUCOSE BLD STRIP.AUTO-MCNC: 128 MG/DL (ref 65–117)
SERVICE CMNT-IMP: ABNORMAL
SERVICE CMNT-IMP: NORMAL

## 2025-02-05 PROCEDURE — 2500000003 HC RX 250 WO HCPCS: Performed by: INTERNAL MEDICINE

## 2025-02-05 PROCEDURE — 6370000000 HC RX 637 (ALT 250 FOR IP): Performed by: INTERNAL MEDICINE

## 2025-02-05 PROCEDURE — 6360000002 HC RX W HCPCS: Performed by: INTERNAL MEDICINE

## 2025-02-05 PROCEDURE — 82962 GLUCOSE BLOOD TEST: CPT

## 2025-02-05 PROCEDURE — 97535 SELF CARE MNGMENT TRAINING: CPT

## 2025-02-05 PROCEDURE — 97530 THERAPEUTIC ACTIVITIES: CPT

## 2025-02-05 PROCEDURE — 97116 GAIT TRAINING THERAPY: CPT | Performed by: PHYSICAL THERAPIST

## 2025-02-05 RX ORDER — HYDROXYZINE HYDROCHLORIDE 10 MG/1
10 TABLET, FILM COATED ORAL 3 TIMES DAILY PRN
Qty: 30 TABLET | Refills: 0 | Status: SHIPPED | OUTPATIENT
Start: 2025-02-05 | End: 2025-02-13

## 2025-02-05 RX ORDER — AMLODIPINE BESYLATE 5 MG/1
5 TABLET ORAL DAILY
Qty: 30 TABLET | Refills: 3 | Status: SHIPPED | OUTPATIENT
Start: 2025-02-06 | End: 2025-02-13

## 2025-02-05 RX ORDER — CARVEDILOL 6.25 MG/1
6.25 TABLET ORAL 2 TIMES DAILY WITH MEALS
Qty: 60 TABLET | Refills: 3 | Status: SHIPPED | OUTPATIENT
Start: 2025-02-05

## 2025-02-05 RX ORDER — HYDROXYZINE HYDROCHLORIDE 10 MG/1
10 TABLET, FILM COATED ORAL 3 TIMES DAILY PRN
Status: DISCONTINUED | OUTPATIENT
Start: 2025-02-05 | End: 2025-02-05 | Stop reason: HOSPADM

## 2025-02-05 RX ORDER — CARVEDILOL 6.25 MG/1
6.25 TABLET ORAL 2 TIMES DAILY WITH MEALS
Status: DISCONTINUED | OUTPATIENT
Start: 2025-02-05 | End: 2025-02-05 | Stop reason: HOSPADM

## 2025-02-05 RX ORDER — LACOSAMIDE 50 MG/1
50 TABLET ORAL 2 TIMES DAILY
Qty: 60 TABLET | Refills: 1 | Status: SHIPPED | OUTPATIENT
Start: 2025-02-05 | End: 2025-04-06

## 2025-02-05 RX ORDER — AMLODIPINE BESYLATE 5 MG/1
5 TABLET ORAL DAILY
Status: DISCONTINUED | OUTPATIENT
Start: 2025-02-06 | End: 2025-02-05

## 2025-02-05 RX ORDER — AMLODIPINE BESYLATE 5 MG/1
5 TABLET ORAL DAILY
Status: DISCONTINUED | OUTPATIENT
Start: 2025-02-06 | End: 2025-02-05 | Stop reason: HOSPADM

## 2025-02-05 RX ORDER — LEVETIRACETAM 750 MG/1
750 TABLET ORAL 2 TIMES DAILY
Qty: 60 TABLET | Refills: 3 | Status: SHIPPED | OUTPATIENT
Start: 2025-02-05

## 2025-02-05 RX ADMIN — ENOXAPARIN SODIUM 30 MG: 100 INJECTION SUBCUTANEOUS at 09:27

## 2025-02-05 RX ADMIN — PANTOPRAZOLE SODIUM 40 MG: 40 TABLET, DELAYED RELEASE ORAL at 09:38

## 2025-02-05 RX ADMIN — HYDROXYZINE HYDROCHLORIDE 10 MG: 10 TABLET ORAL at 11:35

## 2025-02-05 RX ADMIN — CYANOCOBALAMIN TAB 500 MCG 1000 MCG: 500 TAB at 09:25

## 2025-02-05 RX ADMIN — Medication 100 MG: at 09:24

## 2025-02-05 RX ADMIN — LEVETIRACETAM 750 MG: 500 TABLET, FILM COATED ORAL at 09:25

## 2025-02-05 RX ADMIN — SODIUM CHLORIDE, PRESERVATIVE FREE 10 ML: 5 INJECTION INTRAVENOUS at 09:27

## 2025-02-05 RX ADMIN — ACETAMINOPHEN 650 MG: 160 SOLUTION ORAL at 09:33

## 2025-02-05 RX ADMIN — HYDRALAZINE HYDROCHLORIDE 10 MG: 20 INJECTION INTRAMUSCULAR; INTRAVENOUS at 09:33

## 2025-02-05 RX ADMIN — ACETAMINOPHEN 650 MG: 160 SOLUTION ORAL at 06:12

## 2025-02-05 RX ADMIN — FOLIC ACID 1 MG: 1 TABLET ORAL at 09:27

## 2025-02-05 ASSESSMENT — PAIN DESCRIPTION - LOCATION: LOCATION: HEAD

## 2025-02-05 ASSESSMENT — PAIN - FUNCTIONAL ASSESSMENT: PAIN_FUNCTIONAL_ASSESSMENT: ACTIVITIES ARE NOT PREVENTED

## 2025-02-05 ASSESSMENT — PAIN SCALES - GENERAL: PAINLEVEL_OUTOF10: 4

## 2025-02-05 ASSESSMENT — PAIN DESCRIPTION - DESCRIPTORS: DESCRIPTORS: ACHING

## 2025-02-05 ASSESSMENT — PAIN DESCRIPTION - ORIENTATION: ORIENTATION: MID

## 2025-02-05 NOTE — CARE COORDINATION
Cleared for D/C from CM standpoint  Transition of Care Plan:     RUR: 12%  Prior Level of Functioning: Independent  Disposition: home with [facility]  P2P for IPR was denied   GUILLAUME: 2/5  If SNF or IPR: Date FOC offered: 2/3  Date FOC received: 2/3  Accepting facility: [facility] (insurance denied)  Follow up appointments: PCP, specialists as indicated   DME needed: RW- delivered at the bedside   Transportation at discharge: spouse, ETA 3PM  IM/IMM Medicare/ letter given: given  Is patient a [place] and connected with [facility]? no              If yes, was [place] transfer form completed and [facility] notified?   Caregiver Contact: spouse  Discharge Caregiver contacted prior to discharge?pt contacted   Care Conference needed? no  Barriers to discharge:  none    Chart reviewed. CM aware of discharge order. P2P for IPR was denied. Spoke with pt at bedside to confirm alternative discharge plan. Pt prefers to return home with [facility] services. 2nd IM given. Offered FOC for home health services, no preference indicated. Referral sent and [facility] able to accept. Information added to AVS for review. CM made aware that pt requires a RW for discharge. RW ordered through [employer]- approved and CM delivered at the bedside prior to discharge today. Spouse to transport home. No further CM needs identified at this time.       02/05/25 1542   Services At/After Discharge   Transition of Care Consult (CM Consult) Discharge Planning   Services At/After Discharge Home Health;DME   [place] Resource Information Provided? No   Mode of Transport at Discharge Other (see comment)  (spouse)   Hospital Transport Time of Discharge 1500   Confirm Follow Up Transport Family   Condition of Participation: Discharge Planning   The Plan for Transition of Care is related to the following treatment goals: return to baseline   The Patient and/or Patient Representative was provided with a Choice of Provider? Patient   The

## 2025-02-05 NOTE — PLAN OF CARE
Problem: Safety - Adult  Goal: Free from fall injury  2/4/2025 2349 by Shazia Ashley RN  Outcome: Progressing  2/4/2025 1855 by Montserrat Gomez RN  Outcome: Progressing     Problem: Discharge Planning  Goal: Discharge to home or other facility with appropriate resources  2/4/2025 2349 by Shazia Ashley RN  Outcome: Progressing  2/4/2025 1855 by Montserrat Gomez RN  Outcome: Progressing     Problem: Pain  Goal: Verbalizes/displays adequate comfort level or baseline comfort level  2/4/2025 2349 by Shazia Ashley RN  Outcome: Progressing  2/4/2025 1855 by Montserrat Gomez RN  Outcome: Progressing     Problem: Confusion  Goal: Confusion, delirium, dementia, or psychosis is improved or at baseline  Description: INTERVENTIONS:  1. Assess for possible contributors to thought disturbance, including medications, impaired vision or hearing, underlying metabolic abnormalities, dehydration, psychiatric diagnoses, and notify attending LIP  2. [place] high risk fall precautions, as indicated  3. Provide frequent short contacts to provide reality reorientation, refocusing and direction  4. Decrease environmental stimuli, including noise as appropriate  5. Monitor and intervene to maintain adequate nutrition, hydration, elimination, sleep and activity  6. If unable to ensure safety without constant attention obtain sitter and review sitter guidelines with assigned personnel  7. Initiate Psychosocial CNS and Spiritual Care consult, as indicated  2/4/2025 2349 by Shazia Ashley RN  Outcome: Progressing  2/4/2025 1855 by Montserrat Gomez RN  Outcome: Progressing     Problem: Skin/Tissue Integrity  Goal: Skin integrity remains intact  Description: 1.  Monitor for areas of redness and/or skin breakdown  2.  Assess vascular access sites hourly  3.  Every 4-6 hours minimum:  Change oxygen saturation probe site  4.  Every 4-6 hours:  If on nasal continuous positive airway pressure, respiratory therapy assess nares and determine need for

## 2025-02-05 NOTE — PLAN OF CARE
Problem: Physical Therapy - Adult  Goal: By Discharge: Performs mobility at highest level of function for planned discharge setting.  See evaluation for individualized goals.  Description: FUNCTIONAL STATUS PRIOR TO ADMISSION: Patient was independent and active without use of DME.    HOME SUPPORT PRIOR TO ADMISSION: The patient lived with  and son.    Physical Therapy Goals  Initiated 1/28/2025  1.  Patient will move from supine to sit and sit to supine, scoot up and down, and roll side to side in bed with minimal assistance within 7 day(s).    2.  Patient will perform sit to stand with minimal assistance within 7 day(s).  3.  Patient will transfer from bed to chair and chair to bed with moderate assistance using the least restrictive device within 7 day(s).  4.  Patient will ambulate with moderate assistance for 25 feet with the least restrictive device within 7 day(s).   Outcome: Progressing  PHYSICAL THERAPY TREATMENT    Patient: Cole Velasquez (74 y.o. female)  Date: 2/5/2025  Diagnosis: Encephalopathy acute [G93.40]  AMS (altered mental status) [R41.82] AMS (altered mental status)      Precautions: Fall Risk, Bed Alarm                      ASSESSMENT:  Patient continues to benefit from skilled PT services and is progressing towards goals. The patient is in the bed on arrival with OT present checking BP.  She is sitting EOB with good sitting balance.  It is noted she is to be discharged home today and she lives in a ranch home with steps to enter so with assist of 2 she ambulated 30 feet into the hallway with a RW and then rode in the wheelchair to the steps.   Her railings at home are too far apart to use both of them so she was instructed in descending and ascending 3 steps using the left railing going sideways one at a time.  She is steady on the steps and her family will be with her on disch.  She had occasional standing rest breaks during this activity and feels she continues to be weak.  She

## 2025-02-05 NOTE — TELEPHONE ENCOUNTER
[facility]ed call to [facility]. Giving verbal order for OT & PT.  Patient has not been discharge from the hospital.  Home health stated she should be discharge today or tomorrow.

## 2025-02-05 NOTE — PROGRESS NOTES
End of Shift Note    Bedside shift change report given to Amada CARDENAS LPN (oncoming nurse) by Shazia Ashley RN (offgoing nurse).  Report included the following information SBAR, Kardex, ED Summary, OR Summary, Procedure Summary, Intake/Output, MAR, Accordion, Recent Results, and Med Rec Status    Shift worked:  [id number]     Shift summary and any significant changes:     Pt had calm night, no complaints made. Headaches well controlled with scheduled pain medication. No seizure episode seen.     Concerns for physician to address:  none     Zone phone for oncoming shift:   [phone]       Activity:  Level of Assistance: Standby assist, set-up cues, supervision of patient - no hands on  Number times ambulated in hallways past shift: 0  Number of times OOB to chair past shift: 2    Cardiac:   Cardiac Monitoring: No      Cardiac Rhythm: Sinus rhythm    Access:  Current line(s): PIV     Genitourinary:   Urinary Status: Voiding, Bathroom privileges    Respiratory:   O2 Device: None (Room air)  Chronic home O2 use?: NO  Incentive spirometer at bedside: NO    GI:  Last BM (including prior to admit): 02/04/25  Current diet:  ADULT ORAL NUTRITION SUPPLEMENT; Lunch, Dinner; Diabetic Oral Supplement  ADULT DIET; Regular  Passing flatus: YES    Pain Management:   Patient states pain is manageable on current regimen: YES    Skin:  Berlin Scale Score: 18  Interventions: Wound Offloading (Prevention Methods): Pillows, Repositioning, Turning    Patient Safety:  Fall Risk: Nursing Judgement-Fall Risk High(Add Comments): Yes  Fall Risk Interventions  Nursing Judgement-Fall Risk High(Add Comments): Yes  Toilet Every 2 Hours-In Advance of Need: Yes  Hourly Visual Checks: In bed, Awake  Fall Visual Posted: Socks  Room Door Open: Deferred to promote rest  Alarm On: Bed  Patient Moved Closer to Nursing Station: No    Active Consults:   IP CONSULT TO INTENSIVIST  IP CONSULT TO DIETITIAN  IP CONSULT TO INFECTIOUS DISEASES  IP CONSULT TO RHEUMATOLOGY  IP

## 2025-02-05 NOTE — PLAN OF CARE
Problem: Occupational Therapy - Adult  Goal: By Discharge: Performs self-care activities at highest level of function for planned discharge setting.  See evaluation for individualized goals.  Description: FUNCTIONAL STATUS PRIOR TO ADMISSION:  Patient questionable historian secondary to confusion but son present and reported patient was independent for ADLs and functional mobility. She was driving and completing IADLs without assist.     Active : Yes     HOME SUPPORT: Patient lived with her  and son.    Occupational Therapy Goals:  Weekly Reassessment 2/5/2025  1.  Patient will perform standing grooming routine with Modified [place] within 7 day(s).  2.  Patient will perform upper body dressing with [place] within 7 day(s).  3.  Patient will perform lower body dressing with Modified [place] within 7 day(s).  4.  Patient will perform toilet transfers with Modified [place] within 7 day(s).  5.  Patient will perform all aspects of toileting with Modified [place] within 7 day(s).  6.  Patient will participate in upper extremity therapeutic exercise/activities with Modified [place] for 10 minutes within 7 day(s).      Initiated 1/28/2025 - ALL GOALS MET 2/5/25  1.  Patient will perform grooming with Minimal Assist within 7 day(s).  2.  Patient will perform upper body dressing with Moderate Assist within 7 day(s).  3.  Patient will perform lower body dressing with Maximal Assist within 7 day(s).  4.  Patient will perform toilet transfers with Moderate Assist  within 7 day(s).  5.  Patient will perform all aspects of toileting with Moderate Assist within 7 day(s).  6.  Patient will participate in upper extremity therapeutic exercise/activities with Minimal Assist for 3 minutes within 7 day(s).    Outcome: Progressing  OCCUPATIONAL THERAPY TREATMENT: WEEKLY REASSESSMENT    Patient: Cole Velasquez (74 y.o. female)  Date: 2/5/2025  Primary Diagnosis: Encephalopathy acute

## 2025-02-05 NOTE — DISCHARGE SUMMARY
Discharge Summary    Name: Cole Velasquez  [id number]  YOB: 1950 (Age: 74 y.o.)   Date of Admission: 1/23/2025  Date of Discharge: 2/5/2025  Attending Physician: Riley Leon MD    Discharge Diagnosis:   Serotonin syndrome  Seizures  Type 2 diabetes, chronic  Hx HTN and HLD  Anxiety, chronic  Insomnia, chronic  Hx GERD    Consultations:  IP CONSULT TO INTENSIVIST  IP CONSULT TO DIETITIAN  IP CONSULT TO INFECTIOUS DISEASES  IP CONSULT TO RHEUMATOLOGY  IP CONSULT TO PSYCHIATRY  IP CONSULT TO CASE MANAGEMENT  IP CONSULT TO CASE MANAGEMENT  IP CONSULT TO CASE MANAGEMENT      Brief Admission History/Reason for Admission Per Danielle Kruger MD:       Brief Hospital Course by Main Problems:     Found down/unwitnessed fall, PTA  Witnessed seizure activity, POA,   Acute encephalopathy, POA  SIRS/Transient fever and tachycardia without clear infectious source, not POA, resolved- possible serotonin syndrome  Right sided weakness, not POA, improved        CT head and neck- NAD        MRI brain- NAD        EEG- no epileptic foci but study abnormal consistent with encephalopathy  Intubated 1/23 for AW protection. Extubated 1/27 and tolerating well. Now weaned to 5lpm.  Neurology consulted and following. Overnight EEG- no epileptic foci. Continue keppra at 1000mg BID.  ID consulted with onset of fever to 103.1 on 1/23. Started on empiric abx/antiviral for meningitis. Appreciate recommendations        Resp virus panel all negative.         Meningitis panel all negative. CSF culture negative        Blood cultures negative        CXR- no focal infiltrates        UA unremarkable with no evidence of infection  SLP consulted.  PT/OT consulted. Recommending IPR.  Continuous pulse ox/tele  Monitor lytes and supplement as needed  Add on serum lvl for B1, B12 and vit D. Start thiamine, B12 and D supplements  Check RPR and lyme tests  Repeat CTA/perfusion brain early this AM

## 2025-02-05 NOTE — TELEPHONE ENCOUNTER
[facility]in with [facility] [phone]     Calling to see if Dr. Mead will follow for   Nursing, PT and OT -- please call with a verbal.

## 2025-02-05 NOTE — PROGRESS NOTES
Comprehensive Nutrition Assessment    Type and Reason for Visit:  Reassess    Nutrition Recommendations/Plan:   Continue current diet and supplement      Malnutrition Assessment:  Malnutrition Status:  No malnutrition (01/28/25 1439)    Context:  Acute Illness     Findings of the 6 clinical characteristics of malnutrition:  Energy Intake:  Mild decrease in energy intake  Weight Loss:  No weight loss     Body Fat Loss:  No body fat loss     Muscle Mass Loss:  No muscle mass loss    Fluid Accumulation:  No fluid accumulation Extremities   Strength:  Not Performed    Nutrition Assessment:    Chart reviewed; patient receiving a regular diet. She reports a fair appetite. Eating something at each meal but appetite isn't back to normal yet. She's excited as she states she is going home today but upset her insurance wouldn't cover IPR. Hopeful intake will continue to improve in home environment and with more control over her food. She is drinking Glucerna shakes as desired. Encouraged intake of meals. No nutrition questions or concerns from patient.     Patient Vitals for the past 120 hrs:   PO Meals Eaten (%)   02/02/25 2225 51 - 75%     Nutrition Related Findings:    -110-127   BM 2/4   Trace edema BLE   Norvasc, Atorvastatin, Coreg, Folic Acid, Humalog, Keppra, Protonix, Thiamine, Vitamin B12   Wound Type: Skin Tears       Current Nutrition Intake & Therapies:    Average Meal Intake: 51-75%  Average Supplements Intake:  (Consuming PRN)  ADULT ORAL NUTRITION SUPPLEMENT; Lunch, Dinner; Diabetic Oral Supplement  ADULT DIET; Regular    Anthropometric Measures:  Height: 160 cm (5' 2.99\")  Ideal Body Weight (IBW): 115 lbs (52 kg)    Admission Body Weight: 101.6 kg (224 lb)  Current Body Weight: 106.7 kg (235 lb 3.7 oz), 202.1 % IBW. Weight Source: Bed scale  Current BMI (kg/m2): 41.7           Weight Adjustment For: No Adjustment                 BMI Categories: Obese Class 3 (BMI 40.0 or greater)    Estimated Daily

## 2025-02-13 ENCOUNTER — OFFICE VISIT (OUTPATIENT)
Age: 75
End: 2025-02-13

## 2025-02-13 VITALS
WEIGHT: 234 LBS | DIASTOLIC BLOOD PRESSURE: 82 MMHG | HEIGHT: 69 IN | HEART RATE: 70 BPM | BODY MASS INDEX: 34.66 KG/M2 | TEMPERATURE: 98.1 F | RESPIRATION RATE: 16 BRPM | OXYGEN SATURATION: 96 % | SYSTOLIC BLOOD PRESSURE: 121 MMHG

## 2025-02-13 DIAGNOSIS — G40.909 SEIZURE DISORDER (HCC): ICD-10-CM

## 2025-02-13 DIAGNOSIS — I10 PRIMARY HYPERTENSION: ICD-10-CM

## 2025-02-13 DIAGNOSIS — G90.81 SEROTONIN SYNDROME: ICD-10-CM

## 2025-02-13 DIAGNOSIS — Z09 HOSPITAL DISCHARGE FOLLOW-UP: Primary | ICD-10-CM

## 2025-02-13 ASSESSMENT — PATIENT HEALTH QUESTIONNAIRE - PHQ9
2. FEELING DOWN, DEPRESSED OR HOPELESS: SEVERAL DAYS
SUM OF ALL RESPONSES TO PHQ QUESTIONS 1-9: 1
1. LITTLE INTEREST OR PLEASURE IN DOING THINGS: NOT AT ALL
SUM OF ALL RESPONSES TO PHQ9 QUESTIONS 1 & 2: 1

## 2025-02-13 NOTE — PROGRESS NOTES
Verified name and birth date for privacy precautions.   Chart reviewed in preparation for today's visit.     Chief Complaint   Patient presents with    Follow-Up from Hospital          Health Maintenance Due   Topic    Lipids     Annual Wellness Visit (Medicare Advantage)          Wt Readings from Last 3 Encounters:   02/13/25 106.1 kg (234 lb)   01/30/25 106.7 kg (235 lb 3.7 oz)   12/12/24 101.6 kg (224 lb)     Temp Readings from Last 3 Encounters:   02/13/25 98.1 °F (36.7 °C)   02/05/25 97.7 °F (36.5 °C) (Oral)   12/12/24 97.8 °F (36.6 °C)     BP Readings from Last 3 Encounters:   02/13/25 121/82   02/05/25 137/61   12/12/24 137/84     Pulse Readings from Last 3 Encounters:   02/13/25 70   02/05/25 75   12/12/24 74       Social Determinants of Health     Tobacco Use: Low Risk  (2/13/2025)    Patient History     Smoking Tobacco Use: Never     Smokeless Tobacco Use: Never     Passive Exposure: Past   Alcohol Use: Patient Unable To Answer (1/23/2025)    AUDIT-C     Frequency of Alcohol Consumption: Patient unable to answer     Average Number of Drinks: Patient unable to answer     Frequency of Binge Drinking: Patient unable to answer   Financial Resource Strain: Low Risk  (12/11/2024)    Overall Financial Resource Strain (CARDIA)     Difficulty of Paying Living Expenses: Not hard at all   Food Insecurity: No Food Insecurity (1/23/2025)    Hunger Vital Sign     Worried About Running Out of Food in the Last Year: Never true     Ran Out of Food in the Last Year: Never true   Transportation Needs: No Transportation Needs (1/23/2025)    PRAPARE - Transportation     Lack of Transportation (Medical): No     Lack of Transportation (Non-Medical): No   Physical Activity: Unknown (6/2/2024)    Exercise Vital Sign     Days of Exercise per Week: 0 days     Minutes of Exercise per Session: Not on file   Stress: Not on file   Social Connections: Not on file   Intimate Partner Violence: Not on file   Depression: Not at risk

## 2025-02-13 NOTE — PROGRESS NOTES
Post-Discharge Transitional Care  Follow Up      Cole Velasquez   YOB: 1950    Date of Office Visit:  2/13/2025  Date of Hospital Admission: 1/23/25  Date of Hospital Discharge: 2/5/25  Risk of hospital readmission (high >=14%. Medium >=10%) :Readmission Risk Score: 11.9      Care management risk score Rising risk (score 2-5) and Complex Care (Scores >=6): No Risk Score On File     Non face to face  following discharge, date last encounter closed (first attempt may have been earlier): 02/06/2025    Call initiated 2 business days of discharge: Yes    ASSESSMENT/PLAN:   Hospital discharge follow-up  -     PA DISCHARGE MEDS RECONCILED W/ CURRENT OUTPATIENT MED LIST  Seizure disorder (HCC)  Assessment & Plan:  Has not had followup with neurology, and I do not see an appointment scheduled. Referral placed today.   Orders:  -     [facility] - Kianna Christine DO, [facility] ([address])  Primary hypertension  Assessment & Plan:  Suspect dizziness may be due to orthostasis, recommend to hold her amlodipine for now and see if this helps. Her daughter in law is present via phone call, says that they will monitor her blood pressure at home and stop the amlodipine if it is needed.  Serotonin syndrome  Assessment & Plan:  Not certain this was truly the cause for her seizures, although the remainder of her workup was negative for other cause of her seizures. Based on hospital records I am not sure if she met criteria for this diagnosis. Regardless, will avoid any serotonergic medications at this time. If need arises to manage her anxiety, will refer to psychiatry for this.    Medical Decision Making: high complexity  Return in about 4 weeks (around 3/13/2025) for Followup.         Subjective:   HPI:  Follow up of Hospital problems/diagnosis(es): new onset seizures, concern for serotonin syndrome    Inpatient course: Discharge summary reviewed- see chart.    Interval history/Current status: Reports

## 2025-02-19 PROBLEM — G40.909 SEIZURE DISORDER (HCC): Status: ACTIVE | Noted: 2025-02-19

## 2025-02-19 PROBLEM — G90.81 SEROTONIN SYNDROME: Status: ACTIVE | Noted: 2025-02-19

## 2025-02-19 NOTE — ASSESSMENT & PLAN NOTE
Suspect dizziness may be due to orthostasis, recommend to hold her amlodipine for now and see if this helps. Her daughter in law is present via phone call, says that they will monitor her blood pressure at home and stop the amlodipine if it is needed.

## 2025-02-19 NOTE — ASSESSMENT & PLAN NOTE
Has not had followup with neurology, and I do not see an appointment scheduled. Referral placed today.

## 2025-02-19 NOTE — ASSESSMENT & PLAN NOTE
Not certain this was truly the cause for her seizures, although the remainder of her workup was negative for other cause of her seizures. Based on hospital records I am not sure if she met criteria for this diagnosis. Regardless, will avoid any serotonergic medications at this time. If need arises to manage her anxiety, will refer to psychiatry for this.

## 2025-02-25 ENCOUNTER — TELEPHONE (OUTPATIENT)
Age: 75
End: 2025-02-25

## 2025-02-25 NOTE — TELEPHONE ENCOUNTER
Reason for call:  TC from patient. Patient would like to know if she still needs to get her lab drawn before her appt 3/13? Patient stated that when she was in the hospital she had labs drawn.     Is this a new problem: Yes    Date of last appointment:  2/13/2025     Can we respond via [employer]: No    Best call back number:      Cole Velasquez (Self) [phone] (Mobile)

## 2025-03-06 ENCOUNTER — PATIENT MESSAGE (OUTPATIENT)
Age: 75
End: 2025-03-06

## 2025-03-06 DIAGNOSIS — R56.9 GENERALIZED SEIZURES (HCC): ICD-10-CM

## 2025-03-06 RX ORDER — LACOSAMIDE 50 MG/1
50 TABLET ORAL 2 TIMES DAILY
Qty: 60 TABLET | Refills: 1 | Status: SHIPPED | OUTPATIENT
Start: 2025-03-06 | End: 2025-05-05

## 2025-03-06 RX ORDER — LEVETIRACETAM 750 MG/1
750 TABLET ORAL 2 TIMES DAILY
Qty: 60 TABLET | Refills: 3 | Status: CANCELLED | OUTPATIENT
Start: 2025-03-06

## 2025-03-06 NOTE — TELEPHONE ENCOUNTER
Last office visit 2/13/25  Next Appt  With Internal Medicine (Jona Mead DO)  03/13/2025 at 1:00 PM        Last fill on vimpat 2/5/25 #60 with 1 additional refill    Last fill on keppra 2/5/25 #60 with 3 additional refills

## 2025-03-06 NOTE — TELEPHONE ENCOUNTER
Spoke with patient and she [facility] [address]     I called pharmacy to have them transferred but the vimpat is controlled so I couldn't have it transferred

## 2025-03-07 ENCOUNTER — TELEPHONE (OUTPATIENT)
Age: 75
End: 2025-03-07

## 2025-03-07 NOTE — TELEPHONE ENCOUNTER
Spoke with both pharmacies and confirmed that script had been transferred    [facility] advised that [facility] is [facility] and will contact her when the scrip is better

## 2025-03-07 NOTE — TELEPHONE ENCOUNTER
Medication Refill Request    Cole Velasquez is requesting a refill of the following medication(s):   levETIRAcetam (KEPPRA) 750 MG tablet   Pt do not have any more medication.   Please send refill to:     [facility]/pharmacy #[id number] - [place] - [address] -  [phone] - F [phone] [address] [facility] [address]  Phone: [phone] Fax: [phone]

## 2025-03-13 ENCOUNTER — OFFICE VISIT (OUTPATIENT)
Age: 75
End: 2025-03-13
Payer: MEDICARE

## 2025-03-13 VITALS
HEIGHT: 63 IN | TEMPERATURE: 97.8 F | HEART RATE: 72 BPM | WEIGHT: 208.2 LBS | DIASTOLIC BLOOD PRESSURE: 90 MMHG | SYSTOLIC BLOOD PRESSURE: 150 MMHG | BODY MASS INDEX: 36.89 KG/M2 | OXYGEN SATURATION: 95 % | RESPIRATION RATE: 16 BRPM

## 2025-03-13 DIAGNOSIS — R73.03 PRE-DIABETES: ICD-10-CM

## 2025-03-13 DIAGNOSIS — I10 PRIMARY HYPERTENSION: Primary | ICD-10-CM

## 2025-03-13 DIAGNOSIS — I10 PRIMARY HYPERTENSION: ICD-10-CM

## 2025-03-13 DIAGNOSIS — E78.2 MIXED HYPERLIPIDEMIA: ICD-10-CM

## 2025-03-13 DIAGNOSIS — G40.909 SEIZURE DISORDER (HCC): ICD-10-CM

## 2025-03-13 LAB
ALBUMIN SERPL-MCNC: 3.7 G/DL (ref 3.5–5)
ALBUMIN/GLOB SERPL: 0.9 (ref 1.1–2.2)
ALP SERPL-CCNC: 85 U/L (ref 45–117)
ALT SERPL-CCNC: 25 U/L (ref 12–78)
ANION GAP SERPL CALC-SCNC: 3 MMOL/L (ref 2–12)
AST SERPL-CCNC: 19 U/L (ref 15–37)
BILIRUB SERPL-MCNC: 1 MG/DL (ref 0.2–1)
BUN SERPL-MCNC: 7 MG/DL (ref 6–20)
BUN/CREAT SERPL: 10 (ref 12–20)
CALCIUM SERPL-MCNC: 10 MG/DL (ref 8.5–10.1)
CHLORIDE SERPL-SCNC: 103 MMOL/L (ref 97–108)
CHOLEST SERPL-MCNC: 225 MG/DL
CO2 SERPL-SCNC: 32 MMOL/L (ref 21–32)
CREAT SERPL-MCNC: 0.73 MG/DL (ref 0.55–1.02)
ERYTHROCYTE [DISTWIDTH] IN BLOOD BY AUTOMATED COUNT: 12.9 % (ref 11.5–14.5)
EST. AVERAGE GLUCOSE BLD GHB EST-MCNC: 114 MG/DL
GLOBULIN SER CALC-MCNC: 4 G/DL (ref 2–4)
GLUCOSE SERPL-MCNC: 100 MG/DL (ref 65–100)
HBA1C MFR BLD: 5.6 % (ref 4–5.6)
HCT VFR BLD AUTO: 46.5 % (ref 35–47)
HDLC SERPL-MCNC: 50 MG/DL
HDLC SERPL: 4.5 (ref 0–5)
HGB BLD-MCNC: 15.4 G/DL (ref 11.5–16)
LDLC SERPL CALC-MCNC: 132 MG/DL (ref 0–100)
MCH RBC QN AUTO: 31.1 PG (ref 26–34)
MCHC RBC AUTO-ENTMCNC: 33.1 G/DL (ref 30–36.5)
MCV RBC AUTO: 93.9 FL (ref 80–99)
NRBC # BLD: 0 K/UL (ref 0–0.01)
NRBC BLD-RTO: 0 PER 100 WBC
PLATELET # BLD AUTO: 202 K/UL (ref 150–400)
PMV BLD AUTO: 11.5 FL (ref 8.9–12.9)
POTASSIUM SERPL-SCNC: 3.8 MMOL/L (ref 3.5–5.1)
PROT SERPL-MCNC: 7.7 G/DL (ref 6.4–8.2)
RBC # BLD AUTO: 4.95 M/UL (ref 3.8–5.2)
SODIUM SERPL-SCNC: 138 MMOL/L (ref 136–145)
TRIGL SERPL-MCNC: 215 MG/DL
VLDLC SERPL CALC-MCNC: 43 MG/DL
WBC # BLD AUTO: 6.8 K/UL (ref 3.6–11)

## 2025-03-13 PROCEDURE — 1090F PRES/ABSN URINE INCON ASSESS: CPT | Performed by: STUDENT IN AN ORGANIZED HEALTH CARE EDUCATION/TRAINING PROGRAM

## 2025-03-13 PROCEDURE — 1125F AMNT PAIN NOTED PAIN PRSNT: CPT | Performed by: STUDENT IN AN ORGANIZED HEALTH CARE EDUCATION/TRAINING PROGRAM

## 2025-03-13 PROCEDURE — 1159F MED LIST DOCD IN RCRD: CPT | Performed by: STUDENT IN AN ORGANIZED HEALTH CARE EDUCATION/TRAINING PROGRAM

## 2025-03-13 PROCEDURE — 99214 OFFICE O/P EST MOD 30 MIN: CPT | Performed by: STUDENT IN AN ORGANIZED HEALTH CARE EDUCATION/TRAINING PROGRAM

## 2025-03-13 PROCEDURE — G8399 PT W/DXA RESULTS DOCUMENT: HCPCS | Performed by: STUDENT IN AN ORGANIZED HEALTH CARE EDUCATION/TRAINING PROGRAM

## 2025-03-13 PROCEDURE — 3080F DIAST BP >= 90 MM HG: CPT | Performed by: STUDENT IN AN ORGANIZED HEALTH CARE EDUCATION/TRAINING PROGRAM

## 2025-03-13 PROCEDURE — 1123F ACP DISCUSS/DSCN MKR DOCD: CPT | Performed by: STUDENT IN AN ORGANIZED HEALTH CARE EDUCATION/TRAINING PROGRAM

## 2025-03-13 PROCEDURE — G8417 CALC BMI ABV UP PARAM F/U: HCPCS | Performed by: STUDENT IN AN ORGANIZED HEALTH CARE EDUCATION/TRAINING PROGRAM

## 2025-03-13 PROCEDURE — 3077F SYST BP >= 140 MM HG: CPT | Performed by: STUDENT IN AN ORGANIZED HEALTH CARE EDUCATION/TRAINING PROGRAM

## 2025-03-13 PROCEDURE — 1036F TOBACCO NON-USER: CPT | Performed by: STUDENT IN AN ORGANIZED HEALTH CARE EDUCATION/TRAINING PROGRAM

## 2025-03-13 PROCEDURE — G8427 DOCREV CUR MEDS BY ELIG CLIN: HCPCS | Performed by: STUDENT IN AN ORGANIZED HEALTH CARE EDUCATION/TRAINING PROGRAM

## 2025-03-13 RX ORDER — AMLODIPINE BESYLATE 5 MG/1
5 TABLET ORAL DAILY
Qty: 90 TABLET | Refills: 3 | Status: SHIPPED | OUTPATIENT
Start: 2025-03-13

## 2025-03-13 SDOH — ECONOMIC STABILITY: FOOD INSECURITY: WITHIN THE PAST 12 MONTHS, YOU WORRIED THAT YOUR FOOD WOULD RUN OUT BEFORE YOU GOT MONEY TO BUY MORE.: NEVER TRUE

## 2025-03-13 SDOH — ECONOMIC STABILITY: FOOD INSECURITY: WITHIN THE PAST 12 MONTHS, THE FOOD YOU BOUGHT JUST DIDN'T LAST AND YOU DIDN'T HAVE MONEY TO GET MORE.: NEVER TRUE

## 2025-03-13 ASSESSMENT — PATIENT HEALTH QUESTIONNAIRE - PHQ9
1. LITTLE INTEREST OR PLEASURE IN DOING THINGS: SEVERAL DAYS
2. FEELING DOWN, DEPRESSED OR HOPELESS: NOT AT ALL
SUM OF ALL RESPONSES TO PHQ QUESTIONS 1-9: 1

## 2025-03-13 NOTE — PROGRESS NOTES
days. Max Daily Amount: 100 mg 3/6/25 5/5/25 Yes Jona Mead DO   levETIRAcetam (KEPPRA) 750 MG tablet Take 1 tablet by mouth 2 times daily 2/5/25  Yes Riley Leon MD   carvedilol (COREG) 6.25 MG tablet Take 1 tablet by mouth 2 times daily (with meals) 2/5/25  Yes Riley Leon MD   folic acid (FOLVITE) 1 MG tablet Take 1 tablet by mouth daily 2/4/25  Yes Wendy Castillo MD   vitamin B-12 1000 MCG tablet Take 1 tablet by mouth daily 2/4/25  Yes Wendy Castillo MD   thiamine mononitrate (THIAMINE) 100 MG tablet Take 1 tablet by mouth daily 2/4/25 5/5/25 Yes Wendy Castillo MD       The following sections were reviewed & updated as appropriate: Problem List, Allergies, PMH, PSH, FH, and SH.      Objective:   Resp 16   Ht 1.6 m (5' 3\")   Wt 94.4 kg (208 lb 3.2 oz)   BMI 36.88 kg/m²     Physical Exam  Constitutional:       General: She is not in acute distress.     Appearance: Normal appearance.   Eyes:      General: No scleral icterus.     Conjunctiva/sclera: Conjunctivae normal.   Pulmonary:      Effort: Pulmonary effort is normal. No respiratory distress.   Skin:     General: Skin is warm and dry.      Findings: No rash.   Neurological:      Mental Status: She is alert and oriented to person, place, and time. Mental status is at baseline.   Psychiatric:         Mood and Affect: Mood normal.         Behavior: Behavior normal.           The patient (or guardian, if applicable) and other individuals in attendance with the patient were advised that Artificial Intelligence will be utilized during this visit to record, process the conversation to generate a clinical note, and support improvement of the AI technology. The patient (or guardian, if applicable) and other individuals in attendance at the appointment consented to the use of AI, including the recording.        Jona Mead DO

## 2025-03-18 ENCOUNTER — RESULTS FOLLOW-UP (OUTPATIENT)
Age: 75
End: 2025-03-18

## 2025-03-23 NOTE — PROCEDURES
PROCEDURE NOTE  Date: 1/24/2025   Name: Cole Velasquez  YOB: 1950    Procedures    Evaluated patient for bedside LP.  Utilized 2 RN staff and 1 RT staff to optimize patient in a side lying position with bed max inflated. Note the patient is intubated and sedated.  Utilized ultrasound to optimize viewing of vertebrae.  Despite optimal positioning and use of ultrasound, I was unable to palpate vertebrae adequately or obtain adequate visualization to establish a safe view/window to attempt LP. The procedure was not performed as I did not feel that it could be done safely at the bedside due to the above stated reasons.  IR has been consulted to do the procedure. I updated the family in person.    Consent was obtained for the procedure.  There is also consent on the chart for IR to do the procedure.     Body mass index is 39.68 kg/m².    Vitals:    01/23/25 2330   BP: 109/61   Pulse: (!) 101   Resp: 18   Temp:    SpO2: 98%     Renata Hubbard NP       [FreeTextEntry1] : # HS, severe, Hollis stage 3 with extensive scarring  # Suppurative acne on face vs #facial HS with extensive scarring, per pt worsened on cosentyx # High risk med mgmt Chronic, severe, flaring - Has failed many prior medications as above in hx, including humira, cosentyx, isotretinoin, doxy/blake, prednisone - Has had multiple HS surgeries in groin buttocks, axilla (most recently 2025) with good repsonse - started upadacitinib 15mg 3/13/25 - Checked cytokine panel at - TNF- alpha WNL. IL-10, IL-6, IL-8, Interferon gamma are all elevated.  - Reviewed labs from :  cbc with WBC 18.7, Hgb 12.9, . cmp with only mild elevation in glucose (106) total protein (8.9) and ALT slightly decreased (8) otherwise WNL.  QG negative. hep serologies - negative. lipid panel with mild high TG, LDL and Non-HDL and low HDL. total cholesterol is normal. g6PD (for dapsone) elevated at 25.7  PLAN:  - Photos taken today for chart  - Plan to start infliximab infusions starting at week 0,2 and 6 followed by q 4 wks as maint. Due to insurance denial of high starting dose (10 mg/kg) plan to start at a lower dose with increase as tolerated  - C/w upadicitinib 15mg PO daily, if labs below (CBC, CMP) are WNL, will INCREASE to 30mg daily. SED. Samples of 15mg PO daily given in office today. PLAN TO STOP ONCE INFLIXIMAB STARTED - Given significant flare today, espeically on back, will START prednisone 30mg daily x 2 weeks. SED  - Advised pt to STOP anxiolytic (eg. xanax) prior to bedtime - START gabapentin 300mg qhs (or prior to bedtime - pt notes she sleeps during the day). SED including drowsiness  - Will check CBC and CMP today, if WNL will increase Rinvoq dose per above  - Will f/u genetic testing to r/o IL-1 mediated process (i.e. PASH) - Will f/u skin biopsy from chest to r/o CD in light of worsening on il-17i. Sutures removed today, results pending  -- r/b/a anti-TNF therapy discussed including but not limited to immunosuppression with increased risk of infection, decreased cell counts, elevated liver enzymes, worsening CHF and rash. -- r/b/a MICHAEL inhibitors reviewed, discussed SE including but not limited to immunosuppression and increased risk of infection, malignancy, cytopenia, LFT elevation, lipid elevation, thrombotic events and CV events.  - pt found hibiclens and BPO wash too drying, previously provided samples of CLn wash and dove sensitive skin    RTC 2 weeks   >45 min

## 2025-04-21 RX ORDER — FOLIC ACID 1 MG/1
1 TABLET ORAL DAILY
Qty: 90 TABLET | Refills: 3 | Status: SHIPPED | OUTPATIENT
Start: 2025-04-21

## 2025-04-21 NOTE — TELEPHONE ENCOUNTER
Reason for call:  Spoke with pt. Pt requesting refill on med   vitamin B-12 1000 MCG tablet   folic acid (FOLVITE) 1 MG tablet     [facility]/pharmacy #[id number] [place], [place] - [facility] [address] - P [phone] - F [phone]  [phone]     Is this a new problem: Yes    Date of last appointment:  3/13/2025     Can we respond via [employer]: No    Best call back number: Cole Velasquez   [phone]

## 2025-04-21 NOTE — TELEPHONE ENCOUNTER
Last office visit 3/13/25  Next Appt  With Internal Medicine (Jona Mead DO)  06/23/2025 at 1:30 PM    Last fill on folic acid 2/4/25 #30 with 3 additional refills     Last fill on b12 2/4/25    #30 with 3 additional refills

## 2025-05-01 ENCOUNTER — ANESTHESIA (OUTPATIENT)
Facility: HOSPITAL | Age: 75
End: 2025-05-01
Payer: MEDICARE

## 2025-05-01 ENCOUNTER — APPOINTMENT (OUTPATIENT)
Facility: HOSPITAL | Age: 75
DRG: 272 | End: 2025-05-01
Payer: MEDICARE

## 2025-05-01 ENCOUNTER — ANESTHESIA EVENT (OUTPATIENT)
Facility: HOSPITAL | Age: 75
End: 2025-05-01
Payer: MEDICARE

## 2025-05-01 ENCOUNTER — HOSPITAL ENCOUNTER (INPATIENT)
Facility: HOSPITAL | Age: 75
LOS: 1 days | Discharge: HOME | DRG: 272 | End: 2025-05-02
Attending: STUDENT IN AN ORGANIZED HEALTH CARE EDUCATION/TRAINING PROGRAM | Admitting: SURGERY
Payer: MEDICARE

## 2025-05-01 DIAGNOSIS — I82.220 IVC THROMBOSIS (HCC): Primary | ICD-10-CM

## 2025-05-01 DIAGNOSIS — I82.220 THROMBOSIS OF INFERIOR VENA CAVA (HCC): ICD-10-CM

## 2025-05-01 LAB
ALBUMIN SERPL-MCNC: 3.6 G/DL (ref 3.5–5)
ALBUMIN/GLOB SERPL: 0.9 (ref 1.1–2.2)
ALP SERPL-CCNC: 75 U/L (ref 45–117)
ALT SERPL-CCNC: 16 U/L (ref 12–78)
ANION GAP SERPL CALC-SCNC: 6 MMOL/L (ref 2–12)
APPEARANCE UR: CLEAR
AST SERPL-CCNC: 14 U/L (ref 15–37)
BACTERIA URNS QL MICRO: NEGATIVE /HPF
BASOPHILS # BLD: 0.07 K/UL (ref 0–0.1)
BASOPHILS NFR BLD: 0.9 % (ref 0–1)
BILIRUB SERPL-MCNC: 0.9 MG/DL (ref 0.2–1)
BILIRUB UR QL: NEGATIVE
BUN SERPL-MCNC: 9 MG/DL (ref 6–20)
BUN/CREAT SERPL: 10 (ref 12–20)
CALCIUM SERPL-MCNC: 9.2 MG/DL (ref 8.5–10.1)
CHLORIDE SERPL-SCNC: 106 MMOL/L (ref 97–108)
CO2 SERPL-SCNC: 25 MMOL/L (ref 21–32)
COLOR UR: NORMAL
COMMENT:: NORMAL
CREAT SERPL-MCNC: 0.86 MG/DL (ref 0.55–1.02)
DIFFERENTIAL METHOD BLD: NORMAL
EOSINOPHIL # BLD: 0.32 K/UL (ref 0–0.4)
EOSINOPHIL NFR BLD: 4.3 % (ref 0–7)
EPITH CASTS URNS QL MICRO: NORMAL /LPF
ERYTHROCYTE [DISTWIDTH] IN BLOOD BY AUTOMATED COUNT: 12.5 % (ref 11.5–14.5)
GLOBULIN SER CALC-MCNC: 4.1 G/DL (ref 2–4)
GLUCOSE SERPL-MCNC: 130 MG/DL (ref 65–100)
GLUCOSE UR STRIP.AUTO-MCNC: NEGATIVE MG/DL
HCT VFR BLD AUTO: 46.6 % (ref 35–47)
HGB BLD-MCNC: 15.9 G/DL (ref 11.5–16)
HGB UR QL STRIP: NEGATIVE
HYALINE CASTS URNS QL MICRO: NORMAL /LPF (ref 0–2)
IMM GRANULOCYTES # BLD AUTO: 0.03 K/UL (ref 0–0.04)
IMM GRANULOCYTES NFR BLD AUTO: 0.4 % (ref 0–0.5)
KETONES UR QL STRIP.AUTO: NEGATIVE MG/DL
LEUKOCYTE ESTERASE UR QL STRIP.AUTO: NEGATIVE
LYMPHOCYTES # BLD: 2.65 K/UL (ref 0.8–3.5)
LYMPHOCYTES NFR BLD: 35.8 % (ref 12–49)
MCH RBC QN AUTO: 31 PG (ref 26–34)
MCHC RBC AUTO-ENTMCNC: 34.1 G/DL (ref 30–36.5)
MCV RBC AUTO: 90.8 FL (ref 80–99)
MONOCYTES # BLD: 0.45 K/UL (ref 0–1)
MONOCYTES NFR BLD: 6.1 % (ref 5–13)
NEUTS SEG # BLD: 3.88 K/UL (ref 1.8–8)
NEUTS SEG NFR BLD: 52.5 % (ref 32–75)
NITRITE UR QL STRIP.AUTO: NEGATIVE
NRBC # BLD: 0 K/UL (ref 0–0.01)
NRBC BLD-RTO: 0 PER 100 WBC
NT PRO BNP: 42 PG/ML
PH UR STRIP: 5.5 (ref 5–8)
PLATELET # BLD AUTO: 228 K/UL (ref 150–400)
PMV BLD AUTO: 10.5 FL (ref 8.9–12.9)
POTASSIUM SERPL-SCNC: 3.5 MMOL/L (ref 3.5–5.1)
PROT SERPL-MCNC: 7.7 G/DL (ref 6.4–8.2)
PROT UR STRIP-MCNC: NEGATIVE MG/DL
RBC # BLD AUTO: 5.13 M/UL (ref 3.8–5.2)
RBC #/AREA URNS HPF: NORMAL /HPF (ref 0–5)
SODIUM SERPL-SCNC: 137 MMOL/L (ref 136–145)
SPECIMEN HOLD: NORMAL
TROPONIN I SERPL HS-MCNC: 4 NG/L (ref 0–51)
URINE CULTURE IF INDICATED: NORMAL
UROBILINOGEN UR QL STRIP.AUTO: 0.2 EU/DL (ref 0.2–1)
WBC # BLD AUTO: 7.4 K/UL (ref 3.6–11)
WBC URNS QL MICRO: NORMAL /HPF (ref 0–4)

## 2025-05-01 PROCEDURE — 3700000000 HC ANESTHESIA ATTENDED CARE: Performed by: SURGERY

## 2025-05-01 PROCEDURE — 86850 RBC ANTIBODY SCREEN: CPT

## 2025-05-01 PROCEDURE — 6360000002 HC RX W HCPCS: Performed by: SURGERY

## 2025-05-01 PROCEDURE — 6360000004 HC RX CONTRAST MEDICATION: Performed by: SURGERY

## 2025-05-01 PROCEDURE — C1769 GUIDE WIRE: HCPCS | Performed by: SURGERY

## 2025-05-01 PROCEDURE — 6360000002 HC RX W HCPCS

## 2025-05-01 PROCEDURE — 3700000001 HC ADD 15 MINUTES (ANESTHESIA): Performed by: SURGERY

## 2025-05-01 PROCEDURE — 88304 TISSUE EXAM BY PATHOLOGIST: CPT

## 2025-05-01 PROCEDURE — 6360000004 HC RX CONTRAST MEDICATION: Performed by: STUDENT IN AN ORGANIZED HEALTH CARE EDUCATION/TRAINING PROGRAM

## 2025-05-01 PROCEDURE — 86900 BLOOD TYPING SEROLOGIC ABO: CPT

## 2025-05-01 PROCEDURE — 76000 FLUOROSCOPY <1 HR PHYS/QHP: CPT

## 2025-05-01 PROCEDURE — C1757 CATH, THROMBECTOMY/EMBOLECT: HCPCS | Performed by: SURGERY

## 2025-05-01 PROCEDURE — 2580000003 HC RX 258: Performed by: NURSE ANESTHETIST, CERTIFIED REGISTERED

## 2025-05-01 PROCEDURE — 86901 BLOOD TYPING SEROLOGIC RH(D): CPT

## 2025-05-01 PROCEDURE — 84484 ASSAY OF TROPONIN QUANT: CPT

## 2025-05-01 PROCEDURE — 7100000000 HC PACU RECOVERY - FIRST 15 MIN: Performed by: SURGERY

## 2025-05-01 PROCEDURE — 06CC3ZZ EXTIRPATION OF MATTER FROM RIGHT COMMON ILIAC VEIN, PERCUTANEOUS APPROACH: ICD-10-PCS | Performed by: SURGERY

## 2025-05-01 PROCEDURE — B5191ZZ FLUOROSCOPY OF INFERIOR VENA CAVA USING LOW OSMOLAR CONTRAST: ICD-10-PCS | Performed by: SURGERY

## 2025-05-01 PROCEDURE — 93971 EXTREMITY STUDY: CPT

## 2025-05-01 PROCEDURE — 7100000001 HC PACU RECOVERY - ADDTL 15 MIN: Performed by: SURGERY

## 2025-05-01 PROCEDURE — 6360000002 HC RX W HCPCS: Performed by: NURSE ANESTHETIST, CERTIFIED REGISTERED

## 2025-05-01 PROCEDURE — 81001 URINALYSIS AUTO W/SCOPE: CPT

## 2025-05-01 PROCEDURE — 2500000003 HC RX 250 WO HCPCS: Performed by: NURSE ANESTHETIST, CERTIFIED REGISTERED

## 2025-05-01 PROCEDURE — 2060000000 HC ICU INTERMEDIATE R&B

## 2025-05-01 PROCEDURE — 71275 CT ANGIOGRAPHY CHEST: CPT

## 2025-05-01 PROCEDURE — G0378 HOSPITAL OBSERVATION PER HR: HCPCS

## 2025-05-01 PROCEDURE — 83880 ASSAY OF NATRIURETIC PEPTIDE: CPT

## 2025-05-01 PROCEDURE — 2709999900 HC NON-CHARGEABLE SUPPLY: Performed by: SURGERY

## 2025-05-01 PROCEDURE — B51D1ZZ FLUOROSCOPY OF BILATERAL LOWER EXTREMITY VEINS USING LOW OSMOLAR CONTRAST: ICD-10-PCS | Performed by: SURGERY

## 2025-05-01 PROCEDURE — 36415 COLL VENOUS BLD VENIPUNCTURE: CPT

## 2025-05-01 PROCEDURE — 2500000003 HC RX 250 WO HCPCS: Performed by: SURGERY

## 2025-05-01 PROCEDURE — 3600000004 HC SURGERY LEVEL 4 BASE: Performed by: SURGERY

## 2025-05-01 PROCEDURE — 3600000014 HC SURGERY LEVEL 4 ADDTL 15MIN: Performed by: SURGERY

## 2025-05-01 PROCEDURE — 99285 EMERGENCY DEPT VISIT HI MDM: CPT

## 2025-05-01 PROCEDURE — 80053 COMPREHEN METABOLIC PANEL: CPT

## 2025-05-01 PROCEDURE — 06C03ZZ EXTIRPATION OF MATTER FROM INFERIOR VENA CAVA, PERCUTANEOUS APPROACH: ICD-10-PCS | Performed by: SURGERY

## 2025-05-01 PROCEDURE — 6360000002 HC RX W HCPCS: Performed by: ANESTHESIOLOGY

## 2025-05-01 PROCEDURE — C1894 INTRO/SHEATH, NON-LASER: HCPCS | Performed by: SURGERY

## 2025-05-01 PROCEDURE — C1753 CATH, INTRAVAS ULTRASOUND: HCPCS | Performed by: SURGERY

## 2025-05-01 PROCEDURE — 74018 RADEX ABDOMEN 1 VIEW: CPT

## 2025-05-01 PROCEDURE — 85025 COMPLETE CBC W/AUTO DIFF WBC: CPT

## 2025-05-01 PROCEDURE — 6370000000 HC RX 637 (ALT 250 FOR IP): Performed by: SURGERY

## 2025-05-01 PROCEDURE — 2580000003 HC RX 258: Performed by: SURGERY

## 2025-05-01 PROCEDURE — 74174 CTA ABD&PLVS W/CONTRAST: CPT

## 2025-05-01 RX ORDER — HEPARIN SODIUM 1000 [USP'U]/ML
INJECTION, SOLUTION INTRAVENOUS; SUBCUTANEOUS
Status: DISCONTINUED | OUTPATIENT
Start: 2025-05-01 | End: 2025-05-01 | Stop reason: SDUPTHER

## 2025-05-01 RX ORDER — PROCHLORPERAZINE EDISYLATE 5 MG/ML
10 INJECTION INTRAMUSCULAR; INTRAVENOUS EVERY 6 HOURS PRN
Status: DISCONTINUED | OUTPATIENT
Start: 2025-05-01 | End: 2025-05-02 | Stop reason: HOSPADM

## 2025-05-01 RX ORDER — SODIUM CHLORIDE, SODIUM LACTATE, POTASSIUM CHLORIDE, CALCIUM CHLORIDE 600; 310; 30; 20 MG/100ML; MG/100ML; MG/100ML; MG/100ML
INJECTION, SOLUTION INTRAVENOUS
Status: DISCONTINUED | OUTPATIENT
Start: 2025-05-01 | End: 2025-05-01 | Stop reason: SDUPTHER

## 2025-05-01 RX ORDER — CEFAZOLIN SODIUM 1 G/3ML
INJECTION, POWDER, FOR SOLUTION INTRAMUSCULAR; INTRAVENOUS
Status: DISCONTINUED | OUTPATIENT
Start: 2025-05-01 | End: 2025-05-01 | Stop reason: SDUPTHER

## 2025-05-01 RX ORDER — MIDAZOLAM HYDROCHLORIDE 1 MG/ML
INJECTION, SOLUTION INTRAMUSCULAR; INTRAVENOUS
Status: DISCONTINUED | OUTPATIENT
Start: 2025-05-01 | End: 2025-05-01 | Stop reason: SDUPTHER

## 2025-05-01 RX ORDER — GAUZE BANDAGE 2" X 2"
100 BANDAGE TOPICAL DAILY
Status: DISCONTINUED | OUTPATIENT
Start: 2025-05-01 | End: 2025-05-02 | Stop reason: HOSPADM

## 2025-05-01 RX ORDER — ROCURONIUM BROMIDE 10 MG/ML
INJECTION, SOLUTION INTRAVENOUS
Status: DISCONTINUED | OUTPATIENT
Start: 2025-05-01 | End: 2025-05-01 | Stop reason: SDUPTHER

## 2025-05-01 RX ORDER — PHENYLEPHRINE HCL IN 0.9% NACL 0.4MG/10ML
SYRINGE (ML) INTRAVENOUS
Status: DISCONTINUED | OUTPATIENT
Start: 2025-05-01 | End: 2025-05-01 | Stop reason: SDUPTHER

## 2025-05-01 RX ORDER — CARVEDILOL 6.25 MG/1
6.25 TABLET ORAL 2 TIMES DAILY WITH MEALS
Status: DISCONTINUED | OUTPATIENT
Start: 2025-05-01 | End: 2025-05-02 | Stop reason: HOSPADM

## 2025-05-01 RX ORDER — ONDANSETRON 2 MG/ML
INJECTION INTRAMUSCULAR; INTRAVENOUS
Status: COMPLETED
Start: 2025-05-01 | End: 2025-05-01

## 2025-05-01 RX ORDER — LEVETIRACETAM 500 MG/1
750 TABLET ORAL 2 TIMES DAILY
Status: DISCONTINUED | OUTPATIENT
Start: 2025-05-01 | End: 2025-05-02 | Stop reason: HOSPADM

## 2025-05-01 RX ORDER — ONDANSETRON 2 MG/ML
INJECTION INTRAMUSCULAR; INTRAVENOUS
Status: DISCONTINUED | OUTPATIENT
Start: 2025-05-01 | End: 2025-05-01 | Stop reason: SDUPTHER

## 2025-05-01 RX ORDER — LACOSAMIDE 50 MG/1
50 TABLET ORAL 2 TIMES DAILY
Status: DISCONTINUED | OUTPATIENT
Start: 2025-05-01 | End: 2025-05-02 | Stop reason: HOSPADM

## 2025-05-01 RX ORDER — SUCCINYLCHOLINE CHLORIDE 20 MG/ML
INJECTION INTRAMUSCULAR; INTRAVENOUS
Status: DISCONTINUED | OUTPATIENT
Start: 2025-05-01 | End: 2025-05-01 | Stop reason: SDUPTHER

## 2025-05-01 RX ORDER — ACETAMINOPHEN 325 MG/1
650 TABLET ORAL EVERY 4 HOURS PRN
Status: DISCONTINUED | OUTPATIENT
Start: 2025-05-01 | End: 2025-05-02 | Stop reason: HOSPADM

## 2025-05-01 RX ORDER — IOPAMIDOL 755 MG/ML
INJECTION, SOLUTION INTRAVASCULAR PRN
Status: DISCONTINUED | OUTPATIENT
Start: 2025-05-01 | End: 2025-05-01 | Stop reason: ALTCHOICE

## 2025-05-01 RX ORDER — LIDOCAINE HYDROCHLORIDE 20 MG/ML
INJECTION, SOLUTION EPIDURAL; INFILTRATION; INTRACAUDAL; PERINEURAL
Status: DISCONTINUED | OUTPATIENT
Start: 2025-05-01 | End: 2025-05-01 | Stop reason: SDUPTHER

## 2025-05-01 RX ORDER — OXYCODONE AND ACETAMINOPHEN 5; 325 MG/1; MG/1
1 TABLET ORAL EVERY 4 HOURS PRN
Refills: 0 | Status: DISCONTINUED | OUTPATIENT
Start: 2025-05-01 | End: 2025-05-02 | Stop reason: HOSPADM

## 2025-05-01 RX ORDER — AMLODIPINE BESYLATE 5 MG/1
5 TABLET ORAL DAILY
Status: DISCONTINUED | OUTPATIENT
Start: 2025-05-02 | End: 2025-05-02 | Stop reason: HOSPADM

## 2025-05-01 RX ORDER — PHENYLEPHRINE HYDROCHLORIDE 10 MG/ML
INJECTION INTRAVENOUS
Status: DISPENSED
Start: 2025-05-01 | End: 2025-05-02

## 2025-05-01 RX ORDER — ONDANSETRON 2 MG/ML
4 INJECTION INTRAMUSCULAR; INTRAVENOUS EVERY 6 HOURS PRN
Status: DISCONTINUED | OUTPATIENT
Start: 2025-05-01 | End: 2025-05-02 | Stop reason: HOSPADM

## 2025-05-01 RX ORDER — SODIUM CHLORIDE 9 MG/ML
INJECTION, SOLUTION INTRAVENOUS PRN
Status: DISCONTINUED | OUTPATIENT
Start: 2025-05-01 | End: 2025-05-01

## 2025-05-01 RX ORDER — ONDANSETRON 2 MG/ML
4 INJECTION INTRAMUSCULAR; INTRAVENOUS EVERY 6 HOURS PRN
Status: DISCONTINUED | OUTPATIENT
Start: 2025-05-01 | End: 2025-05-01 | Stop reason: HOSPADM

## 2025-05-01 RX ORDER — FENTANYL CITRATE 50 UG/ML
INJECTION, SOLUTION INTRAMUSCULAR; INTRAVENOUS
Status: DISCONTINUED | OUTPATIENT
Start: 2025-05-01 | End: 2025-05-01 | Stop reason: SDUPTHER

## 2025-05-01 RX ORDER — DEXAMETHASONE SODIUM PHOSPHATE 4 MG/ML
INJECTION, SOLUTION INTRA-ARTICULAR; INTRALESIONAL; INTRAMUSCULAR; INTRAVENOUS; SOFT TISSUE
Status: DISCONTINUED | OUTPATIENT
Start: 2025-05-01 | End: 2025-05-01 | Stop reason: SDUPTHER

## 2025-05-01 RX ORDER — DEXMEDETOMIDINE HYDROCHLORIDE 100 UG/ML
INJECTION, SOLUTION INTRAVENOUS
Status: DISCONTINUED | OUTPATIENT
Start: 2025-05-01 | End: 2025-05-01 | Stop reason: SDUPTHER

## 2025-05-01 RX ORDER — BISACODYL 10 MG
10 SUPPOSITORY, RECTAL RECTAL DAILY PRN
Status: DISCONTINUED | OUTPATIENT
Start: 2025-05-01 | End: 2025-05-02 | Stop reason: HOSPADM

## 2025-05-01 RX ORDER — IOPAMIDOL 755 MG/ML
100 INJECTION, SOLUTION INTRAVASCULAR
Status: COMPLETED | OUTPATIENT
Start: 2025-05-01 | End: 2025-05-01

## 2025-05-01 RX ADMIN — CEFAZOLIN 2 G: 1 INJECTION, POWDER, FOR SOLUTION INTRAMUSCULAR; INTRAVENOUS; PARENTERAL at 19:28

## 2025-05-01 RX ADMIN — LACOSAMIDE 50 MG: 50 TABLET, FILM COATED ORAL at 22:59

## 2025-05-01 RX ADMIN — APIXABAN 5 MG: 5 TABLET, FILM COATED ORAL at 22:57

## 2025-05-01 RX ADMIN — Medication 100 MG: at 23:00

## 2025-05-01 RX ADMIN — Medication 40 MCG: at 20:06

## 2025-05-01 RX ADMIN — PHENYLEPHRINE HYDROCHLORIDE 40 MCG/MIN: 10 INJECTION INTRAVENOUS at 19:30

## 2025-05-01 RX ADMIN — Medication 80 MCG: at 19:53

## 2025-05-01 RX ADMIN — HEPARIN SODIUM 7500 UNITS: 1000 INJECTION INTRAVENOUS; SUBCUTANEOUS at 19:45

## 2025-05-01 RX ADMIN — ONDANSETRON 4 MG: 2 INJECTION INTRAMUSCULAR; INTRAVENOUS at 22:12

## 2025-05-01 RX ADMIN — PROPOFOL 10 MG: 10 INJECTION, EMULSION INTRAVENOUS at 18:54

## 2025-05-01 RX ADMIN — ROCURONIUM BROMIDE 5 MG: 10 INJECTION INTRAVENOUS at 19:10

## 2025-05-01 RX ADMIN — MIDAZOLAM HYDROCHLORIDE 2 MG: 1 INJECTION, SOLUTION INTRAMUSCULAR; INTRAVENOUS at 18:54

## 2025-05-01 RX ADMIN — SODIUM CHLORIDE, SODIUM LACTATE, POTASSIUM CHLORIDE, CALCIUM CHLORIDE: 600; 310; 30; 20 INJECTION, SOLUTION INTRAVENOUS at 20:00

## 2025-05-01 RX ADMIN — SUCCINYLCHOLINE CHLORIDE 140 MG: 20 INJECTION, SOLUTION INTRAMUSCULAR; INTRAVENOUS at 19:10

## 2025-05-01 RX ADMIN — Medication 80 MCG: at 19:35

## 2025-05-01 RX ADMIN — FENTANYL CITRATE 50 MCG: 50 INJECTION, SOLUTION INTRAMUSCULAR; INTRAVENOUS at 20:40

## 2025-05-01 RX ADMIN — Medication 40 MCG: at 20:04

## 2025-05-01 RX ADMIN — Medication 40 MCG: at 20:07

## 2025-05-01 RX ADMIN — ONDANSETRON 4 MG: 2 INJECTION, SOLUTION INTRAMUSCULAR; INTRAVENOUS at 22:12

## 2025-05-01 RX ADMIN — SUGAMMADEX 200 MG: 100 INJECTION, SOLUTION INTRAVENOUS at 20:20

## 2025-05-01 RX ADMIN — ONDANSETRON HYDROCHLORIDE 4 MG: 2 INJECTION, SOLUTION INTRAMUSCULAR; INTRAVENOUS at 20:10

## 2025-05-01 RX ADMIN — Medication 120 MCG: at 19:33

## 2025-05-01 RX ADMIN — SODIUM CHLORIDE, POTASSIUM CHLORIDE, SODIUM LACTATE AND CALCIUM CHLORIDE: 600; 310; 30; 20 INJECTION, SOLUTION INTRAVENOUS at 18:54

## 2025-05-01 RX ADMIN — OXYCODONE AND ACETAMINOPHEN 1 TABLET: 325; 5 TABLET ORAL at 23:25

## 2025-05-01 RX ADMIN — DEXAMETHASONE SODIUM PHOSPHATE 8 MG: 4 INJECTION, SOLUTION INTRAMUSCULAR; INTRAVENOUS at 19:35

## 2025-05-01 RX ADMIN — ROCURONIUM BROMIDE 45 MG: 10 INJECTION INTRAVENOUS at 19:15

## 2025-05-01 RX ADMIN — LIDOCAINE HYDROCHLORIDE 100 MG: 20 INJECTION, SOLUTION EPIDURAL; INFILTRATION; INTRACAUDAL; PERINEURAL at 19:10

## 2025-05-01 RX ADMIN — Medication 80 MCG: at 19:51

## 2025-05-01 RX ADMIN — FENTANYL CITRATE 50 MCG: 50 INJECTION, SOLUTION INTRAMUSCULAR; INTRAVENOUS at 20:25

## 2025-05-01 RX ADMIN — PROPOFOL 70 MG: 10 INJECTION, EMULSION INTRAVENOUS at 19:10

## 2025-05-01 RX ADMIN — DEXMEDETOMIDINE 10 MCG: 100 INJECTION, SOLUTION INTRAVENOUS at 20:15

## 2025-05-01 RX ADMIN — IOPAMIDOL 100 ML: 755 INJECTION, SOLUTION INTRAVENOUS at 14:22

## 2025-05-01 RX ADMIN — LEVETIRACETAM 750 MG: 500 TABLET, FILM COATED ORAL at 22:58

## 2025-05-01 RX ADMIN — DEXMEDETOMIDINE 10 MCG: 100 INJECTION, SOLUTION INTRAVENOUS at 20:39

## 2025-05-01 RX ADMIN — Medication 80 MCG: at 19:39

## 2025-05-01 RX ADMIN — PHENYLEPHRINE HYDROCHLORIDE 30 MCG/MIN: 50 INJECTION INTRAVENOUS at 20:55

## 2025-05-01 ASSESSMENT — PAIN SCALES - GENERAL
PAINLEVEL_OUTOF10: 0
PAINLEVEL_OUTOF10: 5

## 2025-05-01 ASSESSMENT — LIFESTYLE VARIABLES
HOW MANY STANDARD DRINKS CONTAINING ALCOHOL DO YOU HAVE ON A TYPICAL DAY: PATIENT DOES NOT DRINK
HOW OFTEN DO YOU HAVE A DRINK CONTAINING ALCOHOL: NEVER
HOW OFTEN DO YOU HAVE A DRINK CONTAINING ALCOHOL: NEVER
HOW MANY STANDARD DRINKS CONTAINING ALCOHOL DO YOU HAVE ON A TYPICAL DAY: PATIENT DOES NOT DRINK

## 2025-05-01 ASSESSMENT — ENCOUNTER SYMPTOMS
SHORTNESS OF BREATH: 0
COUGH: 0
COLOR CHANGE: 0
NAUSEA: 0
VOMITING: 0

## 2025-05-01 ASSESSMENT — PAIN DESCRIPTION - DESCRIPTORS: DESCRIPTORS: ACHING

## 2025-05-01 ASSESSMENT — PAIN DESCRIPTION - LOCATION: LOCATION: GROIN

## 2025-05-01 ASSESSMENT — PAIN DESCRIPTION - ORIENTATION: ORIENTATION: RIGHT

## 2025-05-01 NOTE — CONSULTS
Vascular Surgery Consult Note  2025    Subjective:     Cole Velasquez is an obese 75 y.o. female with a pmhx significant for HTN, BIMAL, SZ, and GERD.  She does not smoke.  She presents to the emergency room today after referral by her urologist for an incidental finding of an IVC thrombosis that was found on a CT scan for routine surveillance of a renal mass.  Her CTA of the chest is negative for PE and her venous duplex is negative for deep venous thrombosis.  She has some mild right leg swelling that has been ongoing for quite some time.  She has been seen by Urologist and the mass is stable.  There are no plans for intervention.    Past medical history  Hypertension  Obstructive sleep apnea  Seizure disorder  Gastroesophageal reflux disease   Renal mass  Hemorrhoids   Rosacea   Gout    Past procedural history  Benign right breast biopsy    section x2  Cholecystectomy   Hysterectomy  Tonsillectomy     Family history  Father: Dementia, stroke, heart disease   Mother: Breast cancer, diabetes, heart disease,     Social history  She has never smoker  She reports alcohol use     Home medications   folic acid (FOLVITE) 1 MG tablet Take 1 tablet by mouth daily   cyanocobalamin 1000 MCG tablet Take 1 tablet by mouth daily   Acetaminophen (TYLENOL PO) Take by mouth as needed   amLODIPine (NORVASC) 5 MG tablet Take 1 tablet by mouth daily   lacosamide (VIMPAT) 50 MG TABS tablet Take 1 tablet by mouth 2 times daily for 60 days. Max Daily Amount: 100 mg   levETIRAcetam (KEPPRA) 750 MG tablet Take 1 tablet by mouth 2 times daily   carvedilol (COREG) 6.25 MG tablet Take 1 tablet by mouth 2 times daily (with meals)   thiamine mononitrate (THIAMINE) 100 MG tablet Take 1 tablet by mouth daily     Allergies and intolerances  Hydrochlorothiazide: Gout  Codeine: Nausea and vomiting  Minocycline: Nausea     Review of Systems   Constitutional:  Negative for activity change, chills, fatigue and fever.   HENT:  Negative for

## 2025-05-01 NOTE — ED PROVIDER NOTES
[facility] EMERGENCY DEPARTMENT  EMERGENCY DEPARTMENT ENCOUNTER       Pt Name: Cole Velasquez  MRN: [id number]  Birthdate 1950  Date of evaluation: 2025  Provider: Bipin Mabry MD   PCP: Jona Mead DO  Note Started: 7:26 PM 25     CHIEF COMPLAINT       Chief Complaint   Patient presents with    Referral - General     Pt In  states she was sent here by a doctor for a \"critical CT scan\". Pt state she was told she has a mass or clot on an artery. Pt denies CP or SOB        HISTORY OF PRESENT ILLNESS: 1 or more elements      History From: Patient  None     Cole Velasquez is a 75 y.o. female who presents to the emergency department with abnormal imaging finding.  Patient has been following with urology for surveillance of renal mass.  She gets regular CT scans performed, she had a CT today which showed thrombosis in inferior vena cava.  Patient denies any associated abdominal pain, chest pain, shortness of breath.  Otherwise denies complaints.     Nursing Notes were all reviewed and agreed with or any disagreements were addressed in the HPI.     REVIEW OF SYSTEMS      Review of Systems     Positives and Pertinent negatives as per HPI.    PAST HISTORY     Past Medical History:  Past Medical History:   Diagnosis Date    Gall stones     GERD (gastroesophageal reflux disease)     Hemorrhoid     Hypertension     Ill-defined condition     rosacea    Ill-defined condition     prediabetes - is on metformin/no longer on metformin    Ill-defined condition     gout    Ill-defined condition     obesity per pt    Sleep apnea     borderline - was put on a machine/not using a machine now         Past Surgical History:  Past Surgical History:   Procedure Laterality Date    BREAST BIOPSY Right     15+ years ago. Benign (per patient) - as of 10/29/2021 pt states she thinks she has only had one breast bx    BREAST SURGERY      benign tumor right breast     SECTION      x 2    CHOLECYSTECTOMY   05/01/25  1:48 PM    Specimen: Urine   Result Value Ref Range    Color, UA YELLOW/STRAW      Appearance CLEAR CLEAR      pH, Urine 5.5 5.0 - 8.0      Protein, UA Negative NEG mg/dL    Glucose, Ur Negative NEG mg/dL    Ketones, Urine Negative NEG mg/dL    Bilirubin, Urine Negative NEG      Blood, Urine Negative NEG      Urobilinogen, Urine 0.2 0.2 - 1.0 EU/dL    Nitrite, Urine Negative NEG      Leukocyte Esterase, Urine Negative NEG      Urine Culture if Indicated CULTURE NOT INDICATED BY UA RESULT CNI      WBC, UA 0-4 0 - 4 /hpf    RBC, UA 0-5 0 - 5 /hpf    Epithelial Cells, UA FEW FEW /lpf    BACTERIA, URINE Negative NEG /hpf    Hyaline Casts, UA 0-2 0 - 2 /lpf   }          RADIOLOGY:  Non-plain film images such as CT, Ultrasound and MRI are read by the radiologist. Plain radiographic images are visualized and preliminarily interpreted by the ED Provider with the below findings:          Interpretation per the Radiologist below, if available at the time of this note:     CTA CHEST ABDOMEN PELVIS W CONTRAST   Final Result   1.  No evidence of pulmonary embolism. No other acute abnormalities in the   chest.   2.  A filling defect in the inferior vena cava is better demonstrated on the   previous study. On the current study, evaluation is limited because of poor   contrast opacification of venous structures.   3.  No other acute abnormalities in the abdomen or pelvis.   4.  Multiple small hypodensities in the kidneys too small to fully characterize.      Electronically signed by PADMINI Jones      Vascular duplex lower extremity venous right   Final Result              PROCEDURES   Unless otherwise noted below, none  Procedures       CRITICAL CARE TIME       SCREENINGS   NIH Stroke Score       Heart Score       Curb-65          EMERGENCY DEPARTMENT COURSE and DIFFERENTIAL DIAGNOSIS/MDM   Vitals:    Vitals:    05/01/25 1700 05/01/25 1730 05/01/25 1830 05/01/25 1845   BP: (!) 164/81 (!) 147/76 (!) 160/124 (!) 179/96    Pulse: 77 77 93 86   Resp: 13 12 18 23   Temp:       SpO2: 98% 97% 97% 96%            Patient was given the following medications:  Medications   0.9 % sodium chloride infusion (has no administration in time range)   iopamidol (ISOVUE-370) 76 % injection 100 mL (100 mLs IntraVENous Given 5/1/25 1422)       CONSULTS: (Who and What was discussed)  IP CONSULT TO VASCULAR SURGERY      Chronic Conditions: HTN    Social Determinants affecting Dx or Tx: None    Records Reviewed (source and summary of external notes): Nursing Notes    CC/HPI Summary, DDx, ED Course, and Reassessment:     75-year-old female presenting to the emergency department with abnormal imaging finding.  Patient has IVC thrombosis on CT.  Ordered CTA chest abdomen pelvis to assess extent of clot and rule out pulmonary emboli.  Patient also has a right lower extremity edema, ordered duplex.  Ordered basic labs as well.    Patient's labs are reassuring, CT does not show pulmonary emboli.  Duplex negative.  Consulted vascular surgery who will take patient to the OR.         Disposition Considerations (Tests not done, Shared Decision Making, Pt Expectation of Test or Tx.):      FINAL IMPRESSION     1. Thrombosis of inferior vena cava (HCC)          DISPOSITION/PLAN   DISPOSITION Send To Or 05/01/2025 07:26:00 PM      OR     PATIENT REFERRED TO:  No follow-up provider specified.       DISCHARGE MEDICATIONS:     Medication List        ASK your doctor about these medications      amLODIPine 5 MG tablet  Commonly known as: NORVASC  Take 1 tablet by mouth daily     carvedilol 6.25 MG tablet  Commonly known as: COREG  Take 1 tablet by mouth 2 times daily (with meals)     cyanocobalamin 1000 MCG tablet  Take 1 tablet by mouth daily     folic acid 1 MG tablet  Commonly known as: FOLVITE  Take 1 tablet by mouth daily     lacosamide 50 MG Tabs tablet  Commonly known as: VIMPAT  Take 1 tablet by mouth 2 times daily for 60 days. Max Daily Amount: 100 mg

## 2025-05-01 NOTE — ANESTHESIA PRE PROCEDURE
Department of Anesthesiology  Preprocedure Note       Name:  Cole Velasquez   Age:  75 y.o.  :  1950                                          MRN:  [id number]         Date:  2025      Surgeon: Surgeon(s):  Dez Silva MD    Procedure: Procedure(s):  Thrombectomy Inferior Vena Cava    Medications prior to admission:   Prior to Admission medications    Medication Sig Start Date End Date Taking? Authorizing Provider   folic acid (FOLVITE) 1 MG tablet Take 1 tablet by mouth daily 25   Jona Mead DO   cyanocobalamin 1000 MCG tablet Take 1 tablet by mouth daily 25   Jona Mead DO   Acetaminophen (TYLENOL PO) Take by mouth as needed    Provider, MD Pina   amLODIPine (NORVASC) 5 MG tablet Take 1 tablet by mouth daily 3/13/25   Jona Mead DO   lacosamide (VIMPAT) 50 MG TABS tablet Take 1 tablet by mouth 2 times daily for 60 days. Max Daily Amount: 100 mg 3/6/25 5/5/25  Jona Mead DO   levETIRAcetam (KEPPRA) 750 MG tablet Take 1 tablet by mouth 2 times daily 25   Riley Leon MD   carvedilol (COREG) 6.25 MG tablet Take 1 tablet by mouth 2 times daily (with meals) 25   Riley Leon MD   thiamine mononitrate (THIAMINE) 100 MG tablet Take 1 tablet by mouth daily 25  Wendy Castillo MD       Current medications:    No current facility-administered medications for this encounter.     Current Outpatient Medications   Medication Sig Dispense Refill   • folic acid (FOLVITE) 1 MG tablet Take 1 tablet by mouth daily 90 tablet 3   • cyanocobalamin 1000 MCG tablet Take 1 tablet by mouth daily 90 tablet 3   • Acetaminophen (TYLENOL PO) Take by mouth as needed     • amLODIPine (NORVASC) 5 MG tablet Take 1 tablet by mouth daily 90 tablet 3   • lacosamide (VIMPAT) 50 MG TABS tablet Take 1 tablet by mouth 2 times daily for 60 days. Max Daily Amount: 100 mg 60 tablet 1   • levETIRAcetam (KEPPRA) 750 MG tablet Take 1 tablet by mouth 2 times

## 2025-05-01 NOTE — PROGRESS NOTES
[facility]      Patient: Cole Velasquez MRN: [id number]  SSN: [id number]    YOB: 1950  Age: 75 y.o.  Sex: female        ADMITTED: 5/1/2025 to Bipin Mabry MD by No admitting provider for patient encounter. for No admission diagnoses are documented for this encounter.    Assessment:   Left Renal cyst/ mass active surveillance - unchanged from prior imagining  - 2014 Cystoscopy squamous metaplasia was noted  Concern for infrarenal IVC Thrombus on outpatient CT imagining  HTN  DM  Recommendations:   Recommend IR/ Vascular consult  Courtesy visit - no urological surgical intervention needed  Renal mass/ cyst is stable  Urology will sign off at this time. Patient to keep her 5/6 appointment if she is not hospitalized, otherwise it can be rescheduled at discharge.    Reviewed with supervising MD: Dr. Degroot  Interval History     Cole Velasquez is seen in ER. She went to  today for her annual CT imagining for renal mass/ cyst.  CT showed central IVC thrombosis ( see below) and she was instructed to come ER for evaluation. The patient is followed by Dr. Degroot with annual imagining surveillance.   -----------------------------CT from 5/1 urology office------------------------------  Comparisons: 10/17/2023, 3/10/2022    Findings:    Urinary tract:     Right kidney: Partial horizontal lie. No intrarenal calculi.  No proximal  ureteral calculi.  No hydronephrosis or proximal hydroureter. Enhanced imaging  demonstrates no enhancing masses. A previously described exophytic Bosniak 2  cyst in the interpolar region has developed more concerning imaging features on  today's study. This lesion now measures 13 x 13 mm on series 3 image 58 and  series 5 image 67 versus 7 x 8 mm on the previous exam. Additionally, there are  probable enhancing mural nodular components that are difficult to characterize  secondary to small size of this lesion, but are best seen on series 6  image 87.  Subcentimeter conventional, patent arterial and venous anatomy hypodense foci  are also present that are too small to characterize. Excretory phase imaging  demonstrates no filling defects in the collecting system or the proximal ureter.    Left kidney: Partial horizontal lie. No intrarenal calculi.  No proximal  ureteral calculi.  No hydronephrosis or proximal hydroureter. Enhanced imaging  demonstrates no enhancing masses. A 13 mm lesion on series 3 image 42  (previously measuring 12 mm) previously characterized as a Bosniak 3 cystic  lesion demonstrates imaging characteristics consistent with a Bosniak 2 cyst on  today's exam. A thin septation is noted with no enhancing elements within this  lesion. Subcentimeter hypodense foci are also noted that are too small to  characterize. Excretory phase imaging demonstrates no filling defects in the  collecting system or the proximal ureter.    Abdomen:  Lung bases: Mild bronchiectasis in the posterior basilar segments of the lower  lobes.    Liver: No steatosis. There is no intrahepatic biliary ductal dilatation. No  focal liver lesions.    Gallbladder: Status post cholecystectomy..    Pancreas: No mass or ductal dilatation.    Spleen:  Normal in size.  No focal splenic lesions.    Adrenal glands: No nodules.    Bowel: No dilated large or small bowel. Diverticulosis coli is noted.    Retroperitoneum: No lymphadenopathy. Trace atheromatous disease. No aneurysm.  There is a partially visualized large, central thrombus measuring 13 mm in  diameter (series 6 image 65) noted in the infrarenal IVC extending into the  right common iliac artery that extends beyond the level of the imaging  field-of-view of this exam.    Peritoneum: No ascites or thickening.    Osseous structures and body wall: No aggressive osseous lesions. Osteopenia is  noted. Degenerative changes are noted in the lumbar spine. Diffuse idiopathic  skeletal hyperostosis is

## 2025-05-01 NOTE — ANESTHESIA PROCEDURE NOTES
Arterial Line:    An arterial line was placed using ultrasound guidance, in the OR for the following indication(s): continuous blood pressure monitoring and blood sampling needed.    A 20 gauge (size) (length), Arrow (type) catheter was placed, Seldinger technique used, into the right radial artery, secured by Tegaderm.  Anesthesia type: Local  Local infiltration: Injection    Events:  patient tolerated procedure well with no complications.5/1/2025 7:00 PM5/1/2025 7:05 PM  Anesthesiologist: Kiko Mahajan MD  Performed: Anesthesiologist   Preanesthetic Checklist  Completed: patient identified, IV checked, site marked, risks and benefits discussed, surgical/procedural consents, equipment checked, pre-op evaluation, timeout performed, anesthesia consent given, oxygen available, monitors applied/VS acknowledged, fire risk safety assessment completed and verbalized and blood product R/B/A discussed and consented

## 2025-05-02 VITALS
OXYGEN SATURATION: 100 % | DIASTOLIC BLOOD PRESSURE: 83 MMHG | HEART RATE: 86 BPM | RESPIRATION RATE: 16 BRPM | TEMPERATURE: 97.7 F | SYSTOLIC BLOOD PRESSURE: 123 MMHG

## 2025-05-02 LAB
ANION GAP SERPL CALC-SCNC: 11 MMOL/L (ref 2–12)
BUN SERPL-MCNC: 9 MG/DL (ref 6–20)
BUN/CREAT SERPL: 10 (ref 12–20)
CALCIUM SERPL-MCNC: 9.2 MG/DL (ref 8.5–10.1)
CHLORIDE SERPL-SCNC: 104 MMOL/L (ref 97–108)
CO2 SERPL-SCNC: 19 MMOL/L (ref 21–32)
CREAT SERPL-MCNC: 0.86 MG/DL (ref 0.55–1.02)
GLUCOSE SERPL-MCNC: 168 MG/DL (ref 65–100)
POTASSIUM SERPL-SCNC: 4.8 MMOL/L (ref 3.5–5.1)
SODIUM SERPL-SCNC: 134 MMOL/L (ref 136–145)

## 2025-05-02 PROCEDURE — 36415 COLL VENOUS BLD VENIPUNCTURE: CPT

## 2025-05-02 PROCEDURE — 6370000000 HC RX 637 (ALT 250 FOR IP): Performed by: SURGERY

## 2025-05-02 PROCEDURE — G0378 HOSPITAL OBSERVATION PER HR: HCPCS

## 2025-05-02 PROCEDURE — 2700000000 HC OXYGEN THERAPY PER DAY

## 2025-05-02 PROCEDURE — 80048 BASIC METABOLIC PNL TOTAL CA: CPT

## 2025-05-02 RX ORDER — HYDROXYZINE HYDROCHLORIDE 25 MG/1
25 TABLET, FILM COATED ORAL EVERY 8 HOURS PRN
Qty: 30 TABLET | Refills: 0 | Status: SHIPPED | OUTPATIENT
Start: 2025-05-02 | End: 2025-05-12

## 2025-05-02 RX ORDER — ACETAMINOPHEN 500 MG
500 TABLET ORAL EVERY 6 HOURS PRN
Status: SHIPPED | COMMUNITY
Start: 2025-05-02 | End: 2025-05-08

## 2025-05-02 RX ADMIN — AMLODIPINE BESYLATE 5 MG: 5 TABLET ORAL at 09:21

## 2025-05-02 RX ADMIN — APIXABAN 5 MG: 5 TABLET, FILM COATED ORAL at 09:20

## 2025-05-02 RX ADMIN — CARVEDILOL 6.25 MG: 6.25 TABLET, FILM COATED ORAL at 09:20

## 2025-05-02 RX ADMIN — LACOSAMIDE 50 MG: 50 TABLET, FILM COATED ORAL at 09:20

## 2025-05-02 RX ADMIN — Medication 100 MG: at 09:20

## 2025-05-02 RX ADMIN — OXYCODONE AND ACETAMINOPHEN 1 TABLET: 325; 5 TABLET ORAL at 04:36

## 2025-05-02 RX ADMIN — LEVETIRACETAM 750 MG: 500 TABLET, FILM COATED ORAL at 09:20

## 2025-05-02 RX ADMIN — ACETAMINOPHEN 650 MG: 325 TABLET ORAL at 01:39

## 2025-05-02 ASSESSMENT — PAIN SCALES - GENERAL
PAINLEVEL_OUTOF10: 8
PAINLEVEL_OUTOF10: 0
PAINLEVEL_OUTOF10: 0
PAINLEVEL_OUTOF10: 9

## 2025-05-02 ASSESSMENT — PAIN DESCRIPTION - DESCRIPTORS
DESCRIPTORS: ACHING
DESCRIPTORS: ACHING

## 2025-05-02 ASSESSMENT — PAIN DESCRIPTION - ORIENTATION
ORIENTATION: RIGHT
ORIENTATION: RIGHT

## 2025-05-02 ASSESSMENT — PAIN DESCRIPTION - LOCATION
LOCATION: GROIN
LOCATION: GROIN;THROAT

## 2025-05-02 NOTE — BRIEF OP NOTE
Brief Postoperative Note      Patient: Cole Velasquez  YOB: 1950  MRN: [id number]    Date of Procedure: 5/1/2025    Pre-Op Diagnosis Codes:      * Thrombosis of inferior vena cava (HCC) [I82.220]    Post-Op Diagnosis: Same       Procedure(s):  Thrombectomy Inferior Vena Cava    Surgeon(s):  Dez Silva MD    Assistant:  Surgical Assistant: Jaron Oconnor    Anesthesia: General    Estimated Blood Loss (mL): 150    Complications: None    Specimens:   ID Type Source Tests Collected by Time Destination   1 : Inferior vena cava thrombus, possible mass Tissue Abdomen SURGICAL PATHOLOGY Dez Silva MD 5/1/2025 2016        Implants:  * No implants in log *      Drains:   [REMOVED] Urinary Catheter 05/01/25 2 Way (Removed)       Findings:  Infection Present At Time Of Surgery (PATOS) (choose all levels that have infection present):  No infection present  Other Findings: Large thrombus in IVC and Rt CIV.  Excellent result w/ thrombectomy w/ Clotriever XL.  16 Fr Rt CFV and 7 Fr Lt CFV.    Electronically signed by Dez Silva MD on 5/1/2025 at 8:44 PM

## 2025-05-02 NOTE — PROGRESS NOTES
Hospital follow-up PCP transitional care appointment has been scheduled with Dr. Jona Mead on 5/8/25 at 1530. This is the first available appt due to limited provider availability. PCP office does not offer alternate provider option for hospital follow up. [facility] placed [facility] information AVS for patient resource. Pending patient discharge. Ericka Toure, Care Management Assistant

## 2025-05-02 NOTE — PROGRESS NOTES

## 2025-05-02 NOTE — ANESTHESIA POST-OP
TRANSFER - OUT REPORT:    Verbal report given to Duc ENGLE (name) on Cole Velasquez  being transferred to 2314(unit) for routine post-op       Report consisted of patient’s Situation, Background, Assessment and   Recommendations(SBAR).     Information from the following report(s) Surgery Report, Intake/Output, MAR, and Cardiac Rhythm NSR  was reviewed with the receiving nurse.    Opportunity for questions and clarification was provided.      Patient transported with:   Monitor  O2 @ 2 liters  Registered Nurse

## 2025-05-02 NOTE — PROGRESS NOTES
Bedside shift change report given to ONIEL Calles (oncoming nurse) by ONIEL Viera (offgoing nurse). Report included the following information Nurse Handoff Report.

## 2025-05-02 NOTE — FLOWSHEET NOTE
05/01/25 2040   Handoff   Communication Given Periop Handoff/Relief   Handoff phase Phase I receiving   Handoff Given To Navid Manrique RN   Handoff Received From Franca Lee RN / Naomy Lara CRNA   Handoff Communication Face to Face;At bedside   Time Handoff Given 2040

## 2025-05-02 NOTE — PROGRESS NOTES
End of Shift Note    Bedside shift change report given to ONIEL Calles (oncoming nurse) by Maximiliano Robertson RN (offgoing nurse).  Report included the following information SBAR, Kardex, Intake/Output, MAR, Recent Results, Med Rec Status, and Cardiac Rhythm NSR    Shift worked:  [id number]     Shift summary and any significant changes:       2209: Pt arrived to floor, Complaining of nausea and had 1x episode of emesis, VS obtained, Phenlyephrine infusing     2212: Zofran given, Pt satisfied    2237:  notified of blood in emesis and nausea via telephone, Verbal orders to add the following:  -Compazine   -Zofran prn   -Give 5mg Eliquis, rather than scheduled  10mg Eliquis    2325: Pt reported pain in R groin, PRN percocet given, Pt satisfied     2343: Phenylphrine stopped      Pt mention history of Anxiety and trouble sleeping, dicussed with oncoming RN       Concerns for physician to address:       Zone phone for oncoming shift:          Activity:     Number times ambulated in hallways past shift: 0  Number of times OOB to chair past shift: 0    Cardiac:   Cardiac Monitoring: Yes      Cardiac Rhythm: Sinus rhythm    Access:  Current line(s): PIV     Genitourinary:   Urinary Status: Voiding, External catheter    Respiratory:   O2 Device: Nasal cannula  Chronic home O2 use?: NO  Incentive spirometer at bedside: YES    GI:     Current diet:  ADULT DIET; Regular  Passing flatus: YES    Pain Management:   Patient states pain is manageable on current regimen: YES    Skin:  Berlin Scale Score: 18  Interventions: Wound Offloading (Prevention Methods): Pillows, Repositioning, Turning    Patient Safety:  Fall Risk:    Fall Risk Interventions  Toilet Every 2 Hours-In Advance of Need: Yes  Hourly Visual Checks: In bed  Fall Visual Posted: Fall sign posted, Armband, Socks  Room Door Open: Deferred to promote rest  Alarm On: Bed  Patient Moved Closer to Nursing Station: No    Active Consults:   IP CONSULT TO VASCULAR

## 2025-05-02 NOTE — DISCHARGE SUMMARY
Vascular Surgery Discharge Summary     Patient ID:  Cole Velasquez  [id number]  75 y.o.  1950    Admitting Provider: Dez Silva MD  Discharging Provider: Ct Lazaro [facility]    Admit Date: 5/1/2025    Discharge Date: 5/2/2025    Discharge Diagnoses:    Inferior vena cava thrombosis POA   Renal mass POA   -Urology consulted   Hypertension POA   Obstructive sleep apnea POA  Seizure disorder secondary to serotonin syndrome POA   Anxiety POA   Gastroesophageal reflux disease POA     Procedures during admission:  Inferior vena cava thrombectomy 5/1/2025    Hospital Course:   Cole Velasquez is an obese 75 y.o. female with a pmhx significant for HTN, BIMAL, SZ, RAYNA, and GERD.  She does not smoke.  She presented to the emergency room today after referral by her urologist for an incidental finding of an IVC thrombosis that was found on a CT scan for routine surveillance of a renal mass.  Her CTA of the chest was negative for PE and her venous duplex was negative for deep venous thrombosis.  She has some mild right leg swelling that has been ongoing for quite some time.  She has been seen by Urologist and the mass is stable.  There are no plans for intervention.  She is status post inferior vena cava thrombectomy on 5/1/2025.  Her postprocedure course was complicated by anxiety.  She no longer takes antidepressants due to history of serotonin syndrome with seizure.  She has outpatient follow-up in June with neurology.  She was prescribed Vistaril as needed for her anxiety.  She was prescribed Eliquis for management of her thrombosis.    Pertinent Results:   Recent Results (from the past 24 hours)   Extra Tubes Hold    Collection Time: 05/01/25 12:37 PM   Result Value Ref Range    Specimen HOld PST     Comment:        Add-on orders for these samples will be processed based on acceptable specimen integrity and analyte stability, which may vary by analyte.   CBC with Auto Differential    Collection Time:

## 2025-05-02 NOTE — H&P
History and Physical    Subjective:     Cole Velasquez is a 75 y.o. female who has an incidental finding of large IVC thrombus.  See consult note for details.  Consult note is H&P.     Past Medical History:   Diagnosis Date    Gall stones     GERD (gastroesophageal reflux disease)     Hemorrhoid     Hypertension     Ill-defined condition     rosacea    Ill-defined condition     prediabetes - is on metformin/no longer on metformin    Ill-defined condition     gout    Ill-defined condition     obesity per pt    Sleep apnea     borderline - was put on a machine/not using a machine now      Past Surgical History:   Procedure Laterality Date    BREAST BIOPSY Right     15+ years ago. Benign (per patient) - as of 10/29/2021 pt states she thinks she has only had one breast bx    BREAST SURGERY      benign tumor right breast     SECTION      x 2    CHOLECYSTECTOMY      COLONOSCOPY N/A 2021    COLONOSCOPY   :- performed by Lang Parikh MD at [facility] ENDOSCOPY    COLONOSCOPY      COLONOSCOPY N/A 2016    COLONOSCOPY performed by Lang Parikh MD at [facility] ENDOSCOPY    DEXA BONE DENSITY AXIAL SKELETON      nml    HYSTERECTOMY (CERVIX STATUS UNKNOWN)      INVASIVE VASCULAR N/A 2025    Thrombectomy Inferior Vena Cava performed by Dez Silva MD at [facility] MAIN OR    VANITA MAMMOGRAM SCREEN BILAT  3/2014    PAP SMEAR      SINUS SURGERY PROC UNLISTED      TONSILLECTOMY       Family History   Problem Relation Age of Onset    Dementia Father     Cancer Mother         breast, 50s     Diabetes Mother     Heart Disease Mother         MI, CAD    Breast Cancer Mother         onset: late 50 early 60    Stroke Father     Heart Disease Father         CAD      Social History     Tobacco Use    Smoking status: Never     Passive exposure: Past    Smokeless tobacco: Never   Substance Use Topics    Alcohol use: Yes       Prior to Admission medications    Medication Sig Start Date End Date

## 2025-05-02 NOTE — ANESTHESIA POSTPROCEDURE EVALUATION
Department of Anesthesiology  Postprocedure Note    Patient: Cole Velasquez  MRN: [id number]  YOB: 1950  Date of evaluation: 5/1/2025    Procedure Summary       Date: 05/01/25 Room / Location: [facility] MAIN OR [facility] OR    Anesthesia Start: 1904 Anesthesia Stop: 2050    Procedure: Thrombectomy Inferior Vena Cava (Groin) Diagnosis:       Thrombosis of inferior vena cava (HCC)      (Thrombosis of inferior vena cava (HCC) [I82.220])    Providers: Dez Silva MD Responsible Provider: Kiko Mahajan MD    Anesthesia Type: General ASA Status: 3 - Emergent            Anesthesia Type: General    Jamari Phase I: Jamari Score: 10    Jamari Phase II:      Anesthesia Post Evaluation    Patient location during evaluation: PACU  Patient participation: complete - patient participated  Level of consciousness: awake  Pain score: 0  Airway patency: patent  Nausea & Vomiting: no nausea and no vomiting  Cardiovascular status: hemodynamically stable  Respiratory status: acceptable  Hydration status: euvolemic  Multimodal analgesia pain management approach  Pain management: adequate    No notable events documented.

## 2025-05-02 NOTE — CARE COORDINATION
Care Management Initial Assessment       RUR: 11% Low RUR  Readmission? No  1st IM letter given? No  1st  letter given: No     05/02/25 0821   Service Assessment   Patient Orientation Alert and Oriented;Person;Place;Situation;Self   Cognition Alert   History Provided By Patient   Primary Caregiver Self   Accompanied By/Relationship no one   Support Systems Spouse/Significant Other  (Vadim Velasquez (Spouse) [phone])   Patient's Healthcare Decision Maker is: Legal Next of Kin   PCP Verified by CM Yes   Last Visit to PCP Within last 3 months   Prior Functional Level Independent in ADLs/IADLs   Current Functional Level Independent in ADLs/IADLs   Can patient return to prior living arrangement Yes   Ability to make needs known: Good   Family able to assist with home care needs: Yes   Would you like for me to discuss the discharge plan with any other family members/significant others, and if so, who? Yes  (Vadim Velasquez (Spouse) [phone])   Financial Resources Medicare   Community Resources None   CM/SW Referral Disease Management Education   Social/Functional History   Lives With Spouse   Type of Home House   Home Layout One level   Home Access Stairs to enter with rails   Entrance Stairs - Number of Steps 5   Entrance Stairs - Rails Both   Home Equipment Cane;Walker - Rolling   Active  No   Patient's  Info Vadim Velasquez (Spouse) [phone]   Discharge Planning   Type of Residence House   Living Arrangements Spouse/Significant Other  (Vadim Velasquez (Spouse) [phone])   Current Services Prior To Admission Home Care   Patient expects to be discharged to: House         The  (CM) conducted an initial meeting with the patient , formally introducing themselves and clarifying their role in discharge planning and transitional care. Demographic and insurance details were verified for accuracy. Notably, the patient has no prior history with home health, skilled nursing facilities

## 2025-05-02 NOTE — DISCHARGE INSTRUCTIONS
Discharge Instructions After Vascular Surgery   Home care  Check your incision every day for signs of infection such as swelling, redness, warmth, or drainage.  Don’t bathe or soak in a tub or go swimming until your incisions are well healed (usually at least 2 weeks). You can shower to keep your incisions clean. Just make sure you dry them well afterward.  Dry dressing changes daily.      Activity  Don’t drive while taking pain medication.    No lifting.  Nothing more than 10 lbs for 2 weeks.   Expect to start walking soon after surgery. Walking helps reduce swelling and helps your incision heal.   Don’t stand or sit with your feet down for long. When you sit, raise your feet as high as you comfortably can.  Take your medicines exactly as directed. Don’t skip doses.  Avoid skin burns by testing the temperature of shower water before you get in.  Wear slippers or shoes when walking. Don’t go barefoot or wear open-toed shoes.    Follow-up  See your doctor to have your staples removed 2-3 weeks after your surgery.    When to call your healthcare provider   Call your provider right away if you have any of the following:  Fever of 100.4°F (38°C)  Signs of infection (redness, swelling, or warmth at the incision site)  Drainage from your incision  Changes in color, temperature, feeling, or movement in either leg or foot  Increasing pain or numbness in your foot or leg  Leg swelling that doesn't get better overnight  Chest pain or trouble breathing    Long-term changes at home  Eat a healthy, low-fat, low cholesterol, and low calorie diet.   Maintain your ideal body weight.  After you have recovered from surgery, try to exercise more, especially walking.   If you smoke, ask your primary doctor for help quitting.      Please call the office at [phone] for any issues

## 2025-05-04 LAB
ABO + RH BLD: NORMAL
BLD PROD TYP BPU: NORMAL
BLOOD BANK DISPENSE STATUS: NORMAL
BLOOD GROUP ANTIBODIES SERPL: NORMAL
BPU ID: NORMAL
CROSSMATCH RESULT: NORMAL
SPECIMEN EXP DATE BLD: NORMAL
UNIT DIVISION: 0

## 2025-05-08 ENCOUNTER — OFFICE VISIT (OUTPATIENT)
Age: 75
End: 2025-05-08

## 2025-05-08 VITALS
OXYGEN SATURATION: 95 % | DIASTOLIC BLOOD PRESSURE: 82 MMHG | SYSTOLIC BLOOD PRESSURE: 145 MMHG | BODY MASS INDEX: 38.34 KG/M2 | HEART RATE: 79 BPM | HEIGHT: 63 IN | TEMPERATURE: 98.9 F | RESPIRATION RATE: 16 BRPM | WEIGHT: 216.4 LBS

## 2025-05-08 DIAGNOSIS — Z09 HOSPITAL DISCHARGE FOLLOW-UP: Primary | ICD-10-CM

## 2025-05-08 DIAGNOSIS — F41.9 ANXIETY: ICD-10-CM

## 2025-05-08 DIAGNOSIS — I10 PRIMARY HYPERTENSION: ICD-10-CM

## 2025-05-08 DIAGNOSIS — G40.909 SEIZURE DISORDER (HCC): ICD-10-CM

## 2025-05-08 DIAGNOSIS — R56.9 GENERALIZED SEIZURES (HCC): ICD-10-CM

## 2025-05-08 DIAGNOSIS — I82.220 THROMBOSIS OF INFERIOR VENA CAVA (HCC): ICD-10-CM

## 2025-05-08 PROBLEM — G90.81 SEROTONIN SYNDROME: Status: RESOLVED | Noted: 2025-02-19 | Resolved: 2025-05-08

## 2025-05-08 PROBLEM — E66.813 OBESITY, CLASS III, BMI 40-49.9 (MORBID OBESITY) (HCC): Status: RESOLVED | Noted: 2018-04-19 | Resolved: 2025-05-08

## 2025-05-08 PROBLEM — J69.0 ASPIRATION PNEUMONIA OF BOTH UPPER LOBES DUE TO GASTRIC SECRETIONS (HCC): Status: RESOLVED | Noted: 2025-01-25 | Resolved: 2025-05-08

## 2025-05-08 PROBLEM — G03.9 ACUTE MENINGITIS: Status: RESOLVED | Noted: 2025-01-25 | Resolved: 2025-05-08

## 2025-05-08 PROBLEM — A41.9 SEVERE SEPSIS (HCC): Status: RESOLVED | Noted: 2025-01-25 | Resolved: 2025-05-08

## 2025-05-08 PROBLEM — R41.82 ACUTE ALTERATION IN MENTAL STATUS: Status: RESOLVED | Noted: 2025-01-23 | Resolved: 2025-05-08

## 2025-05-08 PROBLEM — G03.0 ASEPTIC MENINGITIS: Status: RESOLVED | Noted: 2025-01-27 | Resolved: 2025-05-08

## 2025-05-08 PROBLEM — R65.20 SEVERE SEPSIS (HCC): Status: RESOLVED | Noted: 2025-01-25 | Resolved: 2025-05-08

## 2025-05-08 PROBLEM — G93.40 ENCEPHALOPATHY ACUTE: Status: RESOLVED | Noted: 2025-01-23 | Resolved: 2025-05-08

## 2025-05-08 PROBLEM — J96.01 ACUTE RESPIRATORY FAILURE WITH HYPOXIA (HCC): Status: RESOLVED | Noted: 2025-01-25 | Resolved: 2025-05-08

## 2025-05-08 PROBLEM — R41.82 AMS (ALTERED MENTAL STATUS): Status: RESOLVED | Noted: 2025-01-23 | Resolved: 2025-05-08

## 2025-05-08 RX ORDER — LACOSAMIDE 50 MG/1
50 TABLET ORAL 2 TIMES DAILY
Qty: 60 TABLET | Refills: 1 | OUTPATIENT
Start: 2025-05-08 | End: 2025-07-07

## 2025-05-08 RX ORDER — LEVETIRACETAM 750 MG/1
750 TABLET ORAL 2 TIMES DAILY
Qty: 60 TABLET | Refills: 0 | Status: SHIPPED | OUTPATIENT
Start: 2025-05-08

## 2025-05-08 RX ORDER — LACOSAMIDE 50 MG/1
50 TABLET ORAL 2 TIMES DAILY
Qty: 60 TABLET | Refills: 0 | Status: SHIPPED | OUTPATIENT
Start: 2025-05-08 | End: 2025-06-07

## 2025-05-08 RX ORDER — AMLODIPINE BESYLATE 5 MG/1
5 TABLET ORAL DAILY
Qty: 90 TABLET | Refills: 3 | Status: SHIPPED | OUTPATIENT
Start: 2025-05-08

## 2025-05-08 RX ORDER — CARVEDILOL 6.25 MG/1
6.25 TABLET ORAL 2 TIMES DAILY WITH MEALS
Qty: 180 TABLET | Refills: 3 | Status: SHIPPED | OUTPATIENT
Start: 2025-05-08 | End: 2026-05-03

## 2025-05-08 NOTE — ASSESSMENT & PLAN NOTE
We discussed concern for underlying malignancy, particularly in the setting of a new renal mass. She will continue workup with urology for this and we will continue her Eliquis. At this point would continue anticoagulation indefinitely due to concern for malignancy/unprovoked thrombus.

## 2025-05-08 NOTE — PATIENT INSTRUCTIONS
Reach out to any of the psychiatry practices on the list Dr Mead provided to schedule a consultation for your anxiety.

## 2025-05-08 NOTE — ASSESSMENT & PLAN NOTE
Increased since stopping SSRI. With history of serotonin syndrome, options for medication management are limited. Ideally would not resume a benzodiazepine due to her age and other comorbidities. Referred to psychiatry for further management.

## 2025-05-08 NOTE — TELEPHONE ENCOUNTER
Pt last seen on 3/13/2025. Due to return in 3 months.    Has appt on 6/23/2025.    Rx last filled on 3/6/25 #60/1RF.    Will forward to MD for refill.

## 2025-05-08 NOTE — ASSESSMENT & PLAN NOTE
Refill sent for Keppra and Vimpat today. I reviewed with her that once she follows up with neurology I will let them take over prescribing her anti-seizure medications but will continue to fill the rest of her prescriptions for her.

## 2025-05-08 NOTE — PROGRESS NOTES
Post-Discharge Transitional Care  Follow Up      Cole Velasquez   YOB: 1950    Date of Office Visit:  5/8/2025  Date of Hospital Admission: 5/1/25  Date of Hospital Discharge: 5/2/25  Risk of hospital readmission (high >=14%. Medium >=10%) :Readmission Risk Score: 11.5      Care management risk score Rising risk (score 2-5) and Complex Care (Scores >=6): No Risk Score On File     Non face to face  following discharge, date last encounter closed (first attempt may have been earlier): 05/05/2025    Call initiated 2 business days of discharge: Yes    ASSESSMENT/PLAN:   Hospital discharge follow-up  -     [place] DISCHARGE MEDS RECONCILED W/ CURRENT OUTPATIENT MED LIST  Primary hypertension  -     amLODIPine (NORVASC) 5 MG tablet; Take 1 tablet by mouth daily, Disp-90 tablet, R-3Normal  -     carvedilol (COREG) 6.25 MG tablet; Take 1 tablet by mouth 2 times daily (with meals), Disp-180 tablet, R-3Normal  Thrombosis of inferior vena cava (HCC)  Assessment & Plan:  We discussed concern for underlying malignancy, particularly in the setting of a new renal mass. She will continue workup with urology for this and we will continue her Eliquis. At this point would continue anticoagulation indefinitely due to concern for malignancy/unprovoked thrombus.   Orders:  -     apixaban (ELIQUIS) 5 MG TABS tablet; Take 1 tablet by mouth 2 times daily, Disp-180 tablet, R-3Normal  Seizure disorder (HCC)  Assessment & Plan:  Refill sent for Keppra and Vimpat today. I reviewed with her that once she follows up with neurology I will let them take over prescribing her anti-seizure medications but will continue to fill the rest of her prescriptions for her.  Orders:  -     levETIRAcetam (KEPPRA) 750 MG tablet; Take 1 tablet by mouth 2 times daily, Disp-60 tablet, R-0Normal  -     lacosamide (VIMPAT) 50 MG TABS tablet; Take 1 tablet by mouth 2 times daily for 30 days. Max Daily Amount: 100 mg, Disp-60 tablet,

## 2025-05-30 NOTE — OP NOTE
[facility]              [address] [place] [address]                            OPERATIVE REPORT      PATIENT NAME: ANNY CONTRERAS            : 1950  MED REC NO: [id number]                       ROOM: [id number]  ACCOUNT NO: [id number]                       ADMIT DATE: 2025  PROVIDER: Dez Silva MD    DATE OF SERVICE:  2025    PREOPERATIVE DIAGNOSES:  Thrombosis of inferior vena cava and right common iliac vein.    POSTOPERATIVE DIAGNOSES:  Thrombosis of inferior vena cava and right common iliac vein.    PROCEDURES PERFORMED:       1. Thrombectomy of inferior vena cava and right common iliac vein.     2. Angiograms of bilateral iliac veins and inferior vena cava.     3. Intravascular ultrasound of right external and common iliac veins and inferior vena cava.    SURGEON:  Dez Silva MD    ASSISTANT:  None.    ANESTHESIA:  General.    ESTIMATED BLOOD LOSS:  150 mL.    SPECIMENS REMOVED:  Thrombus from inferior vena cava.    INTRAOPERATIVE FINDINGS:  Large partially occlusive thrombus in inferior vena cava and right common iliac vein.  Excellent result with percutaneous thrombectomy.     COMPLICATIONS:  None.    IMPLANTS:  None.    INDICATIONS:  The patient is a 75-year-old female, who had an incidental finding of a large thrombus in her inferior vena cava, which appeared to extend into the right common iliac vein on CT scan.  She presents for urgent thrombectomy.    DESCRIPTION OF PROCEDURE:  After informed consent was obtained, the patient was given preoperative intravenous antibiotics within 1 hour of the incision.  She was taken to the operating room and after induction of adequate general anesthesia, both groins were prepped and draped as a sterile field.  Under ultrasound guidance, micropuncture access was obtained to the bilateral common femoral veins.  A 7-Dutch sheath was placed on the left.  A 7-Dutch sheath was  placed on the right.  Contrast was injected through the sheath to perform angiograms of the bilateral iliac veins and inferior vena cava.  The left external and common iliac veins were normal.  The right external iliac vein was normal.  There was a filling defect in the right common iliac vein extending well up into the infrarenal inferior vena cava consistent with a large amount of thrombus.  The right femoral sheath was exchanged for an 8-Sol[place] Islander sheath.  The patient was maintained on systemic heparin.  Intravascular ultrasound was used to evaluate the right external and common iliac veins and the inferior vena cava.  This was done to further evaluate the filling defect and to determine the exact proximal and distal extents of the apparent thrombus.  I was confirmed that the right external iliac vein was clear.  There was partially occlusive thrombus in the mid to proximal right common iliac vein.  There was a large amount of partially occlusive thrombus extending up into the inferior vena cava proximal to the midportion of the infrarenal vena cava.  The immediate infrarenal vena cava and the area around the renal veins was totally clear.  The proximal and distal extents of the thrombus were marked.  The right femoral sheath was exchanged for a 16-[place] Islander ClotTriever sheath over an Amplatz wire.  A ClotTriever XL thrombectomy catheter was then used to clear all of the thrombus from the inferior vena cava and right common iliac vein.  Multiple passes were made and a large amount of thrombus was extracted.  Some of this was sent to Pathology due to the unusual nature of the case.  Completion angiograms and intravascular ultrasound showed 99% clearance of the thrombus in the inferior vena cava with only a small spot of thrombus adherent to the wall, which was not removed with the ClotTriever.  The right common iliac vein was totally clear.  The left femoral sheath was removed and manual pressure was held with good

## 2025-06-03 ENCOUNTER — TELEMEDICINE (OUTPATIENT)
Age: 75
End: 2025-06-03
Payer: MEDICARE

## 2025-06-03 ENCOUNTER — TELEPHONE (OUTPATIENT)
Age: 75
End: 2025-06-03

## 2025-06-03 DIAGNOSIS — G40.909 SEIZURE DISORDER (HCC): ICD-10-CM

## 2025-06-03 PROCEDURE — 1036F TOBACCO NON-USER: CPT | Performed by: NURSE PRACTITIONER

## 2025-06-03 PROCEDURE — G8399 PT W/DXA RESULTS DOCUMENT: HCPCS | Performed by: NURSE PRACTITIONER

## 2025-06-03 PROCEDURE — 1123F ACP DISCUSS/DSCN MKR DOCD: CPT | Performed by: NURSE PRACTITIONER

## 2025-06-03 PROCEDURE — 1159F MED LIST DOCD IN RCRD: CPT | Performed by: NURSE PRACTITIONER

## 2025-06-03 PROCEDURE — G8427 DOCREV CUR MEDS BY ELIG CLIN: HCPCS | Performed by: NURSE PRACTITIONER

## 2025-06-03 PROCEDURE — 1090F PRES/ABSN URINE INCON ASSESS: CPT | Performed by: NURSE PRACTITIONER

## 2025-06-03 PROCEDURE — 1160F RVW MEDS BY RX/DR IN RCRD: CPT | Performed by: NURSE PRACTITIONER

## 2025-06-03 PROCEDURE — 99215 OFFICE O/P EST HI 40 MIN: CPT | Performed by: NURSE PRACTITIONER

## 2025-06-03 PROCEDURE — G8417 CALC BMI ABV UP PARAM F/U: HCPCS | Performed by: NURSE PRACTITIONER

## 2025-06-03 RX ORDER — LEVETIRACETAM 750 MG/1
750 TABLET ORAL 2 TIMES DAILY
Qty: 180 TABLET | Refills: 3 | Status: SHIPPED | OUTPATIENT
Start: 2025-06-03 | End: 2026-05-29

## 2025-06-03 RX ORDER — LACOSAMIDE 50 MG/1
50 TABLET ORAL 2 TIMES DAILY
Qty: 180 TABLET | Refills: 3 | Status: SHIPPED | OUTPATIENT
Start: 2025-06-03 | End: 2026-05-29

## 2025-06-03 RX ORDER — HYDROXYZINE HYDROCHLORIDE 25 MG/1
25 TABLET, FILM COATED ORAL EVERY 8 HOURS PRN
COMMUNITY
Start: 2025-05-02

## 2025-06-03 NOTE — PROGRESS NOTES
obtained to localize the volume for  acquisition. Unenhanced multislice helical axial CT of the head was performed  prior to intravenous contrast administration.  Multislice helical axial CT  angiography was performed from the aortic arch to the top of the head during  uneventful rapid bolus intravenous contrast administration.   Coronal and  sagittal reformations and 3D/MIP  post processing were performed.  CT brain perfusion was performed with generation of hemodynamic maps of multiple  parameters, including cerebral blood flow, cerebral blood volume, and MTT (mean  transit time). CT dose reduction was achieved through use of a standardized  protocol tailored for this examination and automatic exposure control for dose  modulation. This study was analyzed by the [email or web address] algorithm.    FINDINGS:  CT Head:  Brain parenchyma demonstrates no suggestion of acute infarct. Mild patchy  periventricular and deep white matter ill-defined hypodensities, nonspecific and  likely microangiopathic white matter disease. The ventricles sizes and  configuration are within normal limits. Basal CSF cisterns are patent. No  cerebellar tonsillar herniation.    CT Brain Perfusion:  Study was repeated due to nondiagnostic initial outcome. Repeat study was of  good diagnostic quality. Brain parenchymal perfusion study demonstrates no  suggestion of acute infarct. No regional area of elevated Tmax, decreased  cerebral blood flow or blood volume.  rCBF < 30% = 0 cc.  Tmax > 6 seconds = 0 cc.      CTA  Intracranial: There is no evidence of large vessel occlusion in the anterior  circulation. There is no evidence of large vessel occlusion in the posterior  circulation.  There is no evidence of aneurysm or vascular malformation. The dural venous  sinuses and deep cerebral venous system are patent.    Neck:  There is no evidence of significant stenosis in the cervical right and left  internal carotid arteries.  Carotid bulbs without significant

## 2025-06-03 NOTE — ASSESSMENT & PLAN NOTE
Onset: 01/2025  Last Seizure: 01/2025  Semiology: Generalized convulsion with right gaze deviation    Medications:   - Keppra 750mg BID   - Vimpat 50mg BID    -24HR EEG (1/23-1/24/2024):INTERPRETATION:  This is a somewhat borderline recording showing just a slight increase generalized slowing, which may be consistent with the generalized encephalopathy of toxic, metabolic, or degenerative type or could be secondary to drowsiness and sedation.  There were no focal areas of slowing, no clear spike or spike-and-wave discharges, no recorded electrographic spells of any type seen.  There was no clinical evidence of any seizures on her video monitoring either.  MRI Brain (01/24/2025): IMPRESSION: Normal MRI of the brain for patient age,Trace bilateral mastoid fluid. No intracranial mass, hemorrhage or evidence of acute infarction.    Ms. Velasquez presents for follow-up today.  She has had no evidence of clinical or subclinical seizures.    Continue Keppra 750 mg BID  Continue Vimpat 50 mg twice daily  We will schedule in person follow-up in the [facility] clinic.  Pending patient's clinical course perhaps we can off-titrate the Vimpat and leave her on medical therapy with Keppra.  Of course would have to discuss the risk of recurrent seizure however the benefit of being on 1 less medication.  We discussed seizure precautions including: no driving for 6 months

## 2025-06-03 NOTE — TELEPHONE ENCOUNTER
Patient called stating that her [facility] reached out to her stating that they cannot refill the Vimpat because it shows it is for a \"1 day supply\" and that patient would take possibly 3 tablets daily. Patient asked that we please call them to straighten this out so they can dispense her medication to her. Please advise. [phone]    Prescription in chart shows:    lacosamide (VIMPAT) 50 MG TABS tablet  Dispense Quantity: 180 tablet  Refills: 3       Sig: Take 1 tablet by mouth 2 times daily for 360 days. Max Daily Amount: 100 mg     E-Prescribing Status: Receipt confirmed by pharmacy (6/3/2025 11:18 AM EDT)     [facility] - [address] [phone] - F [phone]

## 2025-06-05 NOTE — TELEPHONE ENCOUNTER
Called pharmacy and spoke with pharmacist who states that Vimpat has been shipped out on 06/04/25 and will be to patient within the next few days.     Called patient and left voicemail informing her that this has been done. Advised to call [facility] if needed for any additional questions.

## 2025-06-18 ENCOUNTER — TELEPHONE (OUTPATIENT)
Age: 75
End: 2025-06-18

## 2025-06-18 SDOH — HEALTH STABILITY: PHYSICAL HEALTH: ON AVERAGE, HOW MANY MINUTES DO YOU ENGAGE IN EXERCISE AT THIS LEVEL?: 0 MIN

## 2025-06-18 SDOH — HEALTH STABILITY: PHYSICAL HEALTH: ON AVERAGE, HOW MANY DAYS PER WEEK DO YOU ENGAGE IN MODERATE TO STRENUOUS EXERCISE (LIKE A BRISK WALK)?: 0 DAYS

## 2025-06-18 ASSESSMENT — LIFESTYLE VARIABLES
HOW OFTEN DO YOU HAVE A DRINK CONTAINING ALCOHOL: 1
HOW MANY STANDARD DRINKS CONTAINING ALCOHOL DO YOU HAVE ON A TYPICAL DAY: PATIENT DOES NOT DRINK
HOW MANY STANDARD DRINKS CONTAINING ALCOHOL DO YOU HAVE ON A TYPICAL DAY: 0
HOW OFTEN DO YOU HAVE SIX OR MORE DRINKS ON ONE OCCASION: 1
HOW OFTEN DO YOU HAVE A DRINK CONTAINING ALCOHOL: NEVER

## 2025-06-18 ASSESSMENT — PATIENT HEALTH QUESTIONNAIRE - PHQ9
SUM OF ALL RESPONSES TO PHQ QUESTIONS 1-9: 0
1. LITTLE INTEREST OR PLEASURE IN DOING THINGS: NOT AT ALL
2. FEELING DOWN, DEPRESSED OR HOPELESS: NOT AT ALL
SUM OF ALL RESPONSES TO PHQ QUESTIONS 1-9: 0

## 2025-06-23 ENCOUNTER — OFFICE VISIT (OUTPATIENT)
Age: 75
End: 2025-06-23
Payer: MEDICARE

## 2025-06-23 VITALS
WEIGHT: 217.6 LBS | TEMPERATURE: 98.2 F | SYSTOLIC BLOOD PRESSURE: 127 MMHG | HEART RATE: 80 BPM | HEIGHT: 62 IN | BODY MASS INDEX: 40.04 KG/M2 | OXYGEN SATURATION: 96 % | RESPIRATION RATE: 18 BRPM | DIASTOLIC BLOOD PRESSURE: 81 MMHG

## 2025-06-23 DIAGNOSIS — G40.909 SEIZURE DISORDER (HCC): ICD-10-CM

## 2025-06-23 DIAGNOSIS — Z00.00 MEDICARE ANNUAL WELLNESS VISIT, SUBSEQUENT: Primary | ICD-10-CM

## 2025-06-23 DIAGNOSIS — I82.220 THROMBOSIS OF INFERIOR VENA CAVA (HCC): ICD-10-CM

## 2025-06-23 DIAGNOSIS — I10 PRIMARY HYPERTENSION: ICD-10-CM

## 2025-06-23 DIAGNOSIS — F41.9 ANXIETY: ICD-10-CM

## 2025-06-23 PROCEDURE — 3074F SYST BP LT 130 MM HG: CPT | Performed by: STUDENT IN AN ORGANIZED HEALTH CARE EDUCATION/TRAINING PROGRAM

## 2025-06-23 PROCEDURE — G0439 PPPS, SUBSEQ VISIT: HCPCS | Performed by: STUDENT IN AN ORGANIZED HEALTH CARE EDUCATION/TRAINING PROGRAM

## 2025-06-23 PROCEDURE — 1159F MED LIST DOCD IN RCRD: CPT | Performed by: STUDENT IN AN ORGANIZED HEALTH CARE EDUCATION/TRAINING PROGRAM

## 2025-06-23 PROCEDURE — 1125F AMNT PAIN NOTED PAIN PRSNT: CPT | Performed by: STUDENT IN AN ORGANIZED HEALTH CARE EDUCATION/TRAINING PROGRAM

## 2025-06-23 PROCEDURE — 3079F DIAST BP 80-89 MM HG: CPT | Performed by: STUDENT IN AN ORGANIZED HEALTH CARE EDUCATION/TRAINING PROGRAM

## 2025-06-23 PROCEDURE — 1123F ACP DISCUSS/DSCN MKR DOCD: CPT | Performed by: STUDENT IN AN ORGANIZED HEALTH CARE EDUCATION/TRAINING PROGRAM

## 2025-06-23 RX ORDER — THIAMINE MONONITRATE (VIT B1) 100 MG
100 TABLET ORAL DAILY
COMMUNITY

## 2025-06-23 RX ORDER — HYDROXYZINE HYDROCHLORIDE 10 MG/1
10 TABLET, FILM COATED ORAL 3 TIMES DAILY PRN
COMMUNITY

## 2025-06-23 NOTE — PROGRESS NOTES
Medicare Annual Wellness Visit    Cole CORNEJO Eva is here for Medicare AWV    Assessment & Plan   Medicare annual wellness visit, subsequent  Thrombosis of inferior vena cava (HCC)  Assessment & Plan:  Continue management per vascular surgery. To complete 6 months of Eliquis, which would be around 11/2025  Primary hypertension  Assessment & Plan:   Chronic, at goal (stable), continue current treatment plan  Seizure disorder (HCC)  Assessment & Plan:  Continue management per neurology  Anxiety  Assessment & Plan:  Continue hydroxyzine PRN. Last visit referred to psychiatry - continue management per specialist.     Return in about 6 months (around 12/23/2025) for Followup.     Subjective   The following acute and/or chronic problems were also addressed today:    IVC Thrombus  - S/p thrombectomy, now on Eliquis 5 mg BID  - Found incidentally on imaging ordered by urology for surveillance of renal mass  - Continues to follow with urology and vascular surgery. She says that she was instructed by vascular surgery that she will need to complete 6 months of Eliquis    Seizure disorder  Hx of serotonin syndrome  - Remains on Keppra and Vimpat, has followed up with neurology since last visit. Plan is to continue current medications for now and consider titrating off of Vimpat in another six months    HTN  - Amlodipine 5 and carvedilol 6.25 mg BID    Generalized anxiety  - Discussed this at last visit, with hx of serotonin syndrome, referred to psychiatry for further evaluation    Pre-DM  - Last A1c 3/25 in the normal range at 5.6    Gout  - Ran out of allopurinol recently but reported no recent flares. Has remained off of the medication since then      Patient's complete Health Risk Assessment and screening values have been reviewed and are found in Flowsheets. The following problems were reviewed today and where indicated follow up appointments were made and/or referrals ordered.    Positive Risk Factor Screenings with

## 2025-06-25 ENCOUNTER — TELEPHONE (OUTPATIENT)
Age: 75
End: 2025-06-25

## 2025-06-30 NOTE — ASSESSMENT & PLAN NOTE
Continue management per vascular surgery. To complete 6 months of Eliquis, which would be around 11/2025

## 2025-07-14 ENCOUNTER — OFFICE VISIT (OUTPATIENT)
Age: 75
End: 2025-07-14
Payer: MEDICARE

## 2025-07-14 VITALS
SYSTOLIC BLOOD PRESSURE: 128 MMHG | OXYGEN SATURATION: 97 % | HEART RATE: 81 BPM | BODY MASS INDEX: 39.09 KG/M2 | HEIGHT: 63 IN | DIASTOLIC BLOOD PRESSURE: 86 MMHG | TEMPERATURE: 98.3 F | RESPIRATION RATE: 18 BRPM | WEIGHT: 220.6 LBS

## 2025-07-14 DIAGNOSIS — G40.209 COMPLEX PARTIAL SEIZURES EVOLVING TO GENERALIZED TONIC-CLONIC SEIZURES (HCC): ICD-10-CM

## 2025-07-14 DIAGNOSIS — G43.109 MIGRAINE WITH AURA AND WITHOUT STATUS MIGRAINOSUS, NOT INTRACTABLE: ICD-10-CM

## 2025-07-14 DIAGNOSIS — I65.23 BILATERAL CAROTID ARTERY STENOSIS: ICD-10-CM

## 2025-07-14 DIAGNOSIS — H81.393 VESTIBULAR VERTIGO, BILATERAL: Primary | ICD-10-CM

## 2025-07-14 DIAGNOSIS — Z51.81 THERAPEUTIC DRUG MONITORING: ICD-10-CM

## 2025-07-14 DIAGNOSIS — I67.89 CEREBRAL MICROVASCULAR DISEASE: ICD-10-CM

## 2025-07-14 PROCEDURE — 1159F MED LIST DOCD IN RCRD: CPT | Performed by: PSYCHIATRY & NEUROLOGY

## 2025-07-14 PROCEDURE — 99215 OFFICE O/P EST HI 40 MIN: CPT | Performed by: PSYCHIATRY & NEUROLOGY

## 2025-07-14 PROCEDURE — G8417 CALC BMI ABV UP PARAM F/U: HCPCS | Performed by: PSYCHIATRY & NEUROLOGY

## 2025-07-14 PROCEDURE — 1090F PRES/ABSN URINE INCON ASSESS: CPT | Performed by: PSYCHIATRY & NEUROLOGY

## 2025-07-14 PROCEDURE — G8427 DOCREV CUR MEDS BY ELIG CLIN: HCPCS | Performed by: PSYCHIATRY & NEUROLOGY

## 2025-07-14 PROCEDURE — 1123F ACP DISCUSS/DSCN MKR DOCD: CPT | Performed by: PSYCHIATRY & NEUROLOGY

## 2025-07-14 PROCEDURE — G8399 PT W/DXA RESULTS DOCUMENT: HCPCS | Performed by: PSYCHIATRY & NEUROLOGY

## 2025-07-14 PROCEDURE — 1036F TOBACCO NON-USER: CPT | Performed by: PSYCHIATRY & NEUROLOGY

## 2025-07-14 PROCEDURE — 3079F DIAST BP 80-89 MM HG: CPT | Performed by: PSYCHIATRY & NEUROLOGY

## 2025-07-14 PROCEDURE — 3074F SYST BP LT 130 MM HG: CPT | Performed by: PSYCHIATRY & NEUROLOGY

## 2025-07-14 PROCEDURE — 1125F AMNT PAIN NOTED PAIN PRSNT: CPT | Performed by: PSYCHIATRY & NEUROLOGY

## 2025-07-14 PROCEDURE — 1160F RVW MEDS BY RX/DR IN RCRD: CPT | Performed by: PSYCHIATRY & NEUROLOGY

## 2025-07-14 RX ORDER — ACETAMINOPHEN 500 MG
500 TABLET ORAL EVERY 6 HOURS PRN
COMMUNITY

## 2025-07-14 RX ORDER — MECLIZINE HYDROCHLORIDE 25 MG/1
25 TABLET ORAL 3 TIMES DAILY PRN
Qty: 100 TABLET | Refills: 5 | Status: SHIPPED | OUTPATIENT
Start: 2025-07-14

## 2025-07-14 ASSESSMENT — PATIENT HEALTH QUESTIONNAIRE - PHQ9
SUM OF ALL RESPONSES TO PHQ QUESTIONS 1-9: 0
1. LITTLE INTEREST OR PLEASURE IN DOING THINGS: NOT AT ALL
SUM OF ALL RESPONSES TO PHQ QUESTIONS 1-9: 0
2. FEELING DOWN, DEPRESSED OR HOPELESS: NOT AT ALL
SUM OF ALL RESPONSES TO PHQ QUESTIONS 1-9: 0
SUM OF ALL RESPONSES TO PHQ QUESTIONS 1-9: 0

## 2025-07-14 NOTE — PROGRESS NOTES
Chief Complaint  Patient presents with   Follow-up    Want to discuss her medication, went to the ER Jan 13,2005. Had several seizure     1. Have you been to the ER, urgent care clinic since your last visit?  Hospitalized since your last visit?  [facility] for seizures, had surgery on the 15th for mass.    2. Have you seen or consulted any other health care providers outside of the [facility] since your last visit?  Include any pap smears or colon screening.   no    
brain and she had rapid EEGs that just showed mild diffuse slowing typical of encephalopathy but no spike or spike and wave discharges or electrographic seizures.  She has not had any levels done recently.  She had a 24-hour EEG in January 2025 that showed mild generalized slowing, but no other abnormality.  She has had no seizures since that time.  She does not have any side effect on the medication so far.  She has remained stable since that time, we reviewed all her hospital records, we reviewed her MRI scan personally, and her CTA of the head and neck that showed no large vessel occlusive disease and her EEGs both a rapid and a 24-hour EEG that she did have.  We we will have the patient get her levels checked today, and will check a 24-hour ambulatory EEG to make sure she is controlled, and possibly consider tapering her off of her medication if possible.  We advised her not to drive until we get these test done.  Patient has another new problem of dizziness, and vertigo that she has had before it comes on when she moves her head, but also seems to come on sometimes when she is just sitting still we will check a Doppler study to make sure there is no vascular insufficiency prescribe some Antivert for her for the vertigo because she said something they gave her in the past for vertigo made her better.  She has had no hearing loss or tinnitus and her symptoms are clearly worse with motion and she has no other new focal neurologic deficit.      Past Medical History:   Diagnosis Date    Gall stones     GERD (gastroesophageal reflux disease)     Hemorrhoid     Hypertension     Ill-defined condition     rosacea    Ill-defined condition     prediabetes - is on metformin/no longer on metformin    Ill-defined condition     gout    Ill-defined condition     obesity per pt    Sleep apnea     borderline - was put on a machine/not using a machine now      Past Surgical History:   Procedure Laterality Date    BREAST BIOPSY

## 2025-07-16 LAB — LEVETIRACETAM SERPL-MCNC: 40.9 UG/ML (ref 10–40)

## 2025-07-18 LAB — LACOSAMIDE SERPL-MCNC: 5.4 UG/ML (ref 5–10)

## 2025-08-28 ENCOUNTER — HOSPITAL ENCOUNTER (OUTPATIENT)
Facility: HOSPITAL | Age: 75
Discharge: HOME | End: 2025-08-31
Attending: PSYCHIATRY & NEUROLOGY
Payer: MEDICARE

## 2025-08-28 DIAGNOSIS — G40.209 COMPLEX PARTIAL SEIZURES EVOLVING TO GENERALIZED TONIC-CLONIC SEIZURES (HCC): ICD-10-CM

## 2025-08-28 PROCEDURE — 95719 EEG PHYS/QHP EA INCR W/O VID: CPT | Performed by: PSYCHIATRY & NEUROLOGY

## 2025-08-28 PROCEDURE — 95700 EEG CONT REC W/VID EEG TECH: CPT

## 2025-08-28 PROCEDURE — 95714 VEEG EA 12-26 HR UNMNTR: CPT

## 2025-08-28 PROCEDURE — 95957 EEG DIGITAL ANALYSIS: CPT

## 2025-09-01 PROBLEM — R41.82 ACUTE ALTERATION IN MENTAL STATUS: Status: ACTIVE | Noted: 2025-09-01

## (undated) DEVICE — INTRODUCER SHTH 8FR CANN L11CM DIL TIP 35MM BLU TUNGSTEN

## (undated) DEVICE — SUTURE NONABSORBABLE MONOFILAMENT 3-0 PS-1 18 IN BLK ETHILON 1663H

## (undated) DEVICE — GOWN,SIRUS,NONRNF,SETINSLV,2XL,18/CS: Brand: MEDLINE

## (undated) DEVICE — Device

## (undated) DEVICE — SYRINGE ANGIO CNTRST DEL 20 CC POLYCARB LIGHT GRN MEDALLION

## (undated) DEVICE — CLOTTRIEVER CATHETER, 16 MM, 115 CM, VARIABLE: Brand: CLOTTRIEVER CATHETER, VARIABLE LENGTH

## (undated) DEVICE — SUTURE ETHILON SZ 3-0 L18IN NONABSORBABLE BLK PS-2 L19MM 3/8 1669H

## (undated) DEVICE — MICROPUNCTURE INTRODUCER SET SILHOUETTE TRANSITIONLESS WITH STAINLESS STEEL WIRE GUIDE: Brand: MICROPUNCTURE

## (undated) DEVICE — TUBING HYDR IRR --

## (undated) DEVICE — CATHETER ANGIO 5FR L100CM GWIRE 0.038IN BERN NONBRAIDED HI

## (undated) DEVICE — Device: Brand: VISIONS PV .035 DIGITAL IVUS CATHETER

## (undated) DEVICE — SOLUTION IV 1000 ML 0.9 NACL INJ USP EXCEL PLAS CONTAINER

## (undated) DEVICE — DECANTER BAG 9": Brand: MEDLINE INDUSTRIES, INC.

## (undated) DEVICE — RADIFOCUS GLIDEWIRE: Brand: GLIDEWIRE

## (undated) DEVICE — CLOTTRIEVER SHEATH, 16 FR, 15 CM LENGTH: Brand: CLOTTRIEVER SHEATH,  16 FR

## (undated) DEVICE — APPLICATOR MEDICATED 26 CC SOLUTION HI LT ORNG CHLORAPREP

## (undated) DEVICE — GUIDEWIRE VASC L260CM DIA0.035IN L7CM DIA3MM J TIP PTFE S

## (undated) DEVICE — BLADE CLIPPER GEN PURP NS

## (undated) DEVICE — OR HYBRID-MRMC: Brand: MEDLINE INDUSTRIES, INC.

## (undated) DEVICE — PROVE COVER: Brand: UNBRANDED

## (undated) DEVICE — GLOVE SURG SZ 8 L12IN FNGR THK94MIL STD WHT LTX FREE